# Patient Record
Sex: MALE | Race: WHITE | NOT HISPANIC OR LATINO | Employment: OTHER | URBAN - METROPOLITAN AREA
[De-identification: names, ages, dates, MRNs, and addresses within clinical notes are randomized per-mention and may not be internally consistent; named-entity substitution may affect disease eponyms.]

---

## 2020-09-02 ENCOUNTER — TRANSCRIBE ORDERS (OUTPATIENT)
Dept: ADMINISTRATIVE | Facility: HOSPITAL | Age: 70
End: 2020-09-02

## 2020-09-02 ENCOUNTER — HOSPITAL ENCOUNTER (OUTPATIENT)
Dept: GENERAL RADIOLOGY | Facility: HOSPITAL | Age: 70
Discharge: HOME | End: 2020-09-02
Admitting: INTERNAL MEDICINE

## 2020-09-02 DIAGNOSIS — M25.561 RIGHT KNEE PAIN, UNSPECIFIED CHRONICITY: ICD-10-CM

## 2020-09-02 DIAGNOSIS — M25.561 RIGHT KNEE PAIN, UNSPECIFIED CHRONICITY: Primary | ICD-10-CM

## 2020-09-02 PROCEDURE — 73562 X-RAY EXAM OF KNEE 3: CPT

## 2021-10-30 ENCOUNTER — HOSPITAL ENCOUNTER (INPATIENT)
Facility: HOSPITAL | Age: 71
LOS: 7 days | Discharge: HOME HEALTH | End: 2021-11-06
Attending: EMERGENCY MEDICINE | Admitting: SURGERY

## 2021-10-30 ENCOUNTER — APPOINTMENT (OUTPATIENT)
Dept: CT IMAGING | Facility: HOSPITAL | Age: 71
End: 2021-10-30

## 2021-10-30 ENCOUNTER — ANESTHESIA EVENT CONVERTED (OUTPATIENT)
Dept: ANESTHESIOLOGY | Facility: HOSPITAL | Age: 71
End: 2021-10-30

## 2021-10-30 ENCOUNTER — ANESTHESIA (OUTPATIENT)
Dept: PERIOP | Facility: HOSPITAL | Age: 71
End: 2021-10-30

## 2021-10-30 ENCOUNTER — ANESTHESIA EVENT (OUTPATIENT)
Dept: PERIOP | Facility: HOSPITAL | Age: 71
End: 2021-10-30

## 2021-10-30 ENCOUNTER — APPOINTMENT (OUTPATIENT)
Dept: ULTRASOUND IMAGING | Facility: HOSPITAL | Age: 71
End: 2021-10-30

## 2021-10-30 ENCOUNTER — APPOINTMENT (OUTPATIENT)
Dept: GENERAL RADIOLOGY | Facility: HOSPITAL | Age: 71
End: 2021-10-30

## 2021-10-30 DIAGNOSIS — K63.1 PERFORATED SIGMOID COLON (HCC): ICD-10-CM

## 2021-10-30 DIAGNOSIS — R79.89 ELEVATED LACTIC ACID LEVEL: ICD-10-CM

## 2021-10-30 DIAGNOSIS — K57.92 ACUTE DIVERTICULITIS: Primary | ICD-10-CM

## 2021-10-30 DIAGNOSIS — K57.20 PERFORATED DIVERTICULUM OF LARGE INTESTINE: ICD-10-CM

## 2021-10-30 DIAGNOSIS — K65.9 PERITONITIS (HCC): ICD-10-CM

## 2021-10-30 PROBLEM — E78.5 HYPERLIPEMIA: Status: ACTIVE | Noted: 2021-10-30

## 2021-10-30 PROBLEM — I10 ESSENTIAL HYPERTENSION: Status: ACTIVE | Noted: 2021-10-30

## 2021-10-30 LAB
ALBUMIN SERPL-MCNC: 4.5 G/DL (ref 3.5–5.2)
ALBUMIN/GLOB SERPL: 1.7 G/DL
ALP SERPL-CCNC: 62 U/L (ref 39–117)
ALT SERPL W P-5'-P-CCNC: 18 U/L (ref 1–41)
ANION GAP SERPL CALCULATED.3IONS-SCNC: 14 MMOL/L (ref 5–15)
AST SERPL-CCNC: 21 U/L (ref 1–40)
BASOPHILS # BLD MANUAL: 0 10*3/MM3 (ref 0–0.2)
BASOPHILS NFR BLD AUTO: 0 % (ref 0–1.5)
BILIRUB SERPL-MCNC: 0.4 MG/DL (ref 0–1.2)
BUN SERPL-MCNC: 36 MG/DL (ref 8–23)
BUN/CREAT SERPL: 34.6 (ref 7–25)
BURR CELLS BLD QL SMEAR: ABNORMAL
CALCIUM SPEC-SCNC: 9.6 MG/DL (ref 8.6–10.5)
CHLORIDE SERPL-SCNC: 102 MMOL/L (ref 98–107)
CO2 SERPL-SCNC: 23 MMOL/L (ref 22–29)
CREAT SERPL-MCNC: 1.04 MG/DL (ref 0.76–1.27)
D-LACTATE SERPL-SCNC: 2.6 MMOL/L (ref 0.5–2)
D-LACTATE SERPL-SCNC: 4.2 MMOL/L (ref 0.5–2)
DEPRECATED RDW RBC AUTO: 48.3 FL (ref 37–54)
EOSINOPHIL # BLD MANUAL: 0 10*3/MM3 (ref 0–0.4)
EOSINOPHIL NFR BLD MANUAL: 0 % (ref 0.3–6.2)
ERYTHROCYTE [DISTWIDTH] IN BLOOD BY AUTOMATED COUNT: 14.1 % (ref 12.3–15.4)
GFR SERPL CREATININE-BSD FRML MDRD: 71 ML/MIN/1.73
GLOBULIN UR ELPH-MCNC: 2.7 GM/DL
GLUCOSE SERPL-MCNC: 102 MG/DL (ref 65–99)
HCT VFR BLD AUTO: 45.9 % (ref 37.5–51)
HGB BLD-MCNC: 15.1 G/DL (ref 13–17.7)
HOLD SPECIMEN: NORMAL
LIPASE SERPL-CCNC: 18 U/L (ref 13–60)
LYMPHOCYTES # BLD MANUAL: 1.14 10*3/MM3 (ref 0.7–3.1)
LYMPHOCYTES NFR BLD MANUAL: 4 % (ref 5–12)
LYMPHOCYTES NFR BLD MANUAL: 6 % (ref 19.6–45.3)
MCH RBC QN AUTO: 30.7 PG (ref 26.6–33)
MCHC RBC AUTO-ENTMCNC: 32.9 G/DL (ref 31.5–35.7)
MCV RBC AUTO: 93.3 FL (ref 79–97)
MONOCYTES # BLD AUTO: 0.76 10*3/MM3 (ref 0.1–0.9)
NEUTROPHILS # BLD AUTO: 17.15 10*3/MM3 (ref 1.7–7)
NEUTROPHILS NFR BLD MANUAL: 77 % (ref 42.7–76)
NEUTS BAND NFR BLD MANUAL: 13 % (ref 0–5)
NRBC SPEC MANUAL: 0 /100 WBC (ref 0–0.2)
PLAT MORPH BLD: NORMAL
PLATELET # BLD AUTO: 304 10*3/MM3 (ref 140–450)
PMV BLD AUTO: 9.7 FL (ref 6–12)
POTASSIUM SERPL-SCNC: 4.3 MMOL/L (ref 3.5–5.2)
PROCALCITONIN SERPL-MCNC: 0.19 NG/ML (ref 0–0.25)
PROT SERPL-MCNC: 7.2 G/DL (ref 6–8.5)
RBC # BLD AUTO: 4.92 10*6/MM3 (ref 4.14–5.8)
SARS-COV-2 RDRP RESP QL NAA+PROBE: NORMAL
SODIUM SERPL-SCNC: 139 MMOL/L (ref 136–145)
TROPONIN T SERPL-MCNC: <0.01 NG/ML (ref 0–0.03)
WBC # BLD AUTO: 19.05 10*3/MM3 (ref 3.4–10.8)
WBC MORPH BLD: NORMAL
WHOLE BLOOD HOLD SPECIMEN: NORMAL
WHOLE BLOOD HOLD SPECIMEN: NORMAL

## 2021-10-30 PROCEDURE — 71045 X-RAY EXAM CHEST 1 VIEW: CPT

## 2021-10-30 PROCEDURE — 87116 MYCOBACTERIA CULTURE: CPT | Performed by: SURGERY

## 2021-10-30 PROCEDURE — 0D1M0Z4 BYPASS DESCENDING COLON TO CUTANEOUS, OPEN APPROACH: ICD-10-PCS | Performed by: SURGERY

## 2021-10-30 PROCEDURE — 88307 TISSUE EXAM BY PATHOLOGIST: CPT | Performed by: SURGERY

## 2021-10-30 PROCEDURE — 0DTN0ZZ RESECTION OF SIGMOID COLON, OPEN APPROACH: ICD-10-PCS | Performed by: SURGERY

## 2021-10-30 PROCEDURE — 25010000002 FENTANYL CITRATE (PF) 50 MCG/ML SOLUTION: Performed by: EMERGENCY MEDICINE

## 2021-10-30 PROCEDURE — 80053 COMPREHEN METABOLIC PANEL: CPT | Performed by: EMERGENCY MEDICINE

## 2021-10-30 PROCEDURE — 25010000002 LORAZEPAM PER 2 MG: Performed by: EMERGENCY MEDICINE

## 2021-10-30 PROCEDURE — 83605 ASSAY OF LACTIC ACID: CPT | Performed by: EMERGENCY MEDICINE

## 2021-10-30 PROCEDURE — 25010000002 PIPERACILLIN SOD-TAZOBACTAM PER 1 G: Performed by: EMERGENCY MEDICINE

## 2021-10-30 PROCEDURE — 85007 BL SMEAR W/DIFF WBC COUNT: CPT | Performed by: EMERGENCY MEDICINE

## 2021-10-30 PROCEDURE — 76870 US EXAM SCROTUM: CPT

## 2021-10-30 PROCEDURE — 25010000002 HYDROMORPHONE 1 MG/ML SOLUTION: Performed by: EMERGENCY MEDICINE

## 2021-10-30 PROCEDURE — 74178 CT ABD&PLV WO CNTR FLWD CNTR: CPT

## 2021-10-30 PROCEDURE — 83690 ASSAY OF LIPASE: CPT | Performed by: EMERGENCY MEDICINE

## 2021-10-30 PROCEDURE — 25010000002 PIPERACILLIN SOD-TAZOBACTAM PER 1 G: Performed by: SURGERY

## 2021-10-30 PROCEDURE — 25010000002 DEXAMETHASONE PER 1 MG: Performed by: ANESTHESIOLOGY

## 2021-10-30 PROCEDURE — 25010000002 IOPAMIDOL 61 % SOLUTION: Performed by: EMERGENCY MEDICINE

## 2021-10-30 PROCEDURE — 25010000002 DIAZEPAM PER 5 MG: Performed by: INTERNAL MEDICINE

## 2021-10-30 PROCEDURE — 25010000002 ONDANSETRON PER 1 MG: Performed by: ANESTHESIOLOGY

## 2021-10-30 PROCEDURE — 87186 SC STD MICRODIL/AGAR DIL: CPT | Performed by: SURGERY

## 2021-10-30 PROCEDURE — 25010000002 HYDROMORPHONE HCL-NACL 30-0.9 MG/30ML-% SOLUTION PREFILLED SYRINGE: Performed by: SURGERY

## 2021-10-30 PROCEDURE — 84145 PROCALCITONIN (PCT): CPT | Performed by: EMERGENCY MEDICINE

## 2021-10-30 PROCEDURE — 87075 CULTR BACTERIA EXCEPT BLOOD: CPT | Performed by: SURGERY

## 2021-10-30 PROCEDURE — 25010000002 NEOSTIGMINE 10 MG/10ML SOLUTION: Performed by: ANESTHESIOLOGY

## 2021-10-30 PROCEDURE — 93005 ELECTROCARDIOGRAM TRACING: CPT | Performed by: EMERGENCY MEDICINE

## 2021-10-30 PROCEDURE — 25010000002 DEXAMETHASONE SODIUM PHOSPHATE 10 MG/ML SOLUTION: Performed by: ANESTHESIOLOGY

## 2021-10-30 PROCEDURE — 87040 BLOOD CULTURE FOR BACTERIA: CPT | Performed by: EMERGENCY MEDICINE

## 2021-10-30 PROCEDURE — 87077 CULTURE AEROBIC IDENTIFY: CPT | Performed by: SURGERY

## 2021-10-30 PROCEDURE — 25010000002 ONDANSETRON PER 1 MG: Performed by: EMERGENCY MEDICINE

## 2021-10-30 PROCEDURE — 25010000002 HYDROMORPHONE PER 4 MG: Performed by: ANESTHESIOLOGY

## 2021-10-30 PROCEDURE — 87635 SARS-COV-2 COVID-19 AMP PRB: CPT | Performed by: EMERGENCY MEDICINE

## 2021-10-30 PROCEDURE — 87070 CULTURE OTHR SPECIMN AEROBIC: CPT | Performed by: SURGERY

## 2021-10-30 PROCEDURE — 87185 SC STD ENZYME DETCJ PER NZM: CPT | Performed by: SURGERY

## 2021-10-30 PROCEDURE — 99223 1ST HOSP IP/OBS HIGH 75: CPT | Performed by: INTERNAL MEDICINE

## 2021-10-30 PROCEDURE — 99283 EMERGENCY DEPT VISIT LOW MDM: CPT

## 2021-10-30 PROCEDURE — 84484 ASSAY OF TROPONIN QUANT: CPT | Performed by: EMERGENCY MEDICINE

## 2021-10-30 PROCEDURE — 25010000002 SUCCINYLCHOLINE PER 20 MG: Performed by: ANESTHESIOLOGY

## 2021-10-30 PROCEDURE — 25010000002 PROPOFOL 10 MG/ML EMULSION: Performed by: ANESTHESIOLOGY

## 2021-10-30 PROCEDURE — 87206 SMEAR FLUORESCENT/ACID STAI: CPT | Performed by: SURGERY

## 2021-10-30 PROCEDURE — 87102 FUNGUS ISOLATION CULTURE: CPT | Performed by: SURGERY

## 2021-10-30 PROCEDURE — 85025 COMPLETE CBC W/AUTO DIFF WBC: CPT | Performed by: EMERGENCY MEDICINE

## 2021-10-30 PROCEDURE — C1889 IMPLANT/INSERT DEVICE, NOC: HCPCS | Performed by: SURGERY

## 2021-10-30 PROCEDURE — 87205 SMEAR GRAM STAIN: CPT | Performed by: SURGERY

## 2021-10-30 PROCEDURE — 0 LIDOCAINE 1 % SOLUTION: Performed by: ANESTHESIOLOGY

## 2021-10-30 PROCEDURE — 25010000002 FENTANYL CITRATE (PF) 50 MCG/ML SOLUTION: Performed by: ANESTHESIOLOGY

## 2021-10-30 PROCEDURE — 44140 PARTIAL REMOVAL OF COLON: CPT | Performed by: PHYSICIAN ASSISTANT

## 2021-10-30 DEVICE — ECHELON CONTOUR GST RELOAD BLUE
Type: IMPLANTABLE DEVICE | Site: ABDOMEN | Status: FUNCTIONAL
Brand: ECHELON

## 2021-10-30 DEVICE — ECHELON CONTOUR W/ BLUE RELOAD
Type: IMPLANTABLE DEVICE | Site: ABDOMEN | Status: FUNCTIONAL
Brand: ECHELON

## 2021-10-30 RX ORDER — CYCLOBENZAPRINE HCL 5 MG
5 TABLET ORAL EVERY 8 HOURS SCHEDULED
Status: DISCONTINUED | OUTPATIENT
Start: 2021-10-30 | End: 2021-11-03

## 2021-10-30 RX ORDER — MAGNESIUM HYDROXIDE 1200 MG/15ML
LIQUID ORAL AS NEEDED
Status: DISCONTINUED | OUTPATIENT
Start: 2021-10-30 | End: 2021-10-30 | Stop reason: HOSPADM

## 2021-10-30 RX ORDER — HEPARIN SODIUM 5000 [USP'U]/ML
5000 INJECTION, SOLUTION INTRAVENOUS; SUBCUTANEOUS EVERY 8 HOURS SCHEDULED
Status: DISCONTINUED | OUTPATIENT
Start: 2021-10-31 | End: 2021-11-01

## 2021-10-30 RX ORDER — ACETAMINOPHEN 325 MG/1
650 TABLET ORAL EVERY 6 HOURS
Status: DISCONTINUED | OUTPATIENT
Start: 2021-10-30 | End: 2021-11-05

## 2021-10-30 RX ORDER — FENTANYL CITRATE 50 UG/ML
INJECTION, SOLUTION INTRAMUSCULAR; INTRAVENOUS AS NEEDED
Status: DISCONTINUED | OUTPATIENT
Start: 2021-10-30 | End: 2021-10-30 | Stop reason: SURG

## 2021-10-30 RX ORDER — SODIUM CHLORIDE, SODIUM LACTATE, POTASSIUM CHLORIDE, CALCIUM CHLORIDE 600; 310; 30; 20 MG/100ML; MG/100ML; MG/100ML; MG/100ML
125 INJECTION, SOLUTION INTRAVENOUS CONTINUOUS
Status: DISCONTINUED | OUTPATIENT
Start: 2021-10-30 | End: 2021-11-01

## 2021-10-30 RX ORDER — ONDANSETRON 2 MG/ML
4 INJECTION INTRAMUSCULAR; INTRAVENOUS ONCE
Status: COMPLETED | OUTPATIENT
Start: 2021-10-30 | End: 2021-10-30

## 2021-10-30 RX ORDER — FAMOTIDINE 10 MG/ML
20 INJECTION, SOLUTION INTRAVENOUS ONCE
Status: COMPLETED | OUTPATIENT
Start: 2021-10-30 | End: 2021-10-30

## 2021-10-30 RX ORDER — FENTANYL CITRATE 50 UG/ML
50 INJECTION, SOLUTION INTRAMUSCULAR; INTRAVENOUS
Status: DISCONTINUED | OUTPATIENT
Start: 2021-10-30 | End: 2021-10-31

## 2021-10-30 RX ORDER — DIPHENHYDRAMINE HYDROCHLORIDE 50 MG/ML
25 INJECTION INTRAMUSCULAR; INTRAVENOUS EVERY 6 HOURS PRN
Status: DISCONTINUED | OUTPATIENT
Start: 2021-10-30 | End: 2021-11-06 | Stop reason: HOSPADM

## 2021-10-30 RX ORDER — FENTANYL CITRATE 50 UG/ML
50 INJECTION, SOLUTION INTRAMUSCULAR; INTRAVENOUS
Status: DISCONTINUED | OUTPATIENT
Start: 2021-10-30 | End: 2021-10-30 | Stop reason: HOSPADM

## 2021-10-30 RX ORDER — MORPHINE SULFATE 2 MG/ML
2 INJECTION, SOLUTION INTRAMUSCULAR; INTRAVENOUS
Status: DISCONTINUED | OUTPATIENT
Start: 2021-10-30 | End: 2021-11-06 | Stop reason: HOSPADM

## 2021-10-30 RX ORDER — BUPIVACAINE HYDROCHLORIDE 2.5 MG/ML
INJECTION, SOLUTION EPIDURAL; INFILTRATION; INTRACAUDAL
Status: COMPLETED | OUTPATIENT
Start: 2021-10-30 | End: 2021-10-30

## 2021-10-30 RX ORDER — PANTOPRAZOLE SODIUM 40 MG/1
40 TABLET, DELAYED RELEASE ORAL
Status: DISCONTINUED | OUTPATIENT
Start: 2021-10-31 | End: 2021-10-31

## 2021-10-30 RX ORDER — DEXAMETHASONE SODIUM PHOSPHATE 4 MG/ML
INJECTION, SOLUTION INTRA-ARTICULAR; INTRALESIONAL; INTRAMUSCULAR; INTRAVENOUS; SOFT TISSUE AS NEEDED
Status: DISCONTINUED | OUTPATIENT
Start: 2021-10-30 | End: 2021-10-30 | Stop reason: SURG

## 2021-10-30 RX ORDER — FAMOTIDINE 10 MG/ML
INJECTION, SOLUTION INTRAVENOUS
Status: DISPENSED
Start: 2021-10-30 | End: 2021-10-31

## 2021-10-30 RX ORDER — SUCCINYLCHOLINE CHLORIDE 20 MG/ML
INJECTION INTRAMUSCULAR; INTRAVENOUS AS NEEDED
Status: DISCONTINUED | OUTPATIENT
Start: 2021-10-30 | End: 2021-10-30 | Stop reason: SURG

## 2021-10-30 RX ORDER — NALOXONE HCL 0.4 MG/ML
0.1 VIAL (ML) INJECTION
Status: DISCONTINUED | OUTPATIENT
Start: 2021-10-30 | End: 2021-11-05

## 2021-10-30 RX ORDER — SODIUM CHLORIDE 0.9 % (FLUSH) 0.9 %
10 SYRINGE (ML) INJECTION AS NEEDED
Status: DISCONTINUED | OUTPATIENT
Start: 2021-10-30 | End: 2021-11-06 | Stop reason: HOSPADM

## 2021-10-30 RX ORDER — DIAZEPAM 5 MG/ML
5 INJECTION, SOLUTION INTRAMUSCULAR; INTRAVENOUS ONCE
Status: COMPLETED | OUTPATIENT
Start: 2021-10-30 | End: 2021-10-30

## 2021-10-30 RX ORDER — PROPOFOL 10 MG/ML
VIAL (ML) INTRAVENOUS AS NEEDED
Status: DISCONTINUED | OUTPATIENT
Start: 2021-10-30 | End: 2021-10-30 | Stop reason: SURG

## 2021-10-30 RX ORDER — LABETALOL HYDROCHLORIDE 5 MG/ML
INJECTION, SOLUTION INTRAVENOUS AS NEEDED
Status: DISCONTINUED | OUTPATIENT
Start: 2021-10-30 | End: 2021-10-30 | Stop reason: SURG

## 2021-10-30 RX ORDER — ROCURONIUM BROMIDE 10 MG/ML
INJECTION, SOLUTION INTRAVENOUS AS NEEDED
Status: DISCONTINUED | OUTPATIENT
Start: 2021-10-30 | End: 2021-10-30 | Stop reason: SURG

## 2021-10-30 RX ORDER — DEXAMETHASONE SODIUM PHOSPHATE 10 MG/ML
INJECTION, SOLUTION INTRAMUSCULAR; INTRAVENOUS
Status: COMPLETED | OUTPATIENT
Start: 2021-10-30 | End: 2021-10-30

## 2021-10-30 RX ORDER — NEOSTIGMINE METHYLSULFATE 1 MG/ML
INJECTION, SOLUTION INTRAVENOUS AS NEEDED
Status: DISCONTINUED | OUTPATIENT
Start: 2021-10-30 | End: 2021-10-30 | Stop reason: SURG

## 2021-10-30 RX ORDER — LIDOCAINE HYDROCHLORIDE 10 MG/ML
INJECTION, SOLUTION INFILTRATION; PERINEURAL AS NEEDED
Status: DISCONTINUED | OUTPATIENT
Start: 2021-10-30 | End: 2021-10-30 | Stop reason: SURG

## 2021-10-30 RX ORDER — ONDANSETRON 2 MG/ML
4 INJECTION INTRAMUSCULAR; INTRAVENOUS EVERY 6 HOURS PRN
Status: DISCONTINUED | OUTPATIENT
Start: 2021-10-30 | End: 2021-11-06 | Stop reason: HOSPADM

## 2021-10-30 RX ORDER — HYDROMORPHONE HYDROCHLORIDE 1 MG/ML
0.5 INJECTION, SOLUTION INTRAMUSCULAR; INTRAVENOUS; SUBCUTANEOUS
Status: DISCONTINUED | OUTPATIENT
Start: 2021-10-30 | End: 2021-10-30 | Stop reason: HOSPADM

## 2021-10-30 RX ORDER — GLYCOPYRROLATE 0.2 MG/ML
INJECTION INTRAMUSCULAR; INTRAVENOUS AS NEEDED
Status: DISCONTINUED | OUTPATIENT
Start: 2021-10-30 | End: 2021-10-30 | Stop reason: SURG

## 2021-10-30 RX ORDER — LORAZEPAM 2 MG/ML
1 INJECTION INTRAMUSCULAR ONCE
Status: COMPLETED | OUTPATIENT
Start: 2021-10-30 | End: 2021-10-30

## 2021-10-30 RX ORDER — ONDANSETRON 2 MG/ML
INJECTION INTRAMUSCULAR; INTRAVENOUS AS NEEDED
Status: DISCONTINUED | OUTPATIENT
Start: 2021-10-30 | End: 2021-10-30 | Stop reason: SURG

## 2021-10-30 RX ORDER — ONDANSETRON 4 MG/1
4 TABLET, FILM COATED ORAL EVERY 6 HOURS PRN
Status: DISCONTINUED | OUTPATIENT
Start: 2021-10-30 | End: 2021-11-06 | Stop reason: HOSPADM

## 2021-10-30 RX ORDER — NALOXONE HCL 0.4 MG/ML
0.4 VIAL (ML) INJECTION
Status: DISCONTINUED | OUTPATIENT
Start: 2021-10-30 | End: 2021-11-06 | Stop reason: HOSPADM

## 2021-10-30 RX ORDER — SODIUM CHLORIDE 0.9 % (FLUSH) 0.9 %
10 SYRINGE (ML) INJECTION EVERY 12 HOURS SCHEDULED
Status: DISCONTINUED | OUTPATIENT
Start: 2021-10-30 | End: 2021-10-30

## 2021-10-30 RX ORDER — SODIUM CHLORIDE, SODIUM LACTATE, POTASSIUM CHLORIDE, CALCIUM CHLORIDE 600; 310; 30; 20 MG/100ML; MG/100ML; MG/100ML; MG/100ML
9 INJECTION, SOLUTION INTRAVENOUS CONTINUOUS
Status: DISCONTINUED | OUTPATIENT
Start: 2021-10-30 | End: 2021-10-31

## 2021-10-30 RX ADMIN — ROCURONIUM BROMIDE 10 MG: 10 INJECTION INTRAVENOUS at 16:27

## 2021-10-30 RX ADMIN — LIDOCAINE HYDROCHLORIDE 50 MG: 10 INJECTION, SOLUTION INFILTRATION; PERINEURAL at 15:23

## 2021-10-30 RX ADMIN — LORAZEPAM 1 MG: 2 INJECTION INTRAMUSCULAR; INTRAVENOUS at 13:57

## 2021-10-30 RX ADMIN — HYDROMORPHONE HYDROCHLORIDE 1 MG: 1 INJECTION, SOLUTION INTRAMUSCULAR; INTRAVENOUS; SUBCUTANEOUS at 13:31

## 2021-10-30 RX ADMIN — PROPOFOL 200 MG: 10 INJECTION, EMULSION INTRAVENOUS at 15:23

## 2021-10-30 RX ADMIN — SODIUM CHLORIDE, POTASSIUM CHLORIDE, SODIUM LACTATE AND CALCIUM CHLORIDE 9 ML/HR: 600; 310; 30; 20 INJECTION, SOLUTION INTRAVENOUS at 15:14

## 2021-10-30 RX ADMIN — ROCURONIUM BROMIDE 5 MG: 10 INJECTION INTRAVENOUS at 17:01

## 2021-10-30 RX ADMIN — SODIUM CHLORIDE 1000 ML: 9 INJECTION, SOLUTION INTRAVENOUS at 12:50

## 2021-10-30 RX ADMIN — ROCURONIUM BROMIDE 5 MG: 10 INJECTION INTRAVENOUS at 15:23

## 2021-10-30 RX ADMIN — FENTANYL CITRATE 50 MCG: 50 INJECTION, SOLUTION INTRAMUSCULAR; INTRAVENOUS at 16:24

## 2021-10-30 RX ADMIN — BUPIVACAINE HYDROCHLORIDE 60 ML: 2.5 INJECTION, SOLUTION EPIDURAL; INFILTRATION; INTRACAUDAL; PERINEURAL at 15:28

## 2021-10-30 RX ADMIN — FAMOTIDINE 20 MG: 10 INJECTION, SOLUTION INTRAVENOUS at 15:13

## 2021-10-30 RX ADMIN — DEXAMETHASONE SODIUM PHOSPHATE 4 MG: 4 INJECTION, SOLUTION INTRA-ARTICULAR; INTRALESIONAL; INTRAMUSCULAR; INTRAVENOUS; SOFT TISSUE at 15:32

## 2021-10-30 RX ADMIN — HYDROMORPHONE HYDROCHLORIDE 0.5 MG: 1 INJECTION, SOLUTION INTRAMUSCULAR; INTRAVENOUS; SUBCUTANEOUS at 17:59

## 2021-10-30 RX ADMIN — ROCURONIUM BROMIDE 45 MG: 10 INJECTION INTRAVENOUS at 15:32

## 2021-10-30 RX ADMIN — LABETALOL 20 MG/4 ML (5 MG/ML) INTRAVENOUS SYRINGE 5 MG: at 16:00

## 2021-10-30 RX ADMIN — SODIUM CHLORIDE 1000 ML: 9 INJECTION, SOLUTION INTRAVENOUS at 21:27

## 2021-10-30 RX ADMIN — PROPOFOL 50 MG: 10 INJECTION, EMULSION INTRAVENOUS at 17:26

## 2021-10-30 RX ADMIN — ONDANSETRON 4 MG: 2 INJECTION INTRAMUSCULAR; INTRAVENOUS at 12:46

## 2021-10-30 RX ADMIN — HYDROMORPHONE HYDROCHLORIDE 0.5 MG: 1 INJECTION, SOLUTION INTRAMUSCULAR; INTRAVENOUS; SUBCUTANEOUS at 17:57

## 2021-10-30 RX ADMIN — TAZOBACTAM SODIUM AND PIPERACILLIN SODIUM 3.38 G: 375; 3 INJECTION, SOLUTION INTRAVENOUS at 22:29

## 2021-10-30 RX ADMIN — NEOSTIGMINE 3.5 MG: 1 INJECTION INTRAVENOUS at 17:24

## 2021-10-30 RX ADMIN — ONDANSETRON 4 MG: 2 INJECTION INTRAMUSCULAR; INTRAVENOUS at 17:12

## 2021-10-30 RX ADMIN — SODIUM CHLORIDE, POTASSIUM CHLORIDE, SODIUM LACTATE AND CALCIUM CHLORIDE 125 ML/HR: 600; 310; 30; 20 INJECTION, SOLUTION INTRAVENOUS at 18:15

## 2021-10-30 RX ADMIN — DEXAMETHASONE SODIUM PHOSPHATE 4 MG: 10 INJECTION, SOLUTION INTRAMUSCULAR; INTRAVENOUS at 15:28

## 2021-10-30 RX ADMIN — GLYCOPYRROLATE 0.5 MG: 0.2 INJECTION INTRAMUSCULAR; INTRAVENOUS at 17:24

## 2021-10-30 RX ADMIN — FENTANYL CITRATE 50 MCG: 50 INJECTION, SOLUTION INTRAMUSCULAR; INTRAVENOUS at 17:39

## 2021-10-30 RX ADMIN — FENTANYL CITRATE 100 MCG: 50 INJECTION, SOLUTION INTRAMUSCULAR; INTRAVENOUS at 15:23

## 2021-10-30 RX ADMIN — Medication: at 18:09

## 2021-10-30 RX ADMIN — HYDROMORPHONE HYDROCHLORIDE 1 MG: 1 INJECTION, SOLUTION INTRAMUSCULAR; INTRAVENOUS; SUBCUTANEOUS at 12:46

## 2021-10-30 RX ADMIN — Medication 180 MG: at 15:23

## 2021-10-30 RX ADMIN — DIAZEPAM 5 MG: 5 INJECTION, SOLUTION INTRAMUSCULAR; INTRAVENOUS at 22:28

## 2021-10-30 RX ADMIN — TAZOBACTAM SODIUM AND PIPERACILLIN SODIUM 4.5 G: 500; 4 INJECTION, SOLUTION INTRAVENOUS at 14:08

## 2021-10-30 RX ADMIN — IOPAMIDOL 100 ML: 612 INJECTION, SOLUTION INTRAVENOUS at 13:41

## 2021-10-30 RX ADMIN — SODIUM CHLORIDE, POTASSIUM CHLORIDE, SODIUM LACTATE AND CALCIUM CHLORIDE: 600; 310; 30; 20 INJECTION, SOLUTION INTRAVENOUS at 16:11

## 2021-10-30 NOTE — ANESTHESIA POSTPROCEDURE EVALUATION
Patient: Khalif Joshua    Procedure Summary     Date: 10/30/21 Room / Location:  [facility]    Anesthesia Start: 1516 Anesthesia Stop: 1757    Procedure: LAPAROTOMY EXPLORATORY, SIGMOID COLECTOMY,  END COLOSTOMY (N/A Abdomen) Diagnosis:     Surgeons: Jose Ferguson MD Provider: Mary Graves MD    Anesthesia Type: general with block ASA Status: 3 - Emergent          Anesthesia Type: general with block    Vitals  Vitals Value Taken Time   /133 10/30/21 1754   Temp     Pulse 108 10/30/21 1757   Resp     SpO2 92 % 10/30/21 1757   Vitals shown include unvalidated device data.        Post Anesthesia Care and Evaluation    Patient location during evaluation: PACU  Patient participation: complete - patient participated  Level of consciousness: awake  Pain score: 4  Pain management: adequate  Airway patency: patent  Anesthetic complications: No anesthetic complications  PONV Status: none  Cardiovascular status: acceptable and hemodynamically stable  Respiratory status: acceptable, nasal cannula and nonlabored ventilation  Hydration status: acceptable    Comments: No apparent anesthesia complications noted at this time

## 2021-10-30 NOTE — ANESTHESIA PROCEDURE NOTES
Airway  Date/Time: 10/30/2021 3:24 PM  Airway not difficult    General Information and Staff    Patient location during procedure: OR  Anesthesiologist: Mary Graves MD    Indications and Patient Condition  Indications for airway management: airway protection    Preoxygenated: yes  Mask difficulty assessment: 0 - not attempted    Final Airway Details  Final airway type: endotracheal airway      Successful airway: ETT    Successful intubation technique: video laryngoscopy and RSI  Facilitating devices/methods: cricoid pressure and intubating stylet  Endotracheal tube insertion site: oral  Blade: Glidescope  Blade size: 4  ETT size (mm): 7.5  Cormack-Lehane Classification: grade I - full view of glottis  Placement verified by: chest auscultation and capnometry   Measured from: teeth  ETT/EBT  to teeth (cm): 23  Number of attempts at approach: 1  Assessment: lips, teeth, and gum same as pre-op and atraumatic intubation

## 2021-10-30 NOTE — ANESTHESIA PROCEDURE NOTES
Peripheral Block      Patient reassessed immediately prior to procedure    Patient location during procedure: OR  Start time: 10/30/2021 3:28 PM  Reason for block: at surgeon's request and post-op pain management  Performed by  Anesthesiologist: Mary Graves MD  Preanesthetic Checklist  Completed: patient identified, IV checked, site marked, risks and benefits discussed, surgical consent, monitors and equipment checked, pre-op evaluation and timeout performed  Prep:  Pt Position: supine  Sterile barriers:cap, gloves, sterile barriers and mask  Prep: ChloraPrep  Patient monitoring: blood pressure monitoring, continuous pulse oximetry and EKG  Procedure    Sedation: yes  Performed under: general  Guidance:ultrasound guided  Images:still images obtained, printed/placed on chart    Laterality:Bilateral  Block Type:TAP  Injection Technique:single-shot  Needle Type:short-bevel and echogenic  Needle Gauge:20 G  Resistance on Injection: none    Medications Used: bupivacaine PF (MARCAINE) 0.25 % injection, 60 mL  dexamethasone sodium phosphate injection, 4 mg  Med administered at 10/30/2021 3:28 PM      Medications  Comment:Block Injection:  LA dose divided between Right and Left block        Post Assessment  Injection Assessment: negative aspiration for heme, incremental injection and no paresthesia on injection  Patient Tolerance:comfortable throughout block  Complications:no  Additional Notes      Under Ultrasound guidance, a BBraun 4inch 360 degree needle was advanced with Normal Saline hydro dissection of tissue.  The Internal Oblique and Transversus Abdominus muscles where visualized.  At or before the aponeurosis of Internal Oblique, local anesthetic spread was visualized in the Transversus Abdominus Plane. Injection was made incrementally with aspiration every 5 mls.  There was no  intravascular injection,  injection pressure was normal, there was no neural injection, and the procedure was completed without  difficulty.  Thank You.

## 2021-10-31 PROBLEM — A41.9 SEPSIS WITHOUT ACUTE ORGAN DYSFUNCTION: Status: ACTIVE | Noted: 2021-10-31

## 2021-10-31 PROBLEM — K65.9 PERITONITIS: Status: ACTIVE | Noted: 2021-10-31

## 2021-10-31 PROBLEM — E87.20 LACTIC ACIDOSIS: Status: ACTIVE | Noted: 2021-10-31

## 2021-10-31 PROBLEM — R94.31 ABNORMAL EKG: Status: ACTIVE | Noted: 2021-10-31

## 2021-10-31 LAB
ALBUMIN SERPL-MCNC: 3.6 G/DL (ref 3.5–5.2)
ALBUMIN/GLOB SERPL: 1.4 G/DL
ALP SERPL-CCNC: 40 U/L (ref 39–117)
ALT SERPL W P-5'-P-CCNC: 14 U/L (ref 1–41)
ANION GAP SERPL CALCULATED.3IONS-SCNC: 11 MMOL/L (ref 5–15)
AST SERPL-CCNC: 18 U/L (ref 1–40)
BASOPHILS # BLD MANUAL: 0 10*3/MM3 (ref 0–0.2)
BASOPHILS NFR BLD AUTO: 0 % (ref 0–1.5)
BILIRUB SERPL-MCNC: 0.5 MG/DL (ref 0–1.2)
BUN SERPL-MCNC: 31 MG/DL (ref 8–23)
BUN/CREAT SERPL: 33.7 (ref 7–25)
CALCIUM SPEC-SCNC: 8.3 MG/DL (ref 8.6–10.5)
CHLORIDE SERPL-SCNC: 104 MMOL/L (ref 98–107)
CO2 SERPL-SCNC: 24 MMOL/L (ref 22–29)
CREAT SERPL-MCNC: 0.92 MG/DL (ref 0.76–1.27)
D-LACTATE SERPL-SCNC: 3.2 MMOL/L (ref 0.5–2)
DEPRECATED RDW RBC AUTO: 49.1 FL (ref 37–54)
EOSINOPHIL # BLD MANUAL: 0 10*3/MM3 (ref 0–0.4)
EOSINOPHIL NFR BLD MANUAL: 0 % (ref 0.3–6.2)
ERYTHROCYTE [DISTWIDTH] IN BLOOD BY AUTOMATED COUNT: 14.4 % (ref 12.3–15.4)
GFR SERPL CREATININE-BSD FRML MDRD: 81 ML/MIN/1.73
GLOBULIN UR ELPH-MCNC: 2.5 GM/DL
GLUCOSE SERPL-MCNC: 153 MG/DL (ref 65–99)
HCT VFR BLD AUTO: 40.7 % (ref 37.5–51)
HGB BLD-MCNC: 13.2 G/DL (ref 13–17.7)
LYMPHOCYTES # BLD MANUAL: 0.76 10*3/MM3 (ref 0.7–3.1)
LYMPHOCYTES NFR BLD MANUAL: 10 % (ref 5–12)
LYMPHOCYTES NFR BLD MANUAL: 7 % (ref 19.6–45.3)
MCH RBC QN AUTO: 30 PG (ref 26.6–33)
MCHC RBC AUTO-ENTMCNC: 32.4 G/DL (ref 31.5–35.7)
MCV RBC AUTO: 92.5 FL (ref 79–97)
METAMYELOCYTES NFR BLD MANUAL: 2 % (ref 0–0)
MONOCYTES # BLD AUTO: 1.08 10*3/MM3 (ref 0.1–0.9)
NEUTROPHILS # BLD AUTO: 8.76 10*3/MM3 (ref 1.7–7)
NEUTROPHILS NFR BLD MANUAL: 56 % (ref 42.7–76)
NEUTS BAND NFR BLD MANUAL: 25 % (ref 0–5)
NRBC SPEC MANUAL: 0 /100 WBC (ref 0–0.2)
PLAT MORPH BLD: NORMAL
PLATELET # BLD AUTO: 274 10*3/MM3 (ref 140–450)
PMV BLD AUTO: 10.1 FL (ref 6–12)
POTASSIUM SERPL-SCNC: 4.6 MMOL/L (ref 3.5–5.2)
PROT SERPL-MCNC: 6.1 G/DL (ref 6–8.5)
QT INTERVAL: 336 MS
QTC INTERVAL: 444 MS
RBC # BLD AUTO: 4.4 10*6/MM3 (ref 4.14–5.8)
RBC MORPH BLD: NORMAL
SODIUM SERPL-SCNC: 139 MMOL/L (ref 136–145)
TROPONIN T SERPL-MCNC: <0.01 NG/ML (ref 0–0.03)
WBC # BLD AUTO: 10.82 10*3/MM3 (ref 3.4–10.8)
WBC MORPH BLD: NORMAL

## 2021-10-31 PROCEDURE — 97161 PT EVAL LOW COMPLEX 20 MIN: CPT | Performed by: PHYSICAL THERAPIST

## 2021-10-31 PROCEDURE — 85007 BL SMEAR W/DIFF WBC COUNT: CPT | Performed by: INTERNAL MEDICINE

## 2021-10-31 PROCEDURE — 97530 THERAPEUTIC ACTIVITIES: CPT | Performed by: PHYSICAL THERAPIST

## 2021-10-31 PROCEDURE — 97116 GAIT TRAINING THERAPY: CPT | Performed by: PHYSICAL THERAPIST

## 2021-10-31 PROCEDURE — 80053 COMPREHEN METABOLIC PANEL: CPT | Performed by: INTERNAL MEDICINE

## 2021-10-31 PROCEDURE — 85025 COMPLETE CBC W/AUTO DIFF WBC: CPT | Performed by: INTERNAL MEDICINE

## 2021-10-31 PROCEDURE — 25010000002 HYDROMORPHONE 1 MG/ML SOLUTION: Performed by: INTERNAL MEDICINE

## 2021-10-31 PROCEDURE — 84484 ASSAY OF TROPONIN QUANT: CPT | Performed by: INTERNAL MEDICINE

## 2021-10-31 PROCEDURE — 25010000002 PIPERACILLIN SOD-TAZOBACTAM PER 1 G: Performed by: SURGERY

## 2021-10-31 PROCEDURE — 25010000002 MORPHINE PER 10 MG: Performed by: SURGERY

## 2021-10-31 PROCEDURE — 83605 ASSAY OF LACTIC ACID: CPT | Performed by: INTERNAL MEDICINE

## 2021-10-31 PROCEDURE — 99232 SBSQ HOSP IP/OBS MODERATE 35: CPT | Performed by: INTERNAL MEDICINE

## 2021-10-31 PROCEDURE — 25010000002 HEPARIN (PORCINE) PER 1000 UNITS: Performed by: SURGERY

## 2021-10-31 RX ORDER — GABAPENTIN 100 MG/1
100 CAPSULE ORAL EVERY 8 HOURS SCHEDULED
Status: DISCONTINUED | OUTPATIENT
Start: 2021-10-31 | End: 2021-11-05

## 2021-10-31 RX ORDER — MIDAZOLAM HYDROCHLORIDE 1 MG/ML
0.5 INJECTION INTRAMUSCULAR; INTRAVENOUS
Status: DISCONTINUED | OUTPATIENT
Start: 2021-10-31 | End: 2021-10-31

## 2021-10-31 RX ORDER — SODIUM CHLORIDE 0.9 % (FLUSH) 0.9 %
10 SYRINGE (ML) INJECTION AS NEEDED
Status: DISCONTINUED | OUTPATIENT
Start: 2021-10-31 | End: 2021-11-06 | Stop reason: HOSPADM

## 2021-10-31 RX ORDER — LANSOPRAZOLE
15 KIT
Status: DISCONTINUED | OUTPATIENT
Start: 2021-10-31 | End: 2021-11-05

## 2021-10-31 RX ORDER — FAMOTIDINE 20 MG/1
20 TABLET, FILM COATED ORAL ONCE
Status: DISCONTINUED | OUTPATIENT
Start: 2021-10-31 | End: 2021-10-31

## 2021-10-31 RX ORDER — LIDOCAINE HYDROCHLORIDE 10 MG/ML
0.5 INJECTION, SOLUTION EPIDURAL; INFILTRATION; INTRACAUDAL; PERINEURAL ONCE AS NEEDED
Status: DISCONTINUED | OUTPATIENT
Start: 2021-10-31 | End: 2021-10-31

## 2021-10-31 RX ADMIN — ACETAMINOPHEN 650 MG: 325 TABLET, FILM COATED ORAL at 23:02

## 2021-10-31 RX ADMIN — HEPARIN SODIUM 5000 UNITS: 5000 INJECTION, SOLUTION INTRAVENOUS; SUBCUTANEOUS at 23:02

## 2021-10-31 RX ADMIN — CYCLOBENZAPRINE HYDROCHLORIDE 5 MG: 5 TABLET, FILM COATED ORAL at 14:20

## 2021-10-31 RX ADMIN — TAZOBACTAM SODIUM AND PIPERACILLIN SODIUM 3.38 G: 375; 3 INJECTION, SOLUTION INTRAVENOUS at 23:01

## 2021-10-31 RX ADMIN — MORPHINE SULFATE 2 MG: 2 INJECTION, SOLUTION INTRAMUSCULAR; INTRAVENOUS at 03:57

## 2021-10-31 RX ADMIN — Medication 10 ML: at 23:01

## 2021-10-31 RX ADMIN — TAZOBACTAM SODIUM AND PIPERACILLIN SODIUM 3.38 G: 375; 3 INJECTION, SOLUTION INTRAVENOUS at 14:20

## 2021-10-31 RX ADMIN — HYDROMORPHONE HYDROCHLORIDE 1 MG: 1 INJECTION, SOLUTION INTRAMUSCULAR; INTRAVENOUS; SUBCUTANEOUS at 01:12

## 2021-10-31 RX ADMIN — HEPARIN SODIUM 5000 UNITS: 5000 INJECTION, SOLUTION INTRAVENOUS; SUBCUTANEOUS at 14:20

## 2021-10-31 RX ADMIN — CYCLOBENZAPRINE HYDROCHLORIDE 5 MG: 5 TABLET, FILM COATED ORAL at 23:01

## 2021-10-31 RX ADMIN — ACETAMINOPHEN 650 MG: 325 TABLET, FILM COATED ORAL at 14:19

## 2021-10-31 RX ADMIN — PHENOL 2 SPRAY: 1.5 LIQUID ORAL at 17:19

## 2021-10-31 RX ADMIN — GABAPENTIN 100 MG: 100 CAPSULE ORAL at 23:01

## 2021-10-31 RX ADMIN — ACETAMINOPHEN 650 MG: 325 TABLET, FILM COATED ORAL at 17:19

## 2021-10-31 RX ADMIN — ACETAMINOPHEN 650 MG: 325 TABLET, FILM COATED ORAL at 07:08

## 2021-10-31 RX ADMIN — GABAPENTIN 100 MG: 100 CAPSULE ORAL at 14:20

## 2021-10-31 RX ADMIN — CYCLOBENZAPRINE HYDROCHLORIDE 5 MG: 5 TABLET, FILM COATED ORAL at 07:08

## 2021-10-31 RX ADMIN — TAZOBACTAM SODIUM AND PIPERACILLIN SODIUM 3.38 G: 375; 3 INJECTION, SOLUTION INTRAVENOUS at 07:10

## 2021-10-31 RX ADMIN — LANSOPRAZOLE 15 MG: KIT at 07:08

## 2021-10-31 RX ADMIN — SODIUM CHLORIDE 500 ML: 9 INJECTION, SOLUTION INTRAVENOUS at 07:08

## 2021-11-01 LAB
ANION GAP SERPL CALCULATED.3IONS-SCNC: 9 MMOL/L (ref 5–15)
BUN SERPL-MCNC: 21 MG/DL (ref 8–23)
BUN/CREAT SERPL: 30.4 (ref 7–25)
CALCIUM SPEC-SCNC: 8.8 MG/DL (ref 8.6–10.5)
CHLORIDE SERPL-SCNC: 102 MMOL/L (ref 98–107)
CO2 SERPL-SCNC: 28 MMOL/L (ref 22–29)
CREAT SERPL-MCNC: 0.69 MG/DL (ref 0.76–1.27)
DEPRECATED RDW RBC AUTO: 49.1 FL (ref 37–54)
ERYTHROCYTE [DISTWIDTH] IN BLOOD BY AUTOMATED COUNT: 14.1 % (ref 12.3–15.4)
GFR SERPL CREATININE-BSD FRML MDRD: 113 ML/MIN/1.73
GLUCOSE SERPL-MCNC: 105 MG/DL (ref 65–99)
HCT VFR BLD AUTO: 38.8 % (ref 37.5–51)
HGB BLD-MCNC: 12.5 G/DL (ref 13–17.7)
MAGNESIUM SERPL-MCNC: 2.7 MG/DL (ref 1.6–2.4)
MCH RBC QN AUTO: 30.7 PG (ref 26.6–33)
MCHC RBC AUTO-ENTMCNC: 32.2 G/DL (ref 31.5–35.7)
MCV RBC AUTO: 95.3 FL (ref 79–97)
PHOSPHATE SERPL-MCNC: 2.3 MG/DL (ref 2.5–4.5)
PLATELET # BLD AUTO: 229 10*3/MM3 (ref 140–450)
PMV BLD AUTO: 10 FL (ref 6–12)
POTASSIUM SERPL-SCNC: 4.8 MMOL/L (ref 3.5–5.2)
RBC # BLD AUTO: 4.07 10*6/MM3 (ref 4.14–5.8)
SODIUM SERPL-SCNC: 139 MMOL/L (ref 136–145)
WBC # BLD AUTO: 11.19 10*3/MM3 (ref 3.4–10.8)

## 2021-11-01 PROCEDURE — 25010000002 ENOXAPARIN PER 10 MG: Performed by: SURGERY

## 2021-11-01 PROCEDURE — 85027 COMPLETE CBC AUTOMATED: CPT | Performed by: SURGERY

## 2021-11-01 PROCEDURE — 83735 ASSAY OF MAGNESIUM: CPT | Performed by: SURGERY

## 2021-11-01 PROCEDURE — 25010000002 PIPERACILLIN SOD-TAZOBACTAM PER 1 G: Performed by: SURGERY

## 2021-11-01 PROCEDURE — 80048 BASIC METABOLIC PNL TOTAL CA: CPT | Performed by: SURGERY

## 2021-11-01 PROCEDURE — 25010000002 MORPHINE PER 10 MG: Performed by: SURGERY

## 2021-11-01 PROCEDURE — 99232 SBSQ HOSP IP/OBS MODERATE 35: CPT | Performed by: INTERNAL MEDICINE

## 2021-11-01 PROCEDURE — 84100 ASSAY OF PHOSPHORUS: CPT | Performed by: SURGERY

## 2021-11-01 RX ORDER — DICLOFENAC SODIUM 75 MG/1
75 TABLET, DELAYED RELEASE ORAL 2 TIMES DAILY
COMMUNITY
End: 2022-04-19

## 2021-11-01 RX ORDER — LOSARTAN POTASSIUM 25 MG/1
100 TABLET ORAL
Status: DISCONTINUED | OUTPATIENT
Start: 2021-11-01 | End: 2021-11-06 | Stop reason: HOSPADM

## 2021-11-01 RX ORDER — DEXTROSE, SODIUM CHLORIDE, AND POTASSIUM CHLORIDE 5; .45; .15 G/100ML; G/100ML; G/100ML
50 INJECTION INTRAVENOUS CONTINUOUS
Status: DISCONTINUED | OUTPATIENT
Start: 2021-11-01 | End: 2021-11-04

## 2021-11-01 RX ORDER — DIPHENHYDRAMINE HYDROCHLORIDE AND LIDOCAINE HYDROCHLORIDE AND ALUMINUM HYDROXIDE AND MAGNESIUM HYDRO
10 KIT EVERY 6 HOURS PRN
Status: DISCONTINUED | OUTPATIENT
Start: 2021-11-01 | End: 2021-11-06 | Stop reason: HOSPADM

## 2021-11-01 RX ORDER — ROSUVASTATIN CALCIUM 40 MG/1
40 TABLET, COATED ORAL DAILY
COMMUNITY

## 2021-11-01 RX ORDER — LOSARTAN POTASSIUM AND HYDROCHLOROTHIAZIDE 25; 100 MG/1; MG/1
1 TABLET ORAL DAILY
COMMUNITY

## 2021-11-01 RX ADMIN — GABAPENTIN 100 MG: 100 CAPSULE ORAL at 21:35

## 2021-11-01 RX ADMIN — CYCLOBENZAPRINE HYDROCHLORIDE 5 MG: 5 TABLET, FILM COATED ORAL at 21:35

## 2021-11-01 RX ADMIN — CYCLOBENZAPRINE HYDROCHLORIDE 5 MG: 5 TABLET, FILM COATED ORAL at 06:34

## 2021-11-01 RX ADMIN — TAZOBACTAM SODIUM AND PIPERACILLIN SODIUM 3.38 G: 375; 3 INJECTION, SOLUTION INTRAVENOUS at 06:34

## 2021-11-01 RX ADMIN — MORPHINE SULFATE 2 MG: 2 INJECTION, SOLUTION INTRAMUSCULAR; INTRAVENOUS at 03:47

## 2021-11-01 RX ADMIN — GABAPENTIN 100 MG: 100 CAPSULE ORAL at 14:52

## 2021-11-01 RX ADMIN — LANSOPRAZOLE 15 MG: KIT at 06:34

## 2021-11-01 RX ADMIN — ENOXAPARIN SODIUM 40 MG: 40 INJECTION SUBCUTANEOUS at 08:27

## 2021-11-01 RX ADMIN — ACETAMINOPHEN 650 MG: 325 TABLET, FILM COATED ORAL at 17:56

## 2021-11-01 RX ADMIN — CYCLOBENZAPRINE HYDROCHLORIDE 5 MG: 5 TABLET, FILM COATED ORAL at 14:53

## 2021-11-01 RX ADMIN — LOSARTAN POTASSIUM 100 MG: 25 TABLET, FILM COATED ORAL at 14:52

## 2021-11-01 RX ADMIN — ACETAMINOPHEN 650 MG: 325 TABLET, FILM COATED ORAL at 06:34

## 2021-11-01 RX ADMIN — POTASSIUM CHLORIDE, DEXTROSE MONOHYDRATE AND SODIUM CHLORIDE 75 ML/HR: 150; 5; 450 INJECTION, SOLUTION INTRAVENOUS at 21:39

## 2021-11-01 RX ADMIN — ACETAMINOPHEN 650 MG: 325 TABLET, FILM COATED ORAL at 12:26

## 2021-11-01 RX ADMIN — TAZOBACTAM SODIUM AND PIPERACILLIN SODIUM 3.38 G: 375; 3 INJECTION, SOLUTION INTRAVENOUS at 14:56

## 2021-11-01 RX ADMIN — POTASSIUM CHLORIDE, DEXTROSE MONOHYDRATE AND SODIUM CHLORIDE 75 ML/HR: 150; 5; 450 INJECTION, SOLUTION INTRAVENOUS at 08:27

## 2021-11-01 RX ADMIN — Medication 10 ML: at 22:15

## 2021-11-01 RX ADMIN — GABAPENTIN 100 MG: 100 CAPSULE ORAL at 06:34

## 2021-11-01 RX ADMIN — TAZOBACTAM SODIUM AND PIPERACILLIN SODIUM 3.38 G: 375; 3 INJECTION, SOLUTION INTRAVENOUS at 21:35

## 2021-11-01 RX ADMIN — MORPHINE SULFATE 2 MG: 2 INJECTION, SOLUTION INTRAMUSCULAR; INTRAVENOUS at 22:15

## 2021-11-01 NOTE — PROGRESS NOTES
[facility] Medicine Services  CONSULT NOTE      Patient Name: Khalif Joshua  : 1950  MRN: [id number]    Primary Care Physician: Renee Liriano MD  Provider requesting consultation: No Known Provider    Subjective   Subjective     Reason for Consultation:  Med management post op    HPI:  Up in chair. On RA. NGT in place, clamped. Uncomfortable and reports pain all the time but PCA controlling. Asking what he can do to get home sooner. Wife present. Using IS. Ambulating.       Review of Systems  Gen- No fevers, chills  CV- No chest pain, palpitations  Resp- No cough, dyspnea  GI- No N/V/D,+ abd pain      All other systems reviewed and are negative.     Personal History     Past Medical History:   Diagnosis Date   • Diverticulitis    • Hyperlipemia    • Hypertension    • Osteoarthritis        Past Surgical History:   Procedure Laterality Date   • EXPLORATORY LAPAROTOMY N/A 10/30/2021    Procedure: LAPAROTOMY EXPLORATORY, SIGMOID COLECTOMY,  END COLOSTOMY;  Surgeon: Jose Ferguson MD;  [facility];  Service: General;  Laterality: N/A;   • KNEE ARTHROSCOPY Bilateral        Medications:  diclofenac, losartan-hydrochlorothiazide, and rosuvastatin    Scheduled Meds:acetaminophen, 650 mg, Oral, Q6H  cyclobenzaprine, 5 mg, Oral, Q8H  enoxaparin, 40 mg, Subcutaneous, Q24H  gabapentin, 100 mg, Oral, Q8H  lansoprazole, 15 mg, Per G Tube, Q AM  piperacillin-tazobactam, 3.375 g, Intravenous, Q8H      Continuous Infusions:dextrose 5 % and sodium chloride 0.45 % with KCl 20 mEq/L, 75 mL/hr, Last Rate: 75 mL/hr (21)  HYDROmorphone HCl-NaCl,       PRN Meds:.diphenhydrAMINE  •  First Mouthwash (Magic Mouthwash)  •  influenza vaccine  •  Morphine **AND** naloxone  •  naloxone  •  ondansetron **OR** ondansetron  •  phenol  •  sodium chloride  •  sodium chloride    No Known Allergies    Objective   Objective     Vital Signs:   Temp:  [98 °F (36.7 °C)-98.8 °F (37.1 °C)] 98 °F  (36.7 °C)  Heart Rate:  [] 99  Resp:  [16-18] 18  BP: (102-195)/() 146/79  Flow (L/min):  [3] 3    Physical Exam  GEN: NAD, resting in bed, sitting up in chair, more alert   HEENT: moist mucosa  NECK: supple, no masses  RESP: on RA, effort improved  CV: on tele, sinus rhythm  PSYCH: normal affect, appropriate  NEURO: awake, alert, no focal deficits noted  MSK: no edema noted  SKIN: no rashes noted       Result Review:      LAB RESULTS:      Lab 11/01/21  0724 10/31/21  0812 10/31/21  0233 10/30/21  2013 10/30/21  1215   WBC 11.19* 10.82*  --   --  19.05*   HEMOGLOBIN 12.5* 13.2  --   --  15.1   HEMATOCRIT 38.8 40.7  --   --  45.9   PLATELETS 229 274  --   --  304   NEUTROS ABS  --  8.76*  --   --  17.15*   EOS ABS  --  0.00  --   --  0.00   MCV 95.3 92.5  --   --  93.3   PROCALCITONIN  --   --   --   --  0.19   LACTATE  --   --  3.2* 4.2* 2.6*         Lab 10/31/21  0812 10/30/21  1215   SODIUM 139 139   POTASSIUM 4.6 4.3   CHLORIDE 104 102   CO2 24.0 23.0   ANION GAP 11.0 14.0   BUN 31* 36*   CREATININE 0.92 1.04   GLUCOSE 153* 102*   CALCIUM 8.3* 9.6         Lab 10/31/21  0812 10/30/21  1215   TOTAL PROTEIN 6.1 7.2   ALBUMIN 3.60 4.50   GLOBULIN 2.5 2.7   ALT (SGPT) 14 18   AST (SGOT) 18 21   BILIRUBIN 0.5 0.4   ALK PHOS 40 62   LIPASE  --  18         Lab 10/31/21  0812 10/30/21  1215   TROPONIN T <0.010 <0.010                 Brief Urine Lab Results     None        Microbiology Results (last 10 days)     Procedure Component Value - Date/Time    Body Fluid Culture - Peritoneal Fluid, Peritoneum [[id number]]  (Abnormal) Collected: 10/30/21 1663    Lab Status: Preliminary result Specimen: Peritoneal Fluid from Peritoneum Updated: 11/01/21 1124     Body Fluid Culture Light growth (2+) Escherichia coli      Light growth (2+) Pseudomonas species     Gram Stain Many (4+) Red blood cells      Many (4+) WBCs seen      Few (2+) Gram negative bacilli    AFB Culture - Peritoneal Fluid, Peritoneum [[id number]]  Collected: 10/30/21 1553    Lab Status: Preliminary result Specimen: Peritoneal Fluid from Peritoneum Updated: 10/31/21 1136     AFB Stain No acid fast bacilli seen on concentrated smear    COVID PRE-OP / PRE-PROCEDURE SCREENING ORDER (NO ISOLATION) - Swab, Nasopharynx [[id number]]  (Normal) Collected: 10/30/21 1402    Lab Status: Final result Specimen: Swab from Nasopharynx Updated: 10/30/21 1441    Narrative:      The following orders were created for panel order COVID PRE-OP / PRE-PROCEDURE SCREENING ORDER (NO ISOLATION) - Swab, Nasopharynx.  Procedure                               Abnormality         Status                     ---------                               -----------         ------                     COVID-19, ABBOTT IN-HOUS...[[id number]]  Normal              Final result                 Please view results for these tests on the individual orders.    COVID-19, ABBOTT IN-HOUSE,NASAL Swab (NO TRANSPORT MEDIA) 2 HR TAT - Swab, Nasopharynx [[id number]]  (Normal) Collected: 10/30/21 1402    Lab Status: Final result Specimen: Swab from Nasopharynx Updated: 10/30/21 1441     COVID19 Presumptive Negative    Narrative:      Fact sheet for providers: https://www.fda.gov/media/172156/download     Fact sheet for patients: https://www.fda.gov/media/342376/download    Test performed by PCR.  If inconsistent with clinical signs and symptoms patient should be tested with different authorized molecular test.    Blood Culture - Blood, Hand, Left [[id number]]  (Normal) Collected: 10/30/21 1400    Lab Status: Preliminary result Specimen: Blood from Hand, Left Updated: 10/31/21 1515     Blood Culture No growth at 24 hours    Blood Culture - Blood, Hand, Right [[id number]]  (Normal) Collected: 10/30/21 1355    Lab Status: Preliminary result Specimen: Blood from Hand, Right Updated: 10/31/21 1515     Blood Culture No growth at 24 hours          CT Abdomen Pelvis With & Without Contrast    Result Date: 10/30/2021  EXAMINATION:  CT ABDOMEN PELVIS W WO CONTRAST-  INDICATION: LLQ, suprapubic pain  TECHNIQUE: CT abdomen pelvis with and without IV contrast.  COMPARISON: NONE  FINDINGS:  The lower thorax demonstrates coronary artery calcifications and mild dependent bibasilar atelectasis.  There is evidence of pneumoperitoneum beneath the right hemidiaphragm (series 2 image 18). Numerous additional smaller locules of free air are noted along the anterior abdominal wall and in the lower mid abdomen and pelvis (series 2 image 67). There is severe colonic diverticulosis without evidence of acute diverticulitis of the sigmoid colon near the region of some of the small foci of pelvic and lower abdominal free air. There is associated fat stranding and a small amount of simple appearing, nonorganized fluid in the region of the right paracolic gutter (series 2 image 77). A normal-appearing appendix is identified with small appendicolith; the appendix is nondilated and partially air-filled, with the tip terminating in the region of the right paracolic gutter fluid and inflammatory mesenteric fat stranding. The remainder of the GI tract appears unremarkable. No evidence of bowel ischemia. No evidence of bowel obstruction. The liver, gallbladder, bile ducts, spleen, pancreas, and adrenal glands are unremarkable. There is moderate bilateral pelvocaliectasis without evidence of obstruction. Nonobstructing punctate right superior pole renal calculus is noted. The ureters are normal. The bladder is normal. Seminal vesicle and prostatic calcifications are seen. Atherosclerosis of the abdominal aorta without aneurysm. The body wall soft tissues are unremarkable. Likely right hydrocele. No acute osseous findings.      Impression:  Sigmoid colonic diverticulitis with pneumoperitoneum and small nonorganized right lower quadrant fluid concerning for perforation. Surgical consult recommended.  Findings discussed with Dr. Raul Gutierrez by Dr. Ham Maravilla in person at  1:45 PM 10/30/2021.   This report was finalized on 10/30/2021 2:38 PM by Ham Maravilla MD.      US Scrotum & Testicles    Result Date: 10/30/2021  EXAMINATION: US SCROTUM AND TESTICLES-  INDICATION: lower abd pain, testicular pain  TECHNIQUE: Sonographic evaluation of the testicles  COMPARISON: NONE  FINDINGS:  The right testicle measures 3.4 x 2.6 x 3.2 cm. A hydrocele is noted. The testicle demonstrates homogeneous echotexture without focal lesion, with normal color Doppler flow. The right epididymal head measures 0.5 x 0.3 cm.  The left testicle measures 4.1 x 2.2 x 3.2 cm. There is normal homogeneous echogenicity of the testicle without discrete lesion. Normal color Doppler flow. The epididymal head was not imaged as the exam was ended early.      Impression:  Unremarkable appearance of the bilateral testicles without evidence of torsion. Right hydrocele.  Technically incomplete study. The exam was terminated early owing to the acuity of patient's status.   This report was finalized on 10/30/2021 2:59 PM by Ham Maravilla MD.      Peripheral Block    Result Date: 10/30/2021  Mary Graves MD     10/30/2021  3:56 PM Peripheral Block Patient reassessed immediately prior to procedure Patient location during procedure: OR Start time: 10/30/2021 3:28 PM Reason for block: at surgeon's request and post-op pain management Performed by Anesthesiologist: Mary Graves MD Preanesthetic Checklist Completed: patient identified, IV checked, site marked, risks and benefits discussed, surgical consent, monitors and equipment checked, pre-op evaluation and timeout performed Prep: Pt Position: supine Sterile barriers:cap, gloves, sterile barriers and mask Prep: ChloraPrep Patient monitoring: blood pressure monitoring, continuous pulse oximetry and EKG Procedure Sedation: yes Performed under: general Guidance:ultrasound guided Images:still images obtained, printed/placed on chart Laterality:Bilateral Block Type:TAP  Injection Technique:single-shot Needle Type:short-bevel and echogenic Needle Gauge:20 G Resistance on Injection: none Medications Used: bupivacaine PF (MARCAINE) 0.25 % injection, 60 mL dexamethasone sodium phosphate injection, 4 mg Med administered at 10/30/2021 3:28 PM Medications Comment:Block Injection:  LA dose divided between Right and Left block Post Assessment Injection Assessment: negative aspiration for heme, incremental injection and no paresthesia on injection Patient Tolerance:comfortable throughout block Complications:no Additional Notes   Under Ultrasound guidance, a BBraun 4inch 360 degree needle was advanced with Normal Saline hydro dissection of tissue.  The Internal Oblique and Transversus Abdominus muscles where visualized.  At or before the aponeurosis of Internal Oblique, local anesthetic spread was visualized in the Transversus Abdominus Plane. Injection was made incrementally with aspiration every 5 mls.  There was no  intravascular injection,  injection pressure was normal, there was no neural injection, and the procedure was completed without difficulty.  Thank You.           Assessment/Plan   Assessment & Plan     Active Hospital Problems    Diagnosis  POA   • Abnormal EKG [R94.31]  Yes   • Lactic acidosis [E87.2]  Yes   • Sepsis without acute organ dysfunction (HCC) [A41.9]  Yes   • Peritonitis (HCC) [K65.9]  Yes   • Acute diverticulitis [K57.92]  Yes   • Hypertension [I10]  Yes   • Hyperlipemia [E78.5]  Yes      Resolved Hospital Problems   No resolved problems to display.       Khalif Joshua is a 71 y.o. male with HO HTN, HLP and severe OA of his knees.  He presented to the ED with acute onset of abdominal pain and found to have a perforated bowel Dr Ferguson took him to the OR emergently and performed an open sigmoid colectomy with end colostomy creation ([facility]'s Procedure), exploratory laparotomy, and abdominal washout for a perforated diverticulitis and pneumoperitoneum.        This  patient's problems and plans were partially entered by my partner and updated as appropriate by me 11/01/21.      Postop Pain  -patient has dilaudid PCA- good control at this time and patient a bit drowsy      Postop hypoxia--resolved  -probably related to pain   -maximize pulmonary toilet ASAP, add IS- have d/w patient and daugheter     Lactic acidosis-  - hydrate and trend     Sepsis POA due to peritonitis  Cx with Pseudomonas/Ecoli  -cont zosyn  -follow cultures, sensitivities     HTN  -add back home losartan today given elevated readings. Is on HCTZ combo pill as well at home but hold off on that for now as on IVF.      OA-  -monitor     ABN EKG-  patinet as Q waves in anterior/inferior leads-- need stress test postoperatively  -he denies history of any CAD      Thank you for allowing [facility] to provide consultative care for your patient, we will continue to follow while clinically appropriate.    Zoey Doherty MD  11/01/21

## 2021-11-01 NOTE — CONSULTS
INFECTIOUS DISEASE CONSULT/INITIAL HOSPITAL VISIT    Khalif Joshua  1950  [id number]    Date of Consult: 11/1/2021    Admission Date: 10/30/2021      Requesting Provider: No Known Provider  Evaluating Physician: Oliver Oliveros MD    Reason for Consultation: Perforated diverticulitis/sepsis    History of present illness:    11/1/2021:  Patient is a 71 y.o. male Who is seen today for evaluation of  Perforated diverticulitis with severe sepsis.  He has a history of prior diverticulitis and presented to the emergency room on 10/30 Sudden onset of severe left lower quadrant pain.  He took Cipro/Flagyl at home but continued to have rapidly worsening pain.  When he presented to the emergency room, radiographic studies revealed pneumoperitoneum.   He had a white blood cell count of 19.1, and azotemia with a BUN of 36. His lactic acid has been up to 4.2. In addition, he had acute hypoxic respiratory failure requiring 3 L of oxygen.  An abdominal/pelvic CT scan revealed sigmoid colonic diverticulitis with pneumoperitoneum.  He was taken to the OR promptly by Dr. Ferguson  and underwent sigmoid colectomy with colostomy for his perforated diverticulitis. His maximum temperature since admission is 99.9°.   He is growing a gram-negative bacillus from his peritoneal fluid.  Blood cultures are no growth so far.  He is on intravenous Zosyn therapy.  He currently complains of moderate diffuse abdominal pain.  He has a history of 3 prior episodes of diverticulitis treated with oral antibiotics as an outpatient.  His peritoneal cultures are now growing E. coli and Pseudomonas.    Past Medical History:   Diagnosis Date   • Diverticulitis    • Hyperlipemia    • Hypertension    • Osteoarthritis        Past Surgical History:   Procedure Laterality Date   • KNEE ARTHROSCOPY Bilateral 2001       Family History   Problem Relation Age of Onset   • Heart disease Father        Social History     Socioeconomic History   • Marital status:     Tobacco Use   • Smoking status: Never Smoker   • Smokeless tobacco: Never Used   Substance and Sexual Activity   • Alcohol use: Not Currently   • Drug use: Never       No Known Allergies      Medication:    Current Facility-Administered Medications:   •  acetaminophen (TYLENOL) tablet 650 mg, 650 mg, Oral, Q6H, Jose Ferguson MD, 650 mg at 11/01/21 0634  •  cyclobenzaprine (FLEXERIL) tablet 5 mg, 5 mg, Oral, Q8H, Jose Ferguson MD, 5 mg at 11/01/21 0634  •  dextrose 5 % and sodium chloride 0.45 % with KCl 20 mEq/L infusion, 75 mL/hr, Intravenous, Continuous, Jose Ferguson MD  •  diphenhydrAMINE (BENADRYL) injection 25 mg, 25 mg, Intravenous, Q6H PRN, Jose Ferguson MD  •  enoxaparin (LOVENOX) syringe 40 mg, 40 mg, Subcutaneous, Q24H, Jose Ferguson MD  •  First Mouthwash (Magic Mouthwash) 10 mL, 10 mL, Swish & Spit, Q6H PRN, Zoey Doherty MD  •  gabapentin (NEURONTIN) capsule 100 mg, 100 mg, Oral, Q8H, Jose Ferguson MD, 100 mg at 11/01/21 0634  •  HYDROmorphone (DILAUDID) PCA 1 mg/mL syringe, , Intravenous, Continuous, Jose Ferguson MD, Rate Change (DUAL SIGN) at 10/31/21 0351  •  influenza vac split quad (FLUZONE,FLUARIX,AFLURIA,FLULAVAL) injection 0.5 mL, 0.5 mL, Intramuscular, During Hospitalization, Zoey Doherty MD  •  lansoprazole (FIRST) oral suspension 15 mg, 15 mg, Per G Tube, Q AM, Jose Ferguson MD, 15 mg at 11/01/21 0634  •  morphine injection 2 mg, 2 mg, Intravenous, Q2H PRN, 2 mg at 11/01/21 0347 **AND** naloxone (NARCAN) injection 0.4 mg, 0.4 mg, Intravenous, Q5 Min PRN, Jose Ferguson MD  •  naloxone (NARCAN) injection 0.1 mg, 0.1 mg, Intravenous, Q5 Min PRN, Jose Ferguson MD  •  ondansetron (ZOFRAN) tablet 4 mg, 4 mg, Oral, Q6H PRN **OR** ondansetron (ZOFRAN) injection 4 mg, 4 mg, Intravenous, Q6H PRN, Jose Ferguson MD  •  phenol (CHLORASEPTIC) 1.4 % liquid 2 spray, 2 spray, Mouth/Throat, Q2H PRN, Zoey Doherty MD, 2 spray at  10/31/21 1719  •  piperacillin-tazobactam (ZOSYN) 3.375 g in iso-osmotic dextrose 50 ml (premix), 3.375 g, Intravenous, Q8H, Jose Ferguson MD, 3.375 g at 21 0634  •  sodium chloride 0.9 % flush 10 mL, 10 mL, Intravenous, PRN, Jose Ferguson MD, 10 mL at 10/31/21 2301  •  sodium chloride 0.9 % flush 10 mL, 10 mL, Intravenous, PRN, Mary Graves MD    Antibiotics:  Anti-Infectives (From admission, onward)    Ordered     Dose/Rate Route Frequency Start Stop    10/30/21 1746  piperacillin-tazobactam (ZOSYN) 3.375 g in iso-osmotic dextrose 50 ml (premix)        Ordering Provider: Jose Ferguson MD    3.375 g  over 30 Minutes Intravenous Every 8 Hours 10/30/21 2200 21 2159    10/30/21 1321  piperacillin-tazobactam (ZOSYN) 4.5 g in iso-osmotic dextrose 100 mL IVPB (premix)        Ordering Provider: Raul Gutierrez DO    4.5 g  over 30 Minutes Intravenous Once 10/30/21 1323 10/30/21 1438            Review of Systems:  Constitutional--he has had low-grade fevers to 99.9 degrees.  He complains of malaise and fatigue.  HEENT-- No new vision, hearing or throat complaints.  No epistaxis or oral sores.  Denies odynophagia or dysphagia. No headache, photophobia or neck stiffness.  CV-- No chest pain, or history of valvular heart disease  Resp-- No SOB/cough/Hemoptysis  GI-he has diffuse abdominal pain and a history of diverticulitis.  -- No dysuria, hematuria, or flank pain.  Denies hesitancy, urgency or flank pain.  Heme- No active bruising or bleeding;  MS-- no swelling or pain in the bones or joints of arms/legs.  No new back pain.  Neuro-- No acute focal weakness or numbness in the arms or legs.    Skin--No rashes or lesions      Physical Exam:   Vital Signs  Temp (24hrs), Av.8 °F (37.1 °C), Min:98.4 °F (36.9 °C), Max:99.3 °F (37.4 °C)    Temp  Min: 98.4 °F (36.9 °C)  Max: 99.3 °F (37.4 °C)  BP  Min: 102/89  Max: 195/130  Pulse  Min: 89  Max: 100  Resp  Min: 14  Max: 18  SpO2  Min: 93 %  Max:  97 %    GENERAL: Awake and alert, in no acute distress.   HEENT: Normocephalic, atraumatic.  PERRL. EOMI. No conjunctival injection. No icterus. Oropharynx clear without evidence of thrush or exudate. No evidence of peridontal disease.  He has a nasogastric tube in place.  NECK: Supple   LYMPH: No cervical, axillary or inguinal lymphadenopathy.  HEART: RRR; No murmur, rubs, gallops.   LUNGS: Clear to auscultation bilaterally without wheezing, rales, rhonchi. Normal respiratory effort. Nonlabored. No dullness.  ABDOMEN: He has mild diffuse abdominal tenderness  EXT:  No cyanosis, clubbing or edema.  :  Without Enrique catheter.  MSK: No focal joint swelling or erythema  SKIN: Warm and dry without cutaneous eruptions on Inspection/palpation.    NEURO: Oriented to PPT.  Motor 5/5 strength bilaterally  PSYCHIATRIC: Normal insight and judgement. Cooperative with PE    Laboratory Data    Results from last 7 days   Lab Units 10/31/21  0812 10/30/21  1215   WBC 10*3/mm3 10.82* 19.05*   HEMOGLOBIN g/dL 13.2 15.1   HEMATOCRIT % 40.7 45.9   PLATELETS 10*3/mm3 274 304     Results from last 7 days   Lab Units 10/31/21  0812   SODIUM mmol/L 139   POTASSIUM mmol/L 4.6   CHLORIDE mmol/L 104   CO2 mmol/L 24.0   BUN mg/dL 31*   CREATININE mg/dL 0.92   GLUCOSE mg/dL 153*   CALCIUM mg/dL 8.3*     Results from last 7 days   Lab Units 10/31/21  0812   ALK PHOS U/L 40   BILIRUBIN mg/dL 0.5   ALT (SGPT) U/L 14   AST (SGOT) U/L 18             Results from last 7 days   Lab Units 10/31/21  0233   LACTATE mmol/L 3.2*             Estimated Creatinine Clearance: 89.9 mL/min (by C-G formula based on SCr of 0.92 mg/dL).      Microbiology:  Blood Culture   Date Value Ref Range Status   10/30/2021 No growth at 24 hours  Preliminary     No results found for: BCIDPCR, CXREFLEX, CSFCX, CULTURETIS  No results found for: CULTURES, HSVCX, URCX  No results found for: EYECULTURE, GCCX, HSVCULTURE, LABHSV  No results found for: LEGIONELLA, MRSACX, MUMPSCX,  MYCOPLASCX  No results found for: NOCARDIACX, STOOLCX  No results found for: THROATCX, UNSTIMCULT, URINECX, CULTURE, VZVCULTUR  No results found for: VIRALCULTU, WOUNDCX        Radiology:  Imaging Results (Last 72 Hours)     Procedure Component Value Units Date/Time    US Scrotum & Testicles [[id number]] Collected: 10/30/21 1456     Updated: 10/30/21 1502    Narrative:      EXAMINATION: US SCROTUM AND TESTICLES-      INDICATION: lower abd pain, testicular pain     TECHNIQUE: Sonographic evaluation of the testicles     COMPARISON: NONE     FINDINGS:      The right testicle measures 3.4 x 2.6 x 3.2 cm. A hydrocele is noted.  The testicle demonstrates homogeneous echotexture without focal lesion,  with normal color Doppler flow. The right epididymal head measures 0.5 x  0.3 cm.     The left testicle measures 4.1 x 2.2 x 3.2 cm. There is normal  homogeneous echogenicity of the testicle without discrete lesion. Normal  color Doppler flow. The epididymal head was not imaged as the exam was  ended early.       Impression:         Unremarkable appearance of the bilateral testicles without evidence of  torsion. Right hydrocele.     Technically incomplete study. The exam was terminated early owing to the  acuity of patient's status.        This report was finalized on 10/30/2021 2:59 PM by Ham Maravilla MD.       CT Abdomen Pelvis With & Without Contrast [[id number]] Collected: 10/30/21 1424     Updated: 10/30/21 1441    Narrative:      EXAMINATION: CT ABDOMEN PELVIS W WO CONTRAST-      INDICATION: LLQ, suprapubic pain     TECHNIQUE: CT abdomen pelvis with and without IV contrast.     COMPARISON: NONE     FINDINGS:      The lower thorax demonstrates coronary artery calcifications and mild  dependent bibasilar atelectasis.     There is evidence of pneumoperitoneum beneath the right hemidiaphragm  (series 2 image 18). Numerous additional smaller locules of free air are  noted along the anterior abdominal wall and in the lower  mid abdomen and  pelvis (series 2 image 67). There is severe colonic diverticulosis  without evidence of acute diverticulitis of the sigmoid colon near the  region of some of the small foci of pelvic and lower abdominal free air.  There is associated fat stranding and a small amount of simple  appearing, nonorganized fluid in the region of the right paracolic  gutter (series 2 image 77). A normal-appearing appendix is identified  with small appendicolith; the appendix is nondilated and partially  air-filled, with the tip terminating in the region of the right  paracolic gutter fluid and inflammatory mesenteric fat stranding. The  remainder of the GI tract appears unremarkable. No evidence of bowel  ischemia. No evidence of bowel obstruction. The liver, gallbladder, bile  ducts, spleen, pancreas, and adrenal glands are unremarkable. There is  moderate bilateral pelvocaliectasis without evidence of obstruction.  Nonobstructing punctate right superior pole renal calculus is noted. The  ureters are normal. The bladder is normal. Seminal vesicle and prostatic  calcifications are seen. Atherosclerosis of the abdominal aorta without  aneurysm. The body wall soft tissues are unremarkable. Likely right  hydrocele. No acute osseous findings.       Impression:         Sigmoid colonic diverticulitis with pneumoperitoneum and small  nonorganized right lower quadrant fluid concerning for perforation.  Surgical consult recommended.     Findings discussed with Dr. Raul Gutierrez by Dr. Ham Maravilla in person  at 1:45 PM 10/30/2021.        This report was finalized on 10/30/2021 2:38 PM by Ham Maravilla MD.       XR Chest 1 View [[id number]] Collected: 10/30/21 1423     Updated: 10/30/21 1438    Narrative:      EXAMINATION: XR CHEST 1 VW-      INDICATION: Upper Abdominal Pain Triage Protocol     TECHNIQUE: Frontal view of the chest     COMPARISON: None     FINDINGS:      Normal cardiomediastinal silhouette. The lungs are clear  without focal  consolidation. No pleural effusion or pneumothorax. No acute osseous  findings.       Impression:         No acute cardiopulmonary findings.     This report was finalized on 10/30/2021 2:24 PM by Ham Maravilla MD.         I read his radiographic studies with Dr. Light.  He has diffuse, pancolonic diverticulosis.    Impression:   1.  Perforated diverticulitis-with severe intra-abdominal infection, status post sigmoid colectomy and colostomy on 10/30.  Intra-abdominal and blood cultures are pending.  Peritoneal cultures are growing E. coli and Pseudomonas with sensitivities pending.  2.  Severe sepsis- he had a blood pressure down to 89 systolic, tachycardia with a heart rate of 117, lactic acidosis, acute hypoxic respiratory failure, azotemia, leukocytosis/neutrophilia, and a known focus of intra-abdominal infection.  3.  Leukocytosis/neutrophilia  4.  Azotemia/volume depletion  5.  Acute hypoxic respiratory failure    PLAN/RECOMMENDATIONS:   Thank you for asking us to see Khalif Joshua, I recommend the followin.  Blood cultures  2.  Intra-abdominal cultures  3.  Supplemental oxygen  4.  Volume repletion  5.   Continue Zosyn    I discussed his complex situation in detail with the patient himself and his wife.  I discussed his situation in detail with Dr. Ferguson.    Oliver Oliveros MD  2021  08:05 EDT

## 2021-11-01 NOTE — PROGRESS NOTES
"Patient Name:  Khalif Joshua  YOB: 1950  [id number]    Surgery Progress Note    Date of visit: 11/1/2021      Subjective: No acute events overnight.  Still with lower abdominal pain which appears a little bit better controlled than yesterday.  He is less drowsy.  He got out of bed and ambulated in the arreguin with PT yesterday.  No significant output from the colostomy yet.  NG tube with 600 mL of output yesterday.          Objective:     /78 (BP Location: Left arm, Patient Position: Lying)   Pulse 99   Temp 98.6 °F (37 °C) (Oral)   Resp 18   Ht 180.3 cm (71\")   Wt 99.8 kg (220 lb)   SpO2 97%   BMI 30.68 kg/m²     Intake/Output Summary (Last 24 hours) at 11/1/2021 0759  Last data filed at 11/1/2021 0600  Gross per 24 hour   Intake 44.05 ml   Output 2585 ml   Net -2540.95 ml       GEN:   Awake, alert, in no acute distress, mild distress due to pain, NG tube in place  CV:   Regular rate and rhythm  L:  Clear to auscultation bilaterally, not labored on oxygen by nasal cannula  Abd:  Soft, not significantly distended, appropriately tender palpation along incision, colostomy on the left side is at the skin level, appears somewhat congested this morning but viable, midline incision clean with viet in place, MARCO drain serosanguineous  Ext:  No cyanosis, clubbing, or edema    Recent labs that are back at this time have been reviewed.           Assessment/ Plan:    Mr. Joshua is a 71-year-old gentleman with history of hypertension and past diverticulitis who presents with abdominal sepsis and concern for perforated diverticulitis.      #Perforated diverticulitis  #Peritonitis  -Postoperative day 2 after open sigmoid colectomy with end colostomy creation and abdominal washout  -Vital signs are stable.  Labs from today are still pending  -Continue NG tube to low wall suction.  Await return of bowel function to the stoma  -Continue IV fluids.  Transition to D5 half-normal saline today.  -Urine output 1150 " mL yesterday.  Discontinue catheter today  -Continue IV PCA.  Continue scheduled Tylenol, Flexeril, gabapentin  -Continue PPI  -Continue IV Zosyn.  ID consult for duration of antibiotics  -Out of bed and ambulate today.  Incentive spirometry      Jose Ferguson MD  11/1/2021  07:59 EDT

## 2021-11-01 NOTE — CASE MANAGEMENT/SOCIAL WORK
Discharge Planning Assessment  [facility]     Patient Name: Khalif Joshua  MRN: [id number]  Today's Date: 11/1/2021    Admit Date: 10/30/2021     Discharge Needs Assessment     Row Name 11/01/21 1914       Living Environment    Lives With spouse    Name(s) of Who Lives With Patient Eva Joshua- Wife    Current Living Arrangements home/apartment/condo    Primary Care Provided by self    Provides Primary Care For no one    Family Caregiver if Needed spouse    Family Caregiver Names Eva Joshua- Wife    Quality of Family Relationships helpful; involved; supportive    Able to Return to Prior Arrangements yes       Transition Planning    Patient/Family Anticipates Transition to home with help/services    Patient/Family Anticipated Services at Transition home health care    Transportation Anticipated family or friend will provide       Discharge Needs Assessment    Readmission Within the Last 30 Days no previous admission in last 30 days    Current Outpatient/Agency/Support Group homecare agency    Equipment Currently Used at Home none    Concerns to be Addressed discharge planning    Equipment Needed After Discharge wound care supplies    Outpatient/Agency/Support Group Needs homecare agency    Discharge Facility/Level of Care Needs home with home health               Discharge Plan     Row Name 11/01/21 1915       Plan    Plan Home with Home Health    Patient/Family in Agreement with Plan yes    Plan Comments I have met with Mr. Joshua and his wife at his bedside today to initiate a discharge plan.  He states that he lives in [facility] with his wife, Eva and is independent with activities of daily living and mobility.  He denies use of DME and current receipt of home health /outpatient services.  He is 2 days s/p open sigmoid colectomy with end colostomy creation and abdominal washout by Dr Ferguson.  [facility] has been consulted.  He states that he does plan to return to his home at discharge and that his wife will provide  transportation.  His wife requests home health services for ostomy education.  PT/OT rec home with assist.  CM will cont to follow his plan of care, submit referrals for home health services closer to discharge and assist with any other discharge planning as recommendations become available.    Final Discharge Disposition Code 06 - home with home health care              Continued Care and Services - Admitted Since 10/30/2021    Coordination has not been started for this encounter.          Demographic Summary     Row Name 11/01/21 1913       General Information    General Information Comments I have confirmed with Mr. Joshua his PCP is Renee Liriano DO and his insurance is Medicare.               Functional Status     Row Name 11/01/21 1913       Functional Status, IADL    Medications independent    Meal Preparation independent    Housekeeping independent    Laundry independent    Shopping independent               Psychosocial    No documentation.                Abuse/Neglect    No documentation.                Legal    No documentation.                Substance Abuse    No documentation.                Patient Forms    No documentation.                   Julissa Argueta RN

## 2021-11-01 NOTE — NURSING NOTE
71-year-old male seen today for colostomy surgery.  Colostomy is irregular, red, dusky and slightly elevated above skin level.  Education done with patient wife and appliance changed.  Patient and wife very eager to learn, wife did have some trouble looking at stoma and changing appliance but was determined to learn.  Patient had difficulty seeing stoma due to abdominal swelling.  Education done and questions answered.  Extensively reviewed pouching, diet, showering.  Supplies placed in the room.  WOCN will follow up and please call with any questions or concerns.  Educational folder reviewed.

## 2021-11-02 LAB
ANION GAP SERPL CALCULATED.3IONS-SCNC: 15 MMOL/L (ref 5–15)
BACTERIA FLD CULT: ABNORMAL
BACTERIA FLD CULT: ABNORMAL
BACTERIA SPEC ANAEROBE CULT: ABNORMAL
BUN SERPL-MCNC: 18 MG/DL (ref 8–23)
BUN/CREAT SERPL: 22.2 (ref 7–25)
CALCIUM SPEC-SCNC: 9 MG/DL (ref 8.6–10.5)
CHLORIDE SERPL-SCNC: 99 MMOL/L (ref 98–107)
CO2 SERPL-SCNC: 23 MMOL/L (ref 22–29)
CREAT SERPL-MCNC: 0.81 MG/DL (ref 0.76–1.27)
CYTO UR: NORMAL
DEPRECATED RDW RBC AUTO: 48.2 FL (ref 37–54)
ERYTHROCYTE [DISTWIDTH] IN BLOOD BY AUTOMATED COUNT: 13.7 % (ref 12.3–15.4)
GFR SERPL CREATININE-BSD FRML MDRD: 94 ML/MIN/1.73
GLUCOSE SERPL-MCNC: 96 MG/DL (ref 65–99)
GRAM STN SPEC: ABNORMAL
HCT VFR BLD AUTO: 39.3 % (ref 37.5–51)
HGB BLD-MCNC: 12.7 G/DL (ref 13–17.7)
LAB AP CASE REPORT: NORMAL
LAB AP CLINICAL INFORMATION: NORMAL
MCH RBC QN AUTO: 30.5 PG (ref 26.6–33)
MCHC RBC AUTO-ENTMCNC: 32.3 G/DL (ref 31.5–35.7)
MCV RBC AUTO: 94.5 FL (ref 79–97)
PATH REPORT.FINAL DX SPEC: NORMAL
PATH REPORT.GROSS SPEC: NORMAL
PHOSPHATE SERPL-MCNC: 3.2 MG/DL (ref 2.5–4.5)
PLATELET # BLD AUTO: 252 10*3/MM3 (ref 140–450)
PMV BLD AUTO: 9.9 FL (ref 6–12)
POTASSIUM SERPL-SCNC: 4.6 MMOL/L (ref 3.5–5.2)
RBC # BLD AUTO: 4.16 10*6/MM3 (ref 4.14–5.8)
SODIUM SERPL-SCNC: 137 MMOL/L (ref 136–145)
WBC # BLD AUTO: 13.07 10*3/MM3 (ref 3.4–10.8)

## 2021-11-02 PROCEDURE — 84100 ASSAY OF PHOSPHORUS: CPT | Performed by: PHYSICIAN ASSISTANT

## 2021-11-02 PROCEDURE — 25010000002 ENOXAPARIN PER 10 MG: Performed by: SURGERY

## 2021-11-02 PROCEDURE — 25010000002 PIPERACILLIN SOD-TAZOBACTAM PER 1 G: Performed by: SURGERY

## 2021-11-02 PROCEDURE — 97165 OT EVAL LOW COMPLEX 30 MIN: CPT

## 2021-11-02 PROCEDURE — 80048 BASIC METABOLIC PNL TOTAL CA: CPT | Performed by: SURGERY

## 2021-11-02 PROCEDURE — 25010000002 MORPHINE PER 10 MG: Performed by: SURGERY

## 2021-11-02 PROCEDURE — 99232 SBSQ HOSP IP/OBS MODERATE 35: CPT | Performed by: INTERNAL MEDICINE

## 2021-11-02 PROCEDURE — 85027 COMPLETE CBC AUTOMATED: CPT | Performed by: SURGERY

## 2021-11-02 RX ORDER — OXYCODONE AND ACETAMINOPHEN 7.5; 325 MG/1; MG/1
1 TABLET ORAL EVERY 4 HOURS PRN
Status: DISCONTINUED | OUTPATIENT
Start: 2021-11-02 | End: 2021-11-06 | Stop reason: HOSPADM

## 2021-11-02 RX ADMIN — CYCLOBENZAPRINE HYDROCHLORIDE 5 MG: 5 TABLET, FILM COATED ORAL at 13:07

## 2021-11-02 RX ADMIN — POTASSIUM CHLORIDE, DEXTROSE MONOHYDRATE AND SODIUM CHLORIDE 50 ML/HR: 150; 5; 450 INJECTION, SOLUTION INTRAVENOUS at 14:09

## 2021-11-02 RX ADMIN — OXYCODONE HYDROCHLORIDE AND ACETAMINOPHEN 1 TABLET: 7.5; 325 TABLET ORAL at 21:48

## 2021-11-02 RX ADMIN — LOSARTAN POTASSIUM 100 MG: 25 TABLET, FILM COATED ORAL at 08:56

## 2021-11-02 RX ADMIN — GABAPENTIN 100 MG: 100 CAPSULE ORAL at 13:07

## 2021-11-02 RX ADMIN — LANSOPRAZOLE 15 MG: KIT at 05:41

## 2021-11-02 RX ADMIN — TAZOBACTAM SODIUM AND PIPERACILLIN SODIUM 3.38 G: 375; 3 INJECTION, SOLUTION INTRAVENOUS at 05:42

## 2021-11-02 RX ADMIN — MORPHINE SULFATE 2 MG: 2 INJECTION, SOLUTION INTRAMUSCULAR; INTRAVENOUS at 05:41

## 2021-11-02 RX ADMIN — TAZOBACTAM SODIUM AND PIPERACILLIN SODIUM 3.38 G: 375; 3 INJECTION, SOLUTION INTRAVENOUS at 22:03

## 2021-11-02 RX ADMIN — ACETAMINOPHEN 650 MG: 325 TABLET, FILM COATED ORAL at 18:45

## 2021-11-02 RX ADMIN — TAZOBACTAM SODIUM AND PIPERACILLIN SODIUM 3.38 G: 375; 3 INJECTION, SOLUTION INTRAVENOUS at 15:57

## 2021-11-02 RX ADMIN — ACETAMINOPHEN 650 MG: 325 TABLET, FILM COATED ORAL at 13:01

## 2021-11-02 RX ADMIN — MORPHINE SULFATE 2 MG: 2 INJECTION, SOLUTION INTRAMUSCULAR; INTRAVENOUS at 02:55

## 2021-11-02 RX ADMIN — CYCLOBENZAPRINE HYDROCHLORIDE 5 MG: 5 TABLET, FILM COATED ORAL at 21:48

## 2021-11-02 RX ADMIN — ACETAMINOPHEN 650 MG: 325 TABLET, FILM COATED ORAL at 05:41

## 2021-11-02 RX ADMIN — GABAPENTIN 100 MG: 100 CAPSULE ORAL at 05:41

## 2021-11-02 RX ADMIN — GABAPENTIN 100 MG: 100 CAPSULE ORAL at 21:48

## 2021-11-02 RX ADMIN — ENOXAPARIN SODIUM 40 MG: 40 INJECTION SUBCUTANEOUS at 08:56

## 2021-11-02 RX ADMIN — CYCLOBENZAPRINE HYDROCHLORIDE 5 MG: 5 TABLET, FILM COATED ORAL at 05:41

## 2021-11-02 NOTE — PLAN OF CARE
Goal Outcome Evaluation:  Plan of Care Reviewed With: patient           Outcome Summary: OT eval complete.  Pt mod ind with bed mobility,  independent to don/doff socks given increased time and effort - pt declined need for AE for LBD,  SBA STS,  SBA to ambulate in room without AD.  Pt with pain, but demo ability to complete self care setup/ ind.  No further skilled OT indicated at this time.  Recommend home with assist upon d/c

## 2021-11-02 NOTE — NURSING NOTE
Spoke with Samir Harris about potassium phosphate order.  Pt is getting D5 in 0.45 normal saline + 20 meq of potassium.  Pt's last potassium level was 4.8.  Due to patient's potassium level and his fluids, I was told to hold the potassium phosphate until levels are checked in the AM.  Spoke with pharmacy, they do not have sodium phosphate.  Will continue to monitor.    Moni Acosta RN

## 2021-11-02 NOTE — PROGRESS NOTES
[facility] Medicine Services  CONSULT NOTE      Patient Name: hKalif Joshua  : 1950  MRN: [id number]    Primary Care Physician: Renee Liriano MD  Provider requesting consultation: No Known Provider    Subjective   Subjective     Reason for Consultation:  Med management post op    HPI:  Sitting in bed. NGT remains and patient reports frustrated it can't come out today. Ambulating in halls well. Family at bedside. Pain somewhat improved. VSS otherwise    Review of Systems  Gen- No fevers, chills  CV- No chest pain, palpitations  Resp- No cough, dyspnea  GI- No N/V/D,-abd pain      All other systems reviewed and are negative.     Personal History     Past Medical History:   Diagnosis Date   • Diverticulitis    • Hyperlipemia    • Hypertension    • Osteoarthritis        Past Surgical History:   Procedure Laterality Date   • EXPLORATORY LAPAROTOMY N/A 10/30/2021    Procedure: LAPAROTOMY EXPLORATORY, SIGMOID COLECTOMY,  END COLOSTOMY;  Surgeon: Jose Ferguson MD;  Location: [facility];  Service: General;  Laterality: N/A;   • KNEE ARTHROSCOPY Bilateral        Medications:  diclofenac, losartan-hydrochlorothiazide, and rosuvastatin    Scheduled Meds:acetaminophen, 650 mg, Oral, Q6H  cyclobenzaprine, 5 mg, Oral, Q8H  enoxaparin, 40 mg, Subcutaneous, Q24H  gabapentin, 100 mg, Oral, Q8H  lansoprazole, 15 mg, Per G Tube, Q AM  losartan, 100 mg, Oral, Q24H  piperacillin-tazobactam, 3.375 g, Intravenous, Q8H      Continuous Infusions:dextrose 5 % and sodium chloride 0.45 % with KCl 20 mEq/L, 50 mL/hr, Last Rate: 50 mL/hr (21 0857)  HYDROmorphone HCl-NaCl,       PRN Meds:.diphenhydrAMINE  •  First Mouthwash (Magic Mouthwash)  •  influenza vaccine  •  Morphine **AND** naloxone  •  naloxone  •  ondansetron **OR** ondansetron  •  phenol  •  potassium phosphate  •  sodium chloride  •  sodium chloride    No Known Allergies    Objective   Objective     Vital Signs:   Temp:  [97.7 °F  (36.5 °C)-99 °F (37.2 °C)] 99 °F (37.2 °C)  Heart Rate:  [85-93] 91  Resp:  [18] 18  BP: (119-147)/(63-94) 135/75    Physical Exam  GEN: NAD, resting in bed, sitting up in bed  HEENT: moist mucosa, NGT to suction, output noted  NECK: supple, no masses  RESP: on RA, effort improved  CV: on tele, sinus rhythm  PSYCH: normal affect, appropriate  NEURO: awake, alert, no focal deficits noted  MSK: no edema noted  SKIN: no rashes noted       Result Review:      LAB RESULTS:      Lab 11/02/21  0900 11/01/21  0724 10/31/21  0812 10/31/21  0233 10/30/21  2013 10/30/21  1215   WBC 13.07* 11.19* 10.82*  --   --  19.05*   HEMOGLOBIN 12.7* 12.5* 13.2  --   --  15.1   HEMATOCRIT 39.3 38.8 40.7  --   --  45.9   PLATELETS 252 229 274  --   --  304   NEUTROS ABS  --   --  8.76*  --   --  17.15*   EOS ABS  --   --  0.00  --   --  0.00   MCV 94.5 95.3 92.5  --   --  93.3   PROCALCITONIN  --   --   --   --   --  0.19   LACTATE  --   --   --  3.2* 4.2* 2.6*         Lab 11/01/21  1159 10/31/21  0812 10/30/21  1215   SODIUM 139 139 139   POTASSIUM 4.8 4.6 4.3   CHLORIDE 102 104 102   CO2 28.0 24.0 23.0   ANION GAP 9.0 11.0 14.0   BUN 21 31* 36*   CREATININE 0.69* 0.92 1.04   GLUCOSE 105* 153* 102*   CALCIUM 8.8 8.3* 9.6   MAGNESIUM 2.7*  --   --    PHOSPHORUS 2.3*  --   --          Lab 10/31/21  0812 10/30/21  1215   TOTAL PROTEIN 6.1 7.2   ALBUMIN 3.60 4.50   GLOBULIN 2.5 2.7   ALT (SGPT) 14 18   AST (SGOT) 18 21   BILIRUBIN 0.5 0.4   ALK PHOS 40 62   LIPASE  --  18         Lab 10/31/21  0812 10/30/21  1215   TROPONIN T <0.010 <0.010                 Brief Urine Lab Results     None        Microbiology Results (last 10 days)     Procedure Component Value - Date/Time    Anaerobic Culture - Peritoneal Fluid, Peritoneum [[id number]]  (Abnormal) Collected: 10/30/21 1553    Lab Status: Final result Specimen: Peritoneal Fluid from Peritoneum Updated: 11/02/21 1201     Anaerobic Culture Bacteroides fragilis group     Comment: Beta Lactamase  Positive         Body Fluid Culture - Peritoneal Fluid, Peritoneum [[id number]]  (Abnormal)  (Susceptibility) Collected: 10/30/21 1553    Lab Status: Final result Specimen: Peritoneal Fluid from Peritoneum Updated: 11/02/21 0955     Body Fluid Culture Light growth (2+) Escherichia coli      Light growth (2+) Pseudomonas aeruginosa     Gram Stain Many (4+) Red blood cells      Many (4+) WBCs seen      Few (2+) Gram negative bacilli    Susceptibility      Escherichia coli     MARY KAY     Ampicillin Resistant     Ampicillin + Sulbactam Resistant     Cefepime Susceptible     Ceftazidime Susceptible     Ceftriaxone Susceptible     Gentamicin Susceptible     Levofloxacin Susceptible     Piperacillin + Tazobactam Susceptible     Trimethoprim + Sulfamethoxazole Resistant                  Linear View               Susceptibility      Pseudomonas aeruginosa     MARY KAY     Cefepime Susceptible     Ceftazidime Susceptible     Ciprofloxacin Susceptible     Levofloxacin Susceptible     Piperacillin + Tazobactam Susceptible                  Linear View               Susceptibility Comments     Escherichia coli    Cefazolin sensitivity will not be reported for Enterobacteriaceae in non-urine isolates. If cefazolin is preferred, please call the microbiology lab to request an E-test.  With the exception of urinary-sourced infections, aminoglycosides should not be used as monotherapy.             AFB Culture - Peritoneal Fluid, Peritoneum [[id number]] Collected: 10/30/21 1553    Lab Status: Preliminary result Specimen: Peritoneal Fluid from Peritoneum Updated: 10/31/21 1136     AFB Stain No acid fast bacilli seen on concentrated smear    COVID PRE-OP / PRE-PROCEDURE SCREENING ORDER (NO ISOLATION) - Swab, Nasopharynx [[id number]]  (Normal) Collected: 10/30/21 1402    Lab Status: Final result Specimen: Swab from Nasopharynx Updated: 10/30/21 1441    Narrative:      The following orders were created for panel order COVID PRE-OP / PRE-PROCEDURE  SCREENING ORDER (NO ISOLATION) - Swab, Nasopharynx.  Procedure                               Abnormality         Status                     ---------                               -----------         ------                     COVID-19, ABBOTT IN-HOUS...[[id number]]  Normal              Final result                 Please view results for these tests on the individual orders.    COVID-19, ABBOTT IN-HOUSE,NASAL Swab (NO TRANSPORT MEDIA) 2 HR TAT - Swab, Nasopharynx [[id number]]  (Normal) Collected: 10/30/21 1402    Lab Status: Final result Specimen: Swab from Nasopharynx Updated: 10/30/21 1441     COVID19 Presumptive Negative    Narrative:      Fact sheet for providers: https://www.fda.gov/media/130262/download     Fact sheet for patients: https://www.fda.gov/media/097041/download    Test performed by PCR.  If inconsistent with clinical signs and symptoms patient should be tested with different authorized molecular test.    Blood Culture - Blood, Hand, Left [[id number]]  (Normal) Collected: 10/30/21 1400    Lab Status: Preliminary result Specimen: Blood from Hand, Left Updated: 11/01/21 1515     Blood Culture No growth at 2 days    Blood Culture - Blood, Hand, Right [[id number]]  (Normal) Collected: 10/30/21 1355    Lab Status: Preliminary result Specimen: Blood from Hand, Right Updated: 11/01/21 1515     Blood Culture No growth at 2 days          No radiology results from the last 24 hrs        Assessment/Plan   Assessment & Plan     Active Hospital Problems    Diagnosis  POA   • Abnormal EKG [R94.31]  Yes   • Lactic acidosis [E87.2]  Yes   • Sepsis without acute organ dysfunction (HCC) [A41.9]  Yes   • Peritonitis (HCC) [K65.9]  Yes   • Acute diverticulitis [K57.92]  Yes   • Hypertension [I10]  Yes   • Hyperlipemia [E78.5]  Yes      Resolved Hospital Problems   No resolved problems to display.       Khalif Joshua is a 71 y.o. male with HO HTN, HLP and severe OA of his knees.  He presented to the ED with acute onset of  abdominal pain and found to have a perforated bowel Dr Amaya took him to the OR emergently and performed an open sigmoid colectomy with end colostomy creation (Kate's Procedure), exploratory laparotomy, and abdominal washout for a perforated diverticulitis and pneumoperitoneum.        This patient's problems and plans were partially entered by my partner and updated as appropriate by me 11/02/21.    Perforated diverticulitis with peritonitis  --overall management per surgery, Dr amaya  --decr IVF today, continue pain control with PCA and has scheduled non-narcotic options    Postop hypoxia--resolved  -probably related to pain   -maximize pulmonary toilet ASAP, add IS- have d/w patient and daugheter     Lactic acidosis-  - hydrate and trend     Sepsis POA due to peritonitis  Cx with Pseudomonas/Ecoli  -cont abx, ID now following, appreciate  -follow cultures, sensitivities     HTN  -better control after addition of home losartan 11/1     OA-  -monitor     ABN EKG-  patinet as Q waves in anterior/inferior leads-- need stress test postoperatively  -he denies history of any CAD      Thank you for allowing [facility] Medicine Service to provide consultative care for your patient, we will continue to follow while clinically appropriate.    Zoey Doherty MD  11/02/21

## 2021-11-02 NOTE — THERAPY DISCHARGE NOTE
Acute Care - Occupational Therapy Discharge  [facility]    Patient Name: Khalif Joshua  : 1950    MRN: [id number]                              Today's Date: 2021       Admit Date: 10/30/2021    Visit Dx:     ICD-10-CM ICD-9-CM   1. Acute diverticulitis  K57.92 562.11   2. Perforated diverticulum of large intestine  K57.20 562.10   3. Perforated sigmoid colon (HCC)  K63.1 569.83   4. Elevated lactic acid level  R79.89 276.2     Patient Active Problem List   Diagnosis   • Acute diverticulitis   • Hypertension   • Hyperlipemia   • Abnormal EKG   • Lactic acidosis   • Sepsis without acute organ dysfunction (HCC)   • Peritonitis (HCC)     Past Medical History:   Diagnosis Date   • Diverticulitis    • Hyperlipemia    • Hypertension    • Osteoarthritis      Past Surgical History:   Procedure Laterality Date   • EXPLORATORY LAPAROTOMY N/A 10/30/2021    Procedure: LAPAROTOMY EXPLORATORY, SIGMOID COLECTOMY,  END COLOSTOMY;  Surgeon: Jose Ferguson MD;  Location: [facility];  Service: General;  Laterality: N/A;   • KNEE ARTHROSCOPY Bilateral       General Information     Row Name 21 1440          OT Time and Intention    Mode of Treatment occupational therapy  -     Row Name 21 1440          General Information    Patient Profile Reviewed yes  -     Prior Level of Function independent:; all household mobility; community mobility; ADL's; driving  has Rw and shower seat, but was not using  -     Existing Precautions/Restrictions fall  NG, MARCO drain, colostomy  -     Barriers to Rehab none identified  -     Row Name 21 1440          Occupational Profile    Environmental Supports and Barriers (Occupational Profile) walk in shower, has RW and shower seat if needed, but was not using previously  -     Row Name 21 1440          Living Environment    Lives With spouse  -     Row Name 21 1440          Home Main Entrance    Number of Stairs, Main Entrance two  -AC     Stair  Railings, Main Entrance none  -     Row Name 11/02/21 1440          Stairs Within Home, Primary    Stairs, Within Home, Primary has basement, but does not need to access  -     Number of Stairs, Within Home, Primary other (see comments)  12  -AC     Stair Railings, Within Home, Primary railings on both sides of stairs  -     Row Name 11/02/21 1440          Cognition    Orientation Status (Cognition) oriented x 4  -     Row Name 11/02/21 1440          Safety Issues, Functional Mobility    Impairments Affecting Function (Mobility) pain  -AC           User Key  (r) = Recorded By, (t) = Taken By, (c) = Cosigned By    Initials Name Provider Type     Marina Michelle OT Occupational Therapist               Mobility/ADL's     Row Name 11/02/21 1508          Bed Mobility    Bed Mobility supine-sit; sit-supine  -     Supine-Sit [place] (Bed Mobility) modified independence  -     Sit-Supine [place] (Bed Mobility) modified independence  -     Assistive Device (Bed Mobility) head of bed elevated; bed rails  -     Comment (Bed Mobility) increased time and effort, declined abdominal pillow to splint for pain  -AC     Row Name 11/02/21 1508          Transfers    Transfers sit-stand transfer  -     Sit-Stand [place] (Transfers) standby assist  -     Row Name 11/02/21 1508          Sit-Stand Transfer    Assistive Device (Sit-Stand Transfers) other (see comments)  No AD  -AC     Row Name 11/02/21 1508          Functional Mobility    Functional Mobility- Ind. Level standby assist  -     Functional Mobility- Device other (see comments)  no AD  -AC     Functional Mobility-Distance (Feet) 30  declined further distance as pt just returned to room from ambulating in hallway  -     Row Name 11/02/21 1508          Activities of Daily Living    BADL Assessment/Intervention lower body dressing  -     Row Name 11/02/21 1508          Lower Body Dressing Assessment/Training    [place] Level (Lower  Body Dressing) doff; don; socks; independent  -AC     Position (Lower Body Dressing) edge of bed sitting  -AC     Comment (Lower Body Dressing) increased time and effort - declined need for AE  -AC           User Key  (r) = Recorded By, (t) = Taken By, (c) = Cosigned By    Initials Name Provider Type     Marina Michelle, OT Occupational Therapist               Obj/Interventions     Row Name 11/02/21 1511          Sensory Assessment (Somatosensory)    Sensory Assessment (Somatosensory) UE sensation intact; bilateral UE  -AC     Row Name 11/02/21 1511          Vision Assessment/Intervention    Visual Impairment/Limitations corrective lenses full-time  -AC     Row Name 11/02/21 1511          Range of Motion Comprehensive    General Range of Motion bilateral upper extremity ROM WNL  -AC     Comment, General Range of Motion BUE grossly 4+/5  -AC           User Key  (r) = Recorded By, (t) = Taken By, (c) = Cosigned By    Initials Name Provider Type     Marina Michelle, OT Occupational Therapist               Goals/Plan    No documentation.                Clinical Impression     Row Name 11/02/21 1512          Pain Assessment    Additional Documentation Pain Scale: Numbers Pre/Post-Treatment (Group)  -[facility] Name 11/02/21 1512          Pain Scale: Numbers Pre/Post-Treatment    Pretreatment Pain Rating 6/10  -     Posttreatment Pain Rating 6/10  -AC     Pain Location abdomen  -AC     Pain Intervention(s) Repositioned; Ambulation/increased activity  -     Row Name 11/02/21 1512          Plan of Care Review    Plan of Care Reviewed With patient  -AC     Outcome Summary OT eval complete.  Pt mod ind with bed mobility,  independent to don/doff socks given increased time and effort - pt declined need for AE for LBD,  SBA STS,  SBA to ambulate in room without AD.  Pt with pain, but demo ability to complete self care setup/ ind.  No further skilled OT indicated at this time.  Recommend home with assist upon d/c  -AC      Row Name 11/02/21 1512          Therapy Assessment/Plan (OT)    Criteria for Skilled Therapeutic Interventions Met (OT) no; no problems identified which require skilled intervention  -AC     Therapy Frequency (OT) daily  -AC     Row Name 11/02/21 1512          Therapy Plan Review/Discharge Plan (OT)    Anticipated Discharge Disposition (OT) home with assist  -AC     Row Name 11/02/21 1512          Vital Signs    Pre Patient Position Supine  -AC     Intra Patient Position Standing  -AC     Post Patient Position Supine  -AC     Row Name 11/02/21 1512          Positioning and Restraints    Pre-Treatment Position in bed  -AC     Post Treatment Position bed  -AC     In Bed fowlers; call light within reach; encouraged to call for assist; with family/caregiver; notified nsg  -           User Key  (r) = Recorded By, (t) = Taken By, (c) = Cosigned By    Initials Name Provider Type    Marina Weber OT Occupational Therapist               Outcome Measures     Row Name 11/02/21 1516          How much help from another is currently needed...    Putting on and taking off regular lower body clothing? 4  -AC     Bathing (including washing, rinsing, and drying) 4  -AC     Toileting (which includes using toilet bed pan or urinal) 3  colostomy  -AC     Putting on and taking off regular upper body clothing 4  -AC     Taking care of personal grooming (such as brushing teeth) 4  -AC     Eating meals 4  -AC     AM-PAC 6 Clicks Score (OT) 23  -AC     Row Name 11/02/21 1516          Functional Assessment    Outcome Measure Options AM-PAC 6 Clicks Daily Activity (OT)  -AC           User Key  (r) = Recorded By, (t) = Taken By, (c) = Cosigned By    Initials Name Provider Type    Marina Weber OT Occupational Therapist              Occupational Therapy Education                 Title: PT OT SLP Therapies (In Progress)     Topic: Occupational Therapy (Done)     Point: ADL training (Done)     Description:   Instruct learner(s) on  proper safety adaptation and remediation techniques during self care or transfers.   Instruct in proper use of assistive devices.              Learning Progress Summary           Patient Nonacceptance, CAROLE, VU by  at 11/2/2021 3373    Comment: educated in availability of AE to assist with LBB/LBD -  pt declined need for use of AE                               User Key     Initials Effective Dates Name Provider Type Discipline     06/16/21 -  Marina Michelle OT Occupational Therapist OT              OT Recommendation and Plan  Retired Outcome Summary/Treatment Plan (OT)  Anticipated Discharge Disposition (OT): home with assist  Therapy Frequency (OT): daily  Plan of Care Review  Plan of Care Reviewed With: patient  Outcome Summary: OT eval complete.  Pt mod ind with bed mobility,  independent to don/doff socks given increased time and effort - pt declined need for AE for LBD,  SBA STS,  SBA to ambulate in room without AD.  Pt with pain, but demo ability to complete self care setup/ ind.  No further skilled OT indicated at this time.  Recommend home with assist upon d/c  Plan of Care Reviewed With: patient  Outcome Summary: OT eval complete.  Pt mod ind with bed mobility,  independent to don/doff socks given increased time and effort - pt declined need for AE for LBD,  SBA STS,  SBA to ambulate in room without AD.  Pt with pain, but demo ability to complete self care setup/ ind.  No further skilled OT indicated at this time.  Recommend home with assist upon d/c     Time Calculation:    Time Calculation- OT     Row Name 11/02/21 1440             Time Calculation- OT    OT Start Time 1440  -AC      OT Received On 11/02/21  -      OT Goal Re-Cert Due Date --  -              Untimed Charges    OT Eval/Re-eval Minutes 50  -AC              Total Minutes    Untimed Charges Total Minutes 50  -AC       Total Minutes 50  -AC            User Key  (r) = Recorded By, (t) = Taken By, (c) = Cosigned By    Initials Name  Provider Type    AC Marina Michelle, OT Occupational Therapist              Therapy Charges for Today     Code Description Service Date Service Provider Modifiers Qty    [id number] HC OT EVAL LOW COMPLEXITY 4 11/2/2021 Marina Michelle, LATONYA GO 1               Marina Michelle OT  11/2/2021

## 2021-11-02 NOTE — PLAN OF CARE
Goal Outcome Evaluation:  Plan of Care Reviewed With: patient     Pt ambulated in arreguin with spouse and walker. NG to suction between med passes. Very little output noted. No output from colostomy yet. Will continue to monitor.     Progress: improving

## 2021-11-02 NOTE — PLAN OF CARE
Goal Outcome Evaluation:  Plan of Care Reviewed With: patient        Progress: improving  Outcome Summary: Pt still has blood output from ostomy.  Pt in pain, pt is using PCA and received a dose of morphine.  Incision is clean and dry, there was some drainiage on dressing.  Cleaned incision with sterile normal saline and changed dressing.  VSS.  Bed alarm on, bed in lowest position and call light within reach.  Will continue to monitor.

## 2021-11-02 NOTE — PROGRESS NOTES
"Patient Name:  Khalif Joshua  YOB: 1950  [id number]    Surgery Progress Note    Date of visit: 11/2/2021      Subjective: No acute events overnight.  Pain is better controlled and he is more alert.  He denies nausea or vomiting with NG tube in place.  Has not had significant colostomy output yet.  Denies fevers or chills.  Voided after Enrique catheter was removed yesterday.  Has been ambulating in the hallway.  Overall feels better          Objective:     /93 (BP Location: Left arm, Patient Position: Sitting)   Pulse 92   Temp 98.8 °F (37.1 °C) (Oral)   Resp 18   Ht 180.3 cm (71\")   Wt 99.8 kg (220 lb)   SpO2 91%   BMI 30.68 kg/m²     Intake/Output Summary (Last 24 hours) at 11/2/2021 0819  Last data filed at 11/2/2021 0600  Gross per 24 hour   Intake 1065.16 ml   Output 2205 ml   Net -1139.84 ml       GEN:   Awake, alert, in no acute distress, resting comfortably in bed, NG tube in place  CV:   Regular rate and rhythm  L:  Clear to auscultation bilaterally, not labored on room air   Abd:  Soft, mildly distended, midline incision clean with serous drainage only from the open parts of the incision, colostomy is pink on the left side, drain on the right side is serosanguineous  Ext:  No cyanosis, clubbing, or edema    Recent labs that are back at this time have been reviewed.           Assessment/ Plan:    Mr. Joshua is a 71-year-old gentleman with history of hypertension and past diverticulitis who presents with abdominal sepsis and concern for perforated diverticulitis.      #Perforated diverticulitis  #Peritonitis  -Postoperative day 3 after open sigmoid colectomy with end colostomy creation and abdominal washout  -Vital signs are stable.    Labs yesterday without acute findings.  Labs from today are pending  -Continue NG tube to low wall suction.  300 mL of output yesterday  -Awaiting return of bowel function through the colostomy  -Decrease IV fluids to 50 cc/h.  Made over a liter 2 L of " urine yesterday  -Continue IV PCA.  Continue scheduled Tylenol, Flexeril, gabapentin  -Continue PPI  -Continue IV Zosyn.  ID consult yesterday.  Intra-abdominal cultures with E. coli and Pseudomonas  -Out of bed and ambulate today.  Incentive spirometry         Jose Ferguson MD  11/2/2021  08:19 EDT

## 2021-11-02 NOTE — NURSING NOTE
"WOC follow up for assessment/education: Colostomy     Stoma size, appearance, stool and status of appliance:  Colostomy still irregular shaped, pale pink, slightly elevated above skin level, appliance that was changed yesterday is in place intact.  Still with serosanguineous, no stool.     Current appliance in place:   Flat [place] 1 piece     Last appliance change:  11/1/2021     Surgery date: 10/30     Education provided:  Patient had just taken a shot of Dilaudid from his PCA so he was slightly \"loopy\".  Wife present at bedside. Asking great questions.  Reviewed frequency of appliance changes, stool consistency, foods to bulk or loosen stool, when to seek medical attention.  Protein intake, hydration and ambulation promoted.    Instructed wife that we would change appliance together most likely Thursday, or sooner if leaking occurs.     WOCN will continue to follow daily. Please contact with questions or if new needs arise.     "

## 2021-11-03 LAB
ANION GAP SERPL CALCULATED.3IONS-SCNC: 10 MMOL/L (ref 5–15)
BUN SERPL-MCNC: 18 MG/DL (ref 8–23)
BUN/CREAT SERPL: 26.5 (ref 7–25)
CALCIUM SPEC-SCNC: 8.6 MG/DL (ref 8.6–10.5)
CHLORIDE SERPL-SCNC: 101 MMOL/L (ref 98–107)
CO2 SERPL-SCNC: 24 MMOL/L (ref 22–29)
CREAT SERPL-MCNC: 0.68 MG/DL (ref 0.76–1.27)
DEPRECATED RDW RBC AUTO: 48.2 FL (ref 37–54)
ERYTHROCYTE [DISTWIDTH] IN BLOOD BY AUTOMATED COUNT: 13.9 % (ref 12.3–15.4)
GFR SERPL CREATININE-BSD FRML MDRD: 115 ML/MIN/1.73
GLUCOSE SERPL-MCNC: 100 MG/DL (ref 65–99)
HCT VFR BLD AUTO: 41.5 % (ref 37.5–51)
HGB BLD-MCNC: 13.3 G/DL (ref 13–17.7)
MAGNESIUM SERPL-MCNC: 2.4 MG/DL (ref 1.6–2.4)
MCH RBC QN AUTO: 30.1 PG (ref 26.6–33)
MCHC RBC AUTO-ENTMCNC: 32 G/DL (ref 31.5–35.7)
MCV RBC AUTO: 93.9 FL (ref 79–97)
PHOSPHATE SERPL-MCNC: 3.3 MG/DL (ref 2.5–4.5)
PLATELET # BLD AUTO: 279 10*3/MM3 (ref 140–450)
PMV BLD AUTO: 9.6 FL (ref 6–12)
POTASSIUM SERPL-SCNC: 4.1 MMOL/L (ref 3.5–5.2)
RBC # BLD AUTO: 4.42 10*6/MM3 (ref 4.14–5.8)
SODIUM SERPL-SCNC: 135 MMOL/L (ref 136–145)
WBC # BLD AUTO: 16.63 10*3/MM3 (ref 3.4–10.8)

## 2021-11-03 PROCEDURE — 85027 COMPLETE CBC AUTOMATED: CPT | Performed by: SURGERY

## 2021-11-03 PROCEDURE — 25010000002 ENOXAPARIN PER 10 MG: Performed by: SURGERY

## 2021-11-03 PROCEDURE — 25010000002 PIPERACILLIN SOD-TAZOBACTAM PER 1 G: Performed by: SURGERY

## 2021-11-03 PROCEDURE — 25010000002 MORPHINE PER 10 MG: Performed by: SURGERY

## 2021-11-03 PROCEDURE — 84100 ASSAY OF PHOSPHORUS: CPT | Performed by: SURGERY

## 2021-11-03 PROCEDURE — 83735 ASSAY OF MAGNESIUM: CPT | Performed by: SURGERY

## 2021-11-03 PROCEDURE — 80048 BASIC METABOLIC PNL TOTAL CA: CPT | Performed by: SURGERY

## 2021-11-03 PROCEDURE — 97110 THERAPEUTIC EXERCISES: CPT

## 2021-11-03 PROCEDURE — 99232 SBSQ HOSP IP/OBS MODERATE 35: CPT | Performed by: INTERNAL MEDICINE

## 2021-11-03 PROCEDURE — 97116 GAIT TRAINING THERAPY: CPT

## 2021-11-03 RX ORDER — DOCUSATE SODIUM 100 MG/1
100 CAPSULE, LIQUID FILLED ORAL 2 TIMES DAILY
Status: DISCONTINUED | OUTPATIENT
Start: 2021-11-03 | End: 2021-11-06 | Stop reason: HOSPADM

## 2021-11-03 RX ORDER — CYCLOBENZAPRINE HCL 10 MG
10 TABLET ORAL EVERY 8 HOURS SCHEDULED
Status: DISCONTINUED | OUTPATIENT
Start: 2021-11-03 | End: 2021-11-05

## 2021-11-03 RX ORDER — SENNA PLUS 8.6 MG/1
1 TABLET ORAL NIGHTLY
Status: DISCONTINUED | OUTPATIENT
Start: 2021-11-03 | End: 2021-11-06 | Stop reason: HOSPADM

## 2021-11-03 RX ADMIN — MORPHINE SULFATE 2 MG: 2 INJECTION, SOLUTION INTRAMUSCULAR; INTRAVENOUS at 09:49

## 2021-11-03 RX ADMIN — LANSOPRAZOLE 15 MG: KIT at 05:19

## 2021-11-03 RX ADMIN — TAZOBACTAM SODIUM AND PIPERACILLIN SODIUM 3.38 G: 375; 3 INJECTION, SOLUTION INTRAVENOUS at 21:45

## 2021-11-03 RX ADMIN — DOCUSATE SODIUM 100 MG: 100 CAPSULE, LIQUID FILLED ORAL at 21:45

## 2021-11-03 RX ADMIN — ENOXAPARIN SODIUM 40 MG: 40 INJECTION SUBCUTANEOUS at 09:07

## 2021-11-03 RX ADMIN — GABAPENTIN 100 MG: 100 CAPSULE ORAL at 13:20

## 2021-11-03 RX ADMIN — ACETAMINOPHEN 650 MG: 325 TABLET, FILM COATED ORAL at 09:06

## 2021-11-03 RX ADMIN — ACETAMINOPHEN 650 MG: 325 TABLET, FILM COATED ORAL at 01:47

## 2021-11-03 RX ADMIN — OXYCODONE HYDROCHLORIDE AND ACETAMINOPHEN 1 TABLET: 7.5; 325 TABLET ORAL at 21:45

## 2021-11-03 RX ADMIN — CYCLOBENZAPRINE 10 MG: 10 TABLET, FILM COATED ORAL at 13:20

## 2021-11-03 RX ADMIN — OXYCODONE HYDROCHLORIDE AND ACETAMINOPHEN 1 TABLET: 7.5; 325 TABLET ORAL at 13:37

## 2021-11-03 RX ADMIN — CYCLOBENZAPRINE HYDROCHLORIDE 5 MG: 5 TABLET, FILM COATED ORAL at 05:18

## 2021-11-03 RX ADMIN — TAZOBACTAM SODIUM AND PIPERACILLIN SODIUM 3.38 G: 375; 3 INJECTION, SOLUTION INTRAVENOUS at 05:18

## 2021-11-03 RX ADMIN — ACETAMINOPHEN 650 MG: 325 TABLET, FILM COATED ORAL at 23:39

## 2021-11-03 RX ADMIN — LOSARTAN POTASSIUM 100 MG: 25 TABLET, FILM COATED ORAL at 09:06

## 2021-11-03 RX ADMIN — MORPHINE SULFATE 2 MG: 2 INJECTION, SOLUTION INTRAMUSCULAR; INTRAVENOUS at 23:39

## 2021-11-03 RX ADMIN — SENNOSIDES 1 TABLET: 8.6 TABLET, FILM COATED ORAL at 21:45

## 2021-11-03 RX ADMIN — CYCLOBENZAPRINE 10 MG: 10 TABLET, FILM COATED ORAL at 21:45

## 2021-11-03 RX ADMIN — GABAPENTIN 100 MG: 100 CAPSULE ORAL at 05:18

## 2021-11-03 RX ADMIN — MORPHINE SULFATE 2 MG: 2 INJECTION, SOLUTION INTRAMUSCULAR; INTRAVENOUS at 17:03

## 2021-11-03 RX ADMIN — POTASSIUM CHLORIDE, DEXTROSE MONOHYDRATE AND SODIUM CHLORIDE 50 ML/HR: 150; 5; 450 INJECTION, SOLUTION INTRAVENOUS at 09:19

## 2021-11-03 RX ADMIN — TAZOBACTAM SODIUM AND PIPERACILLIN SODIUM 3.38 G: 375; 3 INJECTION, SOLUTION INTRAVENOUS at 13:20

## 2021-11-03 RX ADMIN — OXYCODONE HYDROCHLORIDE AND ACETAMINOPHEN 1 TABLET: 7.5; 325 TABLET ORAL at 07:35

## 2021-11-03 RX ADMIN — GABAPENTIN 100 MG: 100 CAPSULE ORAL at 21:45

## 2021-11-03 RX ADMIN — OXYCODONE HYDROCHLORIDE AND ACETAMINOPHEN 1 TABLET: 7.5; 325 TABLET ORAL at 01:47

## 2021-11-03 NOTE — PLAN OF CARE
Goal Outcome Evaluation:  Plan of Care Reviewed With: patient        Progress: improving  Outcome Summary: patient ambulated 200 feet with CGA x1, no AD used but may benefit form rollling walker/cane as patient with c/o bilateral knee pain with mobility. Completed B LE exercises with no complaints.

## 2021-11-03 NOTE — PROGRESS NOTES
INFECTIOUS DISEASE Progress Note    Khalif Joshua  1950  [id number]    Admission Date: 10/30/2021      Requesting Provider: No Known Provider  Evaluating Physician: Oliver Oliveros MD    Reason for Consultation: Perforated diverticulitis/sepsis    History of present illness:    11/1/2021:  Patient is a 71 y.o. male Who is seen today for evaluation of  Perforated diverticulitis with severe sepsis.  He has a history of prior diverticulitis and presented to the emergency room on 10/30 Sudden onset of severe left lower quadrant pain.  He took Cipro/Flagyl at home but continued to have rapidly worsening pain.  When he presented to the emergency room, radiographic studies revealed pneumoperitoneum.   He had a white blood cell count of 19.1, and azotemia with a BUN of 36. His lactic acid has been up to 4.2. In addition, he had acute hypoxic respiratory failure requiring 3 L of oxygen.  An abdominal/pelvic CT scan revealed sigmoid colonic diverticulitis with pneumoperitoneum.  He was taken to the OR promptly by Dr. Ferguson  and underwent sigmoid colectomy with colostomy for his perforated diverticulitis. His maximum temperature since admission is 99.9°.   He is growing a gram-negative bacillus from his peritoneal fluid.  Blood cultures are no growth so far.  He is on intravenous Zosyn therapy.  He currently complains of moderate diffuse abdominal pain.  He has a history of 3 prior episodes of diverticulitis treated with oral antibiotics as an outpatient.  His peritoneal cultures are now growing E. coli and Pseudomonas.    11/2/21: He has decreased abdominal pain.  He has remained afebrile.  He denies nausea and vomiting.  His nasogastric tube remains in place.  His abdominal cultures have grown Bacteroides, Zosyn sensitive Pseudomonas, and Zosyn sensitive E. Coli. His white blood cell count today is 13.1    Past Medical History:   Diagnosis Date   • Diverticulitis    • Hyperlipemia    • Hypertension    • Osteoarthritis         Past Surgical History:   Procedure Laterality Date   • EXPLORATORY LAPAROTOMY N/A 10/30/2021    Procedure: LAPAROTOMY EXPLORATORY, SIGMOID COLECTOMY,  END COLOSTOMY;  Surgeon: Jose Ferguson MD;  Location: [facility];  Service: General;  Laterality: N/A;   • KNEE ARTHROSCOPY Bilateral 2001       Family History   Problem Relation Age of Onset   • Heart disease Father        Social History     Socioeconomic History   • Marital status:    Tobacco Use   • Smoking status: Never Smoker   • Smokeless tobacco: Never Used   Substance and Sexual Activity   • Alcohol use: Not Currently   • Drug use: Never       No Known Allergies      Medication:    Current Facility-Administered Medications:   •  acetaminophen (TYLENOL) tablet 650 mg, 650 mg, Oral, Q6H, Jose Ferguson MD, 650 mg at 11/02/21 1845  •  cyclobenzaprine (FLEXERIL) tablet 5 mg, 5 mg, Oral, Q8H, Jose Ferguson MD, 5 mg at 11/02/21 2148  •  dextrose 5 % and sodium chloride 0.45 % with KCl 20 mEq/L infusion, 50 mL/hr, Intravenous, Continuous, Jose Ferguson MD, Last Rate: 50 mL/hr at 11/02/21 1409, 50 mL/hr at 11/02/21 1409  •  diphenhydrAMINE (BENADRYL) injection 25 mg, 25 mg, Intravenous, Q6H PRN, Jose Ferguson MD  •  enoxaparin (LOVENOX) syringe 40 mg, 40 mg, Subcutaneous, Q24H, Jose Ferguson MD, 40 mg at 11/02/21 0856  •  First Mouthwash (Magic Mouthwash) 10 mL, 10 mL, Swish & Spit, Q6H PRN, Zoey Doherty MD  •  gabapentin (NEURONTIN) capsule 100 mg, 100 mg, Oral, Q8H, Jose Ferguson MD, 100 mg at 11/02/21 2148  •  influenza vac split quad (FLUZONE,FLUARIX,AFLURIA,FLULAVAL) injection 0.5 mL, 0.5 mL, Intramuscular, During Hospitalization, Zoey Doherty MD  •  lansoprazole (FIRST) oral suspension 15 mg, 15 mg, Per G Tube, Q AM, Jose Ferguson MD, 15 mg at 11/02/21 0541  •  losartan (COZAAR) tablet 100 mg, 100 mg, Oral, Q24H, Zoey Doherty MD, 100 mg at 11/02/21 0856  •  morphine injection 2 mg, 2 mg,  Intravenous, Q2H PRN, 2 mg at 21 0541 **AND** naloxone (NARCAN) injection 0.4 mg, 0.4 mg, Intravenous, Q5 Min PRN, Jose Ferguson MD  •  naloxone (NARCAN) injection 0.1 mg, 0.1 mg, Intravenous, Q5 Min PRN, Jose Ferguson MD  •  ondansetron (ZOFRAN) tablet 4 mg, 4 mg, Oral, Q6H PRN **OR** ondansetron (ZOFRAN) injection 4 mg, 4 mg, Intravenous, Q6H PRN, Jose Ferguson MD  •  oxyCODONE-acetaminophen (PERCOCET) 7.5-325 MG per tablet 1 tablet, 1 tablet, Oral, Q4H PRN, Pako Lee MD, 1 tablet at 21 2148  •  phenol (CHLORASEPTIC) 1.4 % liquid 2 spray, 2 spray, Mouth/Throat, Q2H PRN, Zoey Doherty MD, 2 spray at 10/31/21 1719  •  piperacillin-tazobactam (ZOSYN) 3.375 g in iso-osmotic dextrose 50 ml (premix), 3.375 g, Intravenous, Q8H, Jose Ferguson MD, 3.375 g at 21 2203  •  potassium phosphate 15 mmol in sodium chloride 0.9 % 100 mL infusion, 15 mmol, Intravenous, PRN, Zoey Doherty MD  •  sodium chloride 0.9 % flush 10 mL, 10 mL, Intravenous, PRN, Jose Ferguson MD, 10 mL at 10/31/21 2301  •  sodium chloride 0.9 % flush 10 mL, 10 mL, Intravenous, PRN, Mary Graves MD, 10 mL at 21 2215    Antibiotics:  Anti-Infectives (From admission, onward)    Ordered     Dose/Rate Route Frequency Start Stop    10/30/21 1746  piperacillin-tazobactam (ZOSYN) 3.375 g in iso-osmotic dextrose 50 ml (premix)        Ordering Provider: Jose Ferguson MD    3.375 g  over 30 Minutes Intravenous Every 8 Hours 10/30/21 2200 11/03/21 2159    10/30/21 1321  piperacillin-tazobactam (ZOSYN) 4.5 g in iso-osmotic dextrose 100 mL IVPB (premix)        Ordering Provider: Raul Gutierrez DO    4.5 g  over 30 Minutes Intravenous Once 10/30/21 1323 10/30/21 1438            Review of Systems:  See HPI    Physical Exam:   Vital Signs  Temp (24hrs), Av.5 °F (36.9 °C), Min:97.7 °F (36.5 °C), Max:99 °F (37.2 °C)    Temp  Min: 97.7 °F (36.5 °C)  Max: 99 °F (37.2 °C)  BP  Min: 132/77  Max:  147/94  Pulse  Min: 91  Max: 93  Resp  Min: 18  Max: 18  SpO2  Min: 91 %  Max: 95 %    GENERAL: Awake and alert, in no acute distress.   HEENT: Normocephalic, atraumatic.  PERRL. EOMI. No conjunctival injection. No icterus. Oropharynx clear without evidence of thrush or exudate. No evidence of peridontal disease.  He has a nasogastric tube in place.  NECK: Supple   HEART: RRR; No murmur, rubs, gallops.   LUNGS: Clear to auscultation bilaterally without wheezing, rales, rhonchi. Normal respiratory effort. Nonlabored. .  ABDOMEN: He has mild diffuse abdominal tenderness  EXT:  No cyanosis, clubbing or edema.  :  Without Enrique catheter.  MSK: No focal joint swelling or erythema  SKIN: Warm and dry without cutaneous eruptions on Inspection/palpation.    NEURO: Oriented to PPT.  Motor 5/5 strength bilaterally  PSYCHIATRIC: Normal insight and judgement. Cooperative with PE    Laboratory Data    Results from last 7 days   Lab Units 11/02/21  0900 11/01/21  0724 10/31/21  0812   WBC 10*3/mm3 13.07* 11.19* 10.82*   HEMOGLOBIN g/dL 12.7* 12.5* 13.2   HEMATOCRIT % 39.3 38.8 40.7   PLATELETS 10*3/mm3 252 229 274     Results from last 7 days   Lab Units 11/02/21  1407   SODIUM mmol/L 137   POTASSIUM mmol/L 4.6   CHLORIDE mmol/L 99   CO2 mmol/L 23.0   BUN mg/dL 18   CREATININE mg/dL 0.81   GLUCOSE mg/dL 96   CALCIUM mg/dL 9.0     Results from last 7 days   Lab Units 10/31/21  0812   ALK PHOS U/L 40   BILIRUBIN mg/dL 0.5   ALT (SGPT) U/L 14   AST (SGOT) U/L 18             Results from last 7 days   Lab Units 10/31/21  0233   LACTATE mmol/L 3.2*             Estimated Creatinine Clearance: 102.1 mL/min (by C-G formula based on SCr of 0.81 mg/dL).      Microbiology:  Blood Culture   Date Value Ref Range Status   10/30/2021 No growth at 24 hours  Preliminary     No results found for: BCIDPCR, CXREFLEX, CSFCX, CULTURETIS  No results found for: CULTURES, HSVCX, URCX  No results found for: EYECULTURE, GCCX, HSVCULTURE, LABHSV  No  results found for: LEGIONELLA, MRSACX, MUMPSCX, MYCOPLASCX  No results found for: NOCARDIACX, STOOLCX  No results found for: THROATCX, UNSTIMCULT, URINECX, CULTURE, VZVCULTUR  No results found for: VIRALCULTU, WOUNDCX        Radiology:  Imaging Results (Last 72 Hours)     ** No results found for the last 72 hours. **      I read his radiographic studies with Dr. Light.  He has diffuse, pancolonic diverticulosis.    Impression:   1.  Perforated diverticulitis-with severe intra-abdominal infection, status post sigmoid colectomy and colostomy on 10/30.  Peritoneal cultures are growing Pseudomonas, E. Coli, and Bacteroides.  All of these are sensitive to Zosyn  2.  Severe sepsis- he had a blood pressure down to 89 systolic, tachycardia with a heart rate of 117, lactic acidosis, acute hypoxic respiratory failure, azotemia, leukocytosis/neutrophilia, and a known focus of intra-abdominal infection.  3.  Leukocytosis/neutrophilia  4.  Azotemia/volume depletion  5.  Acute hypoxic respiratory failure    PLAN/RECOMMENDATIONS:   1.  Blood cultures  2.  Intra-abdominal cultures  3.  Supplemental oxygen  4.  Volume repletion  5.  Continue Zosyn    Oliver Oliveros MD  11/2/2021  22:30 EDT

## 2021-11-03 NOTE — PROGRESS NOTES
[facility] Medicine Services  CONSULT NOTE      Patient Name: Khalif Joshua  : 1950  MRN: [id number]    Primary Care Physician: Renee Liriano MD  Provider requesting consultation: No Known Provider    Subjective   Subjective     Reason for Consultation:  Med management post op    HPI:  Sitting in bed. Remains frustrated that his NGT in place but doing ok ottherwise. Pain controlled but complaining of back pain- chronic for him. Family at bedside. No other complaints    Review of Systems  Gen- No fevers, chills  CV- No chest pain, palpitations  Resp- No cough, dyspnea  GI- No N/V/D,-abd pain  +back pain    All other systems reviewed and are negative.     Personal History     Past Medical History:   Diagnosis Date   • Diverticulitis    • Hyperlipemia    • Hypertension    • Osteoarthritis        Past Surgical History:   Procedure Laterality Date   • EXPLORATORY LAPAROTOMY N/A 10/30/2021    Procedure: LAPAROTOMY EXPLORATORY, SIGMOID COLECTOMY,  END COLOSTOMY;  Surgeon: Jose Ferguson MD;  Location: [facility];  Service: General;  Laterality: N/A;   • KNEE ARTHROSCOPY Bilateral        Medications:  diclofenac, losartan-hydrochlorothiazide, and rosuvastatin    Scheduled Meds:acetaminophen, 650 mg, Oral, Q6H  cyclobenzaprine, 10 mg, Oral, Q8H  enoxaparin, 40 mg, Subcutaneous, Q24H  gabapentin, 100 mg, Oral, Q8H  lansoprazole, 15 mg, Per G Tube, Q AM  losartan, 100 mg, Oral, Q24H  piperacillin-tazobactam, 3.375 g, Intravenous, Q8H      Continuous Infusions:dextrose 5 % and sodium chloride 0.45 % with KCl 20 mEq/L, 50 mL/hr, Last Rate: 50 mL/hr (21 1409)      PRN Meds:.diphenhydrAMINE  •  First Mouthwash (Magic Mouthwash)  •  influenza vaccine  •  Morphine **AND** naloxone  •  naloxone  •  ondansetron **OR** ondansetron  •  oxyCODONE-acetaminophen  •  phenol  •  potassium phosphate  •  sodium chloride  •  sodium chloride    No Known Allergies    Objective   Objective      Vital Signs:   Temp:  [98.3 °F (36.8 °C)-99 °F (37.2 °C)] 98.3 °F (36.8 °C)  Heart Rate:  [89-93] 93  Resp:  [18] 18  BP: (121-147)/(75-94) 122/78    Physical Exam  GEN: NAD, resting in bed, sitting up in bed, family at bedside  HEENT: moist mucosa, NGT to suction, output noted  NECK: supple, no masses  RESP: on RA, effort improved  CV: on tele, sinus rhythm  PSYCH: normal affect, appropriate  NEURO: awake, alert, no focal deficits noted  MSK: no edema noted  SKIN: no rashes noted       Result Review:      LAB RESULTS:      Lab 11/03/21  0506 11/02/21  0900 11/01/21  0724 10/31/21  0812 10/31/21  0233 10/30/21  2013 10/30/21  1215   WBC 16.63* 13.07* 11.19* 10.82*  --   --  19.05*   HEMOGLOBIN 13.3 12.7* 12.5* 13.2  --   --  15.1   HEMATOCRIT 41.5 39.3 38.8 40.7  --   --  45.9   PLATELETS 279 252 229 274  --   --  304   NEUTROS ABS  --   --   --  8.76*  --   --  17.15*   EOS ABS  --   --   --  0.00  --   --  0.00   MCV 93.9 94.5 95.3 92.5  --   --  93.3   PROCALCITONIN  --   --   --   --   --   --  0.19   LACTATE  --   --   --   --  3.2* 4.2* 2.6*         Lab 11/03/21  0506 11/02/21  1407 11/01/21  1159 10/31/21  0812 10/30/21  1215   SODIUM 135* 137 139 139 139   POTASSIUM 4.1 4.6 4.8 4.6 4.3   CHLORIDE 101 99 102 104 102   CO2 24.0 23.0 28.0 24.0 23.0   ANION GAP 10.0 15.0 9.0 11.0 14.0   BUN 18 18 21 31* 36*   CREATININE 0.68* 0.81 0.69* 0.92 1.04   GLUCOSE 100* 96 105* 153* 102*   CALCIUM 8.6 9.0 8.8 8.3* 9.6   MAGNESIUM 2.4  --  2.7*  --   --    PHOSPHORUS 3.3 3.2 2.3*  --   --          Lab 10/31/21  0812 10/30/21  1215   TOTAL PROTEIN 6.1 7.2   ALBUMIN 3.60 4.50   GLOBULIN 2.5 2.7   ALT (SGPT) 14 18   AST (SGOT) 18 21   BILIRUBIN 0.5 0.4   ALK PHOS 40 62   LIPASE  --  18         Lab 10/31/21  0812 10/30/21  1215   TROPONIN T <0.010 <0.010                 Brief Urine Lab Results     None        Microbiology Results (last 10 days)     Procedure Component Value - Date/Time    Anaerobic Culture - Peritoneal  Fluid, Peritoneum [[id number]]  (Abnormal) Collected: 10/30/21 1553    Lab Status: Final result Specimen: Peritoneal Fluid from Peritoneum Updated: 11/02/21 1201     Anaerobic Culture Bacteroides fragilis group     Comment: Beta Lactamase Positive         Body Fluid Culture - Peritoneal Fluid, Peritoneum [[id number]]  (Abnormal)  (Susceptibility) Collected: 10/30/21 1553    Lab Status: Final result Specimen: Peritoneal Fluid from Peritoneum Updated: 11/02/21 0955     Body Fluid Culture Light growth (2+) Escherichia coli      Light growth (2+) Pseudomonas aeruginosa     Gram Stain Many (4+) Red blood cells      Many (4+) WBCs seen      Few (2+) Gram negative bacilli    Susceptibility      Escherichia coli     MARY KAY     Ampicillin Resistant     Ampicillin + Sulbactam Resistant     Cefepime Susceptible     Ceftazidime Susceptible     Ceftriaxone Susceptible     Gentamicin Susceptible     Levofloxacin Susceptible     Piperacillin + Tazobactam Susceptible     Trimethoprim + Sulfamethoxazole Resistant                  Linear View               Susceptibility      Pseudomonas aeruginosa     MARY KAY     Cefepime Susceptible     Ceftazidime Susceptible     Ciprofloxacin Susceptible     Levofloxacin Susceptible     Piperacillin + Tazobactam Susceptible                  Linear View               Susceptibility Comments     Escherichia coli    Cefazolin sensitivity will not be reported for Enterobacteriaceae in non-urine isolates. If cefazolin is preferred, please call the microbiology lab to request an E-test.  With the exception of urinary-sourced infections, aminoglycosides should not be used as monotherapy.             AFB Culture - Peritoneal Fluid, Peritoneum [[id number]] Collected: 10/30/21 1553    Lab Status: Preliminary result Specimen: Peritoneal Fluid from Peritoneum Updated: 10/31/21 1136     AFB Stain No acid fast bacilli seen on concentrated smear    COVID PRE-OP / PRE-PROCEDURE SCREENING ORDER (NO ISOLATION) - Swab,  Nasopharynx [[id number]]  (Normal) Collected: 10/30/21 1402    Lab Status: Final result Specimen: Swab from Nasopharynx Updated: 10/30/21 1441    Narrative:      The following orders were created for panel order COVID PRE-OP / PRE-PROCEDURE SCREENING ORDER (NO ISOLATION) - Swab, Nasopharynx.  Procedure                               Abnormality         Status                     ---------                               -----------         ------                     COVID-19, ABBOTT IN-HOUS...[[id number]]  Normal              Final result                 Please view results for these tests on the individual orders.    COVID-19, ABBOTT IN-HOUSE,NASAL Swab (NO TRANSPORT MEDIA) 2 HR TAT - Swab, Nasopharynx [[id number]]  (Normal) Collected: 10/30/21 1402    Lab Status: Final result Specimen: Swab from Nasopharynx Updated: 10/30/21 1441     COVID19 Presumptive Negative    Narrative:      Fact sheet for providers: https://www.fda.gov/media/483648/download     Fact sheet for patients: https://www.fda.gov/media/407074/download    Test performed by PCR.  If inconsistent with clinical signs and symptoms patient should be tested with different authorized molecular test.    Blood Culture - Blood, Hand, Left [[id number]]  (Normal) Collected: 10/30/21 1400    Lab Status: Preliminary result Specimen: Blood from Hand, Left Updated: 11/02/21 1515     Blood Culture No growth at 3 days    Blood Culture - Blood, Hand, Right [[id number]]  (Normal) Collected: 10/30/21 1355    Lab Status: Preliminary result Specimen: Blood from Hand, Right Updated: 11/02/21 1515     Blood Culture No growth at 3 days          No radiology results from the last 24 hrs        Assessment/Plan   Assessment & Plan     Active Hospital Problems    Diagnosis  POA   • Abnormal EKG [R94.31]  Yes   • Lactic acidosis [E87.2]  Yes   • Sepsis without acute organ dysfunction (HCC) [A41.9]  Yes   • Peritonitis (HCC) [K65.9]  Yes   • Acute diverticulitis [K57.92]  Yes   •  Hypertension [I10]  Yes   • Hyperlipemia [E78.5]  Yes      Resolved Hospital Problems   No resolved problems to display.       Khalif Joshua is a 71 y.o. male with HO HTN, HLP and severe OA of his knees.  He presented to the ED with acute onset of abdominal pain and found to have a perforated bowel Dr Amaya took him to the OR emergently and performed an open sigmoid colectomy with end colostomy creation (Kate's Procedure), exploratory laparotomy, and abdominal washout for a perforated diverticulitis and pneumoperitoneum.        This patient's problems and plans were partially entered by my partner and updated as appropriate by me 11/03/21.    Perforated diverticulitis with peritonitis  --overall management per surgery, Dr amaya  --decr IVF today, continue pain control with PCA and has scheduled non-narcotic options  --have uptitrated muscle relaxer today    Postop hypoxia--resolved  -probably related to pain   -maximize pulmonary toilet ASAP, add IS- doing well on RA     Lactic acidosis-  - hydrate and trend     Sepsis POA due to peritonitis  Cx with Pseudomonas/Ecoli  -cont abx, ID now following, appreciate  -follow cultures, sensitivities     HTN  -better control after addition of home losartan 11/1     OA-  -monitor     ABN EKG-  patinet as Q waves in anterior/inferior leads-- need stress test postoperatively  -he denies history of any CAD      Thank you for allowing [facility] Medicine Service to provide consultative care for your patient, we will continue to follow while clinically appropriate.    Zoey Doherty MD  11/03/21

## 2021-11-03 NOTE — THERAPY TREATMENT NOTE
Patient Name: Khalif Joshua  : 1950    MRN: [id number]                              Today's Date: 11/3/2021       Admit Date: 10/30/2021    Visit Dx:     ICD-10-CM ICD-9-CM   1. Acute diverticulitis  K57.92 562.11   2. Perforated diverticulum of large intestine  K57.20 562.10   3. Perforated sigmoid colon (HCC)  K63.1 569.83   4. Elevated lactic acid level  R79.89 276.2     Patient Active Problem List   Diagnosis   • Acute diverticulitis   • Hypertension   • Hyperlipemia   • Abnormal EKG   • Lactic acidosis   • Sepsis without acute organ dysfunction (HCC)   • Peritonitis (HCC)     Past Medical History:   Diagnosis Date   • Diverticulitis    • Hyperlipemia    • Hypertension    • Osteoarthritis      Past Surgical History:   Procedure Laterality Date   • EXPLORATORY LAPAROTOMY N/A 10/30/2021    Procedure: LAPAROTOMY EXPLORATORY, SIGMOID COLECTOMY,  END COLOSTOMY;  Surgeon: Jose Ferguson MD;  Location: [facility];  Service: General;  Laterality: N/A;   • KNEE ARTHROSCOPY Bilateral       General Information     Row Name 21 1056          Physical Therapy Time and Intention    Document Type therapy note (daily note)  -AS     Mode of Treatment physical therapy  -AS     Row Name 21 1056          General Information    Patient Profile Reviewed yes  -AS     Existing Precautions/Restrictions fall  NG tube, MARCO drain, colostomy  -AS     Barriers to Rehab none identified  -AS     Row Name 21 1056          Cognition    Orientation Status (Cognition) oriented x 4  -AS     Row Name 21 1056          Safety Issues, Functional Mobility    Safety Issues Affecting Function (Mobility) sequencing abilities; safety precaution awareness; safety precautions follow-through/compliance  -AS     Impairments Affecting Function (Mobility) endurance/activity tolerance; pain  -AS           User Key  (r) = Recorded By, (t) = Taken By, (c) = Cosigned By    Initials Name Provider Type    AS Marily Llamas PTA  Physical Therapy Assistant               Mobility     Row Name 11/03/21 1057          Bed Mobility    Supine-Sit [place] (Bed Mobility) unable to assess  -AS     Row Name 11/03/21 1057          Transfers    Comment (Transfers) verbal cues to reach back when sitting  -AS     Row Name 11/03/21 1057          Bed-Chair Transfer    Bed-Chair [place] (Transfers) verbal cues; contact guard; 1 person assist  -AS     Assistive Device (Bed-Chair Transfers) other (see comments)  no AD  -AS     Row Name 11/03/21 1057          Sit-Stand Transfer    Sit-Stand [place] (Transfers) standby assist  -AS     Assistive Device (Sit-Stand Transfers) other (see comments)  no AD needed  -AS     Row Name 11/03/21 1057          Gait/Stairs (Locomotion)    [place] Level (Gait) verbal cues; contact guard; 1 person assist  -AS     Assistive Device (Gait) other (see comments)  no AD  -AS     Distance in Feet (Gait) 200  -AS     Deviations/Abnormal Patterns (Gait) antalgic; base of support, narrow; abbe decreased; gait speed decreased; stride length decreased  -AS     Bilateral Gait Deviations forward flexed posture  -AS     Comment (Gait/Stairs) patient ambulated 200 feet with CGA x1, no AD used but may benefit form rollling walker/cane as patient with c/o bilateral knee pain with mobility.  -AS           User Key  (r) = Recorded By, (t) = Taken By, (c) = Cosigned By    Initials Name Provider Type    AS Marily Llamas PTA Physical Therapy Assistant               Obj/Interventions     Row Name 11/03/21 1102          Motor Skills    Therapeutic Exercise knee; ankle  -AS     Row Name 11/03/21 1102          Knee (Therapeutic Exercise)    Knee (Therapeutic Exercise) strengthening exercise  -AS     Knee Strengthening (Therapeutic Exercise) bilateral; marching while seated; LAQ (long arc quad); sitting; 10 repetitions  -AS     Row Name 11/03/21 1102          Ankle (Therapeutic Exercise)    Ankle (Therapeutic Exercise) AROM  (active range of motion)  -AS     Ankle AROM (Therapeutic Exercise) bilateral; dorsiflexion; plantarflexion; sitting; 10 repetitions  -AS           User Key  (r) = Recorded By, (t) = Taken By, (c) = Cosigned By    Initials Name Provider Type    AS Marily Llamas PTA Physical Therapy Assistant               Goals/Plan    No documentation.                Clinical Impression     Row Name 11/03/21 1102          Pain    Additional Documentation Pain Scale: Numbers Pre/Post-Treatment (Group)  -AS     Row Name 11/03/21 1102          Pain Scale: Numbers Pre/Post-Treatment    Pretreatment Pain Rating 4/10  -AS     Posttreatment Pain Rating 4/10  -AS     Pain Location - Side Bilateral  -AS     Pain Location - Orientation incisional  -AS     Pain Location abdomen; knee  -AS     Pre/Posttreatment Pain Comment bilateral knee pain with mobility  -AS     Pain Intervention(s) Repositioned; Ambulation/increased activity  -AS     Row Name 11/03/21 1102          Plan of Care Review    Plan of Care Reviewed With patient  -AS     Progress improving  -AS     Outcome Summary patient ambulated 200 feet with CGA x1, no AD used but may benefit form rollling walker/cane as patient with c/o bilateral knee pain with mobility. Completed B LE exercises with no complaints.  -AS     Row Name 11/03/21 1102          Positioning and Restraints    Pre-Treatment Position in bed  -AS     Post Treatment Position chair  -AS     In Chair reclined; call light within reach; encouraged to call for assist; with family/caregiver; legs elevated  -AS           User Key  (r) = Recorded By, (t) = Taken By, (c) = Cosigned By    Initials Name Provider Type    AS Marily Llamas PTA Physical Therapy Assistant               Outcome Measures     Row Name 11/03/21 1104          How much help from another person do you currently need...    Turning from your back to your side while in flat bed without using bedrails? 4  -AS     Moving from lying on back to  sitting on the side of a flat bed without bedrails? 4  -AS     Moving to and from a bed to a chair (including a wheelchair)? 3  -AS     Standing up from a chair using your arms (e.g., wheelchair, bedside chair)? 3  -AS     Climbing 3-5 steps with a railing? 3  -AS     To walk in hospital room? 3  -AS     AM-PAC 6 Clicks Score (PT) 20  -AS     Row Name 11/03/21 1104          Functional Assessment    Outcome Measure Options AM-PAC 6 Clicks Basic Mobility (PT)  -AS           User Key  (r) = Recorded By, (t) = Taken By, (c) = Cosigned By    Initials Name Provider Type    AS Marily Llamas PTA Physical Therapy Assistant                             Physical Therapy Education                 Title: PT OT SLP Therapies (In Progress)     Topic: Physical Therapy (In Progress)     Point: Mobility training (In Progress)     Learning Progress Summary           Patient Acceptance, E, NR by AS at 11/3/2021 1104    Acceptance, E,TB, VU by KR at 11/3/2021 0301    Acceptance, E, VU,NR by LM at 10/31/2021 1319                   Point: Home exercise program (In Progress)     Learning Progress Summary           Patient Acceptance, E, NR by AS at 11/3/2021 1104    Acceptance, E,TB, VU by KR at 11/3/2021 0301                   Point: Precautions (In Progress)     Learning Progress Summary           Patient Acceptance, E, NR by AS at 11/3/2021 1104    Acceptance, E,TB, VU by KR at 11/3/2021 0301    Acceptance, E, VU,NR by LM at 10/31/2021 1319                               User Key     Initials Effective Dates Name Provider Type Discipline    AS 06/16/21 -  Marily Llamas PTA Physical Therapy Assistant PT    LM 06/16/21 -  Violet Schofield PT Physical Therapist PT    KR 01/04/21 -  Luz Marina Delcid, RN Registered Nurse Nurse              PT Recommendation and Plan     Plan of Care Reviewed With: patient  Progress: improving  Outcome Summary: patient ambulated 200 feet with CGA x1, no AD used but may benefit form rollling walker/cane  as patient with c/o bilateral knee pain with mobility. Completed B LE exercises with no complaints.     Time Calculation:    PT Charges     Row Name 11/03/21 1104             Time Calculation    Start Time 1000  -AS      PT Received On 11/03/21  -AS      PT Goal Re-Cert Due Date 11/10/21  -AS              Timed Charges    34035 - PT Therapeutic Exercise Minutes 8  -AS      03071 - Gait Training Minutes  16  -AS              Total Minutes    Timed Charges Total Minutes 24  -AS       Total Minutes 24  -AS            User Key  (r) = Recorded By, (t) = Taken By, (c) = Cosigned By    Initials Name Provider Type    AS Marily Llamas PTA Physical Therapy Assistant              Therapy Charges for Today     Code Description Service Date Service Provider Modifiers Qty    79014256178 HC PT THER PROC EA 15 MIN 11/3/2021 Marily Llamas PTA GP 1    49209597346 HC GAIT TRAINING EA 15 MIN 11/3/2021 Marily Llamas PTA GP 1          PT G-Codes  Outcome Measure Options: AM-PAC 6 Clicks Basic Mobility (PT)  AM-PAC 6 Clicks Score (PT): 20  AM-PAC 6 Clicks Score (OT): 23    Marily Llamas PTA  11/3/2021

## 2021-11-03 NOTE — PROGRESS NOTES
Clinical Nutrition   Reason For Visit: NPO/Clear liquid    Patient Name: Khalif Joshua  YOB: 1950  MRN: [id number]  Date of Encounter: 11/03/21 12:39 EDT  Admission date: 10/30/2021      NPO/Clear liquids >/= 3 days  Will order Boost Breeze with meals to begin when PO diet initiated.    Nutrition Assessment     Admission Problem List:  Abdominal pain  Abdominal sepsis  Perforated diverticulitis  Peritonitis  Free air      Applicable PMH:  HTN, HLD  Diverticulitis  OA      Applicable medical tests/procedures since admission:  (10/30) s/p exploratory laparotomy, sigmoid colectomy, end colostomy, abdominal washout;   NGT to LWS initiated      Reported/Observed/Food/Nutrition Related History   Patient and wife present during visit. Patient's wife reports patient's last solid food intake was Friday evening (10/29). Minimal PO intake since then. NGT clamped at this time. Hopeful to have NGT removed today. Agrees to try orange Boost Breeze once clear liquid diet initiated. Wife requesting nutrition education r/t diverticulitis/new colostomy prior to discharge.    Anthropometrics   Height: 71 in  Weight: 219 lbs (bed scale weight 11/3)  BMI: 30.5  BMI classification: Obese Class I: 30-34.9kg/m2   IBW: 172 lbs    Labs reviewed   Labs reviewed: Yes    Medications reviewed   Medications reviewed: Yes  Pertinent: colace, senekot, antibiotic  GTT: 1/2 normal saline + D5% + KCl @ 50 ml/hr    Current Nutrition Prescription   PO: NPO Diet NPO Except: Ice Chips, Sips With Meds    Average PO intake: insufficient data    Nutrition Diagnosis     Problem Inadequate oral intake   Etiology Altered GI function s/p surgery   Signs/Symptoms NPO/Clear liquids >/= 3 days       Problem Food and nutrition knowledge deficit   Etiology No prior need for edu   Signs/Symptoms New colostomy       Intervention   Intervention: Follow treatment progress, Care plan reviewed, Interview for preferences, Await begin PO, Supplement  provided    -Ordered Boost Breeze with meals to begin when PO diet initiated.  -Will provide colostomy nutrition education prior to discharge.    Goal:   General: Nutrition support treatment, Provide information regarding MNT therapy  PO: Initiate diet as medically appropriate    Monitoring/Evaluation:   Monitoring/Evaluation: Per protocol, I&O, Pertinent labs, Weight, GI status, Symptoms    Sonia Pro RD  Time Spent: 30 min

## 2021-11-03 NOTE — CASE MANAGEMENT/SOCIAL WORK
Continued Stay Note  [facility]     Patient Name: Khalif Joshua  MRN: [id number]  Today's Date: 11/3/2021    Admit Date: 10/30/2021     Discharge Plan     Row Name 11/03/21 1101       Plan    Plan Comments NG tube clamped today and ID is following. CM will follow stephen arrange and discharge needs as appropriate.    Final Discharge Disposition Code 01 - home or self-care               Discharge Codes    No documentation.               Expected Discharge Date and Time     Expected Discharge Date Expected Discharge Time    Nov 4, 2021             Tri Elizondo RN

## 2021-11-03 NOTE — PROGRESS NOTES
"Patient Name:  Khalif Joshua  YOB: 1950  [id number]    Surgery Progress Note    Date of visit: 11/3/2021      Subjective: No acute events overnight.  Main complaints are irritation from the NG tube as well as pain in his hips and back.  No significant colostomy output yet.  250 mL from the NG tube yesterday.  Urine output 1100.  MARCO drain 105.  Ambulated in the hallway yesterday.  Denies fevers or chills          Objective:     /96 (BP Location: Left arm, Patient Position: Lying)   Pulse 89   Temp 98.7 °F (37.1 °C) (Oral)   Resp 18   Ht 180.3 cm (71\")   Wt 99.8 kg (220 lb)   SpO2 93%   BMI 30.68 kg/m²     Intake/Output Summary (Last 24 hours) at 11/3/2021 0913  Last data filed at 11/3/2021 0530  Gross per 24 hour   Intake 987.8 ml   Output 1430 ml   Net -442.2 ml       GEN:   Awake, alert, in no acute distress, resting comfortably in bed   CV:   Regular rate and rhythm  L:  Clear to auscultation bilaterally, not labored on room air   Abd:  Soft, appropriately tender palpation along incision, incision with small amount of serous drainage from open aspects of the incision, colostomy is pink and patent on the left side without significant stool in the bag, MARCO on the right side is serosanguineous  Ext:  No cyanosis, clubbing, or edema    Recent labs that are back at this time have been reviewed.           Assessment/ Plan:    Mr. Joshua is a 71-year-old gentleman with history of hypertension and past diverticulitis who presents with abdominal sepsis and concern for perforated diverticulitis.      #Perforated diverticulitis  #Peritonitis  -Postoperative day 4 after open sigmoid colectomy with end colostomy creation and abdominal washout  -Vital signs are stable.      Labs with leukocytosis that is trending up.  16,000 today.  Otherwise without acute findings  -We will clamp NG tube today.  Check residuals.  -Awaiting return of bowel function through the colostomy  -Continue IV fluids at 50 cc/h.  " Had 1100 mL of urine output yesterday  -Pain appears to be controlled with scheduled Tylenol, gabapentin, Flexeril.  As needed Percocet  -Continue PPI  -Continue IV Zosyn.  ID is following.  Intra-abdominal cultures with E. coli and Pseudomonas  -Has a gradually worsening leukocytosis and has not had return of bowel function yet.  He is high risk for intra-abdominal abscess formation and potentially rectal stump leak given that his colon was unprepped and he had a large stool burden.  Only postoperative day 4 at this point.  Unless labs are improved tomorrow we will tentatively plan to proceed forward with a CT abdomen pelvis tomorrow after things have had more time to coalesce. Discussed with Dr. Domo Ferguson MD  11/3/2021  09:13 EDT

## 2021-11-03 NOTE — NURSING NOTE
WO follow up for assessment/education: Colostomy     Stoma size, appearance, stool and status of appliance: Colostomy still irregular shaped, pale pink, slightly elevated above skin level, appliance that was changed yesterday is in place intact.  Still with serosanguineous, no stool.  Patient is complaining of pain, sore throat, patient seems unmotivated and per wife she noticed that he seems more depressed today.    Current appliance in place:   Ellis 1 piece flat     Last appliance change:  11/1/2021     Surgery date:  10-30     Education provided:   Patient woke briefly during visit, I was able to check appliance, he stated he had no questions but did complain of pain and sore throat.  Patient's wife was focused on his comfort today, she did not have any further questions about pouch change, we will reinforce more tomorrow and possibly change appliance tomorrow or Friday.     WOCN will continue to follow daily. Please contact with questions or if new needs arise.

## 2021-11-03 NOTE — PLAN OF CARE
Goal Outcome Evaluation:  Plan of Care Reviewed With: patient      VSS. NSR on telemetry monitor. Pt has maintained adequate oxygen saturations on room air. Pt has had no c/o n/v. Pt has had some c/o pain, and verbalizes relief with PRN PO pain medication. Pt has also been irritable about NG placement and is hopeful that we can remove it today. He has not had any ouput per NG tube and is currently clamped. Bowel sounds are active and pt has passed flatus. Ostomy has serousanguineous output, though still no stool. Pt has otherwise remained calm and pleasant and has rested peacefully. No other problems identified at this time. Will continue to monitor.

## 2021-11-04 ENCOUNTER — APPOINTMENT (OUTPATIENT)
Dept: CT IMAGING | Facility: HOSPITAL | Age: 71
End: 2021-11-04

## 2021-11-04 LAB
ANION GAP SERPL CALCULATED.3IONS-SCNC: 10 MMOL/L (ref 5–15)
BACTERIA SPEC AEROBE CULT: NORMAL
BACTERIA SPEC AEROBE CULT: NORMAL
BUN SERPL-MCNC: 17 MG/DL (ref 8–23)
BUN/CREAT SERPL: 21.8 (ref 7–25)
CALCIUM SPEC-SCNC: 8.8 MG/DL (ref 8.6–10.5)
CHLORIDE SERPL-SCNC: 97 MMOL/L (ref 98–107)
CO2 SERPL-SCNC: 25 MMOL/L (ref 22–29)
CREAT SERPL-MCNC: 0.78 MG/DL (ref 0.76–1.27)
DEPRECATED RDW RBC AUTO: 46.6 FL (ref 37–54)
ERYTHROCYTE [DISTWIDTH] IN BLOOD BY AUTOMATED COUNT: 13.8 % (ref 12.3–15.4)
GFR SERPL CREATININE-BSD FRML MDRD: 98 ML/MIN/1.73
GLUCOSE SERPL-MCNC: 123 MG/DL (ref 65–99)
HCT VFR BLD AUTO: 41.4 % (ref 37.5–51)
HGB BLD-MCNC: 13.6 G/DL (ref 13–17.7)
MCH RBC QN AUTO: 30.2 PG (ref 26.6–33)
MCHC RBC AUTO-ENTMCNC: 32.9 G/DL (ref 31.5–35.7)
MCV RBC AUTO: 92 FL (ref 79–97)
PLATELET # BLD AUTO: 308 10*3/MM3 (ref 140–450)
PMV BLD AUTO: 9.1 FL (ref 6–12)
POTASSIUM SERPL-SCNC: 5 MMOL/L (ref 3.5–5.2)
RBC # BLD AUTO: 4.5 10*6/MM3 (ref 4.14–5.8)
SODIUM SERPL-SCNC: 132 MMOL/L (ref 136–145)
WBC # BLD AUTO: 14.29 10*3/MM3 (ref 3.4–10.8)

## 2021-11-04 PROCEDURE — C1894 INTRO/SHEATH, NON-LASER: HCPCS

## 2021-11-04 PROCEDURE — 25010000002 ENOXAPARIN PER 10 MG: Performed by: SURGERY

## 2021-11-04 PROCEDURE — C1751 CATH, INF, PER/CENT/MIDLINE: HCPCS

## 2021-11-04 PROCEDURE — 25010000002 PIPERACILLIN SOD-TAZOBACTAM PER 1 G: Performed by: SURGERY

## 2021-11-04 PROCEDURE — 25010000002 PIPERACILLIN SOD-TAZOBACTAM PER 1 G: Performed by: INTERNAL MEDICINE

## 2021-11-04 PROCEDURE — 02HV33Z INSERTION OF INFUSION DEVICE INTO SUPERIOR VENA CAVA, PERCUTANEOUS APPROACH: ICD-10-PCS | Performed by: INTERNAL MEDICINE

## 2021-11-04 PROCEDURE — 25010000002 IOPAMIDOL 61 % SOLUTION: Performed by: SURGERY

## 2021-11-04 PROCEDURE — 80048 BASIC METABOLIC PNL TOTAL CA: CPT | Performed by: SURGERY

## 2021-11-04 PROCEDURE — 74177 CT ABD & PELVIS W/CONTRAST: CPT

## 2021-11-04 PROCEDURE — 99232 SBSQ HOSP IP/OBS MODERATE 35: CPT | Performed by: INTERNAL MEDICINE

## 2021-11-04 PROCEDURE — 85027 COMPLETE CBC AUTOMATED: CPT | Performed by: SURGERY

## 2021-11-04 RX ORDER — SODIUM CHLORIDE 0.9 % (FLUSH) 0.9 %
10 SYRINGE (ML) INJECTION AS NEEDED
Status: DISCONTINUED | OUTPATIENT
Start: 2021-11-04 | End: 2021-11-06 | Stop reason: HOSPADM

## 2021-11-04 RX ADMIN — ACETAMINOPHEN 650 MG: 325 TABLET, FILM COATED ORAL at 05:38

## 2021-11-04 RX ADMIN — GABAPENTIN 100 MG: 100 CAPSULE ORAL at 05:38

## 2021-11-04 RX ADMIN — POTASSIUM CHLORIDE, DEXTROSE MONOHYDRATE AND SODIUM CHLORIDE 50 ML/HR: 150; 5; 450 INJECTION, SOLUTION INTRAVENOUS at 05:43

## 2021-11-04 RX ADMIN — TAZOBACTAM SODIUM AND PIPERACILLIN SODIUM 3.38 G: 375; 3 INJECTION, SOLUTION INTRAVENOUS at 05:41

## 2021-11-04 RX ADMIN — ACETAMINOPHEN 650 MG: 325 TABLET, FILM COATED ORAL at 17:11

## 2021-11-04 RX ADMIN — CYCLOBENZAPRINE 10 MG: 10 TABLET, FILM COATED ORAL at 20:10

## 2021-11-04 RX ADMIN — SENNOSIDES 1 TABLET: 8.6 TABLET, FILM COATED ORAL at 20:10

## 2021-11-04 RX ADMIN — CYCLOBENZAPRINE 10 MG: 10 TABLET, FILM COATED ORAL at 05:38

## 2021-11-04 RX ADMIN — LANSOPRAZOLE 15 MG: KIT at 05:39

## 2021-11-04 RX ADMIN — CYCLOBENZAPRINE 10 MG: 10 TABLET, FILM COATED ORAL at 14:58

## 2021-11-04 RX ADMIN — GABAPENTIN 100 MG: 100 CAPSULE ORAL at 20:10

## 2021-11-04 RX ADMIN — GABAPENTIN 100 MG: 100 CAPSULE ORAL at 14:58

## 2021-11-04 RX ADMIN — OXYCODONE HYDROCHLORIDE AND ACETAMINOPHEN 1 TABLET: 7.5; 325 TABLET ORAL at 03:59

## 2021-11-04 RX ADMIN — LOSARTAN POTASSIUM 100 MG: 25 TABLET, FILM COATED ORAL at 08:38

## 2021-11-04 RX ADMIN — TAZOBACTAM SODIUM AND PIPERACILLIN SODIUM 4.5 G: 500; 4 INJECTION, SOLUTION INTRAVENOUS at 22:12

## 2021-11-04 RX ADMIN — OXYCODONE HYDROCHLORIDE AND ACETAMINOPHEN 1 TABLET: 7.5; 325 TABLET ORAL at 12:57

## 2021-11-04 RX ADMIN — IOPAMIDOL 95 ML: 612 INJECTION, SOLUTION INTRAVENOUS at 13:38

## 2021-11-04 RX ADMIN — ENOXAPARIN SODIUM 40 MG: 40 INJECTION SUBCUTANEOUS at 08:38

## 2021-11-04 RX ADMIN — DOCUSATE SODIUM 100 MG: 100 CAPSULE, LIQUID FILLED ORAL at 08:38

## 2021-11-04 RX ADMIN — TAZOBACTAM SODIUM AND PIPERACILLIN SODIUM 4.5 G: 500; 4 INJECTION, SOLUTION INTRAVENOUS at 15:18

## 2021-11-04 RX ADMIN — ACETAMINOPHEN 650 MG: 325 TABLET, FILM COATED ORAL at 12:03

## 2021-11-04 RX ADMIN — OXYCODONE HYDROCHLORIDE AND ACETAMINOPHEN 1 TABLET: 7.5; 325 TABLET ORAL at 20:10

## 2021-11-04 RX ADMIN — DOCUSATE SODIUM 100 MG: 100 CAPSULE, LIQUID FILLED ORAL at 20:10

## 2021-11-04 NOTE — PROGRESS NOTES
INFECTIOUS DISEASE Progress Note    Khalif Joshua  1950  [id number]    Admission Date: 10/30/2021      Requesting Provider: No Known Provider  Evaluating Physician: Oliver Oliveros MD    Reason for Consultation: Perforated diverticulitis/sepsis    History of present illness:    11/1/2021:  Patient is a 71 y.o. male Who is seen today for evaluation of  Perforated diverticulitis with severe sepsis.  He has a history of prior diverticulitis and presented to the emergency room on 10/30 Sudden onset of severe left lower quadrant pain.  He took Cipro/Flagyl at home but continued to have rapidly worsening pain.  When he presented to the emergency room, radiographic studies revealed pneumoperitoneum.   He had a white blood cell count of 19.1, and azotemia with a BUN of 36. His lactic acid has been up to 4.2. In addition, he had acute hypoxic respiratory failure requiring 3 L of oxygen.  An abdominal/pelvic CT scan revealed sigmoid colonic diverticulitis with pneumoperitoneum.  He was taken to the OR promptly by Dr. Ferguson  and underwent sigmoid colectomy with colostomy for his perforated diverticulitis. His maximum temperature since admission is 99.9°.   He is growing a gram-negative bacillus from his peritoneal fluid.  Blood cultures are no growth so far.  He is on intravenous Zosyn therapy.  He currently complains of moderate diffuse abdominal pain.  He has a history of 3 prior episodes of diverticulitis treated with oral antibiotics as an outpatient.  His peritoneal cultures are now growing E. coli and Pseudomonas.    11/2/21: He has decreased abdominal pain.  He has remained afebrile.  He denies nausea and vomiting.  His nasogastric tube remains in place.  His abdominal cultures have grown Bacteroides, Zosyn sensitive Pseudomonas, and Zosyn sensitive E. Coli. His white blood cell count today is 13.1    11/3/21: He continues to have mild diffuse abdominal pain.  He has remained afebrile. Continues to require a  nasogastric tube. He has worsening leukocytosis.  He denies cough and sputum production.    Past Medical History:   Diagnosis Date   • Diverticulitis    • Hyperlipemia    • Hypertension    • Osteoarthritis        Past Surgical History:   Procedure Laterality Date   • EXPLORATORY LAPAROTOMY N/A 10/30/2021    Procedure: LAPAROTOMY EXPLORATORY, SIGMOID COLECTOMY,  END COLOSTOMY;  Surgeon: Jose Ferguson MD;  Location: [facility];  Service: General;  Laterality: N/A;   • KNEE ARTHROSCOPY Bilateral 2001       Family History   Problem Relation Age of Onset   • Heart disease Father        Social History     Socioeconomic History   • Marital status:    Tobacco Use   • Smoking status: Never Smoker   • Smokeless tobacco: Never Used   Substance and Sexual Activity   • Alcohol use: Not Currently   • Drug use: Never       No Known Allergies      Medication:    Current Facility-Administered Medications:   •  acetaminophen (TYLENOL) tablet 650 mg, 650 mg, Oral, Q6H, Jose Ferguson MD, 650 mg at 11/03/21 0906  •  cyclobenzaprine (FLEXERIL) tablet 10 mg, 10 mg, Oral, Q8H, Zoey Doherty MD, 10 mg at 11/03/21 1320  •  dextrose 5 % and sodium chloride 0.45 % with KCl 20 mEq/L infusion, 50 mL/hr, Intravenous, Continuous, Jose Ferguson MD, Last Rate: 50 mL/hr at 11/03/21 0919, 50 mL/hr at 11/03/21 0919  •  diphenhydrAMINE (BENADRYL) injection 25 mg, 25 mg, Intravenous, Q6H PRN, Jose Ferguson MD  •  docusate sodium (COLACE) capsule 100 mg, 100 mg, Oral, BID, Jose Ferguson MD  •  enoxaparin (LOVENOX) syringe 40 mg, 40 mg, Subcutaneous, Q24H, Jose Ferguson MD, 40 mg at 11/03/21 0907  •  First Mouthwash (Magic Mouthwash) 10 mL, 10 mL, Swish & Spit, Q6H PRN, Zoey Doherty MD  •  gabapentin (NEURONTIN) capsule 100 mg, 100 mg, Oral, Q8H, Jose Ferguson MD, 100 mg at 11/03/21 1320  •  influenza vac split quad (FLUZONE,FLUARIX,AFLURIA,FLULAVAL) injection 0.5 mL, 0.5 mL, Intramuscular, During  Hospitalization, Zoey Doherty MD  •  lansoprazole (FIRST) oral suspension 15 mg, 15 mg, Per G Tube, Q AM, Jose Ferguson MD, 15 mg at 11/03/21 0519  •  losartan (COZAAR) tablet 100 mg, 100 mg, Oral, Q24H, Zoey Doherty MD, 100 mg at 11/03/21 0906  •  morphine injection 2 mg, 2 mg, Intravenous, Q2H PRN, 2 mg at 11/03/21 1703 **AND** naloxone (NARCAN) injection 0.4 mg, 0.4 mg, Intravenous, Q5 Min PRN, Jose Ferguson MD  •  naloxone (NARCAN) injection 0.1 mg, 0.1 mg, Intravenous, Q5 Min PRN, Jose Ferguson MD  •  ondansetron (ZOFRAN) tablet 4 mg, 4 mg, Oral, Q6H PRN **OR** ondansetron (ZOFRAN) injection 4 mg, 4 mg, Intravenous, Q6H PRN, Jose Ferguson MD  •  oxyCODONE-acetaminophen (PERCOCET) 7.5-325 MG per tablet 1 tablet, 1 tablet, Oral, Q4H PRN, Pako Lee MD, 1 tablet at 11/03/21 1337  •  phenol (CHLORASEPTIC) 1.4 % liquid 2 spray, 2 spray, Mouth/Throat, Q2H PRN, Zoey Doherty MD, 2 spray at 10/31/21 1719  •  piperacillin-tazobactam (ZOSYN) 3.375 g in iso-osmotic dextrose 50 ml (premix), 3.375 g, Intravenous, Q8H, Jose Ferguson MD, 3.375 g at 11/03/21 1320  •  potassium phosphate 15 mmol in sodium chloride 0.9 % 100 mL infusion, 15 mmol, Intravenous, PRN, Zoey Doherty MD  •  senna (SENOKOT) tablet 1 tablet, 1 tablet, Oral, Nightly, Jose Ferguson MD  •  sodium chloride 0.9 % flush 10 mL, 10 mL, Intravenous, PRN, Jose Ferguson MD, 10 mL at 10/31/21 2301  •  sodium chloride 0.9 % flush 10 mL, 10 mL, Intravenous, PRN, Mary Graves MD, 10 mL at 11/01/21 2215    Antibiotics:  Anti-Infectives (From admission, onward)    Ordered     Dose/Rate Route Frequency Start Stop    10/30/21 1746  piperacillin-tazobactam (ZOSYN) 3.375 g in iso-osmotic dextrose 50 ml (premix)        Ordering Provider: Jose Ferguson MD    3.375 g  over 30 Minutes Intravenous Every 8 Hours 10/30/21 2200 11/07/21 0910    10/30/21 1321  piperacillin-tazobactam (ZOSYN) 4.5 g in iso-osmotic  dextrose 100 mL IVPB (premix)        Ordering Provider: Raul Gutierrez DO    4.5 g  over 30 Minutes Intravenous Once 10/30/21 1323 10/30/21 1438            Review of Systems:  See HPI    Physical Exam:   Vital Signs  Temp (24hrs), Av.5 °F (36.9 °C), Min:98.3 °F (36.8 °C), Max:98.7 °F (37.1 °C)    Temp  Min: 98.3 °F (36.8 °C)  Max: 98.7 °F (37.1 °C)  BP  Min: 121/84  Max: 124/96  Pulse  Min: 89  Max: 93  Resp  Min: 18  Max: 18  SpO2  Min: 92 %  Max: 93 %    GENERAL: Awake and alert, in no acute distress.   HEENT: Normocephalic, atraumatic.  PERRL. EOMI. No conjunctival injection. No icterus. Oropharynx clear without evidence of thrush or exudate. No evidence of peridontal disease.  He has a nasogastric tube in place.  NECK: Supple   HEART: RRR; No murmur, rubs, gallops.   LUNGS: Clear to auscultation bilaterally without wheezing, rales, rhonchi. Normal respiratory effort. Nonlabored. .  ABDOMEN: He has mild diffuse abdominal tenderness  EXT:  No cyanosis, clubbing or edema.  :  Without Enrique catheter.  MSK: No focal joint swelling or erythema  SKIN: Warm and dry without cutaneous eruptions on Inspection/palpation.    NEURO: Oriented to PPT.  Motor 5/5 strength bilaterally  PSYCHIATRIC: Normal insight and judgement. Cooperative with PE    Laboratory Data    Results from last 7 days   Lab Units 21  0506 21  0900 21  0724   WBC 10*3/mm3 16.63* 13.07* 11.19*   HEMOGLOBIN g/dL 13.3 12.7* 12.5*   HEMATOCRIT % 41.5 39.3 38.8   PLATELETS 10*3/mm3 279 252 229     Results from last 7 days   Lab Units 21  0506   SODIUM mmol/L 135*   POTASSIUM mmol/L 4.1   CHLORIDE mmol/L 101   CO2 mmol/L 24.0   BUN mg/dL 18   CREATININE mg/dL 0.68*   GLUCOSE mg/dL 100*   CALCIUM mg/dL 8.6     Results from last 7 days   Lab Units 10/31/21  0812   ALK PHOS U/L 40   BILIRUBIN mg/dL 0.5   ALT (SGPT) U/L 14   AST (SGOT) U/L 18             Results from last 7 days   Lab Units 10/31/21  0233   LACTATE mmol/L 3.2*              Estimated Creatinine Clearance: 103.2 mL/min (A) (by C-G formula based on SCr of 0.68 mg/dL (L)).      Microbiology:  Blood Culture   Date Value Ref Range Status   10/30/2021 No growth at 24 hours  Preliminary     No results found for: BCIDPCR, CXREFLEX, CSFCX, CULTURETIS  No results found for: CULTURES, HSVCX, URCX  No results found for: EYECULTURE, GCCX, HSVCULTURE, LABHSV  No results found for: LEGIONELLA, MRSACX, MUMPSCX, MYCOPLASCX  No results found for: NOCARDIACX, STOOLCX  No results found for: THROATCX, UNSTIMCULT, URINECX, CULTURE, VZVCULTUR  No results found for: VIRALCULTU, WOUNDCX        Radiology:  Imaging Results (Last 72 Hours)     ** No results found for the last 72 hours. **      I read his radiographic studies with Dr. Light.  He has diffuse, pancolonic diverticulosis.    Impression:   1.  Perforated diverticulitis-with severe intra-abdominal infection, status post sigmoid colectomy and colostomy on 10/30.  Peritoneal cultures are growing Pseudomonas, E. Coli, and Bacteroides.  All of these are sensitive to Zosyn  2.  Severe sepsis- he had a blood pressure down to 89 systolic, tachycardia with a heart rate of 117, lactic acidosis, acute hypoxic respiratory failure, azotemia, leukocytosis/neutrophilia, and a known focus of intra-abdominal infection.  3.  Leukocytosis/neutrophilia-Worsening.  4.  Azotemia/volume depletion  5.  Acute hypoxic respiratory failure    PLAN/RECOMMENDATIONS:   1.  Continue intravenous Zosyn  2.  Intra-abdominal culture  3.  PICC line placement tomorrow      I discussed the potential risks and benefits of PICC line placement with him and his wife in detail today.  I discussed his complex situation with Dr. Ferguson today.  If he worsens with increasing leukocytosis, fevers, or increased abdominal symptoms, we will proceed with a follow-up abdominal/pelvic CT scan to look for intra-abdominal abscess.  I discussed this complex situation with the patient and his wife  today.    Oliver Oliveros MD  11/3/2021  20:29 EDT

## 2021-11-04 NOTE — PROGRESS NOTES
"Patient Name:  Khalif Joshua  YOB: 1950  [id number]    Surgery Progress Note    Date of visit: 11/4/2021      Subjective: No acute events overnight. Denies nausea or vomiting with NGT removed yesterday. Has had some flatus from the colostomy but no stool yet. Denies fevers or chills. Ambulating in the hallway multiple times per day. Tolerated popsicles yesterday. No fevers or chills           Objective:     /79 (BP Location: Left arm, Patient Position: Lying)   Pulse 90   Temp 98.3 °F (36.8 °C) (Oral)   Resp 18   Ht 180.3 cm (71\")   Wt 99.3 kg (219 lb)   SpO2 93%   BMI 30.54 kg/m²     Intake/Output Summary (Last 24 hours) at 11/4/2021 0932  Last data filed at 11/4/2021 0800  Gross per 24 hour   Intake 1943 ml   Output 2448 ml   Net -505 ml       GEN:   Awake, alert, in no acute distress, resting comfortably in bed   CV:   Regular rate and rhythm  L:  Clear to auscultation bilaterally, not labored on room air   Abd:  Soft, appropriately tender to palpation along incision, incision with mild serous drainage and no significant erythema, drain serosang, colostomy pink on the left side  Ext:  No cyanosis, clubbing, or edema    Recent labs that are back at this time have been reviewed.           Assessment/ Plan:    Mr. Joshua is a 71-year-old gentleman with history of hypertension and past diverticulitis who presents with abdominal sepsis and concern for perforated diverticulitis.      #Perforated diverticulitis  #Peritonitis  -Postoperative day 5 after open sigmoid colectomy with end colostomy creation and abdominal washout  -Vital signs are stable. Leukocytosis down slightly at 14k  -Having flatus from the colostomy, no stool output yet  -Advance to clear liquid diet  -2500 mL of urine output yesterday. Will stop IV fluids and monitor Is/Os   -Pain appears to be controlled with scheduled Tylenol, gabapentin, Flexeril.  As needed Percocet  -Continue PPI  -Continue IV Zosyn.  ID is following.  " Intra-abdominal cultures with E. coli and Pseudomonas  -Plan for CT abd/pel due to persistent leukocytosis today          Jose Ferguson MD  11/4/2021  09:32 EDT

## 2021-11-04 NOTE — NURSING NOTE
WOC follow up for assessment/education: Colostomy     Stoma size, appearance, stool and status of appliance: Colostomy still irregular shaped, pale pink, slightly elevated above skin level, due for appliance change, this was done. Still with serosanguineous, no stool.  NG tube has been removed, patient is taking liquid diet, plans for CT scanning today.  Patient measured about 40 up-and-down by 44 side-to-side.    Current appliance in place:   Holley 1 piece flat     Last appliance change:  11/4/2021     Surgery date:  10-30     Education provided:   I was able to change the appliance with the wife's help, although we were rushed because towards the end of the appliance change process the patient was due to be transported to CT scan.  I showed her adhesive remover technique, cleansing around the stoma, barrier spray, how to cut the wafer with the irregularly-shaped stoma, and will apply new wafer.    Patient's wife states she would like our practice with this patient is in hospital.    WOCN will continue to follow daily. Please contact with questions or if new needs arise.

## 2021-11-04 NOTE — PROGRESS NOTES
INFECTIOUS DISEASE Progress Note    Khalif Joshua  1950  [id number]    Admission Date: 10/30/2021      Requesting Provider: No Known Provider  Evaluating Physician: Oliver Oliveros MD    Reason for Consultation: Perforated diverticulitis/sepsis    History of present illness:    11/1/2021:  Patient is a 71 y.o. male Who is seen today for evaluation of  Perforated diverticulitis with severe sepsis.  He has a history of prior diverticulitis and presented to the emergency room on 10/30 Sudden onset of severe left lower quadrant pain.  He took Cipro/Flagyl at home but continued to have rapidly worsening pain.  When he presented to the emergency room, radiographic studies revealed pneumoperitoneum.   He had a white blood cell count of 19.1, and azotemia with a BUN of 36. His lactic acid has been up to 4.2. In addition, he had acute hypoxic respiratory failure requiring 3 L of oxygen.  An abdominal/pelvic CT scan revealed sigmoid colonic diverticulitis with pneumoperitoneum.  He was taken to the OR promptly by Dr. Ferguson  and underwent sigmoid colectomy with colostomy for his perforated diverticulitis. His maximum temperature since admission is 99.9°.   He is growing a gram-negative bacillus from his peritoneal fluid.  Blood cultures are no growth so far.  He is on intravenous Zosyn therapy.  He currently complains of moderate diffuse abdominal pain.  He has a history of 3 prior episodes of diverticulitis treated with oral antibiotics as an outpatient.  His peritoneal cultures are now growing E. coli and Pseudomonas.    11/2/21: He has decreased abdominal pain.  He has remained afebrile.  He denies nausea and vomiting.  His nasogastric tube remains in place.  His abdominal cultures have grown Bacteroides, Zosyn sensitive Pseudomonas, and Zosyn sensitive E. Coli. His white blood cell count today is 13.1    11/3/21: He continues to have mild diffuse abdominal pain.  He has remained afebrile. Continues to require a  nasogastric tube. He has worsening leukocytosis.  He denies cough and sputum production.    11/4/21:  He remains afebrile. His white blood cell count is down today to 14.3. He has decreased abdominal pain.  His nasogastric tube is now out.  He has had minimal ostomy output.  A follow-up abdominal/pelvic CT scan today Reveals a few segments of borderline distended small bowel but there is no evidence of an abscess.    Past Medical History:   Diagnosis Date   • Diverticulitis    • Hyperlipemia    • Hypertension    • Osteoarthritis        Past Surgical History:   Procedure Laterality Date   • EXPLORATORY LAPAROTOMY N/A 10/30/2021    Procedure: LAPAROTOMY EXPLORATORY, SIGMOID COLECTOMY,  END COLOSTOMY;  Surgeon: Jose Ferguson MD;  Location: [facility];  Service: General;  Laterality: N/A;   • KNEE ARTHROSCOPY Bilateral 2001       Family History   Problem Relation Age of Onset   • Heart disease Father        Social History     Socioeconomic History   • Marital status:    Tobacco Use   • Smoking status: Never Smoker   • Smokeless tobacco: Never Used   Substance and Sexual Activity   • Alcohol use: Not Currently   • Drug use: Never       No Known Allergies      Medication:    Current Facility-Administered Medications:   •  acetaminophen (TYLENOL) tablet 650 mg, 650 mg, Oral, Q6H, Jose Ferguson MD, 650 mg at 11/04/21 0538  •  cyclobenzaprine (FLEXERIL) tablet 10 mg, 10 mg, Oral, Q8H, Zoey Doherty MD, 10 mg at 11/04/21 0538  •  dextrose 5 % and sodium chloride 0.45 % with KCl 20 mEq/L infusion, 50 mL/hr, Intravenous, Continuous, Jose Ferguson MD, Last Rate: 50 mL/hr at 11/04/21 0543, 50 mL/hr at 11/04/21 0543  •  diphenhydrAMINE (BENADRYL) injection 25 mg, 25 mg, Intravenous, Q6H PRN, Jose Ferguson MD  •  docusate sodium (COLACE) capsule 100 mg, 100 mg, Oral, BID, Jose Ferguson MD, 100 mg at 11/03/21 2146  •  enoxaparin (LOVENOX) syringe 40 mg, 40 mg, Subcutaneous, Q24H, Jose Ferguson  MD, 40 mg at 11/03/21 0907  •  First Mouthwash (Magic Mouthwash) 10 mL, 10 mL, Swish & Spit, Q6H PRN, Zoey Doherty MD  •  gabapentin (NEURONTIN) capsule 100 mg, 100 mg, Oral, Q8H, Jose Ferguson MD, 100 mg at 11/04/21 0538  •  influenza vac split quad (FLUZONE,FLUARIX,AFLURIA,FLULAVAL) injection 0.5 mL, 0.5 mL, Intramuscular, During Hospitalization, Zoey Doherty MD  •  lansoprazole (FIRST) oral suspension 15 mg, 15 mg, Per G Tube, Q AM, Jose Ferguson MD, 15 mg at 11/04/21 0539  •  losartan (COZAAR) tablet 100 mg, 100 mg, Oral, Q24H, Zoey Doherty MD, 100 mg at 11/03/21 0906  •  morphine injection 2 mg, 2 mg, Intravenous, Q2H PRN, 2 mg at 11/03/21 2339 **AND** naloxone (NARCAN) injection 0.4 mg, 0.4 mg, Intravenous, Q5 Min PRN, Jose Ferguson MD  •  naloxone (NARCAN) injection 0.1 mg, 0.1 mg, Intravenous, Q5 Min PRN, Jose Ferguson MD  •  ondansetron (ZOFRAN) tablet 4 mg, 4 mg, Oral, Q6H PRN **OR** ondansetron (ZOFRAN) injection 4 mg, 4 mg, Intravenous, Q6H PRN, Jose Ferguson MD  •  oxyCODONE-acetaminophen (PERCOCET) 7.5-325 MG per tablet 1 tablet, 1 tablet, Oral, Q4H PRN, Pako Lee MD, 1 tablet at 11/04/21 0359  •  phenol (CHLORASEPTIC) 1.4 % liquid 2 spray, 2 spray, Mouth/Throat, Q2H PRN, Zoey Doherty MD, 2 spray at 10/31/21 1719  •  piperacillin-tazobactam (ZOSYN) 4.5 g in iso-osmotic dextrose 100 mL IVPB (premix), 4.5 g, Intravenous, Q8H, Oliver Oliveros MD  •  potassium phosphate 15 mmol in sodium chloride 0.9 % 100 mL infusion, 15 mmol, Intravenous, PRN, Zoey Doherty MD  •  senna (SENOKOT) tablet 1 tablet, 1 tablet, Oral, Nightly, Jose Ferguson MD, 1 tablet at 11/03/21 2145  •  sodium chloride 0.9 % flush 10 mL, 10 mL, Intravenous, PRN, Jose Ferguson MD, 10 mL at 10/31/21 2301  •  sodium chloride 0.9 % flush 10 mL, 10 mL, Intravenous, PRN, Mary Graves MD, 10 mL at 11/01/21 2215    Antibiotics:  Anti-Infectives (From admission, onward)     Ordered     Dose/Rate Route Frequency Start Stop    21 0746  piperacillin-tazobactam (ZOSYN) 4.5 g in iso-osmotic dextrose 100 mL IVPB (premix)        Ordering Provider: Oliver Oliveros MD    4.5 g  over 4 Hours Intravenous Every 8 Hours 21 1400 21 0910    10/30/21 1321  piperacillin-tazobactam (ZOSYN) 4.5 g in iso-osmotic dextrose 100 mL IVPB (premix)        Ordering Provider: Raul Gutierrez DO    4.5 g  over 30 Minutes Intravenous Once 10/30/21 1323 10/30/21 1438            Review of Systems:  See HPI    Physical Exam:   Vital Signs  Temp (24hrs), Av.3 °F (36.8 °C), Min:97.8 °F (36.6 °C), Max:98.7 °F (37.1 °C)    Temp  Min: 97.8 °F (36.6 °C)  Max: 98.7 °F (37.1 °C)  BP  Min: 116/83  Max: 125/85  Pulse  Min: 89  Max: 96  Resp  Min: 18  Max: 18  SpO2  Min: 93 %  Max: 93 %    GENERAL: Awake and alert, in no acute distress.   HEENT: Normocephalic, atraumatic.  PERRL. EOMI. No conjunctival injection. No icterus. Oropharynx clear without evidence of thrush or exudate. No evidence of peridontal disease.  The nasogastric tube is now out  NECK: Supple   HEART: RRR; No murmur, rubs, gallops.   LUNGS: Clear to auscultation bilaterally without wheezing, rales, rhonchi. Normal respiratory effort. Nonlabored. .  ABDOMEN: He has mild diffuse abdominal tenderness  EXT:  No cyanosis, clubbing or edema.  :  Without Enrique catheter.  MSK: No focal joint swelling or erythema  SKIN: Warm and dry without cutaneous eruptions on Inspection/palpation.    NEURO: Oriented to PPT.  Motor 5/5 strength bilaterally  PSYCHIATRIC: Normal insight and judgement. Cooperative with PE    Laboratory Data    Results from last 7 days   Lab Units 21  0503 21  0506 21  0900   WBC 10*3/mm3 14.29* 16.63* 13.07*   HEMOGLOBIN g/dL 13.6 13.3 12.7*   HEMATOCRIT % 41.4 41.5 39.3   PLATELETS 10*3/mm3 308 279 252     Results from last 7 days   Lab Units 21  0503   SODIUM mmol/L 132*   POTASSIUM mmol/L 5.0   CHLORIDE  mmol/L 97*   CO2 mmol/L 25.0   BUN mg/dL 17   CREATININE mg/dL 0.78   GLUCOSE mg/dL 123*   CALCIUM mg/dL 8.8     Results from last 7 days   Lab Units 10/31/21  0812   ALK PHOS U/L 40   BILIRUBIN mg/dL 0.5   ALT (SGPT) U/L 14   AST (SGOT) U/L 18             Results from last 7 days   Lab Units 10/31/21  0233   LACTATE mmol/L 3.2*             Estimated Creatinine Clearance: 103.2 mL/min (by C-G formula based on SCr of 0.78 mg/dL).      Microbiology:  Blood Culture   Date Value Ref Range Status   10/30/2021 No growth at 24 hours  Preliminary     No results found for: BCIDPCR, CXREFLEX, CSFCX, CULTURETIS  No results found for: CULTURES, HSVCX, URCX  No results found for: EYECULTURE, GCCX, HSVCULTURE, LABHSV  No results found for: LEGIONELLA, MRSACX, MUMPSCX, MYCOPLASCX  No results found for: NOCARDIACX, STOOLCX  No results found for: THROATCX, UNSTIMCULT, URINECX, CULTURE, VZVCULTUR  No results found for: VIRALCULTU, WOUNDCX        Radiology:  Imaging Results (Last 72 Hours)     ** No results found for the last 72 hours. **      I read his radiographic studies with Dr. Light.  He has diffuse, pancolonic diverticulosis.    Impression:   1.  Perforated diverticulitis-with severe intra-abdominal infection, status post sigmoid colectomy and colostomy on 10/30.  Peritoneal cultures are growing Pseudomonas, E. Coli, and Bacteroides.  I will increase his Zosyn to 4.5 g IV every 8 hours.  2.  Severe sepsis- Improved  3.  Leukocytosis/neutrophilia- improved today  4.  Azotemia/volume depletion- improved  5.  Acute hypoxic respiratory failure- improved    PLAN/RECOMMENDATIONS:   1.  Increase Zosyn to 4.5 g IV every 8 hours  2.  CBC, ESR, and CRP in the morning  3.  PICC line placement    I discussed his complex situation with Dr. Ferguson today.  Overall, he appears to be improved.      Oliver Oliveros MD  11/4/2021  07:48 EDT

## 2021-11-04 NOTE — CONSULTS
Placed by Sherly Stewart Rn - confirmed with 3cg.  RUE single lumen PICC with 43cm total and 2cm exposed.

## 2021-11-04 NOTE — PROGRESS NOTES
Clinical Nutrition     Nutrition Education   Reason for Visit:   Criteria noted by RDN during nutrition assessment       Patient Name: Khalif Joshua  YOB: 1950  MRN: [id number]  Date of Encounter: 11/04/21 11:32 EDT  Admission date: 10/30/2021      Assessment   Applicable Diagnoses   Perforated diverticulitis with peritonitis s/p ex lap, sigmoid colectomy, end colostomy, abdominal washout (10/30/21)    Labs reviewed   Yes    Nutrition Diagnosis     Problem Food and nutrition knowledge deficit   Etiology No prior need for education   Signs/Symptoms New colostomy       Goal:     Increase knowledge on diet/nutrition effects on condition/status     Nutrition Intervention     RD provided patient and wife with colostomy nutrition education and written materials (from Nutrition Care Manual). RD reviewed low-fiber diet x 4-6 weeks post-op, slow re-introduction of high-fiber foods after 4-6 weeks, daily chewable/liquid MVI, food lists pertaining to gas/odor/blockage/diarrhea and stool thickening, appropriate oral nutrition supplements, and recommended daily protein intake for post-op wound healing. RD encouraged patient and wife to ask questions as they arise during this admission.    Monitoring/Evaluation:     RD to follow up PRN    Sonia Pro RD  Time Spent: 30 min

## 2021-11-04 NOTE — PROGRESS NOTES
[facility] Medicine Services  PROGRESS NOTE    Patient Name: Khalif Joshua  : 1950  MRN: [id number]    Date of Admission: 10/30/2021  Primary Care Physician: Renee Liriano MD    Subjective   Subjective     CC:  Follow-up med management    HPI: No acute events overnight, patient rested well has no new complaints this morning    ROS:  Gen- No fevers, chills  CV- No chest pain, palpitations  Resp- No cough, dyspnea  GI- No N/V/D, abd pain    All other systems reviewed and are negative  Objective   Objective     Vital Signs:   Temp:  [97.8 °F (36.6 °C)-98.7 °F (37.1 °C)] 98.3 °F (36.8 °C)  Heart Rate:  [89-96] 90  Resp:  [18] 18  BP: (116-125)/(83-96) 116/83     Physical Exam:  Constitutional: No acute distress, awake, alert  HENT: NCAT, mucous membranes moist  Respiratory: Clear to auscultation bilaterally, respiratory effort normal   Cardiovascular: RRR, no murmurs, rubs, or gallops  Gastrointestinal: Positive bowel sounds, incision CDI, colostomy in place  Musculoskeletal: No bilateral ankle edema  Psychiatric: Appropriate affect, cooperative  Neurologic: Oriented x 3, nonfocal  Skin: No rashes      Results Reviewed:  LAB RESULTS:      Lab 21  0503 21  0506 21  0900 21  0724 10/31/21  0812 10/31/21  0233 10/30/21  2013 10/30/21  1215 10/30/21  1215   WBC 14.29* 16.63* 13.07* 11.19* 10.82*  --   --    < > 19.05*   HEMOGLOBIN 13.6 13.3 12.7* 12.5* 13.2  --   --    < > 15.1   HEMATOCRIT 41.4 41.5 39.3 38.8 40.7  --   --    < > 45.9   PLATELETS 308 279 252 229 274  --   --    < > 304   NEUTROS ABS  --   --   --   --  8.76*  --   --   --  17.15*   EOS ABS  --   --   --   --  0.00  --   --   --  0.00   MCV 92.0 93.9 94.5 95.3 92.5  --   --    < > 93.3   PROCALCITONIN  --   --   --   --   --   --   --   --  0.19   LACTATE  --   --   --   --   --  3.2* 4.2*  --  2.6*    < > = values in this interval not displayed.         Lab 21  0503 21  0506  11/02/21  1407 11/01/21  1159 10/31/21  0812   SODIUM 132* 135* 137 139 139   POTASSIUM 5.0 4.1 4.6 4.8 4.6   CHLORIDE 97* 101 99 102 104   CO2 25.0 24.0 23.0 28.0 24.0   ANION GAP 10.0 10.0 15.0 9.0 11.0   BUN 17 18 18 21 31*   CREATININE 0.78 0.68* 0.81 0.69* 0.92   GLUCOSE 123* 100* 96 105* 153*   CALCIUM 8.8 8.6 9.0 8.8 8.3*   MAGNESIUM  --  2.4  --  2.7*  --    PHOSPHORUS  --  3.3 3.2 2.3*  --          Lab 10/31/21  0812 10/30/21  1215   TOTAL PROTEIN 6.1 7.2   ALBUMIN 3.60 4.50   GLOBULIN 2.5 2.7   ALT (SGPT) 14 18   AST (SGOT) 18 21   BILIRUBIN 0.5 0.4   ALK PHOS 40 62   LIPASE  --  18         Lab 10/31/21  0812 10/30/21  1215   TROPONIN T <0.010 <0.010                 Brief Urine Lab Results     None          Microbiology Results Abnormal     Procedure Component Value - Date/Time    Blood Culture - Blood, Hand, Left [[id number]]  (Normal) Collected: 10/30/21 1400    Lab Status: Preliminary result Specimen: Blood from Hand, Left Updated: 11/03/21 1515     Blood Culture No growth at 4 days    Blood Culture - Blood, Hand, Right [[id number]]  (Normal) Collected: 10/30/21 1355    Lab Status: Preliminary result Specimen: Blood from Hand, Right Updated: 11/03/21 1515     Blood Culture No growth at 4 days    AFB Culture - Peritoneal Fluid, Peritoneum [[id number]] Collected: 10/30/21 1553    Lab Status: Preliminary result Specimen: Peritoneal Fluid from Peritoneum Updated: 10/31/21 1136     AFB Stain No acid fast bacilli seen on concentrated smear    COVID PRE-OP / PRE-PROCEDURE SCREENING ORDER (NO ISOLATION) - Swab, Nasopharynx [[id number]]  (Normal) Collected: 10/30/21 1402    Lab Status: Final result Specimen: Swab from Nasopharynx Updated: 10/30/21 1441    Narrative:      The following orders were created for panel order COVID PRE-OP / PRE-PROCEDURE SCREENING ORDER (NO ISOLATION) - Swab, Nasopharynx.  Procedure                               Abnormality         Status                     ---------                                -----------         ------                     COVID-19, ABBOTT IN-HOUS...[[id number]]  Normal              Final result                 Please view results for these tests on the individual orders.    COVID-19, ABBOTT IN-HOUSE,NASAL Swab (NO TRANSPORT MEDIA) 2 HR TAT - Swab, Nasopharynx [[id number]]  (Normal) Collected: 10/30/21 1402    Lab Status: Final result Specimen: Swab from Nasopharynx Updated: 10/30/21 1441     COVID19 Presumptive Negative    Narrative:      Fact sheet for providers: https://www.fda.gov/media/450772/download     Fact sheet for patients: https://www.fda.gov/media/514214/download    Test performed by PCR.  If inconsistent with clinical signs and symptoms patient should be tested with different authorized molecular test.          No radiology results from the last 24 hrs        I have reviewed the medications:  Scheduled Meds:acetaminophen, 650 mg, Oral, Q6H  cyclobenzaprine, 10 mg, Oral, Q8H  docusate sodium, 100 mg, Oral, BID  enoxaparin, 40 mg, Subcutaneous, Q24H  gabapentin, 100 mg, Oral, Q8H  lansoprazole, 15 mg, Per G Tube, Q AM  losartan, 100 mg, Oral, Q24H  piperacillin-tazobactam, 4.5 g, Intravenous, Q8H  senna, 1 tablet, Oral, Nightly      Continuous Infusions:dextrose 5 % and sodium chloride 0.45 % with KCl 20 mEq/L, 50 mL/hr, Last Rate: 50 mL/hr (11/04/21 0543)      PRN Meds:.diphenhydrAMINE  •  First Mouthwash (Magic Mouthwash)  •  influenza vaccine  •  Morphine **AND** naloxone  •  naloxone  •  ondansetron **OR** ondansetron  •  oxyCODONE-acetaminophen  •  phenol  •  potassium phosphate  •  sodium chloride  •  sodium chloride    Assessment/Plan   Assessment & Plan     Active Hospital Problems    Diagnosis  POA   • Abnormal EKG [R94.31]  Yes   • Lactic acidosis [E87.2]  Yes   • Sepsis without acute organ dysfunction (HCC) [A41.9]  Yes   • Peritonitis (HCC) [K65.9]  Yes   • Acute diverticulitis [K57.92]  Yes   • Hypertension [I10]  Yes   • Hyperlipemia [E78.5]  Yes       Resolved Hospital Problems   No resolved problems to display.        Brief Hospital Course to date:  Khalif Joshua is a 71 y.o. male with HO HTN, HLP and severe OA of his knees.  He presented to the ED with acute onset of abdominal pain and found to have a perforated bowel Dr Amaya took him to the OR emergently and performed an open sigmoid colectomy with end colostomy creation (Kate's Procedure), exploratory laparotomy, and abdominal washout for a perforated diverticulitis and pneumoperitoneum.  Hospital medicine has been asked to assist with medical management.     Perforated diverticulitis with peritonitis  --overall management per surgery, Dr amaya  --Continue IV fluids, muscle relaxer, pain control, has scheduled non-narcotic options  --Encouraged to mobilize     Postop hypoxia--resolved  -This was likely related to pain, currently doing well is on room air, continue to encourage pulmonary toilet, IS and mobilization.    Sepsis POA due to peritonitis  Cx with Pseudomonas/Ecoli  Lactic acidosis  -Continue antibiotics per ID, currently on IV Zosyn  -follow cultures, sensitivities  -Continue IV fluids     Hypertension   -BP is now better controlled, continue home regimen      OA-  -monitor     Abnormal EKG  -ashu has Q waves in anterior/inferior leads-- need stress test postoperatively  -he denies history of any CAD     DVT prophylaxis:  Medical and mechanical DVT prophylaxis orders are present.       AM-PAC 6 Clicks Score (PT): 23 (11/03/21 9498)    Disposition: Per primary team    CODE STATUS:   Code Status and Medical Interventions:   Ordered at: 10/30/21 8133     Code Status (Patient has no pulse and is not breathing):    CPR (Attempt to Resuscitate)     Medical Interventions (Patient has pulse or is breathing):    Full       Eliud Breen MD  11/04/21

## 2021-11-05 LAB
ANION GAP SERPL CALCULATED.3IONS-SCNC: 8 MMOL/L (ref 5–15)
BASOPHILS # BLD AUTO: 0.08 10*3/MM3 (ref 0–0.2)
BASOPHILS NFR BLD AUTO: 0.6 % (ref 0–1.5)
BUN SERPL-MCNC: 14 MG/DL (ref 8–23)
BUN/CREAT SERPL: 17.3 (ref 7–25)
CALCIUM SPEC-SCNC: 9 MG/DL (ref 8.6–10.5)
CHLORIDE SERPL-SCNC: 102 MMOL/L (ref 98–107)
CO2 SERPL-SCNC: 27 MMOL/L (ref 22–29)
CREAT SERPL-MCNC: 0.81 MG/DL (ref 0.76–1.27)
CRP SERPL-MCNC: 2.99 MG/DL (ref 0–0.5)
DEPRECATED RDW RBC AUTO: 46.3 FL (ref 37–54)
EOSINOPHIL # BLD AUTO: 0.5 10*3/MM3 (ref 0–0.4)
EOSINOPHIL NFR BLD AUTO: 4 % (ref 0.3–6.2)
ERYTHROCYTE [DISTWIDTH] IN BLOOD BY AUTOMATED COUNT: 13.3 % (ref 12.3–15.4)
ERYTHROCYTE [SEDIMENTATION RATE] IN BLOOD: 44 MM/HR (ref 0–20)
GFR SERPL CREATININE-BSD FRML MDRD: 94 ML/MIN/1.73
GLUCOSE SERPL-MCNC: 93 MG/DL (ref 65–99)
HCT VFR BLD AUTO: 40.5 % (ref 37.5–51)
HGB BLD-MCNC: 12.9 G/DL (ref 13–17.7)
IMM GRANULOCYTES # BLD AUTO: 0.61 10*3/MM3 (ref 0–0.05)
IMM GRANULOCYTES NFR BLD AUTO: 4.9 % (ref 0–0.5)
LYMPHOCYTES # BLD AUTO: 1.59 10*3/MM3 (ref 0.7–3.1)
LYMPHOCYTES NFR BLD AUTO: 12.8 % (ref 19.6–45.3)
MCH RBC QN AUTO: 30.1 PG (ref 26.6–33)
MCHC RBC AUTO-ENTMCNC: 31.9 G/DL (ref 31.5–35.7)
MCV RBC AUTO: 94.4 FL (ref 79–97)
MONOCYTES # BLD AUTO: 1.01 10*3/MM3 (ref 0.1–0.9)
MONOCYTES NFR BLD AUTO: 8.1 % (ref 5–12)
NEUTROPHILS NFR BLD AUTO: 69.6 % (ref 42.7–76)
NEUTROPHILS NFR BLD AUTO: 8.61 10*3/MM3 (ref 1.7–7)
NRBC BLD AUTO-RTO: 0 /100 WBC (ref 0–0.2)
PLATELET # BLD AUTO: 290 10*3/MM3 (ref 140–450)
PMV BLD AUTO: 9.4 FL (ref 6–12)
POTASSIUM SERPL-SCNC: 4.8 MMOL/L (ref 3.5–5.2)
RBC # BLD AUTO: 4.29 10*6/MM3 (ref 4.14–5.8)
SODIUM SERPL-SCNC: 137 MMOL/L (ref 136–145)
WBC # BLD AUTO: 12.4 10*3/MM3 (ref 3.4–10.8)

## 2021-11-05 PROCEDURE — 25010000002 PIPERACILLIN SOD-TAZOBACTAM PER 1 G: Performed by: INTERNAL MEDICINE

## 2021-11-05 PROCEDURE — 86140 C-REACTIVE PROTEIN: CPT | Performed by: INTERNAL MEDICINE

## 2021-11-05 PROCEDURE — 85652 RBC SED RATE AUTOMATED: CPT | Performed by: INTERNAL MEDICINE

## 2021-11-05 PROCEDURE — 97116 GAIT TRAINING THERAPY: CPT

## 2021-11-05 PROCEDURE — 80048 BASIC METABOLIC PNL TOTAL CA: CPT | Performed by: SURGERY

## 2021-11-05 PROCEDURE — 85025 COMPLETE CBC W/AUTO DIFF WBC: CPT | Performed by: INTERNAL MEDICINE

## 2021-11-05 PROCEDURE — 99232 SBSQ HOSP IP/OBS MODERATE 35: CPT | Performed by: INTERNAL MEDICINE

## 2021-11-05 PROCEDURE — 25010000002 ENOXAPARIN PER 10 MG: Performed by: SURGERY

## 2021-11-05 RX ORDER — ACETAMINOPHEN 325 MG/1
650 TABLET ORAL EVERY 6 HOURS PRN
Status: DISCONTINUED | OUTPATIENT
Start: 2021-11-05 | End: 2021-11-06 | Stop reason: HOSPADM

## 2021-11-05 RX ORDER — PANTOPRAZOLE SODIUM 40 MG/1
40 TABLET, DELAYED RELEASE ORAL
Status: DISCONTINUED | OUTPATIENT
Start: 2021-11-06 | End: 2021-11-06 | Stop reason: HOSPADM

## 2021-11-05 RX ADMIN — LANSOPRAZOLE 15 MG: KIT at 06:09

## 2021-11-05 RX ADMIN — SENNOSIDES 1 TABLET: 8.6 TABLET, FILM COATED ORAL at 21:04

## 2021-11-05 RX ADMIN — GABAPENTIN 100 MG: 100 CAPSULE ORAL at 06:09

## 2021-11-05 RX ADMIN — TAZOBACTAM SODIUM AND PIPERACILLIN SODIUM 4.5 G: 500; 4 INJECTION, SOLUTION INTRAVENOUS at 13:36

## 2021-11-05 RX ADMIN — TAZOBACTAM SODIUM AND PIPERACILLIN SODIUM 4.5 G: 500; 4 INJECTION, SOLUTION INTRAVENOUS at 06:08

## 2021-11-05 RX ADMIN — LOSARTAN POTASSIUM 100 MG: 25 TABLET, FILM COATED ORAL at 09:51

## 2021-11-05 RX ADMIN — ENOXAPARIN SODIUM 40 MG: 40 INJECTION SUBCUTANEOUS at 09:50

## 2021-11-05 RX ADMIN — CYCLOBENZAPRINE 10 MG: 10 TABLET, FILM COATED ORAL at 06:09

## 2021-11-05 RX ADMIN — DOCUSATE SODIUM 100 MG: 100 CAPSULE, LIQUID FILLED ORAL at 21:04

## 2021-11-05 RX ADMIN — OXYCODONE HYDROCHLORIDE AND ACETAMINOPHEN 1 TABLET: 7.5; 325 TABLET ORAL at 11:32

## 2021-11-05 RX ADMIN — TAZOBACTAM SODIUM AND PIPERACILLIN SODIUM 4.5 G: 500; 4 INJECTION, SOLUTION INTRAVENOUS at 21:05

## 2021-11-05 RX ADMIN — OXYCODONE HYDROCHLORIDE AND ACETAMINOPHEN 1 TABLET: 7.5; 325 TABLET ORAL at 17:53

## 2021-11-05 RX ADMIN — ACETAMINOPHEN 650 MG: 325 TABLET, FILM COATED ORAL at 06:09

## 2021-11-05 RX ADMIN — OXYCODONE HYDROCHLORIDE AND ACETAMINOPHEN 1 TABLET: 7.5; 325 TABLET ORAL at 06:09

## 2021-11-05 RX ADMIN — DOCUSATE SODIUM 100 MG: 100 CAPSULE, LIQUID FILLED ORAL at 09:51

## 2021-11-05 NOTE — NURSING NOTE
[facility] follow up for ostomy education    Surgery date: 10/30/2021    Ostomy type: End colostomy    Appliance in place: [place] 8331    Last pouch change: 11/4/2021    Stoma Assessment: Stoma is swollen, red, slightly protruding above skin level, just a little bit of stool at the bottom of the pouch.  Stool is hard and round like a pellet.  Only stool visible.  Pouch is intact, pouch is closed, no holes in pouch observed.  Although odor can be smelled.  Patient does not need a one-piece patient would require 2 piece pouching system and upon discharge most likely need closed-end pouches already.    Education performed: Patient and daughter educated on schedule of pouch change, pouch changing with a 2 piece appliance, diet, protein, hydration, showering, emptying, the purpose for paste, purpose for barrier spray encrusting.  Resource folder, and access to [facility] ostomy nurses for follow-up care if needs arise.  Plan is to change appliance with wife tomorrow before discharge.    [facility] will continue to follow.     Please contact with questions or concerns.     Dragon disclaimer:  This note was entered via electronic voice recognition/ transcription prorgram. The electronic translation of spoken language may permit erroneous, or at times, nonsensical words or phrases to be inadvertently transcribed; Although I have reviewed the note for such errors, some may still exist.

## 2021-11-05 NOTE — PROGRESS NOTES
Clinical Nutrition   Reason For Visit: Follow-up protocol    Patient Name: Khalif Joshua  YOB: 1950  MRN: [id number]  Date of Encounter: 11/05/21 12:07 EDT  Admission date: 10/30/2021      Tolerating full liquids. Having some stool output in colostomy bag per pt.  Changed Boost Breeze to Ensure HP to increase protein intake.  Answered additional questions about diet patient will be following at home.    Nutrition Assessment     Admission Problem List:  Abdominal pain  Abdominal sepsis  Perforated diverticulitis  Peritonitis  Free air      Applicable PMH:  HTN, HLD  Diverticulitis  OA      Applicable medical tests/procedures since admission:  (10/30) s/p exploratory laparotomy, sigmoid colectomy, end colostomy, abdominal washout;   NGT to LWS initiated  (11/3) NGT to LWS discontinued  (11/4) CT - consistent with ileus; full liquid diet      Reported/Observed/Food/Nutrition Related History   11/5) Patient and daughter present during visit. Patient reports he tolerated full liquids at breakfast this morning. Having a little bit of stool output in colostomy bag. Drinking some Boost Breeze. Okay with changing Boost Breeze to Ensure HP - chocolate preferred. RD answered questions regarding diet patient will follow when he goes home.    11/3) Patient and wife present during visit. Patient's wife reports patient's last solid food intake was Friday evening (10/29). Minimal PO intake since then. NGT clamped at this time. Hopeful to have NGT removed today. Agrees to try orange Boost Breeze once clear liquid diet initiated. Wife requesting nutrition education r/t diverticulitis/new colostomy prior to discharge.    Anthropometrics   Height: 71 in  Weight: 219 lbs (bed scale weight 11/3)  BMI: 30.5  BMI classification: Obese Class I: 30-34.9kg/m2   IBW: 172 lbs    Labs reviewed   Labs reviewed: Yes    Medications reviewed   Medications reviewed: Yes  Pertinent: colace, senekot, antibiotic    Current Nutrition  Prescription   PO: Diet Full Liquid   Oral Nutrition Supplement: Boost Breeze 3x daily    Average PO intake: insufficient data    Nutrition Diagnosis     11/3  Problem Inadequate oral intake   Etiology Altered GI function s/p surgery   Signs/Symptoms Consuming full liquids at meals and some Boost Breeze   Status: ongoing/improving    11/3  Problem Food and nutrition knowledge deficit   Etiology No prior need for edu   Signs/Symptoms New colostomy   Status: resolved 11/4 - provided edu    Intervention   Intervention: Follow treatment progress, Care plan reviewed, Interview for preferences, Adjusted supplement, Encourage intake, Education provided    -Changed Boost Breeze to Ensure HP.    Goal:   General: Nutrition support treatment  PO: Increase intake, Advace diet as medically appropriate    Monitoring/Evaluation:   Monitoring/Evaluation: Per protocol, I&O, PO intake, Supplement intake, Pertinent labs, Weight, GI status, Symptoms    Sonia Pro RD  Time Spent: 20 min

## 2021-11-05 NOTE — CASE MANAGEMENT/SOCIAL WORK
Continued Stay Note   Chen     Patient Name: Khalif Joshua  MRN: [id number]  Today's Date: 11/5/2021    Admit Date: 10/30/2021     Discharge Plan     Row Name 11/05/21 1515       Plan    Plan Home with IV antibiotics, [facility]    Patient/Family in Agreement with Plan yes    Plan Comments Per discussion in MDR and review of chart, patient will be medically ready for discharge once ostomy output has increased sufficiently, probably Saturday or Sunday per Dr Ferguson's note. ID following for antibiotic management. Dr. Oliveros has contacted [facility] about supplying the IV Zosyn for home infusion. Spoke to Willis with [facility]. She states the cost to patient for the first week is $535.00 and will be $390.00 if patient needs subsequent weeks. Met with patient, his daughter, and his wife in the room. Patient states he is able to afford the cost. [facility] will deliver the antibiotic supply to patient's room and do initial teaching later today.     Patient has requested [facility] and would like [facility]. Orders for HH SN placed in Epic. Referral called to [facility], and she confirms they will be able to accept patient for  care. Per [facility], [facility] will be able to see patient for the first time on Sunday morning, 11/7/21. If he is able to DC on Saturday, she requests that he receive the three doses of IV Zosyn prior to discharge. If he does not discharge until Sunday,  will notify [facility] of the discharge time. CM will continue to follow.    Final Discharge Disposition Code 06 - home with home health care               Discharge Codes    No documentation.               Expected Discharge Date and Time     Expected Discharge Date Expected Discharge Time    Nov 6, 2021             Silvia Bryant RN

## 2021-11-05 NOTE — PROGRESS NOTES
"Patient Name:  Khalif Joshua  YOB: 1950  [id number]    Surgery Progress Note    Date of visit: 11/5/2021      Subjective: No acute events overnight.  Pain is much improved.  Denies nausea or vomiting and has tolerated a full liquid diet.  Only a very small amount of solid stool in the colostomy.  Ambulating without difficulty.  Denies fevers or chills.          Objective:     /89 (BP Location: Left arm, Patient Position: Lying)   Pulse 90   Temp 97.6 °F (36.4 °C) (Oral)   Resp 18   Ht 180.3 cm (71\")   Wt 99.3 kg (219 lb)   SpO2 93%   BMI 30.54 kg/m²     Intake/Output Summary (Last 24 hours) at 11/5/2021 0847  Last data filed at 11/5/2021 0600  Gross per 24 hour   Intake --   Output 1740 ml   Net -1740 ml       GEN:   Awake, alert, in no acute distress, resting comfortably in bed   CV:   Regular rate and rhythm  L:  Clear to auscultation bilaterally, not labored on room air   Abd:  Soft, mildly distended, appropriately tender to palpation along incision, incision is clean with mild serous drainage, MARCO on the right side is serosanguineous, colostomy on the left side is pink with small amount of solid stool in the bag  Ext:  No cyanosis, clubbing, or edema    Recent labs that are back at this time have been reviewed.           Assessment/ Plan:    Mr. Joshua is a 71-year-old gentleman with history of hypertension and past diverticulitis who presents with abdominal sepsis and concern for perforated diverticulitis.      #Perforated diverticulitis  #Peritonitis  -Postoperative day 6 after open sigmoid colectomy with end colostomy creation and abdominal washout  -Vital signs are stable. Leukocytosis down slightly at 12k  -CT scan was performed yesterday and is without any evidence of significant intra-abdominal fluid collection.  Only some mildly distended small bowel loops consistent with ileus  -Having flatus from the colostomy, has had only a very small amount of solid stool output  -Continue " full liquid diet  - over 1 L of urine output yesterday. Will stop IV fluids and monitor Is/Os   -Will make gabapentin Flexeril and Tylenol as needed today.  -Continue PPI  -Continue IV Zosyn.  ID is following.  Intra-abdominal cultures with E. coli and Pseudomonas  -Hopefully can be discharged home tomorrow versus Sunday depending on colostomy output and how he continues to improve         Jose Ferguson MD  11/5/2021  08:47 EDT

## 2021-11-05 NOTE — PROGRESS NOTES
INFECTIOUS DISEASE Progress Note    Khalif Joshua  1950  [id number]    Admission Date: 10/30/2021      Requesting Provider: No Known Provider  Evaluating Physician: Oliver Oliveros MD    Reason for Consultation: Perforated diverticulitis/sepsis    History of present illness:    11/1/2021:  Patient is a 71 y.o. male Who is seen today for evaluation of  Perforated diverticulitis with severe sepsis.  He has a history of prior diverticulitis and presented to the emergency room on 10/30 Sudden onset of severe left lower quadrant pain.  He took Cipro/Flagyl at home but continued to have rapidly worsening pain.  When he presented to the emergency room, radiographic studies revealed pneumoperitoneum.   He had a white blood cell count of 19.1, and azotemia with a BUN of 36. His lactic acid has been up to 4.2. In addition, he had acute hypoxic respiratory failure requiring 3 L of oxygen.  An abdominal/pelvic CT scan revealed sigmoid colonic diverticulitis with pneumoperitoneum.  He was taken to the OR promptly by Dr. Ferguson  and underwent sigmoid colectomy with colostomy for his perforated diverticulitis. His maximum temperature since admission is 99.9°.   He is growing a gram-negative bacillus from his peritoneal fluid.  Blood cultures are no growth so far.  He is on intravenous Zosyn therapy.  He currently complains of moderate diffuse abdominal pain.  He has a history of 3 prior episodes of diverticulitis treated with oral antibiotics as an outpatient.  His peritoneal cultures are now growing E. coli and Pseudomonas.    11/2/21: He has decreased abdominal pain.  He has remained afebrile.  He denies nausea and vomiting.  His nasogastric tube remains in place.  His abdominal cultures have grown Bacteroides, Zosyn sensitive Pseudomonas, and Zosyn sensitive E. Coli. His white blood cell count today is 13.1    11/3/21: He continues to have mild diffuse abdominal pain.  He has remained afebrile. Continues to require a  nasogastric tube. He has worsening leukocytosis.  He denies cough and sputum production.    11/4/21:  He remains afebrile. His white blood cell count is down today to 14.3. He has decreased abdominal pain.  His nasogastric tube is now out.  He has had minimal ostomy output.  A follow-up abdominal/pelvic CT scan today Reveals a few segments of borderline distended small bowel but there is no evidence of an abscess.    11/5/21: He continues to feel better.  His nasogastric tube remains out.  He has decreased diffuse abdominal pain.  He is tolerating an advancing diet.  He denies nausea, vomiting, and diarrhea.  He has remained afebrile.  He would like to go home in the next 1-2 days.      Past Medical History:   Diagnosis Date   • Diverticulitis    • Hyperlipemia    • Hypertension    • Osteoarthritis        Past Surgical History:   Procedure Laterality Date   • EXPLORATORY LAPAROTOMY N/A 10/30/2021    Procedure: LAPAROTOMY EXPLORATORY, SIGMOID COLECTOMY,  END COLOSTOMY;  Surgeon: Jose Ferguson MD;  Location: [facility];  Service: General;  Laterality: N/A;   • KNEE ARTHROSCOPY Bilateral 2001       Family History   Problem Relation Age of Onset   • Heart disease Father        Social History     Socioeconomic History   • Marital status:    Tobacco Use   • Smoking status: Never Smoker   • Smokeless tobacco: Never Used   Substance and Sexual Activity   • Alcohol use: Not Currently   • Drug use: Never       No Known Allergies      Medication:    Current Facility-Administered Medications:   •  acetaminophen (TYLENOL) tablet 650 mg, 650 mg, Oral, Q6H PRN, Jose Ferguson MD  •  diphenhydrAMINE (BENADRYL) injection 25 mg, 25 mg, Intravenous, Q6H PRN, Jose Ferguson MD  •  docusate sodium (COLACE) capsule 100 mg, 100 mg, Oral, BID, Joes Ferguson MD, 100 mg at 11/05/21 0951  •  enoxaparin (LOVENOX) syringe 40 mg, 40 mg, Subcutaneous, Q24H, Jose Ferguson MD, 40 mg at 11/05/21 0950  •  First Mouthwash  (Magic Mouthwash) 10 mL, 10 mL, Swish & Spit, Q6H PRN, Zoey Doherty MD  •  influenza vac split quad (FLUZONE,FLUARIX,AFLURIA,FLULAVAL) injection 0.5 mL, 0.5 mL, Intramuscular, During Hospitalization, Zoey Doherty MD  •  losartan (COZAAR) tablet 100 mg, 100 mg, Oral, Q24H, Zoey Doherty MD, 100 mg at 11/05/21 0951  •  morphine injection 2 mg, 2 mg, Intravenous, Q2H PRN, 2 mg at 11/03/21 2339 **AND** naloxone (NARCAN) injection 0.4 mg, 0.4 mg, Intravenous, Q5 Min PRN, Jose Ferguson MD  •  ondansetron (ZOFRAN) tablet 4 mg, 4 mg, Oral, Q6H PRN **OR** ondansetron (ZOFRAN) injection 4 mg, 4 mg, Intravenous, Q6H PRN, Jose Ferguson MD  •  oxyCODONE-acetaminophen (PERCOCET) 7.5-325 MG per tablet 1 tablet, 1 tablet, Oral, Q4H PRN, Pako Lee MD, 1 tablet at 11/05/21 1132  •  [START ON 11/6/2021] pantoprazole (PROTONIX) EC tablet 40 mg, 40 mg, Oral, Q AM, Bi Lunsford, PharmD  •  phenol (CHLORASEPTIC) 1.4 % liquid 2 spray, 2 spray, Mouth/Throat, Q2H PRN, Zoey Doherty MD, 2 spray at 10/31/21 1719  •  piperacillin-tazobactam (ZOSYN) 4.5 g in iso-osmotic dextrose 100 mL IVPB (premix), 4.5 g, Intravenous, Q8H, Oliver Oliveros MD, 4.5 g at 11/05/21 1336  •  potassium phosphate 15 mmol in sodium chloride 0.9 % 100 mL infusion, 15 mmol, Intravenous, PRN, Zoey Doherty MD  •  senna (SENOKOT) tablet 1 tablet, 1 tablet, Oral, Nightly, Jose Ferguson MD, 1 tablet at 11/04/21 2010  •  sodium chloride 0.9 % flush 10 mL, 10 mL, Intravenous, PRN, Jose Ferguson MD, 10 mL at 10/31/21 2301  •  sodium chloride 0.9 % flush 10 mL, 10 mL, Intravenous, PRN, Mary Graves MD, 10 mL at 11/01/21 2215  •  sodium chloride 0.9 % flush 10 mL, 10 mL, Intravenous, PRN, Oliver Oliveros MD    Antibiotics:  Anti-Infectives (From admission, onward)    Ordered     Dose/Rate Route Frequency Start Stop    11/04/21 0746  piperacillin-tazobactam (ZOSYN) 4.5 g in iso-osmotic dextrose 100 mL IVPB (premix)         Ordering Provider: Oliver Oliveros MD    4.5 g  over 4 Hours Intravenous Every 8 Hours 21 1400 21 2359    10/30/21 1321  piperacillin-tazobactam (ZOSYN) 4.5 g in iso-osmotic dextrose 100 mL IVPB (premix)        Ordering Provider: Raul Gutierrez DO    4.5 g  over 30 Minutes Intravenous Once 10/30/21 1323 10/30/21 1438            Review of Systems:  See HPI    Physical Exam:   Vital Signs  Temp (24hrs), Av.4 °F (36.9 °C), Min:97.6 °F (36.4 °C), Max:98.8 °F (37.1 °C)    Temp  Min: 97.6 °F (36.4 °C)  Max: 98.8 °F (37.1 °C)  BP  Min: 115/86  Max: 129/89  Pulse  Min: 90  Max: 119  Resp  Min: 18  Max: 19  No data recorded    GENERAL: Awake and alert, in no acute distress.   HEENT: Normocephalic, atraumatic.  PERRL. EOMI. No conjunctival injection. No icterus. Oropharynx clear without evidence of thrush or exudate.  The nasogastric tube is now out  NECK: Supple   HEART: RRR; No murmur, rubs, gallops.   LUNGS: Clear to auscultation bilaterally without wheezing, rales, rhonchi. Normal respiratory effort. Nonlabored. .  ABDOMEN: He has mild diffuse abdominal tenderness. He has no incisional erythema or purulent drainage.  An ostomy is in place.  EXT:  No cyanosis, clubbing or edema.  :  Without Enrique catheter.  MSK: No focal joint swelling or erythema  SKIN: Warm and dry without cutaneous eruptions on Inspection/palpation.    NEURO: Oriented to PPT.  Motor 5/5 strength bilaterally  PSYCHIATRIC: Normal insight and judgement. Cooperative with PE  Right arm PICC line: No erythema or drainage    Laboratory Data    Results from last 7 days   Lab Units 21  0542 21  0503 21  0506   WBC 10*3/mm3 12.40* 14.29* 16.63*   HEMOGLOBIN g/dL 12.9* 13.6 13.3   HEMATOCRIT % 40.5 41.4 41.5   PLATELETS 10*3/mm3 290 308 279     Results from last 7 days   Lab Units 21  0542   SODIUM mmol/L 137   POTASSIUM mmol/L 4.8   CHLORIDE mmol/L 102   CO2 mmol/L 27.0   BUN mg/dL 14   CREATININE mg/dL 0.81   GLUCOSE  mg/dL 93   CALCIUM mg/dL 9.0     Results from last 7 days   Lab Units 10/31/21  0812   ALK PHOS U/L 40   BILIRUBIN mg/dL 0.5   ALT (SGPT) U/L 14   AST (SGOT) U/L 18     Results from last 7 days   Lab Units 11/05/21  0542   SED RATE mm/hr 44*     Results from last 7 days   Lab Units 11/05/21  0542   CRP mg/dL 2.99*     Results from last 7 days   Lab Units 10/31/21  0233   LACTATE mmol/L 3.2*             Estimated Creatinine Clearance: 101.9 mL/min (by C-G formula based on SCr of 0.81 mg/dL).      Microbiology:  Blood Culture   Date Value Ref Range Status   10/30/2021 No growth at 24 hours  Preliminary     No results found for: BCIDPCR, CXREFLEX, CSFCX, CULTURETIS  No results found for: CULTURES, HSVCX, URCX  No results found for: EYECULTURE, GCCX, HSVCULTURE, LABHSV  No results found for: LEGIONELLA, MRSACX, MUMPSCX, MYCOPLASCX  No results found for: NOCARDIACX, STOOLCX  No results found for: THROATCX, UNSTIMCULT, URINECX, CULTURE, VZVCULTUR  No results found for: VIRALCULTU, WOUNDCX        Radiology:  Imaging Results (Last 72 Hours)     Procedure Component Value Units Date/Time    CT Abdomen Pelvis With Contrast [[id number]] Collected: 11/04/21 1403     Updated: 11/04/21 1412    Narrative:      EXAMINATION: CT ABDOMEN PELVIS W CONTRAST-      INDICATION: Abdominal pain, fever, post-op     TECHNIQUE: Axial IV contrast-enhanced CT of the abdomen and pelvis with  multiplanar reconstruction     The radiation dose reduction device was turned on for each scan per the  ALARA (As Low as Reasonably Achievable) protocol.     COMPARISON: 10/30/2021     FINDINGS: The lung bases are grossly clear with minimal dependent  atelectasis. The body wall soft tissues demonstrate postoperative  changes with a left lower quadrant ostomy and right pelvic flat MARCO drain  noted. The liver, spleen, pancreas and bilateral adrenal glands  demonstrate homogeneous attenuation without evidence of suspicious focal  lesion. The kidneys demonstrate  unchanged peripelvic cysts, without  evidence of hydronephrosis or nephropathy. The gallbladder is hydropic,  otherwise unremarkable. Operative changes are noted from interval distal  colonic resection. The stomach is mildly distended, and there are  several borderline distended segments of small bowel, favoring likely  ileus over obstruction. There is no focal fluid collection concerning  for abscess. There is no overt pneumoperitoneum to suggest perforation.  The pelvic viscera are unremarkable. Mildly atherosclerotic  nonaneurysmal abdominal aorta. No bulky retroperitoneal lymphadenopathy.  The osseous structures demonstrate no evidence of acute fracture or  aggressive osseous lesion.       Impression:      Operative changes from interval sigmoid colectomy with left  lower quadrant colostomy. There are few segments of borderline distended  small bowel, minimally edematous, possibly suggesting enteritis or  ileus, favored over mechanical obstruction. There is otherwise no  evidence of focal fluid collection concerning for abscess or overt  pneumoperitoneum to suggest perforation.        This report was finalized on 11/4/2021 2:09 PM by José Miguel Ko.         I read his radiographic studies with Dr. Light.  He has diffuse, pancolonic diverticulosis.    Impression:   1.  Perforated diverticulitis-with severe intra-abdominal infection, status post sigmoid colectomy and colostomy on 10/30.  Peritoneal cultures are growing Pseudomonas, E. Coli, and Bacteroides.  I will increased his Zosyn to 4.5 g IV every 8 hours.  2.  Severe sepsis- improved  3.  Leukocytosis/neutrophilia- improving  4.  Azotemia/volume depletion- improved  5.  Acute hypoxic respiratory failure- improved    PLAN/RECOMMENDATIONS:  1.  Continue Zosyn 4.5 g IV every 8 hours-I will plan to discharge him on intravenous Zosyn  2.  Discharge to home on Saturday or Sunday  3.  Follow-up with me on Tuesday 11/9 at 1130-this appointment has been made     He  has continued to improve.  Should be able to discharge on Saturday or Sunday.  I discussed his complex situation with Dr. Ferguson again today.  I discussed his complex situation in detail with his daughter, his wife, and the patient himself again today.  I spent over 35 minutes on his care today.    Outpatient orders:  1.  Outpatient intravenous antibiotic therapy: Zosyn 4.5 g IV every 8 hours with an anticipated duration of 7-10 days-[facility] infusion to provide  2.  Home health to change his PICC line dressing with a Biopatch or Tegaderm CHG gel dressing every Monday  3.  Stat CBC, CMP, ESR, CRP at the time of his visit with me on 11/9  4.  Appointment to see me on Tuesday 11/9 at 1130-this appointment has been made        Oliver Oliveros MD  11/5/2021  16:21 EDT

## 2021-11-05 NOTE — PLAN OF CARE
Goal Outcome Evaluation:  Plan of Care Reviewed With: patient        Progress: improving  Outcome Summary: patient ambulated 500 feet with SBA x1, patient held onto IV pole for support. No LOB noticed. Some SOA noticed with cues for PLB. Making good progress towards all goals.

## 2021-11-05 NOTE — PROGRESS NOTES
[facility] Medicine Services  PROGRESS NOTE    Patient Name: Khalif Joshua  : 1950  MRN: [id number]    Date of Admission: 10/30/2021  Primary Care Physician: Renee Liriano MD    Subjective   Subjective     CC: Follow-up med management    HPI: No acute events overnight, patient rested well, abdominal pain is improved, ambulating halls well, has had some output from colostomy, tolerating diet    ROS:  Gen- No fevers, chills  CV- No chest pain, palpitations  Resp- No cough, dyspnea  GI- No N/V/D, abd pain    All other systems reviewed and are negative  Objective   Objective     Vital Signs:   Temp:  [97.6 °F (36.4 °C)-98.8 °F (37.1 °C)] 97.6 °F (36.4 °C)  Heart Rate:  [] 90  Resp:  [18-19] 18  BP: (118-129)/(76-89) 129/89     Physical Exam:  Constitutional: No acute distress, awake, alert  HENT: NCAT, mucous membranes moist  Respiratory: Clear to auscultation bilaterally, respiratory effort normal   Cardiovascular: RRR, no murmurs, rubs, or gallops  Gastrointestinal: Positive bowel sounds, soft, nontender, incision CDI, colostomy in place  Musculoskeletal: No bilateral ankle edema  Psychiatric: Appropriate affect, cooperative  Neurologic: Oriented x 3, nonfocal  Skin: No rashes      Results Reviewed:  LAB RESULTS:      Lab 21  0542 21  0503 21  0506 21  0900 21  0724 10/31/21  0812 10/31/21  0812 10/31/21  0233 10/30/21  2013 10/30/21  1215 10/30/21  1215   WBC 12.40* 14.29* 16.63* 13.07* 11.19*   < > 10.82*  --   --    < > 19.05*   HEMOGLOBIN 12.9* 13.6 13.3 12.7* 12.5*   < > 13.2  --   --    < > 15.1   HEMATOCRIT 40.5 41.4 41.5 39.3 38.8   < > 40.7  --   --    < > 45.9   PLATELETS 290 308 279 252 229   < > 274  --   --    < > 304   NEUTROS ABS 8.61*  --   --   --   --   --  8.76*  --   --   --  17.15*   IMMATURE GRANS (ABS) 0.61*  --   --   --   --   --   --   --   --   --   --    LYMPHS ABS 1.59  --   --   --   --   --   --   --   --   --   --     MONOS ABS 1.01*  --   --   --   --   --   --   --   --   --   --    EOS ABS 0.50*  --   --   --   --   --  0.00  --   --   --  0.00   MCV 94.4 92.0 93.9 94.5 95.3   < > 92.5  --   --    < > 93.3   SED RATE 44*  --   --   --   --   --   --   --   --   --   --    CRP 2.99*  --   --   --   --   --   --   --   --   --   --    PROCALCITONIN  --   --   --   --   --   --   --   --   --   --  0.19   LACTATE  --   --   --   --   --   --   --  3.2* 4.2*  --  2.6*    < > = values in this interval not displayed.         Lab 11/05/21  0542 11/04/21  0503 11/03/21  0506 11/02/21  1407 11/01/21  1159   SODIUM 137 132* 135* 137 139   POTASSIUM 4.8 5.0 4.1 4.6 4.8   CHLORIDE 102 97* 101 99 102   CO2 27.0 25.0 24.0 23.0 28.0   ANION GAP 8.0 10.0 10.0 15.0 9.0   BUN 14 17 18 18 21   CREATININE 0.81 0.78 0.68* 0.81 0.69*   GLUCOSE 93 123* 100* 96 105*   CALCIUM 9.0 8.8 8.6 9.0 8.8   MAGNESIUM  --   --  2.4  --  2.7*   PHOSPHORUS  --   --  3.3 3.2 2.3*         Lab 10/31/21  0812 10/30/21  1215   TOTAL PROTEIN 6.1 7.2   ALBUMIN 3.60 4.50   GLOBULIN 2.5 2.7   ALT (SGPT) 14 18   AST (SGOT) 18 21   BILIRUBIN 0.5 0.4   ALK PHOS 40 62   LIPASE  --  18         Lab 10/31/21  0812 10/30/21  1215   TROPONIN T <0.010 <0.010                 Brief Urine Lab Results     None          Microbiology Results Abnormal     Procedure Component Value - Date/Time    Fungus Culture - Peritoneal Fluid, Peritoneum [[id number]] Collected: 10/30/21 7023    Lab Status: Preliminary result Specimen: Peritoneal Fluid from Peritoneum Updated: 11/04/21 1615     Fungus Culture No fungus isolated at less than 1 week    AFB Culture - Peritoneal Fluid, Peritoneum [[id number]] Collected: 10/30/21 1553    Lab Status: Preliminary result Specimen: Peritoneal Fluid from Peritoneum Updated: 11/04/21 1615     AFB Culture No AFB isolated at less than 1 week     AFB Stain No acid fast bacilli seen on concentrated smear    Blood Culture - Blood, Hand, Right [[id number]]  (Normal)  Collected: 10/30/21 1355    Lab Status: Final result Specimen: Blood from Hand, Right Updated: 11/04/21 1515     Blood Culture No growth at 5 days    Blood Culture - Blood, Hand, Left [[id number]]  (Normal) Collected: 10/30/21 1400    Lab Status: Final result Specimen: Blood from Hand, Left Updated: 11/04/21 1515     Blood Culture No growth at 5 days    COVID PRE-OP / PRE-PROCEDURE SCREENING ORDER (NO ISOLATION) - Swab, Nasopharynx [[id number]]  (Normal) Collected: 10/30/21 1402    Lab Status: Final result Specimen: Swab from Nasopharynx Updated: 10/30/21 1441    Narrative:      The following orders were created for panel order COVID PRE-OP / PRE-PROCEDURE SCREENING ORDER (NO ISOLATION) - Swab, Nasopharynx.  Procedure                               Abnormality         Status                     ---------                               -----------         ------                     COVID-19, ABBOTT IN-HOUS...[[id number]]  Normal              Final result                 Please view results for these tests on the individual orders.    COVID-19, ABBOTT IN-HOUSE,NASAL Swab (NO TRANSPORT MEDIA) 2 HR TAT - Swab, Nasopharynx [[id number]]  (Normal) Collected: 10/30/21 1402    Lab Status: Final result Specimen: Swab from Nasopharynx Updated: 10/30/21 1441     COVID19 Presumptive Negative    Narrative:      Fact sheet for providers: https://www.fda.gov/media/172774/download     Fact sheet for patients: https://www.fda.gov/media/546615/download    Test performed by PCR.  If inconsistent with clinical signs and symptoms patient should be tested with different authorized molecular test.          CT Abdomen Pelvis With Contrast    Result Date: 11/4/2021  EXAMINATION: CT ABDOMEN PELVIS W CONTRAST-  INDICATION: Abdominal pain, fever, post-op  TECHNIQUE: Axial IV contrast-enhanced CT of the abdomen and pelvis with multiplanar reconstruction  The radiation dose reduction device was turned on for each scan per the ALARA (As Low as  Reasonably Achievable) protocol.  COMPARISON: 10/30/2021  FINDINGS: The lung bases are grossly clear with minimal dependent atelectasis. The body wall soft tissues demonstrate postoperative changes with a left lower quadrant ostomy and right pelvic flat MARCO drain noted. The liver, spleen, pancreas and bilateral adrenal glands demonstrate homogeneous attenuation without evidence of suspicious focal lesion. The kidneys demonstrate unchanged peripelvic cysts, without evidence of hydronephrosis or nephropathy. The gallbladder is hydropic, otherwise unremarkable. Operative changes are noted from interval distal colonic resection. The stomach is mildly distended, and there are several borderline distended segments of small bowel, favoring likely ileus over obstruction. There is no focal fluid collection concerning for abscess. There is no overt pneumoperitoneum to suggest perforation. The pelvic viscera are unremarkable. Mildly atherosclerotic nonaneurysmal abdominal aorta. No bulky retroperitoneal lymphadenopathy. The osseous structures demonstrate no evidence of acute fracture or aggressive osseous lesion.      Impression: Operative changes from interval sigmoid colectomy with left lower quadrant colostomy. There are few segments of borderline distended small bowel, minimally edematous, possibly suggesting enteritis or ileus, favored over mechanical obstruction. There is otherwise no evidence of focal fluid collection concerning for abscess or overt pneumoperitoneum to suggest perforation.   This report was finalized on 11/4/2021 2:09 PM by José Miguel Ko.            I have reviewed the medications:  Scheduled Meds:acetaminophen, 650 mg, Oral, Q6H  cyclobenzaprine, 10 mg, Oral, Q8H  docusate sodium, 100 mg, Oral, BID  enoxaparin, 40 mg, Subcutaneous, Q24H  gabapentin, 100 mg, Oral, Q8H  lansoprazole, 15 mg, Per G Tube, Q AM  losartan, 100 mg, Oral, Q24H  piperacillin-tazobactam, 4.5 g, Intravenous, Q8H  senna, 1  tablet, Oral, Nightly      Continuous Infusions:   PRN Meds:.diphenhydrAMINE  •  First Mouthwash (Magic Mouthwash)  •  influenza vaccine  •  Morphine **AND** naloxone  •  naloxone  •  ondansetron **OR** ondansetron  •  oxyCODONE-acetaminophen  •  phenol  •  potassium phosphate  •  sodium chloride  •  sodium chloride  •  sodium chloride    Assessment/Plan   Assessment & Plan     Active Hospital Problems    Diagnosis  POA   • Abnormal EKG [R94.31]  Yes   • Lactic acidosis [E87.2]  Yes   • Sepsis without acute organ dysfunction (HCC) [A41.9]  Yes   • Peritonitis (HCC) [K65.9]  Yes   • Acute diverticulitis [K57.92]  Yes   • Hypertension [I10]  Yes   • Hyperlipemia [E78.5]  Yes      Resolved Hospital Problems   No resolved problems to display.        Brief Hospital Course to date:  Khalif Joshua is a 71 y.o. male with HO HTN, HLP and severe OA of his knees.  He presented to the ED with acute onset of abdominal pain and found to have a perforated bowel Dr Amaya took him to the OR emergently and performed an open sigmoid colectomy with end colostomy creation (Kate's Procedure), exploratory laparotomy, and abdominal washout for a perforated diverticulitis and pneumoperitoneum.  Hospital medicine has been asked to assist with medical management.     Perforated diverticulitis with peritonitis  --overall management per surgery, Dr amaya  --Continue IV fluids, muscle relaxer, pain control, has scheduled non-narcotic options  --Encouraged to mobilize     Sepsis POA due to peritonitis  Cx with Pseudomonas/Ecoli  Lactic acidosis  -Continue antibiotics per ID, currently on IV Zosyn, PICC line is in place  -Repeat CT abdomen pelvis done 11/4 is unremarkable.    Hypertension   -BP is now better controlled, continue home regimen      OA-  -monitor     Abnormal EKG  -patinet has Q waves in anterior/inferior leads-- need stress test postoperatively  -he denies history of any CAD     DVT prophylaxis:  Medical and mechanical DVT  prophylaxis orders are present.       AM-PAC 6 Clicks Score (PT): 24 (11/04/21 2010)    Disposition: Anticipate discharge in 1 to 2 days, per primary team    CODE STATUS:   Code Status and Medical Interventions:   Ordered at: 10/30/21 8463     Code Status (Patient has no pulse and is not breathing):    CPR (Attempt to Resuscitate)     Medical Interventions (Patient has pulse or is breathing):    Full       Eliud Breen MD  11/05/21

## 2021-11-05 NOTE — THERAPY TREATMENT NOTE
Patient Name: Khalif Joshua  : 1950    MRN: [id number]                              Today's Date: 2021       Admit Date: 10/30/2021    Visit Dx:     ICD-10-CM ICD-9-CM   1. Acute diverticulitis  K57.92 562.11   2. Perforated diverticulum of large intestine  K57.20 562.10   3. Perforated sigmoid colon (HCC)  K63.1 569.83   4. Elevated lactic acid level  R79.89 276.2     Patient Active Problem List   Diagnosis   • Acute diverticulitis   • Hypertension   • Hyperlipemia   • Abnormal EKG   • Lactic acidosis   • Sepsis without acute organ dysfunction (HCC)   • Peritonitis (HCC)     Past Medical History:   Diagnosis Date   • Diverticulitis    • Hyperlipemia    • Hypertension    • Osteoarthritis      Past Surgical History:   Procedure Laterality Date   • EXPLORATORY LAPAROTOMY N/A 10/30/2021    Procedure: LAPAROTOMY EXPLORATORY, SIGMOID COLECTOMY,  END COLOSTOMY;  Surgeon: Jose Ferguson MD;  Location: [facility], 1928–1983;  Service: General;  Laterality: N/A;   • KNEE ARTHROSCOPY Bilateral       General Information     Row Name 21          Physical Therapy Time and Intention    Document Type therapy note (daily note)  -AS     Mode of Treatment physical therapy  -AS     Row Name 21          General Information    Patient Profile Reviewed yes  -AS     Existing Precautions/Restrictions fall  MARCO drain, colostomy  -AS     Barriers to Rehab none identified  -AS     Row Name 21          Cognition    Orientation Status (Cognition) oriented x 4  -AS     Row Name 21          Safety Issues, Functional Mobility    Safety Issues Affecting Function (Mobility) safety precaution awareness  -AS     Impairments Affecting Function (Mobility) endurance/activity tolerance; pain  -AS           User Key  (r) = Recorded By, (t) = Taken By, (c) = Cosigned By    Initials Name Provider Type    AS Marily Llamas PTA Physical Therapy Assistant               Mobility     Row Name 21           Bed Mobility    Comment (Bed Mobility) sitting [facility] pre/post tx  -AS     [facility] Name 11/05/21 0946          Transfers    Comment (Transfers) patient demonstrated safe technique  -AS     [facility] Name 11/05/21 0946          Sit-Stand Transfer    Sit-Stand [place] (Transfers) modified independence  -AS     Assistive Device (Sit-Stand Transfers) other (see comments)  no AD  -AS     Row Name 11/05/21 0946          Gait/Stairs (Locomotion)    [place] Level (Gait) standby assist  -AS     Assistive Device (Gait) other (see comments)  patient holding onto IV pole throughout mobility  -AS     Distance in Feet (Gait) 500  -AS     Deviations/Abnormal Patterns (Gait) antalgic; base of support, narrow; abbe decreased; gait speed decreased  -AS     Comment (Gait/Stairs) patient ambulated 500 feet with SBA x1, patient held onto IV pole for support. No LOB noticed. Some SOA noticed with cues for PLB. Making good progress towards all goals.  -AS           User Key  (r) = Recorded By, (t) = Taken By, (c) = Cosigned By    Initials Name Provider Type    AS Marily Llamas PTA Physical Therapy Assistant               Obj/Interventions     Row Name 11/05/21 0957          Motor Skills    Therapeutic Exercise --  deferred due to focus on ambulation and increasing distance, fatigued after ambulation  -AS           User Key  (r) = Recorded By, (t) = Taken By, (c) = Cosigned By    Initials Name Provider Type    AS Marily Llamas PTA Physical Therapy Assistant               Goals/Plan    No documentation.                Clinical Impression     Row Name 11/05/21 0934          Pain    Additional Documentation Pain Scale: Numbers Pre/Post-Treatment (Group)  -AS     Row Name 11/05/21 0950          Pain Scale: Numbers Pre/Post-Treatment    Pretreatment Pain Rating 3/10  -AS     Posttreatment Pain Rating 3/10  -AS     Pain Location - Side Bilateral  -AS     Pain Location - Orientation incisional  -AS     Pain Location abdomen   -AS     Pain Intervention(s) Ambulation/increased activity; Repositioned  -AS     Row Name 11/05/21 0950          Plan of Care Review    Plan of Care Reviewed With patient  -AS     Progress improving  -AS     Outcome Summary patient ambulated 500 feet with SBA x1, patient held onto IV pole for support. No LOB noticed. Some SOA noticed with cues for PLB. Making good progress towards all goals.  -AS     Row Name 11/05/21 0950          Positioning and Restraints    Pre-Treatment Position sitting in chair/recliner  -AS     Post Treatment Position chair  -AS     In Bed sitting; call light within reach; encouraged to call for assist; with family/caregiver; with nsg  -AS           User Key  (r) = Recorded By, (t) = Taken By, (c) = Cosigned By    Initials Name Provider Type    AS Marily Llamas PTA Physical Therapy Assistant               Outcome Measures     Row Name 11/05/21 0950          How much help from another person do you currently need...    Turning from your back to your side while in flat bed without using bedrails? 4  -AS     Moving from lying on back to sitting on the side of a flat bed without bedrails? 4  -AS     Moving to and from a bed to a chair (including a wheelchair)? 4  -AS     Standing up from a chair using your arms (e.g., wheelchair, bedside chair)? 4  -AS     Climbing 3-5 steps with a railing? 4  -AS     To walk in hospital room? 3  -AS     AM-PAC 6 Clicks Score (PT) 23  -AS     Row Name 11/05/21 0950          Functional Assessment    Outcome Measure Options AM-PAC 6 Clicks Basic Mobility (PT)  -AS           User Key  (r) = Recorded By, (t) = Taken By, (c) = Cosigned By    Initials Name Provider Type    AS Marily Llamas PTA Physical Therapy Assistant                             Physical Therapy Education                 Title: PT OT SLP Therapies (In Progress)     Topic: Physical Therapy (In Progress)     Point: Mobility training (In Progress)     Learning Progress Summary            Patient Acceptance, E, NR by AS at 11/5/2021 0951    Acceptance, E, NR by AS at 11/3/2021 1104    Acceptance, E,TB, VU by KR at 11/3/2021 0301    Acceptance, E, VU,NR by LM at 10/31/2021 1319                   Point: Home exercise program (In Progress)     Learning Progress Summary           Patient Acceptance, E, NR by AS at 11/5/2021 0951    Acceptance, E, NR by AS at 11/3/2021 1104    Acceptance, E,TB, VU by KR at 11/3/2021 0301                   Point: Precautions (In Progress)     Learning Progress Summary           Patient Acceptance, E, NR by AS at 11/5/2021 0951    Acceptance, E, NR by AS at 11/3/2021 1104    Acceptance, E,TB, VU by KR at 11/3/2021 0301    Acceptance, E, VU,NR by LM at 10/31/2021 1319                               User Key     Initials Effective Dates Name Provider Type Discipline    AS 06/16/21 -  Marily Llamas PTA Physical Therapy Assistant PT    LM 06/16/21 -  Violet Schofield PT Physical Therapist PT    KR 01/04/21 -  Luz Marina Delcid, BUNNY Registered Nurse Nurse              PT Recommendation and Plan     Plan of Care Reviewed With: patient  Progress: improving  Outcome Summary: patient ambulated 500 feet with SBA x1, patient held onto IV pole for support. No LOB noticed. Some SOA noticed with cues for PLB. Making good progress towards all goals.     Time Calculation:    PT Charges     Row Name 11/05/21 0951             Time Calculation    Start Time 0920  -AS      PT Received On 11/05/21  -AS      PT Goal Re-Cert Due Date 11/10/21  -AS              Timed Charges    93013 - Gait Training Minutes  24  -AS              Total Minutes    Timed Charges Total Minutes 24  -AS       Total Minutes 24  -AS            User Key  (r) = Recorded By, (t) = Taken By, (c) = Cosigned By    Initials Name Provider Type    AS Marily Llamas PTA Physical Therapy Assistant              Therapy Charges for Today     Code Description Service Date Service Provider Modifiers Qty    [id number] HC GAIT  TRAINING EA 15 MIN 11/5/2021 Marily Llamas, ANTIONE GP 2          PT G-Codes  Outcome Measure Options: AM-PAC 6 Clicks Basic Mobility (PT)  AM-PAC 6 Clicks Score (PT): 23  AM-PAC 6 Clicks Score (OT): 23    Marily Llamas PTA  11/5/2021

## 2021-11-06 ENCOUNTER — HOME HEALTH ADMISSION (OUTPATIENT)
Dept: HOME HEALTH SERVICES | Facility: HOME HEALTHCARE | Age: 71
End: 2021-11-06

## 2021-11-06 ENCOUNTER — READMISSION MANAGEMENT (OUTPATIENT)
Dept: CALL CENTER | Facility: HOSPITAL | Age: 71
End: 2021-11-06

## 2021-11-06 VITALS
OXYGEN SATURATION: 95 % | RESPIRATION RATE: 18 BRPM | SYSTOLIC BLOOD PRESSURE: 141 MMHG | DIASTOLIC BLOOD PRESSURE: 91 MMHG | TEMPERATURE: 99.6 F | BODY MASS INDEX: 30.66 KG/M2 | WEIGHT: 219 LBS | HEART RATE: 95 BPM | HEIGHT: 71 IN

## 2021-11-06 PROCEDURE — 25010000002 ENOXAPARIN PER 10 MG: Performed by: SURGERY

## 2021-11-06 PROCEDURE — 99232 SBSQ HOSP IP/OBS MODERATE 35: CPT | Performed by: INTERNAL MEDICINE

## 2021-11-06 PROCEDURE — 25010000002 PIPERACILLIN SOD-TAZOBACTAM PER 1 G: Performed by: INTERNAL MEDICINE

## 2021-11-06 RX ORDER — DOCUSATE SODIUM 100 MG/1
100 CAPSULE, LIQUID FILLED ORAL 2 TIMES DAILY
Qty: 20 CAPSULE | Refills: 0 | Status: SHIPPED | OUTPATIENT
Start: 2021-11-06 | End: 2022-04-19

## 2021-11-06 RX ORDER — OXYCODONE HYDROCHLORIDE AND ACETAMINOPHEN 5; 325 MG/1; MG/1
1 TABLET ORAL EVERY 4 HOURS PRN
Qty: 14 TABLET | Refills: 0 | Status: SHIPPED | OUTPATIENT
Start: 2021-11-06 | End: 2022-04-19

## 2021-11-06 RX ORDER — SENNA PLUS 8.6 MG/1
1 TABLET ORAL NIGHTLY
Qty: 30 TABLET | Refills: 3 | Status: SHIPPED | OUTPATIENT
Start: 2021-11-06 | End: 2022-04-19

## 2021-11-06 RX ORDER — POLYETHYLENE GLYCOL 3350 17 G/17G
17 POWDER, FOR SOLUTION ORAL DAILY
Status: DISCONTINUED | OUTPATIENT
Start: 2021-11-06 | End: 2021-11-06 | Stop reason: HOSPADM

## 2021-11-06 RX ADMIN — ENOXAPARIN SODIUM 40 MG: 40 INJECTION SUBCUTANEOUS at 08:07

## 2021-11-06 RX ADMIN — TAZOBACTAM SODIUM AND PIPERACILLIN SODIUM 4.5 G: 500; 4 INJECTION, SOLUTION INTRAVENOUS at 13:05

## 2021-11-06 RX ADMIN — OXYCODONE HYDROCHLORIDE AND ACETAMINOPHEN 1 TABLET: 7.5; 325 TABLET ORAL at 08:28

## 2021-11-06 RX ADMIN — PANTOPRAZOLE SODIUM 40 MG: 40 TABLET, DELAYED RELEASE ORAL at 05:54

## 2021-11-06 RX ADMIN — ACETAMINOPHEN 650 MG: 325 TABLET, FILM COATED ORAL at 16:13

## 2021-11-06 RX ADMIN — LOSARTAN POTASSIUM 100 MG: 25 TABLET, FILM COATED ORAL at 08:08

## 2021-11-06 RX ADMIN — TAZOBACTAM SODIUM AND PIPERACILLIN SODIUM 4.5 G: 500; 4 INJECTION, SOLUTION INTRAVENOUS at 05:54

## 2021-11-06 RX ADMIN — POLYETHYLENE GLYCOL 3350 17 G: 17 POWDER, FOR SOLUTION ORAL at 13:57

## 2021-11-06 RX ADMIN — DOCUSATE SODIUM 100 MG: 100 CAPSULE, LIQUID FILLED ORAL at 08:08

## 2021-11-06 NOTE — DISCHARGE SUMMARY
Discharge Summary    Patient Name:  Khalif Joshua  YOB: 1950  [id number]      DATE OF ADMISSION: 10/30/2021    DATE OF DISCHARGE: 11/6/2021       FINAL DIAGNOSES:   Patient Active Problem List   Diagnosis   • Acute diverticulitis   • Hypertension   • Hyperlipemia   • Abnormal EKG   • Lactic acidosis   • Sepsis without acute organ dysfunction (HCC)   • Peritonitis (HCC)       CONSULTING SERVICES: Infectious disease, hospital medicine service, physical therapy, Occupational Therapy, wound ostomy consult     PROCEDURES PERFORMED:   1.  Open sigmoid colectomy with end colostomy creation on October 30, 2021    HISTORY: The patient is a very pleasant 71 y.o. male with a history of perforated sigmoid diverticulitis.     HOSPITAL COURSE: Mr. Joshua presented to the [facility] emergency department on October 30 and was found to have evidence of perforated sigmoid diverticulitis with intra-abdominal sepsis.  He was taken to the operating room emergently where he underwent exploratory laparotomy, open sigmoid colectomy with end colostomy creation, and abdominal washout.  He tolerated the procedure without difficulty and was transported to the floor in stable condition postoperatively.  Over the following week he progressed.  His Enrique catheter was removed on postoperative day 2 and he had no difficulty voiding.  His NG tube was able to be removed on postoperative day 4 and his diet was slowly advanced.  His intra-abdominal cultures grew E. coli and Pseudomonas and infectious disease was consulted.  He was followed by Dr. Oliveros and maintained on IV Zosyn.  A PICC line was placed for continued IV antibiotic therapy.  On the morning of postoperative day 5 due to continued slight leukocytosis he underwent a CT scan of the abdomen and pelvis which demonstrated no evidence of undrained infection or fluid collection.  By the morning of postoperative day 7 he was tolerating a regular diet without  difficulty, he was having stool output from his colostomy, his pain was well controlled on oral pain medicine, and his MARCO drain was able to be removed.  At this time he was deemed appropriate for discharge in stable condition after having achieved maximal benefit of his hospital stay..      CONDITION: At the time of discharge, the patient is tolerating a regular diet without difficulty. he is having normal bowel and bladder function. his incisions are clean, dry and intact.      DISCHARGE MEDICATIONS:   1. All previous home medications.   2. Percocet 5 - 10 mg PO  Q4h  PRN pain  3. Colace 100mg PO BID  4. Miralax 17g QD  5. IV Zosyn 4.5 G infusions      DISCHARGE INSTRUCTIONS:  1. The patient is instructed to follow up with Dr. Ferguson in 2 weeks’ time.  2. The patient is to follow-up with Dr. Oliveros on Tuesday 11/9 at 1130 with labs   3. he is instructed to refrain from lifting more than 15 or 20 pounds until their return to clinic.   4. If the patient has worsening problems with fever or chills nausea or vomiting he is to contact Dr. Ferguson's office and a contact card has been provided.  5.  He has been instructed it is okay to shower.  Let warm soapy water run over the incisions.  Pat dry do not scrub.  Do not scrub or soak in tub bath for 2 weeks.  6.  Keep a dry dressing on the incision sites as needed for drainage.  7.  He has been instructed to schedule a follow-up appointment with his primary care provider within the next week after discharge and to discuss potential need for stress test with him.    Jose Ferguson MD  11/6/2021  09:43 EDT

## 2021-11-06 NOTE — PROGRESS NOTES
[facility] Medicine Services  PROGRESS NOTE    Patient Name: Khalif Joshua  : 1950  MRN: [id number]    Date of Admission: 10/30/2021  Primary Care Physician: Renee Liriano MD    Subjective   Subjective     CC: Follow-up med management    HPI: No acute events overnight, patient rested well, having some output from his colostomy, tolerating his diet.    ROS:  Gen- No fevers, chills  CV- No chest pain, palpitations  Resp- No cough, dyspnea  GI- No N/V/D, abd pain    All other systems reviewed and are negative    Objective   Objective     Vital Signs:   Temp:  [98.6 °F (37 °C)-98.9 °F (37.2 °C)] 98.7 °F (37.1 °C)  Heart Rate:  [] 94  Resp:  [18] 18  BP: (100-126)/(57-86) 100/57     Physical Exam:  Constitutional: No acute distress, awake, alert  HENT: NCAT, mucous membranes moist  Respiratory: Clear to auscultation bilaterally, respiratory effort normal   Cardiovascular: RRR, no murmurs, rubs, or gallops  Gastrointestinal: Positive bowel sounds, soft, incision CDI, colostomy in place  Musculoskeletal: No bilateral ankle edema  Psychiatric: Appropriate affect, cooperative  Neurologic: Oriented x 3, nonfocal  Skin: No rashes      Results Reviewed:  LAB RESULTS:      Lab 21  0542 21  0503 21  0506 21  0900 21  0724 10/31/21  0812 10/31/21  0812 10/31/21  0233 10/30/21  2013 10/30/21  1215 10/30/21  1215   WBC 12.40* 14.29* 16.63* 13.07* 11.19*   < > 10.82*  --   --    < > 19.05*   HEMOGLOBIN 12.9* 13.6 13.3 12.7* 12.5*   < > 13.2  --   --    < > 15.1   HEMATOCRIT 40.5 41.4 41.5 39.3 38.8   < > 40.7  --   --    < > 45.9   PLATELETS 290 308 279 252 229   < > 274  --   --    < > 304   NEUTROS ABS 8.61*  --   --   --   --   --  8.76*  --   --   --  17.15*   IMMATURE GRANS (ABS) 0.61*  --   --   --   --   --   --   --   --   --   --    LYMPHS ABS 1.59  --   --   --   --   --   --   --   --   --   --    MONOS ABS 1.01*  --   --   --   --   --   --   --   --    --   --    EOS ABS 0.50*  --   --   --   --   --  0.00  --   --   --  0.00   MCV 94.4 92.0 93.9 94.5 95.3   < > 92.5  --   --    < > 93.3   SED RATE 44*  --   --   --   --   --   --   --   --   --   --    CRP 2.99*  --   --   --   --   --   --   --   --   --   --    PROCALCITONIN  --   --   --   --   --   --   --   --   --   --  0.19   LACTATE  --   --   --   --   --   --   --  3.2* 4.2*  --  2.6*    < > = values in this interval not displayed.         Lab 11/05/21  0542 11/04/21  0503 11/03/21  0506 11/02/21  1407 11/01/21  1159   SODIUM 137 132* 135* 137 139   POTASSIUM 4.8 5.0 4.1 4.6 4.8   CHLORIDE 102 97* 101 99 102   CO2 27.0 25.0 24.0 23.0 28.0   ANION GAP 8.0 10.0 10.0 15.0 9.0   BUN 14 17 18 18 21   CREATININE 0.81 0.78 0.68* 0.81 0.69*   GLUCOSE 93 123* 100* 96 105*   CALCIUM 9.0 8.8 8.6 9.0 8.8   MAGNESIUM  --   --  2.4  --  2.7*   PHOSPHORUS  --   --  3.3 3.2 2.3*         Lab 10/31/21  0812 10/30/21  1215   TOTAL PROTEIN 6.1 7.2   ALBUMIN 3.60 4.50   GLOBULIN 2.5 2.7   ALT (SGPT) 14 18   AST (SGOT) 18 21   BILIRUBIN 0.5 0.4   ALK PHOS 40 62   LIPASE  --  18         Lab 10/31/21  0812 10/30/21  1215   TROPONIN T <0.010 <0.010                 Brief Urine Lab Results     None          Microbiology Results Abnormal     Procedure Component Value - Date/Time    Fungus Culture - Peritoneal Fluid, Peritoneum [[id number]] Collected: 10/30/21 1636    Lab Status: Preliminary result Specimen: Peritoneal Fluid from Peritoneum Updated: 11/04/21 1615     Fungus Culture No fungus isolated at less than 1 week    AFB Culture - Peritoneal Fluid, Peritoneum [[id number]] Collected: 10/30/21 1553    Lab Status: Preliminary result Specimen: Peritoneal Fluid from Peritoneum Updated: 11/04/21 1615     AFB Culture No AFB isolated at less than 1 week     AFB Stain No acid fast bacilli seen on concentrated smear    Blood Culture - Blood, Hand, Right [[id number]]  (Normal) Collected: 10/30/21 1355    Lab Status: Final result Specimen:  Blood from Hand, Right Updated: 11/04/21 1515     Blood Culture No growth at 5 days    Blood Culture - Blood, Hand, Left [[id number]]  (Normal) Collected: 10/30/21 1400    Lab Status: Final result Specimen: Blood from Hand, Left Updated: 11/04/21 1515     Blood Culture No growth at 5 days    COVID PRE-OP / PRE-PROCEDURE SCREENING ORDER (NO ISOLATION) - Swab, Nasopharynx [[id number]]  (Normal) Collected: 10/30/21 1402    Lab Status: Final result Specimen: Swab from Nasopharynx Updated: 10/30/21 1441    Narrative:      The following orders were created for panel order COVID PRE-OP / PRE-PROCEDURE SCREENING ORDER (NO ISOLATION) - Swab, Nasopharynx.  Procedure                               Abnormality         Status                     ---------                               -----------         ------                     COVID-19, ABBOTT IN-HOUS...[[id number]]  Normal              Final result                 Please view results for these tests on the individual orders.    COVID-19, ABBOTT IN-HOUSE,NASAL Swab (NO TRANSPORT MEDIA) 2 HR TAT - Swab, Nasopharynx [[id number]]  (Normal) Collected: 10/30/21 1402    Lab Status: Final result Specimen: Swab from Nasopharynx Updated: 10/30/21 1441     COVID19 Presumptive Negative    Narrative:      Fact sheet for providers: https://www.fda.gov/media/947518/download     Fact sheet for patients: https://www.fda.gov/media/115892/download    Test performed by PCR.  If inconsistent with clinical signs and symptoms patient should be tested with different authorized molecular test.          CT Abdomen Pelvis With Contrast    Result Date: 11/4/2021  EXAMINATION: CT ABDOMEN PELVIS W CONTRAST-  INDICATION: Abdominal pain, fever, post-op  TECHNIQUE: Axial IV contrast-enhanced CT of the abdomen and pelvis with multiplanar reconstruction  The radiation dose reduction device was turned on for each scan per the ALARA (As Low as Reasonably Achievable) protocol.  COMPARISON: 10/30/2021  FINDINGS: The  lung bases are grossly clear with minimal dependent atelectasis. The body wall soft tissues demonstrate postoperative changes with a left lower quadrant ostomy and right pelvic flat MARCO drain noted. The liver, spleen, pancreas and bilateral adrenal glands demonstrate homogeneous attenuation without evidence of suspicious focal lesion. The kidneys demonstrate unchanged peripelvic cysts, without evidence of hydronephrosis or nephropathy. The gallbladder is hydropic, otherwise unremarkable. Operative changes are noted from interval distal colonic resection. The stomach is mildly distended, and there are several borderline distended segments of small bowel, favoring likely ileus over obstruction. There is no focal fluid collection concerning for abscess. There is no overt pneumoperitoneum to suggest perforation. The pelvic viscera are unremarkable. Mildly atherosclerotic nonaneurysmal abdominal aorta. No bulky retroperitoneal lymphadenopathy. The osseous structures demonstrate no evidence of acute fracture or aggressive osseous lesion.      Impression: Operative changes from interval sigmoid colectomy with left lower quadrant colostomy. There are few segments of borderline distended small bowel, minimally edematous, possibly suggesting enteritis or ileus, favored over mechanical obstruction. There is otherwise no evidence of focal fluid collection concerning for abscess or overt pneumoperitoneum to suggest perforation.   This report was finalized on 11/4/2021 2:09 PM by José Miguel Ko.            I have reviewed the medications:  Scheduled Meds:docusate sodium, 100 mg, Oral, BID  enoxaparin, 40 mg, Subcutaneous, Q24H  losartan, 100 mg, Oral, Q24H  pantoprazole, 40 mg, Oral, Q AM  piperacillin-tazobactam, 4.5 g, Intravenous, Q8H  senna, 1 tablet, Oral, Nightly      Continuous Infusions:   PRN Meds:.•  acetaminophen  •  diphenhydrAMINE  •  First Mouthwash (Magic Mouthwash)  •  influenza vaccine  •  Morphine **AND**  naloxone  •  ondansetron **OR** ondansetron  •  oxyCODONE-acetaminophen  •  phenol  •  potassium phosphate  •  sodium chloride  •  sodium chloride  •  sodium chloride    Assessment/Plan   Assessment & Plan     Active Hospital Problems    Diagnosis  POA   • Abnormal EKG [R94.31]  Yes   • Lactic acidosis [E87.2]  Yes   • Sepsis without acute organ dysfunction (HCC) [A41.9]  Yes   • Peritonitis (HCC) [K65.9]  Yes   • Acute diverticulitis [K57.92]  Yes   • Hypertension [I10]  Yes   • Hyperlipemia [E78.5]  Yes      Resolved Hospital Problems   No resolved problems to display.        Brief Hospital Course to date:  Khalif Joshua is a 71 y.o. male with HO HTN, HLP and severe OA of his knees.  He presented to the ED with acute onset of abdominal pain and found to have a perforated bowel Dr Amaya took him to the OR emergently and performed an open sigmoid colectomy with end colostomy creation (Kate's Procedure), exploratory laparotomy, and abdominal washout for a perforated diverticulitis and pneumoperitoneum.  Hospital medicine has been asked to assist with medical management.     Perforated diverticulitis with peritonitis  --overall management per surgery, Dr amaya  --Continue IV fluids, muscle relaxer, pain control, has scheduled non-narcotic options  --Encouraged to mobilize     Sepsis POA due to peritonitis  Cx with Pseudomonas/Ecoli  Lactic acidosis  -Continue antibiotics per ID, currently on IV Zosyn, PICC line is in place, plan for 7-10 day duration per ID     Hypertension   -BP is now better controlled, continue home regimen      OA-  -monitor     Abnormal EKG  -Patient has Q waves in anterior/inferior leads, no previous EKGs available to compare.  He reports having a annual physical in September and PCP did obtain an EKG. with recommend patient sees his PCP after discharge and if indeed indicated he will need stress test to be arranged  -he denies history of any CAD, no previous chest pain     DVT  prophylaxis:  Medical and mechanical DVT prophylaxis orders are present.       AM-PAC 6 Clicks Score (PT): 23 (11/05/21 2000)    Disposition:ok to d/c home today    CODE STATUS:   Code Status and Medical Interventions:   Ordered at: 10/30/21 2206     Code Status (Patient has no pulse and is not breathing):    CPR (Attempt to Resuscitate)     Medical Interventions (Patient has pulse or is breathing):    Full       Eliud Breen MD  11/06/21

## 2021-11-06 NOTE — NURSING NOTE
"[facility] follow-up:     Colostomy     Patient to be discharged.   Appliance change and stomal teaching performed.     Stomal looks good.   Peristomal skin is intact.   Stomal care performed.   Stoma measures 42mm.   He has subtle stomal creasing at the medial edge next to his umbilicus.   He will need convexity.     Placed him in a [employer] 2 3/4\" convex.     Reviewed activity, diet, hydration, stomal care, showering, and supply ordering.   He plans to use disposable pouches.   I have his needed supplies flagged in the [employer] catalog. Reviewed with spouse.     I will send sample to his house.   He is to contact [facility] if needs arise or if an outpatient appointment is needed.     Questions answered and encouragement provided.     Thanks       "

## 2021-11-06 NOTE — CASE MANAGEMENT/SOCIAL WORK
Case Management Discharge Note      Final Note: I have met with Mr. Joshua and his wife at the bedside to discuss today's discharge.  They remain agreeable to receive [facility] services and state they have received meds and instruction for home iv antibiotic infusions.  Mr. Joshua will receive two infusions prior to his discharge and his wife will administer an infusion tonight at home.  I have notified Chrissy with [facility] to confirm today's discharge.  She states that they will see him in the am.  Mr. Joshua denies having any other discharge needs at this time.         Selected Continued Care - Admitted Since 10/30/2021     Destination    No services have been selected for the patient.              Durable Medical Equipment    No services have been selected for the patient.              Dialysis/Infusion Coordination complete.    Service Provider Selected Services Address Phone Fax Patient Preferred    [facility]  Infusion and IV Therapy [address] [place] [id number] [phone] --          Home Medical Care Coordination complete.    Service Provider Selected Services Address Phone Fax Patient Preferred    [facility]  Home Health Services [address] [facility] [id number] [phone] --          Therapy    No services have been selected for the patient.              Community Resources    No services have been selected for the patient.              [facility]    No services have been selected for the patient.                       Final Discharge Disposition Code: 06 - home with home health care

## 2021-11-07 ENCOUNTER — HOME CARE VISIT (OUTPATIENT)
Dept: HOME HEALTH SERVICES | Facility: HOME HEALTHCARE | Age: 71
End: 2021-11-07

## 2021-11-07 VITALS
DIASTOLIC BLOOD PRESSURE: 64 MMHG | HEART RATE: 96 BPM | OXYGEN SATURATION: 95 % | RESPIRATION RATE: 16 BRPM | TEMPERATURE: 98.4 F | SYSTOLIC BLOOD PRESSURE: 104 MMHG

## 2021-11-07 PROCEDURE — G0299 HHS/HOSPICE OF RN EA 15 MIN: HCPCS

## 2021-11-07 NOTE — OUTREACH NOTE
Prep Survey      Responses   [facility] patient discharged from? [place]   Is LACE score < 7 ? No   Emergency Room discharge w/ pulse ox? No   Eligibility Readm Mgmt   Discharge diagnosis Acute diverticulitisOpen sigmoid colectomy with end colostomy creation on October 30, 2021   Does the patient have one of the following disease processes/diagnoses(primary or secondary)? Other   Does the patient have Home health ordered? Yes   What is the Home health agency?  [facility]    Is there a DME ordered? No   Prep survey completed? Yes          Meredith Ramirez RN

## 2021-11-09 ENCOUNTER — LAB (OUTPATIENT)
Dept: LAB | Facility: HOSPITAL | Age: 71
End: 2021-11-09

## 2021-11-09 ENCOUNTER — TRANSCRIBE ORDERS (OUTPATIENT)
Dept: LAB | Facility: HOSPITAL | Age: 71
End: 2021-11-09

## 2021-11-09 ENCOUNTER — READMISSION MANAGEMENT (OUTPATIENT)
Dept: CALL CENTER | Facility: HOSPITAL | Age: 71
End: 2021-11-09

## 2021-11-09 DIAGNOSIS — K57.20 PERFORATED DIVERTICULUM OF LARGE INTESTINE: ICD-10-CM

## 2021-11-09 DIAGNOSIS — K57.20 PERFORATED DIVERTICULUM OF LARGE INTESTINE: Primary | ICD-10-CM

## 2021-11-09 LAB
ALBUMIN SERPL-MCNC: 4.1 G/DL (ref 3.5–5.2)
ALBUMIN/GLOB SERPL: 1.3 G/DL
ALP SERPL-CCNC: 91 U/L (ref 39–117)
ALT SERPL W P-5'-P-CCNC: 36 U/L (ref 1–41)
ANION GAP SERPL CALCULATED.3IONS-SCNC: 9 MMOL/L (ref 5–15)
AST SERPL-CCNC: 20 U/L (ref 1–40)
BASOPHILS # BLD AUTO: 0.07 10*3/MM3 (ref 0–0.2)
BASOPHILS NFR BLD AUTO: 0.5 % (ref 0–1.5)
BILIRUB SERPL-MCNC: 0.2 MG/DL (ref 0–1.2)
BUN SERPL-MCNC: 21 MG/DL (ref 8–23)
BUN/CREAT SERPL: 26.3 (ref 7–25)
CALCIUM SPEC-SCNC: 9.5 MG/DL (ref 8.6–10.5)
CHLORIDE SERPL-SCNC: 99 MMOL/L (ref 98–107)
CO2 SERPL-SCNC: 29 MMOL/L (ref 22–29)
CREAT SERPL-MCNC: 0.8 MG/DL (ref 0.76–1.27)
CRP SERPL-MCNC: 0.73 MG/DL (ref 0–0.5)
DEPRECATED RDW RBC AUTO: 45.1 FL (ref 37–54)
EOSINOPHIL # BLD AUTO: 0.18 10*3/MM3 (ref 0–0.4)
EOSINOPHIL NFR BLD AUTO: 1.3 % (ref 0.3–6.2)
ERYTHROCYTE [DISTWIDTH] IN BLOOD BY AUTOMATED COUNT: 13.3 % (ref 12.3–15.4)
ERYTHROCYTE [SEDIMENTATION RATE] IN BLOOD: 60 MM/HR (ref 0–20)
GFR SERPL CREATININE-BSD FRML MDRD: 96 ML/MIN/1.73
GLOBULIN UR ELPH-MCNC: 3.2 GM/DL
GLUCOSE SERPL-MCNC: 92 MG/DL (ref 65–99)
HCT VFR BLD AUTO: 42.5 % (ref 37.5–51)
HGB BLD-MCNC: 14 G/DL (ref 13–17.7)
IMM GRANULOCYTES # BLD AUTO: 0.26 10*3/MM3 (ref 0–0.05)
IMM GRANULOCYTES NFR BLD AUTO: 1.9 % (ref 0–0.5)
LYMPHOCYTES # BLD AUTO: 1.88 10*3/MM3 (ref 0.7–3.1)
LYMPHOCYTES NFR BLD AUTO: 13.9 % (ref 19.6–45.3)
MCH RBC QN AUTO: 30.5 PG (ref 26.6–33)
MCHC RBC AUTO-ENTMCNC: 32.9 G/DL (ref 31.5–35.7)
MCV RBC AUTO: 92.6 FL (ref 79–97)
MONOCYTES # BLD AUTO: 0.75 10*3/MM3 (ref 0.1–0.9)
MONOCYTES NFR BLD AUTO: 5.5 % (ref 5–12)
NEUTROPHILS NFR BLD AUTO: 10.43 10*3/MM3 (ref 1.7–7)
NEUTROPHILS NFR BLD AUTO: 76.9 % (ref 42.7–76)
NRBC BLD AUTO-RTO: 0 /100 WBC (ref 0–0.2)
PLATELET # BLD AUTO: 390 10*3/MM3 (ref 140–450)
PMV BLD AUTO: 9.2 FL (ref 6–12)
POTASSIUM SERPL-SCNC: 4.6 MMOL/L (ref 3.5–5.2)
PROT SERPL-MCNC: 7.3 G/DL (ref 6–8.5)
RBC # BLD AUTO: 4.59 10*6/MM3 (ref 4.14–5.8)
SODIUM SERPL-SCNC: 137 MMOL/L (ref 136–145)
WBC # BLD AUTO: 13.57 10*3/MM3 (ref 3.4–10.8)

## 2021-11-09 PROCEDURE — 85025 COMPLETE CBC W/AUTO DIFF WBC: CPT

## 2021-11-09 PROCEDURE — 86140 C-REACTIVE PROTEIN: CPT

## 2021-11-09 PROCEDURE — 80053 COMPREHEN METABOLIC PANEL: CPT

## 2021-11-09 PROCEDURE — 85652 RBC SED RATE AUTOMATED: CPT

## 2021-11-09 PROCEDURE — 36415 COLL VENOUS BLD VENIPUNCTURE: CPT

## 2021-11-09 NOTE — OUTREACH NOTE
Medical Week 1 Survey      Responses   [facility] patient discharged from? [place]   Does the patient have one of the following disease processes/diagnoses(primary or secondary)? Other   Week 1 attempt successful? No   Unsuccessful attempts Attempt 1          Lashonda Sesay RN

## 2021-11-11 ENCOUNTER — HOME CARE VISIT (OUTPATIENT)
Dept: HOME HEALTH SERVICES | Facility: HOME HEALTHCARE | Age: 71
End: 2021-11-11

## 2021-11-11 ENCOUNTER — READMISSION MANAGEMENT (OUTPATIENT)
Dept: CALL CENTER | Facility: HOSPITAL | Age: 71
End: 2021-11-11

## 2021-11-11 NOTE — CASE COMMUNICATION
Patient not seen on 11.11.21 d/t issue with PICC line that he went to [facility] to have fixed.  They changed his PICC dressing at that time.

## 2021-11-11 NOTE — OUTREACH NOTE
Medical Week 1 Survey      Responses   [facility] patient discharged from? [place]   Does the patient have one of the following disease processes/diagnoses(primary or secondary)? Other   Week 1 attempt successful? Yes   Call start time 1515   Call end time 1520   Discharge diagnosis Acute diverticulitisOpen sigmoid colectomy with end colostomy creation on October 30, 2021   Person spoke with today (if not patient) and relationship Eva-Spouse    Medication alerts for this patient Pt receives ABX at home via PICC   Meds reviewed with patient/caregiver? Yes   Is the patient having any side effects they believe may be caused by any medication additions or changes? No   Does the patient have all medications ordered at discharge? Yes   Is the patient taking all medications as directed (includes completed medication regime)? Yes   Comments regarding appointments Appt with ID is on 11/9/21 and again on 11/18/21,  Appt with surgeon is on 11/23/21   Does the patient have a primary care provider?  Yes   Does the patient have an appointment with their PCP within 7 days of discharge? No   What is preventing the patient from scheduling follow up appointments within 7 days of discharge? Haven't had time   Nursing Interventions Advised patient to make appointment   Has the patient kept scheduled appointments due by today? N/A   What is the Home health agency?  [facility]    Has home health visited the patient within 72 hours of discharge? Yes   Psychosocial issues? No   Did the patient receive a copy of their discharge instructions? Yes   Nursing interventions Reviewed instructions with patient   What is the patient's perception of their health status since discharge? Improving   Is the patient/caregiver able to teach back signs and symptoms related to disease process for when to call PCP? Yes   Is the patient/caregiver able to teach back signs and symptoms related to disease process  for when to call 911? Yes   Is the patient/caregiver able to teach back the hierarchy of who to call/visit for symptoms/problems? PCP, Specialist, Home health nurse, Urgent Care, ED, 911 Yes   If the patient is a current smoker, are they able to teach back resources for cessation? Not a smoker   Week 1 call completed? Yes          Tammy Garcia RN

## 2021-11-15 NOTE — HOME HEALTH
Skilled nursing orders: PICC line care/instruction Infusion therapy Ostomy instruction    Notes per surgeon:  CONDITION: At the time of discharge, the patient is tolerating a regular diet without difficulty. he is having normal bowel and bladder function. his incisions are clean, dry and intact.   DISCHARGE MEDICATIONS:   1. All previous home medications.   2. Percocet 5 - 10 mg PO Q4h PRN pain   3. Colace 100mg PO BID   4. Miralax 17g QD   5. IV Zosyn 4.5 G infusions   DISCHARGE INSTRUCTIONS:   1. The patient is instructed to follow up with Dr. Ferguson in 2 weeks’ time.   2. The patient is to follow-up with Dr. Oliveros on Tuesday 11/9 at 1130 with labs   3. he is instructed to refrain from lifting more than 15 or 20 pounds until their return to clinic.   4. If the patient has worsening problems with fever or chills nausea or vomiting he is to contact Dr. Ferguson's office and a contact card has been provided.   5. He has been instructed it is okay to shower. Let warm soapy water run over the incisions. Pat dry do not scrub. Do not scrub or soak in tub bath for 2 weeks.   6. Keep a dry dressing on the incision sites as needed for drainage.   7. He has been instructed to schedule a follow-up appointment with his primary care provider within the next week after discharge and to discuss potential need for stress test with him.   Jose Ferguson MD   11/6/2021   09:43 EDT

## 2021-11-18 ENCOUNTER — HOME CARE VISIT (OUTPATIENT)
Dept: HOME HEALTH SERVICES | Facility: HOME HEALTHCARE | Age: 71
End: 2021-11-18

## 2021-11-18 ENCOUNTER — LAB (OUTPATIENT)
Dept: LAB | Facility: HOSPITAL | Age: 71
End: 2021-11-18

## 2021-11-18 ENCOUNTER — TRANSCRIBE ORDERS (OUTPATIENT)
Dept: LAB | Facility: HOSPITAL | Age: 71
End: 2021-11-18

## 2021-11-18 DIAGNOSIS — A04.8 INTESTINAL INFECTION DUE TO BACTEROIDES FRAGILIS: ICD-10-CM

## 2021-11-18 DIAGNOSIS — K65.0 ACUTE PERITONITIS (HCC): ICD-10-CM

## 2021-11-18 DIAGNOSIS — B96.6 INTESTINAL INFECTION DUE TO BACTEROIDES FRAGILIS: ICD-10-CM

## 2021-11-18 DIAGNOSIS — K57.20 PERFORATED DIVERTICULUM OF LARGE INTESTINE: Primary | ICD-10-CM

## 2021-11-18 DIAGNOSIS — K57.20 PERFORATED DIVERTICULUM OF LARGE INTESTINE: ICD-10-CM

## 2021-11-18 LAB
ALBUMIN SERPL-MCNC: 4.4 G/DL (ref 3.5–5.2)
ALBUMIN/GLOB SERPL: 1.5 G/DL
ALP SERPL-CCNC: 66 U/L (ref 39–117)
ALT SERPL W P-5'-P-CCNC: 15 U/L (ref 1–41)
ANION GAP SERPL CALCULATED.3IONS-SCNC: 10 MMOL/L (ref 5–15)
AST SERPL-CCNC: 15 U/L (ref 1–40)
BASOPHILS # BLD AUTO: 0.06 10*3/MM3 (ref 0–0.2)
BASOPHILS NFR BLD AUTO: 0.8 % (ref 0–1.5)
BILIRUB SERPL-MCNC: 0.4 MG/DL (ref 0–1.2)
BUN SERPL-MCNC: 25 MG/DL (ref 8–23)
BUN/CREAT SERPL: 25 (ref 7–25)
CALCIUM SPEC-SCNC: 10 MG/DL (ref 8.6–10.5)
CHLORIDE SERPL-SCNC: 101 MMOL/L (ref 98–107)
CO2 SERPL-SCNC: 29 MMOL/L (ref 22–29)
CREAT SERPL-MCNC: 1 MG/DL (ref 0.76–1.27)
CRP SERPL-MCNC: <0.3 MG/DL (ref 0–0.5)
DEPRECATED RDW RBC AUTO: 47.9 FL (ref 37–54)
EOSINOPHIL # BLD AUTO: 0.31 10*3/MM3 (ref 0–0.4)
EOSINOPHIL NFR BLD AUTO: 3.9 % (ref 0.3–6.2)
ERYTHROCYTE [DISTWIDTH] IN BLOOD BY AUTOMATED COUNT: 14 % (ref 12.3–15.4)
ERYTHROCYTE [SEDIMENTATION RATE] IN BLOOD: 34 MM/HR (ref 0–20)
GFR SERPL CREATININE-BSD FRML MDRD: 74 ML/MIN/1.73
GLOBULIN UR ELPH-MCNC: 2.9 GM/DL
GLUCOSE SERPL-MCNC: 96 MG/DL (ref 65–99)
HCT VFR BLD AUTO: 42 % (ref 37.5–51)
HGB BLD-MCNC: 13.6 G/DL (ref 13–17.7)
IMM GRANULOCYTES # BLD AUTO: 0.06 10*3/MM3 (ref 0–0.05)
IMM GRANULOCYTES NFR BLD AUTO: 0.8 % (ref 0–0.5)
LYMPHOCYTES # BLD AUTO: 1.66 10*3/MM3 (ref 0.7–3.1)
LYMPHOCYTES NFR BLD AUTO: 21.1 % (ref 19.6–45.3)
MCH RBC QN AUTO: 30.4 PG (ref 26.6–33)
MCHC RBC AUTO-ENTMCNC: 32.4 G/DL (ref 31.5–35.7)
MCV RBC AUTO: 93.8 FL (ref 79–97)
MONOCYTES # BLD AUTO: 0.82 10*3/MM3 (ref 0.1–0.9)
MONOCYTES NFR BLD AUTO: 10.4 % (ref 5–12)
NEUTROPHILS NFR BLD AUTO: 4.95 10*3/MM3 (ref 1.7–7)
NEUTROPHILS NFR BLD AUTO: 63 % (ref 42.7–76)
NRBC BLD AUTO-RTO: 0 /100 WBC (ref 0–0.2)
PLATELET # BLD AUTO: 442 10*3/MM3 (ref 140–450)
PMV BLD AUTO: 8.8 FL (ref 6–12)
POTASSIUM SERPL-SCNC: 4.3 MMOL/L (ref 3.5–5.2)
PROT SERPL-MCNC: 7.3 G/DL (ref 6–8.5)
RBC # BLD AUTO: 4.48 10*6/MM3 (ref 4.14–5.8)
SODIUM SERPL-SCNC: 140 MMOL/L (ref 136–145)
WBC NRBC COR # BLD: 7.86 10*3/MM3 (ref 3.4–10.8)

## 2021-11-18 PROCEDURE — 85025 COMPLETE CBC W/AUTO DIFF WBC: CPT

## 2021-11-18 PROCEDURE — 86140 C-REACTIVE PROTEIN: CPT

## 2021-11-18 PROCEDURE — 85652 RBC SED RATE AUTOMATED: CPT

## 2021-11-18 PROCEDURE — 36415 COLL VENOUS BLD VENIPUNCTURE: CPT

## 2021-11-18 PROCEDURE — 80053 COMPREHEN METABOLIC PANEL: CPT

## 2021-11-19 ENCOUNTER — READMISSION MANAGEMENT (OUTPATIENT)
Dept: CALL CENTER | Facility: HOSPITAL | Age: 71
End: 2021-11-19

## 2021-11-19 NOTE — CASE COMMUNICATION
Patient not seen on 11.18.21 for SNV d/t having PICC line pulled at [facility].  Plan is to discharge patient on 11.22.21.

## 2021-11-19 NOTE — OUTREACH NOTE
Medical Week 2 Survey      Responses   [facility] patient discharged from? [place]   Does the patient have one of the following disease processes/diagnoses(primary or secondary)? Other   Week 2 attempt successful? Yes   Call start time 1451   Discharge diagnosis Acute diverticulitisOpen sigmoid colectomy with end colostomy creation on October 30, 2021   Call end time 1453   Meds reviewed with patient/caregiver? Yes   Is the patient having any side effects they believe may be caused by any medication additions or changes? No   Does the patient have all medications ordered at discharge? Yes   Is the patient taking all medications as directed (includes completed medication regime)? Yes   Does the patient have a primary care provider?  Yes   Has the patient kept scheduled appointments due by today? Yes   Comments Saw Dr. Oliveros yesterday, PICC line out yesterday.    Home health comments Expects to be dischared from  on Monday   Psychosocial issues? No   Did the patient receive a copy of their discharge instructions? Yes   What is the patient's perception of their health status since discharge? Improving   Is the patient/caregiver able to teach back signs and symptoms related to disease process for when to call PCP? Yes   Is the patient/caregiver able to teach back signs and symptoms related to disease process for when to call 911? Yes   Is the patient/caregiver able to teach back the hierarchy of who to call/visit for symptoms/problems? PCP, Specialist, Home health nurse, Urgent Care, ED, 911 Yes   If the patient is a current smoker, are they able to teach back resources for cessation? Not a smoker   Week 2 Call Completed? Yes   Wrap up additional comments Pt states he is doing well. Had PICC line removed yesterday and expects to be discharged by  on Monday.           Courtney Salazar RN

## 2021-11-22 ENCOUNTER — HOME CARE VISIT (OUTPATIENT)
Dept: HOME HEALTH SERVICES | Facility: HOME HEALTHCARE | Age: 71
End: 2021-11-22

## 2021-11-22 VITALS
RESPIRATION RATE: 16 BRPM | TEMPERATURE: 97.5 F | HEART RATE: 107 BPM | DIASTOLIC BLOOD PRESSURE: 81 MMHG | OXYGEN SATURATION: 97 % | SYSTOLIC BLOOD PRESSURE: 118 MMHG

## 2021-11-22 PROCEDURE — G0495 RN CARE TRAIN/EDU IN HH: HCPCS

## 2021-11-22 NOTE — CASE COMMUNICATION
Patient discharged from  services effective 11.22.21 d/t goals met, teaching complete, and patient independent with care.  No further SN needs at this time.

## 2021-11-29 ENCOUNTER — READMISSION MANAGEMENT (OUTPATIENT)
Dept: CALL CENTER | Facility: HOSPITAL | Age: 71
End: 2021-11-29

## 2021-11-29 NOTE — OUTREACH NOTE
Medical Week 3 Survey      Responses   [facility] patient discharged from? [place]   Does the patient have one of the following disease processes/diagnoses(primary or secondary)? Other   Week 3 attempt successful? Yes   Call start time 1641   Call end time 1643   Discharge diagnosis Acute diverticulitisOpen sigmoid colectomy with end colostomy creation on October 30, 2021   Meds reviewed with patient/caregiver? Yes   Is the patient taking all medications as directed (includes completed medication regime)? Yes   Has the patient kept scheduled appointments due by today? Yes   Home health comments Pt has had trouble getting supplies. He's working with a company since he's been discharged from .    What is the patient's perception of their health status since discharge? Improving   Week 3 Call Completed? Yes   Is the patient interested in additional calls from an ambulatory ?  NOTE:  applies to high risk patients requiring additional follow-up. No   Revoked No further contact(revokes)-requires comment   Graduated/Revoked comments Pt reports he's doing well. He's been discharged from .           Tammy Garcia RN

## 2021-11-30 ENCOUNTER — LAB (OUTPATIENT)
Dept: LAB | Facility: HOSPITAL | Age: 71
End: 2021-11-30

## 2021-11-30 ENCOUNTER — TRANSCRIBE ORDERS (OUTPATIENT)
Dept: LAB | Facility: HOSPITAL | Age: 71
End: 2021-11-30

## 2021-11-30 DIAGNOSIS — K57.20 PERFORATED DIVERTICULUM OF LARGE INTESTINE: ICD-10-CM

## 2021-11-30 DIAGNOSIS — K65.0 ACUTE PERITONITIS (HCC): ICD-10-CM

## 2021-11-30 DIAGNOSIS — B95.2 ENTEROCOCCUS FAECALIS INFECTION: Primary | ICD-10-CM

## 2021-11-30 DIAGNOSIS — B96.6 INTESTINAL INFECTION DUE TO BACTEROIDES FRAGILIS: ICD-10-CM

## 2021-11-30 DIAGNOSIS — B95.2 ENTEROCOCCUS FAECALIS INFECTION: ICD-10-CM

## 2021-11-30 DIAGNOSIS — A04.8 INTESTINAL INFECTION DUE TO BACTEROIDES FRAGILIS: ICD-10-CM

## 2021-11-30 LAB
ALBUMIN SERPL-MCNC: 4.5 G/DL (ref 3.5–5.2)
ALBUMIN/GLOB SERPL: 1.5 G/DL
ALP SERPL-CCNC: 63 U/L (ref 39–117)
ALT SERPL W P-5'-P-CCNC: 22 U/L (ref 1–41)
ANION GAP SERPL CALCULATED.3IONS-SCNC: 11 MMOL/L (ref 5–15)
AST SERPL-CCNC: 17 U/L (ref 1–40)
BASOPHILS # BLD AUTO: 0.04 10*3/MM3 (ref 0–0.2)
BASOPHILS NFR BLD AUTO: 0.5 % (ref 0–1.5)
BILIRUB SERPL-MCNC: 0.3 MG/DL (ref 0–1.2)
BUN SERPL-MCNC: 33 MG/DL (ref 8–23)
BUN/CREAT SERPL: 39.8 (ref 7–25)
CALCIUM SPEC-SCNC: 10.4 MG/DL (ref 8.6–10.5)
CHLORIDE SERPL-SCNC: 102 MMOL/L (ref 98–107)
CO2 SERPL-SCNC: 28 MMOL/L (ref 22–29)
CREAT SERPL-MCNC: 0.83 MG/DL (ref 0.76–1.27)
CRP SERPL-MCNC: <0.3 MG/DL (ref 0–0.5)
DEPRECATED RDW RBC AUTO: 47.3 FL (ref 37–54)
EOSINOPHIL # BLD AUTO: 0.23 10*3/MM3 (ref 0–0.4)
EOSINOPHIL NFR BLD AUTO: 2.7 % (ref 0.3–6.2)
ERYTHROCYTE [DISTWIDTH] IN BLOOD BY AUTOMATED COUNT: 14.1 % (ref 12.3–15.4)
ERYTHROCYTE [SEDIMENTATION RATE] IN BLOOD: 23 MM/HR (ref 0–20)
GFR SERPL CREATININE-BSD FRML MDRD: 92 ML/MIN/1.73
GLOBULIN UR ELPH-MCNC: 3.1 GM/DL
GLUCOSE SERPL-MCNC: 110 MG/DL (ref 65–99)
HCT VFR BLD AUTO: 46.4 % (ref 37.5–51)
HGB BLD-MCNC: 15.1 G/DL (ref 13–17.7)
IMM GRANULOCYTES # BLD AUTO: 0.06 10*3/MM3 (ref 0–0.05)
IMM GRANULOCYTES NFR BLD AUTO: 0.7 % (ref 0–0.5)
LYMPHOCYTES # BLD AUTO: 2.49 10*3/MM3 (ref 0.7–3.1)
LYMPHOCYTES NFR BLD AUTO: 28.9 % (ref 19.6–45.3)
MCH RBC QN AUTO: 29.8 PG (ref 26.6–33)
MCHC RBC AUTO-ENTMCNC: 32.5 G/DL (ref 31.5–35.7)
MCV RBC AUTO: 91.7 FL (ref 79–97)
MONOCYTES # BLD AUTO: 0.91 10*3/MM3 (ref 0.1–0.9)
MONOCYTES NFR BLD AUTO: 10.6 % (ref 5–12)
NEUTROPHILS NFR BLD AUTO: 4.88 10*3/MM3 (ref 1.7–7)
NEUTROPHILS NFR BLD AUTO: 56.6 % (ref 42.7–76)
NRBC BLD AUTO-RTO: 0 /100 WBC (ref 0–0.2)
PLATELET # BLD AUTO: 259 10*3/MM3 (ref 140–450)
PMV BLD AUTO: 9.3 FL (ref 6–12)
POTASSIUM SERPL-SCNC: 4.1 MMOL/L (ref 3.5–5.2)
PROT SERPL-MCNC: 7.6 G/DL (ref 6–8.5)
RBC # BLD AUTO: 5.06 10*6/MM3 (ref 4.14–5.8)
SODIUM SERPL-SCNC: 141 MMOL/L (ref 136–145)
WBC NRBC COR # BLD: 8.61 10*3/MM3 (ref 3.4–10.8)

## 2021-11-30 PROCEDURE — 85025 COMPLETE CBC W/AUTO DIFF WBC: CPT

## 2021-11-30 PROCEDURE — 80053 COMPREHEN METABOLIC PANEL: CPT

## 2021-11-30 PROCEDURE — 36415 COLL VENOUS BLD VENIPUNCTURE: CPT

## 2021-11-30 PROCEDURE — 86140 C-REACTIVE PROTEIN: CPT

## 2021-11-30 PROCEDURE — 85652 RBC SED RATE AUTOMATED: CPT

## 2021-12-11 LAB
FUNGUS WND CULT: NORMAL
MYCOBACTERIUM SPEC CULT: NORMAL
NIGHT BLUE STAIN TISS: NORMAL

## 2022-01-28 ENCOUNTER — TRANSCRIBE ORDERS (OUTPATIENT)
Dept: ADMINISTRATIVE | Facility: HOSPITAL | Age: 72
End: 2022-01-28

## 2022-01-28 DIAGNOSIS — K57.20 DIVERTICULITIS OF LARGE INTESTINE WITH PERFORATION AND ABSCESS WITHOUT BLEEDING: Primary | ICD-10-CM

## 2022-01-28 DIAGNOSIS — Z11.59 ENCOUNTER FOR SCREENING FOR VIRAL DISEASE: Primary | ICD-10-CM

## 2022-02-07 ENCOUNTER — APPOINTMENT (OUTPATIENT)
Dept: GENERAL RADIOLOGY | Facility: HOSPITAL | Age: 72
End: 2022-02-07

## 2022-02-08 ENCOUNTER — HOSPITAL ENCOUNTER (OUTPATIENT)
Dept: GENERAL RADIOLOGY | Facility: HOSPITAL | Age: 72
Discharge: HOME | End: 2022-02-08
Admitting: SURGERY

## 2022-02-08 DIAGNOSIS — K57.20 DIVERTICULITIS OF LARGE INTESTINE WITH PERFORATION AND ABSCESS WITHOUT BLEEDING: ICD-10-CM

## 2022-02-08 PROCEDURE — 0 DIATRIZOATE MEGLUMINE & SODIUM PER 1 ML: Performed by: SURGERY

## 2022-02-08 PROCEDURE — 74270 X-RAY XM COLON 1CNTRST STD: CPT

## 2022-02-08 RX ADMIN — DIATRIZOATE MEGLUMINE AND DIATRIZOATE SODIUM 480 ML: 660; 100 LIQUID ORAL; RECTAL at 09:43

## 2022-02-13 ENCOUNTER — LAB (OUTPATIENT)
Dept: PREADMISSION TESTING | Facility: HOSPITAL | Age: 72
End: 2022-02-13

## 2022-02-13 DIAGNOSIS — Z11.59 ENCOUNTER FOR SCREENING FOR VIRAL DISEASE: ICD-10-CM

## 2022-02-13 LAB — SARS-COV-2 RNA PNL SPEC NAA+PROBE: NOT DETECTED

## 2022-02-13 PROCEDURE — U0005 INFEC AGEN DETEC AMPLI PROBE: HCPCS

## 2022-02-13 PROCEDURE — U0004 COV-19 TEST NON-CDC HGH THRU: HCPCS

## 2022-02-13 PROCEDURE — C9803 HOPD COVID-19 SPEC COLLECT: HCPCS

## 2022-04-19 ENCOUNTER — PRE-ADMISSION TESTING (OUTPATIENT)
Dept: PREADMISSION TESTING | Facility: HOSPITAL | Age: 72
End: 2022-04-19

## 2022-04-19 VITALS — WEIGHT: 228.62 LBS | BODY MASS INDEX: 32.01 KG/M2 | HEIGHT: 71 IN

## 2022-04-19 LAB
ANION GAP SERPL CALCULATED.3IONS-SCNC: 8 MMOL/L (ref 5–15)
BUN SERPL-MCNC: 20 MG/DL (ref 8–23)
BUN/CREAT SERPL: 23.3 (ref 7–25)
CALCIUM SPEC-SCNC: 10.1 MG/DL (ref 8.6–10.5)
CHLORIDE SERPL-SCNC: 98 MMOL/L (ref 98–107)
CO2 SERPL-SCNC: 29 MMOL/L (ref 22–29)
CREAT SERPL-MCNC: 0.86 MG/DL (ref 0.76–1.27)
DEPRECATED RDW RBC AUTO: 52.3 FL (ref 37–54)
EGFRCR SERPLBLD CKD-EPI 2021: 93.1 ML/MIN/1.73
ERYTHROCYTE [DISTWIDTH] IN BLOOD BY AUTOMATED COUNT: 14.9 % (ref 12.3–15.4)
GLUCOSE SERPL-MCNC: 91 MG/DL (ref 65–99)
HBA1C MFR BLD: 5.2 % (ref 4.8–5.6)
HCT VFR BLD AUTO: 49.3 % (ref 37.5–51)
HGB BLD-MCNC: 16.5 G/DL (ref 13–17.7)
INR PPP: 0.92 (ref 0.84–1.13)
MCH RBC QN AUTO: 31.5 PG (ref 26.6–33)
MCHC RBC AUTO-ENTMCNC: 33.5 G/DL (ref 31.5–35.7)
MCV RBC AUTO: 94.1 FL (ref 79–97)
PLATELET # BLD AUTO: 290 10*3/MM3 (ref 140–450)
PMV BLD AUTO: 9.7 FL (ref 6–12)
POTASSIUM SERPL-SCNC: 4.1 MMOL/L (ref 3.5–5.2)
PROTHROMBIN TIME: 12.3 SECONDS (ref 11.4–14.4)
QT INTERVAL: 332 MS
QTC INTERVAL: 415 MS
RBC # BLD AUTO: 5.24 10*6/MM3 (ref 4.14–5.8)
SARS-COV-2 RNA PNL SPEC NAA+PROBE: NOT DETECTED
SODIUM SERPL-SCNC: 135 MMOL/L (ref 136–145)
WBC NRBC COR # BLD: 8.56 10*3/MM3 (ref 3.4–10.8)

## 2022-04-19 PROCEDURE — 83036 HEMOGLOBIN GLYCOSYLATED A1C: CPT

## 2022-04-19 PROCEDURE — C9803 HOPD COVID-19 SPEC COLLECT: HCPCS

## 2022-04-19 PROCEDURE — 36415 COLL VENOUS BLD VENIPUNCTURE: CPT

## 2022-04-19 PROCEDURE — 93005 ELECTROCARDIOGRAM TRACING: CPT

## 2022-04-19 PROCEDURE — 85610 PROTHROMBIN TIME: CPT

## 2022-04-19 PROCEDURE — 85027 COMPLETE CBC AUTOMATED: CPT

## 2022-04-19 PROCEDURE — U0004 COV-19 TEST NON-CDC HGH THRU: HCPCS

## 2022-04-19 PROCEDURE — 93010 ELECTROCARDIOGRAM REPORT: CPT | Performed by: INTERNAL MEDICINE

## 2022-04-19 PROCEDURE — U0005 INFEC AGEN DETEC AMPLI PROBE: HCPCS

## 2022-04-19 PROCEDURE — 80048 BASIC METABOLIC PNL TOTAL CA: CPT

## 2022-04-20 ENCOUNTER — ANESTHESIA EVENT (OUTPATIENT)
Dept: PERIOP | Facility: HOSPITAL | Age: 72
End: 2022-04-20

## 2022-04-21 ENCOUNTER — ANESTHESIA EVENT CONVERTED (OUTPATIENT)
Dept: ANESTHESIOLOGY | Facility: HOSPITAL | Age: 72
End: 2022-04-21

## 2022-04-21 ENCOUNTER — HOSPITAL ENCOUNTER (INPATIENT)
Facility: HOSPITAL | Age: 72
LOS: 8 days | Discharge: HOME HEALTH | End: 2022-04-29
Attending: SURGERY | Admitting: SURGERY

## 2022-04-21 ENCOUNTER — ANESTHESIA (OUTPATIENT)
Dept: PERIOP | Facility: HOSPITAL | Age: 72
End: 2022-04-21

## 2022-04-21 DIAGNOSIS — K57.50 DIVERTICUL DISEASE SMALL AND LARGE INTESTINE, NO PERFORATI OR ABSCESS: ICD-10-CM

## 2022-04-21 PROCEDURE — 25010000002 HYDROMORPHONE PER 4 MG: Performed by: NURSE ANESTHETIST, CERTIFIED REGISTERED

## 2022-04-21 PROCEDURE — 25010000002 DEXAMETHASONE PER 1 MG: Performed by: ANESTHESIOLOGY

## 2022-04-21 PROCEDURE — C1889 IMPLANT/INSERT DEVICE, NOC: HCPCS | Performed by: SURGERY

## 2022-04-21 PROCEDURE — 88304 TISSUE EXAM BY PATHOLOGIST: CPT | Performed by: SURGERY

## 2022-04-21 PROCEDURE — 0DSM0ZZ REPOSITION DESCENDING COLON, OPEN APPROACH: ICD-10-PCS | Performed by: SURGERY

## 2022-04-21 PROCEDURE — 0 LIDOCAINE 1 % SOLUTION: Performed by: ANESTHESIOLOGY

## 2022-04-21 PROCEDURE — 25010000002 CEFOXITIN PER 1 G

## 2022-04-21 PROCEDURE — 25010000002 FENTANYL CITRATE (PF) 50 MCG/ML SOLUTION: Performed by: NURSE ANESTHETIST, CERTIFIED REGISTERED

## 2022-04-21 PROCEDURE — 25010000002 ONDANSETRON PER 1 MG: Performed by: ANESTHESIOLOGY

## 2022-04-21 PROCEDURE — 25010000002 FENTANYL CITRATE (PF) 50 MCG/ML SOLUTION: Performed by: ANESTHESIOLOGY

## 2022-04-21 PROCEDURE — 25010000002 FENTANYL CITRATE (PF) 50 MCG/ML SOLUTION

## 2022-04-21 PROCEDURE — 25010000002 MORPHINE PER 10 MG: Performed by: SURGERY

## 2022-04-21 PROCEDURE — 25010000002 PROPOFOL 10 MG/ML EMULSION: Performed by: ANESTHESIOLOGY

## 2022-04-21 PROCEDURE — 0WQF0ZZ REPAIR ABDOMINAL WALL, OPEN APPROACH: ICD-10-PCS | Performed by: SURGERY

## 2022-04-21 PROCEDURE — 25010000002 HYDROMORPHONE 1 MG/ML SOLUTION

## 2022-04-21 PROCEDURE — 25010000002 DEXAMETHASONE SODIUM PHOSPHATE 10 MG/ML SOLUTION: Performed by: NURSE ANESTHETIST, CERTIFIED REGISTERED

## 2022-04-21 PROCEDURE — 25010000002 HYDROMORPHONE HCL-NACL 30-0.9 MG/30ML-% SOLUTION PREFILLED SYRINGE

## 2022-04-21 PROCEDURE — 25010000002 ONDANSETRON PER 1 MG

## 2022-04-21 DEVICE — ECHELON CIRCULAR POWERED STAPLER
Type: IMPLANTABLE DEVICE | Site: SIGMOID COLON | Status: FUNCTIONAL
Brand: ECHELON CIRCULAR

## 2022-04-21 RX ORDER — SODIUM CHLORIDE 0.9 % (FLUSH) 0.9 %
10 SYRINGE (ML) INJECTION EVERY 12 HOURS SCHEDULED
Status: DISCONTINUED | OUTPATIENT
Start: 2022-04-21 | End: 2022-04-21 | Stop reason: HOSPADM

## 2022-04-21 RX ORDER — HEPARIN SODIUM 5000 [USP'U]/ML
5000 INJECTION, SOLUTION INTRAVENOUS; SUBCUTANEOUS EVERY 8 HOURS SCHEDULED
Status: DISCONTINUED | OUTPATIENT
Start: 2022-04-22 | End: 2022-04-22

## 2022-04-21 RX ORDER — DEXAMETHASONE SODIUM PHOSPHATE 4 MG/ML
INJECTION, SOLUTION INTRA-ARTICULAR; INTRALESIONAL; INTRAMUSCULAR; INTRAVENOUS; SOFT TISSUE AS NEEDED
Status: DISCONTINUED | OUTPATIENT
Start: 2022-04-21 | End: 2022-04-21 | Stop reason: SURG

## 2022-04-21 RX ORDER — ONDANSETRON 2 MG/ML
INJECTION INTRAMUSCULAR; INTRAVENOUS
Status: COMPLETED
Start: 2022-04-21 | End: 2022-04-21

## 2022-04-21 RX ORDER — SODIUM CHLORIDE 0.9 % (FLUSH) 0.9 %
10 SYRINGE (ML) INJECTION AS NEEDED
Status: DISCONTINUED | OUTPATIENT
Start: 2022-04-21 | End: 2022-04-21 | Stop reason: HOSPADM

## 2022-04-21 RX ORDER — NALOXONE HCL 0.4 MG/ML
0.4 VIAL (ML) INJECTION
Status: DISCONTINUED | OUTPATIENT
Start: 2022-04-21 | End: 2022-04-25

## 2022-04-21 RX ORDER — PANTOPRAZOLE SODIUM 40 MG/1
40 TABLET, DELAYED RELEASE ORAL
Status: DISCONTINUED | OUTPATIENT
Start: 2022-04-22 | End: 2022-04-29 | Stop reason: HOSPADM

## 2022-04-21 RX ORDER — AMOXICILLIN 250 MG
2 CAPSULE ORAL NIGHTLY
Status: DISCONTINUED | OUTPATIENT
Start: 2022-04-21 | End: 2022-04-29 | Stop reason: HOSPADM

## 2022-04-21 RX ORDER — ONDANSETRON 2 MG/ML
4 INJECTION INTRAMUSCULAR; INTRAVENOUS ONCE AS NEEDED
Status: COMPLETED | OUTPATIENT
Start: 2022-04-21 | End: 2022-04-21

## 2022-04-21 RX ORDER — DEXAMETHASONE SODIUM PHOSPHATE 10 MG/ML
INJECTION, SOLUTION INTRAMUSCULAR; INTRAVENOUS
Status: COMPLETED | OUTPATIENT
Start: 2022-04-21 | End: 2022-04-21

## 2022-04-21 RX ORDER — LIDOCAINE HYDROCHLORIDE 10 MG/ML
INJECTION, SOLUTION INFILTRATION; PERINEURAL AS NEEDED
Status: DISCONTINUED | OUTPATIENT
Start: 2022-04-21 | End: 2022-04-21 | Stop reason: SURG

## 2022-04-21 RX ORDER — PROPOFOL 10 MG/ML
VIAL (ML) INTRAVENOUS AS NEEDED
Status: DISCONTINUED | OUTPATIENT
Start: 2022-04-21 | End: 2022-04-21 | Stop reason: SURG

## 2022-04-21 RX ORDER — ACETAMINOPHEN 325 MG/1
650 TABLET ORAL EVERY 6 HOURS
Status: DISCONTINUED | OUTPATIENT
Start: 2022-04-21 | End: 2022-04-28

## 2022-04-21 RX ORDER — FENTANYL CITRATE 50 UG/ML
INJECTION, SOLUTION INTRAMUSCULAR; INTRAVENOUS
Status: COMPLETED
Start: 2022-04-21 | End: 2022-04-21

## 2022-04-21 RX ORDER — ONDANSETRON 2 MG/ML
INJECTION INTRAMUSCULAR; INTRAVENOUS AS NEEDED
Status: DISCONTINUED | OUTPATIENT
Start: 2022-04-21 | End: 2022-04-21 | Stop reason: SURG

## 2022-04-21 RX ORDER — PROMETHAZINE HYDROCHLORIDE 25 MG/1
25 TABLET ORAL ONCE AS NEEDED
Status: DISCONTINUED | OUTPATIENT
Start: 2022-04-21 | End: 2022-04-21 | Stop reason: HOSPADM

## 2022-04-21 RX ORDER — ROSUVASTATIN CALCIUM 20 MG/1
40 TABLET, COATED ORAL DAILY
Status: DISCONTINUED | OUTPATIENT
Start: 2022-04-21 | End: 2022-04-29 | Stop reason: HOSPADM

## 2022-04-21 RX ORDER — ROCURONIUM BROMIDE 10 MG/ML
INJECTION, SOLUTION INTRAVENOUS AS NEEDED
Status: DISCONTINUED | OUTPATIENT
Start: 2022-04-21 | End: 2022-04-21 | Stop reason: SURG

## 2022-04-21 RX ORDER — MORPHINE SULFATE 2 MG/ML
2 INJECTION, SOLUTION INTRAMUSCULAR; INTRAVENOUS
Status: DISCONTINUED | OUTPATIENT
Start: 2022-04-21 | End: 2022-04-29 | Stop reason: HOSPADM

## 2022-04-21 RX ORDER — SODIUM CHLORIDE, SODIUM LACTATE, POTASSIUM CHLORIDE, CALCIUM CHLORIDE 600; 310; 30; 20 MG/100ML; MG/100ML; MG/100ML; MG/100ML
9 INJECTION, SOLUTION INTRAVENOUS CONTINUOUS PRN
Status: DISCONTINUED | OUTPATIENT
Start: 2022-04-21 | End: 2022-04-23

## 2022-04-21 RX ORDER — BUPIVACAINE HYDROCHLORIDE 2.5 MG/ML
INJECTION, SOLUTION EPIDURAL; INFILTRATION; INTRACAUDAL
Status: COMPLETED | OUTPATIENT
Start: 2022-04-21 | End: 2022-04-21

## 2022-04-21 RX ORDER — ONDANSETRON 2 MG/ML
4 INJECTION INTRAMUSCULAR; INTRAVENOUS EVERY 6 HOURS PRN
Status: DISCONTINUED | OUTPATIENT
Start: 2022-04-21 | End: 2022-04-29 | Stop reason: HOSPADM

## 2022-04-21 RX ORDER — LIDOCAINE HYDROCHLORIDE 10 MG/ML
0.5 INJECTION, SOLUTION EPIDURAL; INFILTRATION; INTRACAUDAL; PERINEURAL ONCE AS NEEDED
Status: COMPLETED | OUTPATIENT
Start: 2022-04-21 | End: 2022-04-21

## 2022-04-21 RX ORDER — SODIUM CHLORIDE, SODIUM LACTATE, POTASSIUM CHLORIDE, CALCIUM CHLORIDE 600; 310; 30; 20 MG/100ML; MG/100ML; MG/100ML; MG/100ML
125 INJECTION, SOLUTION INTRAVENOUS CONTINUOUS
Status: DISCONTINUED | OUTPATIENT
Start: 2022-04-21 | End: 2022-04-23

## 2022-04-21 RX ORDER — FENTANYL CITRATE 50 UG/ML
50 INJECTION, SOLUTION INTRAMUSCULAR; INTRAVENOUS
Status: DISCONTINUED | OUTPATIENT
Start: 2022-04-21 | End: 2022-04-21 | Stop reason: HOSPADM

## 2022-04-21 RX ORDER — ONDANSETRON 4 MG/1
4 TABLET, FILM COATED ORAL EVERY 6 HOURS PRN
Status: DISCONTINUED | OUTPATIENT
Start: 2022-04-21 | End: 2022-04-29 | Stop reason: HOSPADM

## 2022-04-21 RX ORDER — HYDROMORPHONE HYDROCHLORIDE 1 MG/ML
0.5 INJECTION, SOLUTION INTRAMUSCULAR; INTRAVENOUS; SUBCUTANEOUS
Status: DISCONTINUED | OUTPATIENT
Start: 2022-04-21 | End: 2022-04-21 | Stop reason: HOSPADM

## 2022-04-21 RX ORDER — FAMOTIDINE 20 MG/1
20 TABLET, FILM COATED ORAL
Status: COMPLETED | OUTPATIENT
Start: 2022-04-21 | End: 2022-04-21

## 2022-04-21 RX ORDER — PROMETHAZINE HYDROCHLORIDE 25 MG/1
25 SUPPOSITORY RECTAL ONCE AS NEEDED
Status: DISCONTINUED | OUTPATIENT
Start: 2022-04-21 | End: 2022-04-21 | Stop reason: HOSPADM

## 2022-04-21 RX ORDER — NALOXONE HCL 0.4 MG/ML
0.1 VIAL (ML) INJECTION
Status: DISCONTINUED | OUTPATIENT
Start: 2022-04-21 | End: 2022-04-25

## 2022-04-21 RX ORDER — MAGNESIUM HYDROXIDE 1200 MG/15ML
LIQUID ORAL AS NEEDED
Status: DISCONTINUED | OUTPATIENT
Start: 2022-04-21 | End: 2022-04-21 | Stop reason: HOSPADM

## 2022-04-21 RX ORDER — DIPHENHYDRAMINE HYDROCHLORIDE 50 MG/ML
25 INJECTION INTRAMUSCULAR; INTRAVENOUS EVERY 6 HOURS PRN
Status: DISCONTINUED | OUTPATIENT
Start: 2022-04-21 | End: 2022-04-29 | Stop reason: HOSPADM

## 2022-04-21 RX ORDER — FENTANYL CITRATE 50 UG/ML
INJECTION, SOLUTION INTRAMUSCULAR; INTRAVENOUS AS NEEDED
Status: DISCONTINUED | OUTPATIENT
Start: 2022-04-21 | End: 2022-04-21 | Stop reason: SURG

## 2022-04-21 RX ORDER — CYCLOBENZAPRINE HCL 10 MG
5 TABLET ORAL EVERY 8 HOURS SCHEDULED
Status: DISCONTINUED | OUTPATIENT
Start: 2022-04-21 | End: 2022-04-28

## 2022-04-21 RX ADMIN — ROSUVASTATIN CALCIUM 40 MG: 20 TABLET, FILM COATED ORAL at 17:37

## 2022-04-21 RX ADMIN — Medication 2 G: at 10:09

## 2022-04-21 RX ADMIN — FENTANYL CITRATE 50 MCG: 50 INJECTION, SOLUTION INTRAMUSCULAR; INTRAVENOUS at 13:32

## 2022-04-21 RX ADMIN — Medication: at 15:42

## 2022-04-21 RX ADMIN — ONDANSETRON 4 MG: 2 INJECTION INTRAMUSCULAR; INTRAVENOUS at 14:42

## 2022-04-21 RX ADMIN — PROPOFOL 200 MG: 10 INJECTION, EMULSION INTRAVENOUS at 10:04

## 2022-04-21 RX ADMIN — ACETAMINOPHEN 650 MG: 325 TABLET ORAL at 17:37

## 2022-04-21 RX ADMIN — SUGAMMADEX 200 MG: 100 INJECTION, SOLUTION INTRAVENOUS at 13:26

## 2022-04-21 RX ADMIN — ONDANSETRON 4 MG: 2 INJECTION INTRAMUSCULAR; INTRAVENOUS at 12:49

## 2022-04-21 RX ADMIN — HYDROMORPHONE HYDROCHLORIDE 0.5 MG: 1 INJECTION, SOLUTION INTRAMUSCULAR; INTRAVENOUS; SUBCUTANEOUS at 14:10

## 2022-04-21 RX ADMIN — HYDROMORPHONE HYDROCHLORIDE 0.5 MG: 1 INJECTION, SOLUTION INTRAMUSCULAR; INTRAVENOUS; SUBCUTANEOUS at 14:21

## 2022-04-21 RX ADMIN — ROCURONIUM BROMIDE 50 MG: 10 INJECTION, SOLUTION INTRAVENOUS at 10:04

## 2022-04-21 RX ADMIN — FENTANYL CITRATE 50 MCG: 50 INJECTION, SOLUTION INTRAMUSCULAR; INTRAVENOUS at 13:52

## 2022-04-21 RX ADMIN — FENTANYL CITRATE 25 MCG: 50 INJECTION, SOLUTION INTRAMUSCULAR; INTRAVENOUS at 13:21

## 2022-04-21 RX ADMIN — FENTANYL CITRATE 25 MCG: 50 INJECTION, SOLUTION INTRAMUSCULAR; INTRAVENOUS at 13:29

## 2022-04-21 RX ADMIN — ROCURONIUM BROMIDE 20 MG: 10 INJECTION, SOLUTION INTRAVENOUS at 12:04

## 2022-04-21 RX ADMIN — SODIUM CHLORIDE, POTASSIUM CHLORIDE, SODIUM LACTATE AND CALCIUM CHLORIDE 125 ML/HR: 600; 310; 30; 20 INJECTION, SOLUTION INTRAVENOUS at 20:12

## 2022-04-21 RX ADMIN — MORPHINE SULFATE 2 MG: 2 INJECTION, SOLUTION INTRAMUSCULAR; INTRAVENOUS at 16:22

## 2022-04-21 RX ADMIN — FENTANYL CITRATE 50 MCG: 50 INJECTION, SOLUTION INTRAMUSCULAR; INTRAVENOUS at 10:38

## 2022-04-21 RX ADMIN — DEXAMETHASONE SODIUM PHOSPHATE 6 MG: 4 INJECTION, SOLUTION INTRA-ARTICULAR; INTRALESIONAL; INTRAMUSCULAR; INTRAVENOUS; SOFT TISSUE at 10:22

## 2022-04-21 RX ADMIN — HYDROMORPHONE HYDROCHLORIDE 0.5 MG: 1 INJECTION, SOLUTION INTRAMUSCULAR; INTRAVENOUS; SUBCUTANEOUS at 14:43

## 2022-04-21 RX ADMIN — BUPIVACAINE HYDROCHLORIDE 60 ML: 2.5 INJECTION, SOLUTION EPIDURAL; INFILTRATION; INTRACAUDAL; PERINEURAL at 10:27

## 2022-04-21 RX ADMIN — LIDOCAINE HYDROCHLORIDE 0.5 ML: 10 INJECTION, SOLUTION EPIDURAL; INFILTRATION; INTRACAUDAL; PERINEURAL at 08:48

## 2022-04-21 RX ADMIN — FAMOTIDINE 20 MG: 20 TABLET ORAL at 08:40

## 2022-04-21 RX ADMIN — LIDOCAINE HYDROCHLORIDE 50 MG: 10 INJECTION, SOLUTION INFILTRATION; PERINEURAL at 10:04

## 2022-04-21 RX ADMIN — SENNOSIDES AND DOCUSATE SODIUM 2 TABLET: 50; 8.6 TABLET ORAL at 20:12

## 2022-04-21 RX ADMIN — ROCURONIUM BROMIDE 10 MG: 10 INJECTION, SOLUTION INTRAVENOUS at 11:18

## 2022-04-21 RX ADMIN — DEXAMETHASONE SODIUM PHOSPHATE 4 MG: 10 INJECTION, SOLUTION INTRAMUSCULAR; INTRAVENOUS at 10:27

## 2022-04-21 RX ADMIN — SODIUM CHLORIDE, POTASSIUM CHLORIDE, SODIUM LACTATE AND CALCIUM CHLORIDE: 600; 310; 30; 20 INJECTION, SOLUTION INTRAVENOUS at 09:59

## 2022-04-21 RX ADMIN — SODIUM CHLORIDE, POTASSIUM CHLORIDE, SODIUM LACTATE AND CALCIUM CHLORIDE 9 ML/HR: 600; 310; 30; 20 INJECTION, SOLUTION INTRAVENOUS at 08:30

## 2022-04-21 RX ADMIN — FENTANYL CITRATE 50 MCG: 50 INJECTION, SOLUTION INTRAMUSCULAR; INTRAVENOUS at 10:04

## 2022-04-21 RX ADMIN — CYCLOBENZAPRINE 5 MG: 10 TABLET, FILM COATED ORAL at 20:34

## 2022-04-21 RX ADMIN — ROCURONIUM BROMIDE 10 MG: 10 INJECTION, SOLUTION INTRAVENOUS at 11:02

## 2022-04-21 RX ADMIN — ROCURONIUM BROMIDE 10 MG: 10 INJECTION, SOLUTION INTRAVENOUS at 11:45

## 2022-04-21 RX ADMIN — MORPHINE SULFATE 2 MG: 2 INJECTION, SOLUTION INTRAMUSCULAR; INTRAVENOUS at 20:12

## 2022-04-21 RX ADMIN — CYCLOBENZAPRINE 5 MG: 10 TABLET, FILM COATED ORAL at 17:37

## 2022-04-21 RX ADMIN — FENTANYL CITRATE 50 MCG: 50 INJECTION, SOLUTION INTRAMUSCULAR; INTRAVENOUS at 14:01

## 2022-04-21 RX ADMIN — Medication 2 G: at 12:09

## 2022-04-21 NOTE — ANESTHESIA PREPROCEDURE EVALUATION
Anesthesia Evaluation     Patient summary reviewed and Nursing notes reviewed   NPO Solid Status: > 8 hours  NPO Liquid Status: > 2 hours           Airway   Mallampati: III  TM distance: <3 FB  Neck ROM: full  Possible difficult intubation  Dental      Pulmonary    (-) asthma, shortness of breath, recent URI, sleep apnea, not a smoker  Cardiovascular     ECG reviewed    (+) hypertension, hyperlipidemia,   (-) past MI, angina, cardiac stents    ROS comment: ECG NSR     Neuro/Psych  (-) seizures, CVA  GI/Hepatic/Renal/Endo    (+)   diabetes mellitus type 2, thyroid problem hypothyroidism  (-) GERD, liver disease, no renal disease    ROS Comment: Sp diverticuar perf w surgery last year    Musculoskeletal     Abdominal    Substance History      OB/GYN          Other   arthritis,          Phys Exam Other: Glidescope IV grade 1 view (Gouzd)               Anesthesia Plan    ASA 2     general with block   (TAPs  Glidescope )  intravenous induction     Anesthetic plan, all risks, benefits, and alternatives have been provided, discussed and informed consent has been obtained with: patient.    Plan discussed with CRNA.        CODE STATUS:

## 2022-04-21 NOTE — ANESTHESIA POSTPROCEDURE EVALUATION
Patient: Khalif Joshua    Procedure Summary     Date: 04/21/22 Room / Location:  [facility] /  [facility]    Anesthesia Start: 0959 Anesthesia Stop:     Procedure: COLOSTOMY TAKEDOWN OPEN (N/A Abdomen) Diagnosis:     Surgeons: Jose Ferguson MD Provider: Mikhail Krueger MD    Anesthesia Type: general with block ASA Status: 2          Anesthesia Type: general with block    Vitals  Vitals Value Taken Time   BP     Temp     Pulse     Resp     SpO2 96 % 04/21/22 1336   Vitals shown include unvalidated device data.        Post Anesthesia Care and Evaluation    Patient location during evaluation: PACU  Patient participation: complete - patient participated  Level of consciousness: awake and alert  Pain management: adequate  Airway patency: patent  Anesthetic complications: No anesthetic complications  PONV Status: none  Cardiovascular status: hemodynamically stable and acceptable  Respiratory status: nonlabored ventilation, acceptable and nasal cannula  Hydration status: acceptable

## 2022-04-21 NOTE — ANESTHESIA PROCEDURE NOTES
Peripheral Block      Patient reassessed immediately prior to procedure    Patient location during procedure: OR  Reason for block: at surgeon's request and post-op pain management  Performed by  Anesthesiologist: Jean Montoya MD  Preanesthetic Checklist  Completed: patient identified, IV checked, site marked, risks and benefits discussed, surgical consent, monitors and equipment checked, pre-op evaluation and timeout performed  Prep:  Pt Position: supine  Sterile barriers:cap, gloves, sterile barriers and mask  Prep: ChloraPrep  Patient monitoring: blood pressure monitoring, continuous pulse oximetry and EKG  Procedure    Sedation: yes  Performed under: general  Guidance:ultrasound guided  Images:still images obtained, printed/placed on chart    Laterality:Bilateral  Block Type:TAP  Injection Technique:single-shot  Needle Type:short-bevel and echogenic  Needle Gauge:20 G  Resistance on Injection: none    Medications Used: dexamethasone sodium phosphate injection - Injection   4 mg - 4/21/2022 10:27:00 AM  bupivacaine PF (MARCAINE) 0.25 % injection - Injection   60 mL - 4/21/2022 10:27:00 AM      Medications  Comment:Block Injection:  LA dose divided between Right and Left block        Post Assessment  Injection Assessment: negative aspiration for heme, incremental injection and no paresthesia on injection  Patient Tolerance:comfortable throughout block  Complications:no  Additional Notes      Under Ultrasound guidance, a BBraun 4inch 360 degree needle was advanced with Normal Saline hydro dissection of tissue.  The Internal Oblique and Transversus Abdominus muscles where visualized.  At or before the aponeurosis of Internal Oblique, local anesthetic spread was visualized in the Transversus Abdominus Plane. Injection was made incrementally with aspiration every 5 mls.  There was no  intravascular injection,  injection pressure was normal, there was no neural injection, and the procedure was completed without  difficulty.  Thank You.

## 2022-04-21 NOTE — ANESTHESIA PROCEDURE NOTES
Airway  Urgency: elective    Date/Time: 4/21/2022 10:07 AM  Airway not difficult    General Information and Staff    Patient location during procedure: OR  SRNA: Lynn Noe SRNA  Indications and Patient Condition  Indications for airway management: airway protection    Preoxygenated: yes  MILS not maintained throughout  Mask difficulty assessment: 1 - vent by mask    Final Airway Details  Final airway type: endotracheal airway      Successful airway: ETT  Cuffed: yes   Successful intubation technique: direct laryngoscopy  Endotracheal tube insertion site: oral  Blade: Glidescope  Blade size: 4  ETT size (mm): 7.5  Cormack-Lehane Classification: grade I - full view of glottis  Placement verified by: chest auscultation and capnometry   Cuff volume (mL): 9  Measured from: lips  ETT/EBT  to lips (cm): 22  Number of attempts at approach: 1  Assessment: lips, teeth, and gum same as pre-op and atraumatic intubation    Additional Comments  Negative epigastric sounds, Breath sound equal bilaterally with symmetric chest rise and fall

## 2022-04-22 LAB
ANION GAP SERPL CALCULATED.3IONS-SCNC: 8 MMOL/L (ref 5–15)
BASOPHILS # BLD AUTO: 0.02 10*3/MM3 (ref 0–0.2)
BASOPHILS NFR BLD AUTO: 0.2 % (ref 0–1.5)
BUN SERPL-MCNC: 14 MG/DL (ref 8–23)
BUN/CREAT SERPL: 16.9 (ref 7–25)
CALCIUM SPEC-SCNC: 8.6 MG/DL (ref 8.6–10.5)
CHLORIDE SERPL-SCNC: 104 MMOL/L (ref 98–107)
CO2 SERPL-SCNC: 26 MMOL/L (ref 22–29)
CREAT SERPL-MCNC: 0.83 MG/DL (ref 0.76–1.27)
CYTO UR: NORMAL
DEPRECATED RDW RBC AUTO: 50.3 FL (ref 37–54)
EGFRCR SERPLBLD CKD-EPI 2021: 93.6 ML/MIN/1.73
EOSINOPHIL # BLD AUTO: 0 10*3/MM3 (ref 0–0.4)
EOSINOPHIL NFR BLD AUTO: 0 % (ref 0.3–6.2)
ERYTHROCYTE [DISTWIDTH] IN BLOOD BY AUTOMATED COUNT: 14.6 % (ref 12.3–15.4)
GLUCOSE SERPL-MCNC: 117 MG/DL (ref 65–99)
HCT VFR BLD AUTO: 45.3 % (ref 37.5–51)
HGB BLD-MCNC: 15.2 G/DL (ref 13–17.7)
IMM GRANULOCYTES # BLD AUTO: 0.06 10*3/MM3 (ref 0–0.05)
IMM GRANULOCYTES NFR BLD AUTO: 0.5 % (ref 0–0.5)
LAB AP CASE REPORT: NORMAL
LAB AP CLINICAL INFORMATION: NORMAL
LYMPHOCYTES # BLD AUTO: 0.89 10*3/MM3 (ref 0.7–3.1)
LYMPHOCYTES NFR BLD AUTO: 6.9 % (ref 19.6–45.3)
MCH RBC QN AUTO: 31.2 PG (ref 26.6–33)
MCHC RBC AUTO-ENTMCNC: 33.6 G/DL (ref 31.5–35.7)
MCV RBC AUTO: 93 FL (ref 79–97)
MONOCYTES # BLD AUTO: 1.45 10*3/MM3 (ref 0.1–0.9)
MONOCYTES NFR BLD AUTO: 11.2 % (ref 5–12)
NEUTROPHILS NFR BLD AUTO: 10.5 10*3/MM3 (ref 1.7–7)
NEUTROPHILS NFR BLD AUTO: 81.2 % (ref 42.7–76)
NRBC BLD AUTO-RTO: 0 /100 WBC (ref 0–0.2)
PATH REPORT.FINAL DX SPEC: NORMAL
PATH REPORT.GROSS SPEC: NORMAL
PLATELET # BLD AUTO: 255 10*3/MM3 (ref 140–450)
PMV BLD AUTO: 9.5 FL (ref 6–12)
POTASSIUM SERPL-SCNC: 4.3 MMOL/L (ref 3.5–5.2)
RBC # BLD AUTO: 4.87 10*6/MM3 (ref 4.14–5.8)
SODIUM SERPL-SCNC: 138 MMOL/L (ref 136–145)
WBC NRBC COR # BLD: 12.92 10*3/MM3 (ref 3.4–10.8)

## 2022-04-22 PROCEDURE — 25010000002 ENOXAPARIN PER 10 MG: Performed by: SURGERY

## 2022-04-22 PROCEDURE — 85025 COMPLETE CBC W/AUTO DIFF WBC: CPT | Performed by: SURGERY

## 2022-04-22 PROCEDURE — 80048 BASIC METABOLIC PNL TOTAL CA: CPT | Performed by: SURGERY

## 2022-04-22 PROCEDURE — 25010000002 HEPARIN (PORCINE) PER 1000 UNITS: Performed by: SURGERY

## 2022-04-22 PROCEDURE — 25010000002 MORPHINE PER 10 MG: Performed by: SURGERY

## 2022-04-22 RX ADMIN — CYCLOBENZAPRINE 5 MG: 10 TABLET, FILM COATED ORAL at 21:08

## 2022-04-22 RX ADMIN — SODIUM CHLORIDE, POTASSIUM CHLORIDE, SODIUM LACTATE AND CALCIUM CHLORIDE 125 ML/HR: 600; 310; 30; 20 INJECTION, SOLUTION INTRAVENOUS at 12:26

## 2022-04-22 RX ADMIN — ACETAMINOPHEN 650 MG: 325 TABLET ORAL at 13:29

## 2022-04-22 RX ADMIN — ROSUVASTATIN CALCIUM 40 MG: 20 TABLET, FILM COATED ORAL at 09:00

## 2022-04-22 RX ADMIN — SODIUM CHLORIDE, POTASSIUM CHLORIDE, SODIUM LACTATE AND CALCIUM CHLORIDE 125 ML/HR: 600; 310; 30; 20 INJECTION, SOLUTION INTRAVENOUS at 20:08

## 2022-04-22 RX ADMIN — CYCLOBENZAPRINE 5 MG: 10 TABLET, FILM COATED ORAL at 13:29

## 2022-04-22 RX ADMIN — CYCLOBENZAPRINE 5 MG: 10 TABLET, FILM COATED ORAL at 04:53

## 2022-04-22 RX ADMIN — PANTOPRAZOLE SODIUM 40 MG: 40 TABLET, DELAYED RELEASE ORAL at 04:48

## 2022-04-22 RX ADMIN — HEPARIN SODIUM 5000 UNITS: 5000 INJECTION, SOLUTION INTRAVENOUS; SUBCUTANEOUS at 04:48

## 2022-04-22 RX ADMIN — SENNOSIDES AND DOCUSATE SODIUM 2 TABLET: 50; 8.6 TABLET ORAL at 20:08

## 2022-04-22 RX ADMIN — MORPHINE SULFATE 2 MG: 2 INJECTION, SOLUTION INTRAMUSCULAR; INTRAVENOUS at 20:23

## 2022-04-22 RX ADMIN — SODIUM CHLORIDE, POTASSIUM CHLORIDE, SODIUM LACTATE AND CALCIUM CHLORIDE 125 ML/HR: 600; 310; 30; 20 INJECTION, SOLUTION INTRAVENOUS at 04:48

## 2022-04-22 RX ADMIN — ACETAMINOPHEN 650 MG: 325 TABLET ORAL at 18:18

## 2022-04-22 RX ADMIN — ENOXAPARIN SODIUM 40 MG: 40 INJECTION SUBCUTANEOUS at 13:29

## 2022-04-22 RX ADMIN — MORPHINE SULFATE 2 MG: 2 INJECTION, SOLUTION INTRAMUSCULAR; INTRAVENOUS at 04:54

## 2022-04-22 RX ADMIN — ACETAMINOPHEN 650 MG: 325 TABLET ORAL at 04:48

## 2022-04-23 LAB
ANION GAP SERPL CALCULATED.3IONS-SCNC: 16 MMOL/L (ref 5–15)
BUN SERPL-MCNC: 14 MG/DL (ref 8–23)
BUN/CREAT SERPL: 21.9 (ref 7–25)
CALCIUM SPEC-SCNC: 8.5 MG/DL (ref 8.6–10.5)
CHLORIDE SERPL-SCNC: 100 MMOL/L (ref 98–107)
CO2 SERPL-SCNC: 19 MMOL/L (ref 22–29)
CREAT SERPL-MCNC: 0.64 MG/DL (ref 0.76–1.27)
DEPRECATED RDW RBC AUTO: 51 FL (ref 37–54)
EGFRCR SERPLBLD CKD-EPI 2021: 101.2 ML/MIN/1.73
ERYTHROCYTE [DISTWIDTH] IN BLOOD BY AUTOMATED COUNT: 14.6 % (ref 12.3–15.4)
GLUCOSE SERPL-MCNC: 86 MG/DL (ref 65–99)
HCT VFR BLD AUTO: 47.7 % (ref 37.5–51)
HGB BLD-MCNC: 15.8 G/DL (ref 13–17.7)
MAGNESIUM SERPL-MCNC: 2.1 MG/DL (ref 1.6–2.4)
MCH RBC QN AUTO: 31.2 PG (ref 26.6–33)
MCHC RBC AUTO-ENTMCNC: 33.1 G/DL (ref 31.5–35.7)
MCV RBC AUTO: 94.1 FL (ref 79–97)
PHOSPHATE SERPL-MCNC: 2.3 MG/DL (ref 2.5–4.5)
PLATELET # BLD AUTO: 231 10*3/MM3 (ref 140–450)
PMV BLD AUTO: 9.5 FL (ref 6–12)
POTASSIUM SERPL-SCNC: 4.3 MMOL/L (ref 3.5–5.2)
RBC # BLD AUTO: 5.07 10*6/MM3 (ref 4.14–5.8)
SODIUM SERPL-SCNC: 135 MMOL/L (ref 136–145)
WBC NRBC COR # BLD: 11.85 10*3/MM3 (ref 3.4–10.8)

## 2022-04-23 PROCEDURE — 85027 COMPLETE CBC AUTOMATED: CPT | Performed by: SURGERY

## 2022-04-23 PROCEDURE — 80048 BASIC METABOLIC PNL TOTAL CA: CPT | Performed by: SURGERY

## 2022-04-23 PROCEDURE — 84100 ASSAY OF PHOSPHORUS: CPT | Performed by: SURGERY

## 2022-04-23 PROCEDURE — 25010000002 ENOXAPARIN PER 10 MG: Performed by: SURGERY

## 2022-04-23 PROCEDURE — 83735 ASSAY OF MAGNESIUM: CPT | Performed by: SURGERY

## 2022-04-23 PROCEDURE — 25010000002 MORPHINE PER 10 MG: Performed by: SURGERY

## 2022-04-23 RX ORDER — DEXTROSE, SODIUM CHLORIDE, AND POTASSIUM CHLORIDE 5; .45; .15 G/100ML; G/100ML; G/100ML
50 INJECTION INTRAVENOUS CONTINUOUS
Status: DISCONTINUED | OUTPATIENT
Start: 2022-04-23 | End: 2022-04-27

## 2022-04-23 RX ADMIN — POTASSIUM CHLORIDE, DEXTROSE MONOHYDRATE AND SODIUM CHLORIDE 75 ML/HR: 150; 5; 450 INJECTION, SOLUTION INTRAVENOUS at 20:11

## 2022-04-23 RX ADMIN — ACETAMINOPHEN 650 MG: 325 TABLET ORAL at 06:19

## 2022-04-23 RX ADMIN — CYCLOBENZAPRINE 5 MG: 10 TABLET, FILM COATED ORAL at 06:19

## 2022-04-23 RX ADMIN — POTASSIUM CHLORIDE, DEXTROSE MONOHYDRATE AND SODIUM CHLORIDE 75 ML/HR: 150; 5; 450 INJECTION, SOLUTION INTRAVENOUS at 10:10

## 2022-04-23 RX ADMIN — CYCLOBENZAPRINE 5 MG: 10 TABLET, FILM COATED ORAL at 21:21

## 2022-04-23 RX ADMIN — SODIUM CHLORIDE, POTASSIUM CHLORIDE, SODIUM LACTATE AND CALCIUM CHLORIDE 125 ML/HR: 600; 310; 30; 20 INJECTION, SOLUTION INTRAVENOUS at 06:19

## 2022-04-23 RX ADMIN — CYCLOBENZAPRINE 5 MG: 10 TABLET, FILM COATED ORAL at 12:48

## 2022-04-23 RX ADMIN — ENOXAPARIN SODIUM 40 MG: 40 INJECTION SUBCUTANEOUS at 12:48

## 2022-04-23 RX ADMIN — SENNOSIDES AND DOCUSATE SODIUM 2 TABLET: 50; 8.6 TABLET ORAL at 20:11

## 2022-04-23 RX ADMIN — PANTOPRAZOLE SODIUM 40 MG: 40 TABLET, DELAYED RELEASE ORAL at 06:19

## 2022-04-23 RX ADMIN — ACETAMINOPHEN 650 MG: 325 TABLET ORAL at 17:51

## 2022-04-23 RX ADMIN — ROSUVASTATIN CALCIUM 40 MG: 20 TABLET, FILM COATED ORAL at 09:59

## 2022-04-23 RX ADMIN — MORPHINE SULFATE 2 MG: 2 INJECTION, SOLUTION INTRAMUSCULAR; INTRAVENOUS at 06:43

## 2022-04-23 RX ADMIN — ACETAMINOPHEN 650 MG: 325 TABLET ORAL at 12:47

## 2022-04-24 LAB
ANION GAP SERPL CALCULATED.3IONS-SCNC: 8 MMOL/L (ref 5–15)
BUN SERPL-MCNC: 10 MG/DL (ref 8–23)
BUN/CREAT SERPL: 13.9 (ref 7–25)
CALCIUM SPEC-SCNC: 8.6 MG/DL (ref 8.6–10.5)
CHLORIDE SERPL-SCNC: 100 MMOL/L (ref 98–107)
CO2 SERPL-SCNC: 27 MMOL/L (ref 22–29)
CREAT SERPL-MCNC: 0.72 MG/DL (ref 0.76–1.27)
DEPRECATED RDW RBC AUTO: 49.6 FL (ref 37–54)
EGFRCR SERPLBLD CKD-EPI 2021: 97.7 ML/MIN/1.73
ERYTHROCYTE [DISTWIDTH] IN BLOOD BY AUTOMATED COUNT: 13.9 % (ref 12.3–15.4)
GLUCOSE SERPL-MCNC: 99 MG/DL (ref 65–99)
HCT VFR BLD AUTO: 45.8 % (ref 37.5–51)
HGB BLD-MCNC: 15 G/DL (ref 13–17.7)
MCH RBC QN AUTO: 31.3 PG (ref 26.6–33)
MCHC RBC AUTO-ENTMCNC: 32.8 G/DL (ref 31.5–35.7)
MCV RBC AUTO: 95.6 FL (ref 79–97)
PLATELET # BLD AUTO: 251 10*3/MM3 (ref 140–450)
PMV BLD AUTO: 9.6 FL (ref 6–12)
POTASSIUM SERPL-SCNC: 3.7 MMOL/L (ref 3.5–5.2)
RBC # BLD AUTO: 4.79 10*6/MM3 (ref 4.14–5.8)
SODIUM SERPL-SCNC: 135 MMOL/L (ref 136–145)
WBC NRBC COR # BLD: 8.95 10*3/MM3 (ref 3.4–10.8)

## 2022-04-24 PROCEDURE — 25010000002 MORPHINE PER 10 MG: Performed by: SURGERY

## 2022-04-24 PROCEDURE — 85027 COMPLETE CBC AUTOMATED: CPT | Performed by: SURGERY

## 2022-04-24 PROCEDURE — 80048 BASIC METABOLIC PNL TOTAL CA: CPT | Performed by: SURGERY

## 2022-04-24 PROCEDURE — 25010000002 ENOXAPARIN PER 10 MG: Performed by: SURGERY

## 2022-04-24 RX ADMIN — POTASSIUM CHLORIDE, DEXTROSE MONOHYDRATE AND SODIUM CHLORIDE 75 ML/HR: 150; 5; 450 INJECTION, SOLUTION INTRAVENOUS at 05:53

## 2022-04-24 RX ADMIN — ENOXAPARIN SODIUM 40 MG: 40 INJECTION SUBCUTANEOUS at 12:14

## 2022-04-24 RX ADMIN — ACETAMINOPHEN 650 MG: 325 TABLET ORAL at 05:51

## 2022-04-24 RX ADMIN — MORPHINE SULFATE 2 MG: 2 INJECTION, SOLUTION INTRAMUSCULAR; INTRAVENOUS at 19:16

## 2022-04-24 RX ADMIN — ROSUVASTATIN CALCIUM 40 MG: 20 TABLET, FILM COATED ORAL at 08:51

## 2022-04-24 RX ADMIN — SENNOSIDES AND DOCUSATE SODIUM 2 TABLET: 50; 8.6 TABLET ORAL at 21:58

## 2022-04-24 RX ADMIN — ACETAMINOPHEN 650 MG: 325 TABLET ORAL at 12:14

## 2022-04-24 RX ADMIN — CYCLOBENZAPRINE 5 MG: 10 TABLET, FILM COATED ORAL at 21:58

## 2022-04-24 RX ADMIN — CYCLOBENZAPRINE 5 MG: 10 TABLET, FILM COATED ORAL at 14:02

## 2022-04-24 RX ADMIN — CYCLOBENZAPRINE 5 MG: 10 TABLET, FILM COATED ORAL at 05:51

## 2022-04-24 RX ADMIN — PANTOPRAZOLE SODIUM 40 MG: 40 TABLET, DELAYED RELEASE ORAL at 05:51

## 2022-04-24 RX ADMIN — ACETAMINOPHEN 650 MG: 325 TABLET ORAL at 17:13

## 2022-04-25 PROCEDURE — 25010000002 HYDROMORPHONE HCL-NACL 30-0.9 MG/30ML-% SOLUTION PREFILLED SYRINGE: Performed by: SURGERY

## 2022-04-25 PROCEDURE — 25010000002 ENOXAPARIN PER 10 MG: Performed by: SURGERY

## 2022-04-25 PROCEDURE — 25010000002 MORPHINE PER 10 MG: Performed by: SURGERY

## 2022-04-25 RX ORDER — OXYCODONE HYDROCHLORIDE 5 MG/1
10 TABLET ORAL EVERY 4 HOURS PRN
Status: DISCONTINUED | OUTPATIENT
Start: 2022-04-25 | End: 2022-04-25

## 2022-04-25 RX ORDER — NALOXONE HCL 0.4 MG/ML
0.1 VIAL (ML) INJECTION
Status: DISCONTINUED | OUTPATIENT
Start: 2022-04-25 | End: 2022-04-29 | Stop reason: HOSPADM

## 2022-04-25 RX ADMIN — MORPHINE SULFATE 2 MG: 2 INJECTION, SOLUTION INTRAMUSCULAR; INTRAVENOUS at 12:02

## 2022-04-25 RX ADMIN — CYCLOBENZAPRINE 5 MG: 10 TABLET, FILM COATED ORAL at 06:12

## 2022-04-25 RX ADMIN — ACETAMINOPHEN 650 MG: 325 TABLET ORAL at 00:41

## 2022-04-25 RX ADMIN — PANTOPRAZOLE SODIUM 40 MG: 40 TABLET, DELAYED RELEASE ORAL at 06:12

## 2022-04-25 RX ADMIN — POTASSIUM CHLORIDE, DEXTROSE MONOHYDRATE AND SODIUM CHLORIDE 50 ML/HR: 150; 5; 450 INJECTION, SOLUTION INTRAVENOUS at 14:01

## 2022-04-25 RX ADMIN — Medication: at 13:14

## 2022-04-25 RX ADMIN — SENNOSIDES AND DOCUSATE SODIUM 2 TABLET: 50; 8.6 TABLET ORAL at 21:41

## 2022-04-25 RX ADMIN — ACETAMINOPHEN 650 MG: 325 TABLET ORAL at 06:12

## 2022-04-25 RX ADMIN — OXYCODONE 10 MG: 5 TABLET ORAL at 10:26

## 2022-04-25 RX ADMIN — ACETAMINOPHEN 650 MG: 325 TABLET ORAL at 12:51

## 2022-04-25 RX ADMIN — ROSUVASTATIN CALCIUM 40 MG: 20 TABLET, FILM COATED ORAL at 08:29

## 2022-04-25 RX ADMIN — ACETAMINOPHEN 650 MG: 325 TABLET ORAL at 17:35

## 2022-04-25 RX ADMIN — CYCLOBENZAPRINE 5 MG: 10 TABLET, FILM COATED ORAL at 21:41

## 2022-04-25 RX ADMIN — ENOXAPARIN SODIUM 40 MG: 40 INJECTION SUBCUTANEOUS at 12:51

## 2022-04-26 LAB
ANION GAP SERPL CALCULATED.3IONS-SCNC: 11 MMOL/L (ref 5–15)
BUN SERPL-MCNC: 12 MG/DL (ref 8–23)
BUN/CREAT SERPL: 16.9 (ref 7–25)
CALCIUM SPEC-SCNC: 9 MG/DL (ref 8.6–10.5)
CHLORIDE SERPL-SCNC: 97 MMOL/L (ref 98–107)
CO2 SERPL-SCNC: 27 MMOL/L (ref 22–29)
CREAT SERPL-MCNC: 0.71 MG/DL (ref 0.76–1.27)
DEPRECATED RDW RBC AUTO: 48.5 FL (ref 37–54)
EGFRCR SERPLBLD CKD-EPI 2021: 98.1 ML/MIN/1.73
ERYTHROCYTE [DISTWIDTH] IN BLOOD BY AUTOMATED COUNT: 13.5 % (ref 12.3–15.4)
GLUCOSE SERPL-MCNC: 107 MG/DL (ref 65–99)
HCT VFR BLD AUTO: 48.5 % (ref 37.5–51)
HGB BLD-MCNC: 16.1 G/DL (ref 13–17.7)
MCH RBC QN AUTO: 31.6 PG (ref 26.6–33)
MCHC RBC AUTO-ENTMCNC: 33.2 G/DL (ref 31.5–35.7)
MCV RBC AUTO: 95.3 FL (ref 79–97)
PLATELET # BLD AUTO: 298 10*3/MM3 (ref 140–450)
PMV BLD AUTO: 9.3 FL (ref 6–12)
POTASSIUM SERPL-SCNC: 4.1 MMOL/L (ref 3.5–5.2)
RBC # BLD AUTO: 5.09 10*6/MM3 (ref 4.14–5.8)
SODIUM SERPL-SCNC: 135 MMOL/L (ref 136–145)
WBC NRBC COR # BLD: 8.06 10*3/MM3 (ref 3.4–10.8)

## 2022-04-26 PROCEDURE — 80048 BASIC METABOLIC PNL TOTAL CA: CPT | Performed by: SURGERY

## 2022-04-26 PROCEDURE — 25010000002 METOCLOPRAMIDE PER 10 MG: Performed by: SURGERY

## 2022-04-26 PROCEDURE — 25010000002 ENOXAPARIN PER 10 MG: Performed by: SURGERY

## 2022-04-26 PROCEDURE — 85027 COMPLETE CBC AUTOMATED: CPT | Performed by: SURGERY

## 2022-04-26 RX ORDER — METOCLOPRAMIDE HYDROCHLORIDE 5 MG/ML
10 INJECTION INTRAMUSCULAR; INTRAVENOUS EVERY 6 HOURS
Status: DISCONTINUED | OUTPATIENT
Start: 2022-04-26 | End: 2022-04-27

## 2022-04-26 RX ORDER — OXYCODONE HYDROCHLORIDE 5 MG/1
10 TABLET ORAL EVERY 4 HOURS PRN
Status: DISCONTINUED | OUTPATIENT
Start: 2022-04-26 | End: 2022-04-29 | Stop reason: HOSPADM

## 2022-04-26 RX ORDER — ENOXAPARIN SODIUM 100 MG/ML
40 INJECTION SUBCUTANEOUS EVERY 24 HOURS
Status: DISCONTINUED | OUTPATIENT
Start: 2022-04-26 | End: 2022-04-29 | Stop reason: HOSPADM

## 2022-04-26 RX ADMIN — ACETAMINOPHEN 650 MG: 325 TABLET ORAL at 05:51

## 2022-04-26 RX ADMIN — CYCLOBENZAPRINE 5 MG: 10 TABLET, FILM COATED ORAL at 15:33

## 2022-04-26 RX ADMIN — METOCLOPRAMIDE 10 MG: 5 INJECTION, SOLUTION INTRAMUSCULAR; INTRAVENOUS at 16:24

## 2022-04-26 RX ADMIN — ACETAMINOPHEN 650 MG: 325 TABLET ORAL at 12:09

## 2022-04-26 RX ADMIN — ROSUVASTATIN CALCIUM 40 MG: 20 TABLET, FILM COATED ORAL at 08:26

## 2022-04-26 RX ADMIN — OXYCODONE 10 MG: 5 TABLET ORAL at 17:04

## 2022-04-26 RX ADMIN — ACETAMINOPHEN 650 MG: 325 TABLET ORAL at 17:04

## 2022-04-26 RX ADMIN — ENOXAPARIN SODIUM 40 MG: 40 INJECTION SUBCUTANEOUS at 12:09

## 2022-04-26 RX ADMIN — PANTOPRAZOLE SODIUM 40 MG: 40 TABLET, DELAYED RELEASE ORAL at 05:51

## 2022-04-26 RX ADMIN — CYCLOBENZAPRINE 5 MG: 10 TABLET, FILM COATED ORAL at 05:51

## 2022-04-26 RX ADMIN — CYCLOBENZAPRINE 5 MG: 10 TABLET, FILM COATED ORAL at 21:19

## 2022-04-26 RX ADMIN — OXYCODONE 10 MG: 5 TABLET ORAL at 12:41

## 2022-04-27 LAB
ANION GAP SERPL CALCULATED.3IONS-SCNC: 9 MMOL/L (ref 5–15)
BUN SERPL-MCNC: 13 MG/DL (ref 8–23)
BUN/CREAT SERPL: 16.5 (ref 7–25)
CALCIUM SPEC-SCNC: 9.1 MG/DL (ref 8.6–10.5)
CHLORIDE SERPL-SCNC: 100 MMOL/L (ref 98–107)
CO2 SERPL-SCNC: 27 MMOL/L (ref 22–29)
CREAT SERPL-MCNC: 0.79 MG/DL (ref 0.76–1.27)
DEPRECATED RDW RBC AUTO: 48.1 FL (ref 37–54)
EGFRCR SERPLBLD CKD-EPI 2021: 95 ML/MIN/1.73
ERYTHROCYTE [DISTWIDTH] IN BLOOD BY AUTOMATED COUNT: 13.7 % (ref 12.3–15.4)
GLUCOSE SERPL-MCNC: 98 MG/DL (ref 65–99)
HCT VFR BLD AUTO: 46.4 % (ref 37.5–51)
HGB BLD-MCNC: 15.4 G/DL (ref 13–17.7)
MCH RBC QN AUTO: 31.4 PG (ref 26.6–33)
MCHC RBC AUTO-ENTMCNC: 33.2 G/DL (ref 31.5–35.7)
MCV RBC AUTO: 94.5 FL (ref 79–97)
PLATELET # BLD AUTO: 319 10*3/MM3 (ref 140–450)
PMV BLD AUTO: 9.3 FL (ref 6–12)
POTASSIUM SERPL-SCNC: 4 MMOL/L (ref 3.5–5.2)
RBC # BLD AUTO: 4.91 10*6/MM3 (ref 4.14–5.8)
SODIUM SERPL-SCNC: 136 MMOL/L (ref 136–145)
WBC NRBC COR # BLD: 7.36 10*3/MM3 (ref 3.4–10.8)

## 2022-04-27 PROCEDURE — 25010000002 ENOXAPARIN PER 10 MG: Performed by: SURGERY

## 2022-04-27 PROCEDURE — 87205 SMEAR GRAM STAIN: CPT | Performed by: SURGERY

## 2022-04-27 PROCEDURE — 25010000002 METOCLOPRAMIDE PER 10 MG: Performed by: SURGERY

## 2022-04-27 PROCEDURE — 87186 SC STD MICRODIL/AGAR DIL: CPT | Performed by: SURGERY

## 2022-04-27 PROCEDURE — 85027 COMPLETE CBC AUTOMATED: CPT | Performed by: SURGERY

## 2022-04-27 PROCEDURE — 80048 BASIC METABOLIC PNL TOTAL CA: CPT | Performed by: SURGERY

## 2022-04-27 PROCEDURE — 87077 CULTURE AEROBIC IDENTIFY: CPT | Performed by: SURGERY

## 2022-04-27 PROCEDURE — 87070 CULTURE OTHR SPECIMN AEROBIC: CPT | Performed by: SURGERY

## 2022-04-27 RX ORDER — AMOXICILLIN AND CLAVULANATE POTASSIUM 500; 125 MG/1; MG/1
1 TABLET, FILM COATED ORAL EVERY 8 HOURS SCHEDULED
Status: DISCONTINUED | OUTPATIENT
Start: 2022-04-27 | End: 2022-04-28

## 2022-04-27 RX ADMIN — METOCLOPRAMIDE 10 MG: 5 INJECTION, SOLUTION INTRAMUSCULAR; INTRAVENOUS at 06:01

## 2022-04-27 RX ADMIN — ENOXAPARIN SODIUM 40 MG: 40 INJECTION SUBCUTANEOUS at 12:27

## 2022-04-27 RX ADMIN — OXYCODONE 10 MG: 5 TABLET ORAL at 00:11

## 2022-04-27 RX ADMIN — ACETAMINOPHEN 650 MG: 325 TABLET ORAL at 06:01

## 2022-04-27 RX ADMIN — CYCLOBENZAPRINE 5 MG: 10 TABLET, FILM COATED ORAL at 06:01

## 2022-04-27 RX ADMIN — PANTOPRAZOLE SODIUM 40 MG: 40 TABLET, DELAYED RELEASE ORAL at 06:01

## 2022-04-27 RX ADMIN — METOCLOPRAMIDE 10 MG: 5 INJECTION, SOLUTION INTRAMUSCULAR; INTRAVENOUS at 10:30

## 2022-04-27 RX ADMIN — ROSUVASTATIN CALCIUM 40 MG: 20 TABLET, FILM COATED ORAL at 08:48

## 2022-04-27 RX ADMIN — OXYCODONE 10 MG: 5 TABLET ORAL at 17:01

## 2022-04-27 RX ADMIN — METOCLOPRAMIDE 10 MG: 5 INJECTION, SOLUTION INTRAMUSCULAR; INTRAVENOUS at 00:11

## 2022-04-27 RX ADMIN — ACETAMINOPHEN 650 MG: 325 TABLET ORAL at 17:00

## 2022-04-27 RX ADMIN — ACETAMINOPHEN 650 MG: 325 TABLET ORAL at 12:27

## 2022-04-27 RX ADMIN — CYCLOBENZAPRINE 5 MG: 10 TABLET, FILM COATED ORAL at 14:58

## 2022-04-27 RX ADMIN — CYCLOBENZAPRINE 5 MG: 10 TABLET, FILM COATED ORAL at 21:07

## 2022-04-27 RX ADMIN — SENNOSIDES AND DOCUSATE SODIUM 2 TABLET: 50; 8.6 TABLET ORAL at 21:07

## 2022-04-27 RX ADMIN — OXYCODONE 10 MG: 5 TABLET ORAL at 08:48

## 2022-04-27 RX ADMIN — AMOXICILLIN AND CLAVULANATE POTASSIUM 500 MG: 500; 125 TABLET, FILM COATED ORAL at 09:47

## 2022-04-27 RX ADMIN — AMOXICILLIN AND CLAVULANATE POTASSIUM 500 MG: 500; 125 TABLET, FILM COATED ORAL at 21:07

## 2022-04-27 RX ADMIN — OXYCODONE 10 MG: 5 TABLET ORAL at 12:27

## 2022-04-27 RX ADMIN — AMOXICILLIN AND CLAVULANATE POTASSIUM 500 MG: 500; 125 TABLET, FILM COATED ORAL at 14:58

## 2022-04-28 LAB
ANION GAP SERPL CALCULATED.3IONS-SCNC: 9 MMOL/L (ref 5–15)
BUN SERPL-MCNC: 12 MG/DL (ref 8–23)
BUN/CREAT SERPL: 16.2 (ref 7–25)
CALCIUM SPEC-SCNC: 8.7 MG/DL (ref 8.6–10.5)
CHLORIDE SERPL-SCNC: 96 MMOL/L (ref 98–107)
CO2 SERPL-SCNC: 26 MMOL/L (ref 22–29)
CREAT SERPL-MCNC: 0.74 MG/DL (ref 0.76–1.27)
DEPRECATED RDW RBC AUTO: 44.9 FL (ref 37–54)
EGFRCR SERPLBLD CKD-EPI 2021: 96.9 ML/MIN/1.73
ERYTHROCYTE [DISTWIDTH] IN BLOOD BY AUTOMATED COUNT: 13.4 % (ref 12.3–15.4)
GLUCOSE SERPL-MCNC: 100 MG/DL (ref 65–99)
HCT VFR BLD AUTO: 41.5 % (ref 37.5–51)
HGB BLD-MCNC: 14.1 G/DL (ref 13–17.7)
MCH RBC QN AUTO: 30.9 PG (ref 26.6–33)
MCHC RBC AUTO-ENTMCNC: 34 G/DL (ref 31.5–35.7)
MCV RBC AUTO: 90.8 FL (ref 79–97)
PLATELET # BLD AUTO: 300 10*3/MM3 (ref 140–450)
PMV BLD AUTO: 9.3 FL (ref 6–12)
POTASSIUM SERPL-SCNC: 3.8 MMOL/L (ref 3.5–5.2)
RBC # BLD AUTO: 4.57 10*6/MM3 (ref 4.14–5.8)
SODIUM SERPL-SCNC: 131 MMOL/L (ref 136–145)
WBC NRBC COR # BLD: 7.37 10*3/MM3 (ref 3.4–10.8)

## 2022-04-28 PROCEDURE — 85027 COMPLETE CBC AUTOMATED: CPT | Performed by: SURGERY

## 2022-04-28 PROCEDURE — 80048 BASIC METABOLIC PNL TOTAL CA: CPT | Performed by: SURGERY

## 2022-04-28 PROCEDURE — 25010000002 PIPERACILLIN SOD-TAZOBACTAM PER 1 G: Performed by: SURGERY

## 2022-04-28 PROCEDURE — 25010000002 ENOXAPARIN PER 10 MG: Performed by: SURGERY

## 2022-04-28 RX ORDER — ACETAMINOPHEN 325 MG/1
650 TABLET ORAL EVERY 6 HOURS PRN
Status: DISCONTINUED | OUTPATIENT
Start: 2022-04-28 | End: 2022-04-29 | Stop reason: HOSPADM

## 2022-04-28 RX ORDER — SODIUM CHLORIDE 0.9 % (FLUSH) 0.9 %
10 SYRINGE (ML) INJECTION AS NEEDED
Status: DISCONTINUED | OUTPATIENT
Start: 2022-04-28 | End: 2022-04-29 | Stop reason: HOSPADM

## 2022-04-28 RX ORDER — SODIUM CHLORIDE 0.9 % (FLUSH) 0.9 %
10 SYRINGE (ML) INJECTION EVERY 12 HOURS SCHEDULED
Status: DISCONTINUED | OUTPATIENT
Start: 2022-04-28 | End: 2022-04-29 | Stop reason: HOSPADM

## 2022-04-28 RX ORDER — CYCLOBENZAPRINE HCL 10 MG
5 TABLET ORAL 3 TIMES DAILY PRN
Status: DISCONTINUED | OUTPATIENT
Start: 2022-04-28 | End: 2022-04-29 | Stop reason: HOSPADM

## 2022-04-28 RX ADMIN — PANTOPRAZOLE SODIUM 40 MG: 40 TABLET, DELAYED RELEASE ORAL at 05:14

## 2022-04-28 RX ADMIN — TAZOBACTAM SODIUM AND PIPERACILLIN SODIUM 3.38 G: 375; 3 INJECTION, SOLUTION INTRAVENOUS at 20:29

## 2022-04-28 RX ADMIN — ROSUVASTATIN CALCIUM 40 MG: 20 TABLET, FILM COATED ORAL at 08:51

## 2022-04-28 RX ADMIN — ACETAMINOPHEN 650 MG: 325 TABLET ORAL at 05:15

## 2022-04-28 RX ADMIN — AMOXICILLIN AND CLAVULANATE POTASSIUM 500 MG: 500; 125 TABLET, FILM COATED ORAL at 14:17

## 2022-04-28 RX ADMIN — ENOXAPARIN SODIUM 40 MG: 40 INJECTION SUBCUTANEOUS at 14:17

## 2022-04-28 RX ADMIN — SENNOSIDES AND DOCUSATE SODIUM 2 TABLET: 50; 8.6 TABLET ORAL at 20:28

## 2022-04-28 RX ADMIN — CYCLOBENZAPRINE 5 MG: 10 TABLET, FILM COATED ORAL at 05:14

## 2022-04-28 RX ADMIN — OXYCODONE 10 MG: 5 TABLET ORAL at 12:09

## 2022-04-28 RX ADMIN — Medication 10 ML: at 20:29

## 2022-04-28 RX ADMIN — AMOXICILLIN AND CLAVULANATE POTASSIUM 500 MG: 500; 125 TABLET, FILM COATED ORAL at 05:14

## 2022-04-28 RX ADMIN — OXYCODONE 10 MG: 5 TABLET ORAL at 02:06

## 2022-04-28 RX ADMIN — OXYCODONE 10 MG: 5 TABLET ORAL at 20:40

## 2022-04-29 ENCOUNTER — HOME HEALTH ADMISSION (OUTPATIENT)
Dept: HOME HEALTH SERVICES | Facility: HOME HEALTHCARE | Age: 72
End: 2022-04-29

## 2022-04-29 VITALS
RESPIRATION RATE: 18 BRPM | OXYGEN SATURATION: 93 % | BODY MASS INDEX: 31.92 KG/M2 | TEMPERATURE: 98.2 F | SYSTOLIC BLOOD PRESSURE: 133 MMHG | HEIGHT: 71 IN | HEART RATE: 97 BPM | DIASTOLIC BLOOD PRESSURE: 85 MMHG | WEIGHT: 228 LBS

## 2022-04-29 LAB
BACTERIA SPEC AEROBE CULT: ABNORMAL
GRAM STN SPEC: ABNORMAL
GRAM STN SPEC: ABNORMAL

## 2022-04-29 PROCEDURE — 25010000002 PIPERACILLIN SOD-TAZOBACTAM PER 1 G: Performed by: SURGERY

## 2022-04-29 RX ORDER — LEVOFLOXACIN 750 MG/1
750 TABLET ORAL DAILY
Qty: 10 TABLET | Refills: 0 | Status: SHIPPED | OUTPATIENT
Start: 2022-04-29 | End: 2022-05-09

## 2022-04-29 RX ORDER — ONDANSETRON 4 MG/1
4 TABLET, FILM COATED ORAL EVERY 8 HOURS PRN
Qty: 12 TABLET | Refills: 0 | Status: SHIPPED | OUTPATIENT
Start: 2022-04-29

## 2022-04-29 RX ORDER — OXYCODONE HYDROCHLORIDE AND ACETAMINOPHEN 5; 325 MG/1; MG/1
1 TABLET ORAL EVERY 4 HOURS PRN
Qty: 14 TABLET | Refills: 0 | Status: SHIPPED | OUTPATIENT
Start: 2022-04-29

## 2022-04-29 RX ORDER — DOCUSATE SODIUM 100 MG/1
100 CAPSULE, LIQUID FILLED ORAL 2 TIMES DAILY
Qty: 30 CAPSULE | Refills: 1 | Status: SHIPPED | OUTPATIENT
Start: 2022-04-29 | End: 2023-04-29

## 2022-04-29 RX ADMIN — PANTOPRAZOLE SODIUM 40 MG: 40 TABLET, DELAYED RELEASE ORAL at 05:05

## 2022-04-29 RX ADMIN — TAZOBACTAM SODIUM AND PIPERACILLIN SODIUM 3.38 G: 375; 3 INJECTION, SOLUTION INTRAVENOUS at 05:05

## 2022-04-30 ENCOUNTER — READMISSION MANAGEMENT (OUTPATIENT)
Dept: CALL CENTER | Facility: HOSPITAL | Age: 72
End: 2022-04-30

## 2022-04-30 ENCOUNTER — HOME CARE VISIT (OUTPATIENT)
Dept: HOME HEALTH SERVICES | Facility: HOME HEALTHCARE | Age: 72
End: 2022-04-30

## 2022-04-30 PROCEDURE — G0299 HHS/HOSPICE OF RN EA 15 MIN: HCPCS

## 2022-04-30 NOTE — OUTREACH NOTE
Prep Survey    Flowsheet Row Responses   [facility] facility patient discharged from? [place]   Is LACE score < 7 ? No   Emergency Room discharge w/ pulse ox? No   Eligibility Readm Mgmt   Discharge diagnosis Acute diverticulitisOpen colostomy takedown    Does the patient have one of the following disease processes/diagnoses(primary or secondary)? General Surgery   Does the patient have Home health ordered? Yes   What is the Home health agency?  [facility]   Is there a DME ordered? No   Prep survey completed? Yes          MIKA BLAKE - Registered Nurse

## 2022-05-01 VITALS
TEMPERATURE: 97.2 F | RESPIRATION RATE: 18 BRPM | OXYGEN SATURATION: 97 % | HEIGHT: 71 IN | BODY MASS INDEX: 31.8 KG/M2 | SYSTOLIC BLOOD PRESSURE: 128 MMHG | DIASTOLIC BLOOD PRESSURE: 70 MMHG | HEART RATE: 89 BPM

## 2022-05-01 NOTE — HOME HEALTH
SOC Note: 73 y/o male treated at [facility] from 4.21.22-4.29.22 for Open colostomy takedown on 4/21/2022. PMH: Acute diverticulitis. Hypertension, Hyperlipidemia, Lactic acidosis, Sepsis without acute organ dysfunction, Peritonitis, and Diverticul disease small and large intestine, no perforati or abscess. Patient has a midline incision that is closed with staples. Per patient doctor removed a few staple to allow is to be packes with nugauze and gauze. LUQ colostomy reversal is healing well and requires a simple daily dressing. Patient reports eating 3 meals a day. Patient has positive bowel sounds to all quadrants. Patient last bowel movment was the evening before. Patient is continent to bladder and bowel. Patienthas no breakdown to buttocks. Patient has no edema to BLE. Wife was instructed on wound care of midline incision and LUQ colostomy takedown.       Home Health ordered for: disciplines : Skilled nursing for wound care and management    Reason for Hosp/Primary Dx/Co-morbidities: diverticulosis of both small and large intestine without perforation or abscess without bleeding    Focus of Care: aftercare following colostomy takedown along with wound care to midline incision    Current Functional status/mobility/DME: walker, assist x 1     HB status/Living Arrangements: Yes patientis homebound.Lives with wife.    Skin Integrity/wound status: Midline incision with staples and LUQ takedown     Code Status: Full    Fall Risk: High    POC confirmed with Khalif Joshua on date 4.30.2022.

## 2022-05-03 ENCOUNTER — HOME CARE VISIT (OUTPATIENT)
Dept: HOME HEALTH SERVICES | Facility: HOME HEALTHCARE | Age: 72
End: 2022-05-03

## 2022-05-04 ENCOUNTER — READMISSION MANAGEMENT (OUTPATIENT)
Dept: CALL CENTER | Facility: HOSPITAL | Age: 72
End: 2022-05-04

## 2022-05-04 NOTE — OUTREACH NOTE
General Surgery Week 1 Survey    Flowsheet Row Responses   [facility] patient discharged from? [place]   Does the patient have one of the following disease processes/diagnoses(primary or secondary)? General Surgery   Week 1 attempt successful? Yes   Call start time 1125   Call end time 1132   Discharge diagnosis Acute diverticulitisOpen colostomy takedown    Meds reviewed with patient/caregiver? Yes   Is the patient having any side effects they believe may be caused by any medication additions or changes? No   Does the patient have all medications related to this admission filled (includes all antibiotics, pain medications, etc.) Yes   Is the patient taking all medications as directed (includes completed medication regime)? Yes   Does the patient have a follow up appointment scheduled with their surgeon? Yes   Has the patient kept scheduled appointments due by today? N/A   Comments Seen by surgeon yesterday 5/2/2022 and sees surgeon again on Tues May 10 to have staples removed.    What is the Home health agency?  [facility]   Has home health visited the patient within 72 hours of discharge? Yes   Psychosocial issues? No   Did the patient receive a copy of their discharge instructions? Yes   Nursing interventions Reviewed instructions with patient   What is the patient's perception of their health status since discharge? Improving   Nursing interventions Nurse provided patient education   Is the patient /caregiver able to teach back basic post-op care? No tub bath, swimming, or hot tub until instructed by MD, Keep incision areas clean,dry and protected   Is the patient/caregiver able to teach back signs and symptoms of incisional infection? Increased redness, swelling or pain at the incisonal site, Fever   Is the patient/caregiver able to teach back steps to recovery at home? Set small, achievable goals for return to baseline health, Rest and rebuild strength, gradually increase activity, Make a list of questions  for surgeon's appointment   If the patient is a current smoker, are they able to teach back resources for cessation? Not a smoker   Is the patient/caregiver able to teach back the hierarchy of who to call/visit for symptoms/problems? PCP, Specialist, Home health nurse, Urgent Care, ED, 911 Yes   Week 1 call completed? Yes          CORTES MARSH - Registered Nurse

## 2022-05-05 ENCOUNTER — HOME CARE VISIT (OUTPATIENT)
Dept: HOME HEALTH SERVICES | Facility: HOME HEALTHCARE | Age: 72
End: 2022-05-05

## 2022-05-11 ENCOUNTER — HOME CARE VISIT (OUTPATIENT)
Dept: HOME HEALTH SERVICES | Facility: HOME HEALTHCARE | Age: 72
End: 2022-05-11

## 2022-05-11 PROCEDURE — G0299 HHS/HOSPICE OF RN EA 15 MIN: HCPCS

## 2022-05-11 NOTE — CASE COMMUNICATION
Patient missed a { Visit Type:SN} visit from [facility] on {DATE:5-5-22}.     Reason: {HH Missed Visit Reason:Patient cancelled }.       For your records only.   As per home health protocol, MD must be notified of missed/cancelled visits; therefore the prescribed frequency was not met.

## 2022-05-12 ENCOUNTER — READMISSION MANAGEMENT (OUTPATIENT)
Dept: CALL CENTER | Facility: HOSPITAL | Age: 72
End: 2022-05-12

## 2022-05-12 ENCOUNTER — HOME CARE VISIT (OUTPATIENT)
Dept: HOME HEALTH SERVICES | Facility: HOME HEALTHCARE | Age: 72
End: 2022-05-12

## 2022-05-12 NOTE — OUTREACH NOTE
General Surgery Week 2 Survey    Flowsheet Row Responses   [facility] patient discharged from? [place]   Does the patient have one of the following disease processes/diagnoses(primary or secondary)? General Surgery   Week 2 attempt successful? Yes   Call start time 1547   Call end time 1548   Discharge diagnosis Acute diverticulitisOpen colostomy takedown    Person spoke with today (if not patient) and relationship Eva-spouse   Meds reviewed with patient/caregiver? Yes   Is the patient having any side effects they believe may be caused by any medication additions or changes? No   Is the patient taking all medications as directed (includes completed medication regime)? Yes   Does the patient have a follow up appointment scheduled with their surgeon? Yes   Has the patient kept scheduled appointments due by today? Yes   What is the Home health agency?  [facility]   Has home health visited the patient within 72 hours of discharge? Yes   Home health comments Home health discharged pt at last visit   Psychosocial issues? No   What is the patient's perception of their health status since discharge? Improving   Nursing interventions Nurse provided patient education   Is the patient /caregiver able to teach back basic post-op care? Keep incision areas clean,dry and protected, No tub bath, swimming, or hot tub until instructed by MD   Is the patient/caregiver able to teach back signs and symptoms of incisional infection? Increased drainage or bleeding, Increased redness, swelling or pain at the incisonal site, Pus or odor from incision   Is the patient/caregiver able to teach back steps to recovery at home? Set small, achievable goals for return to baseline health, Rest and rebuild strength, gradually increase activity, Eat a well-balance diet   Is the patient/caregiver able to teach back the hierarchy of who to call/visit for symptoms/problems? PCP, Specialist, Home health nurse, Urgent Care, ED, 911 Yes   Week 2 call  completed? Yes          BRUNO S - Registered Nurse

## 2022-05-13 VITALS
RESPIRATION RATE: 18 BRPM | HEART RATE: 105 BPM | OXYGEN SATURATION: 96 % | SYSTOLIC BLOOD PRESSURE: 130 MMHG | TEMPERATURE: 97.2 F | DIASTOLIC BLOOD PRESSURE: 70 MMHG

## 2022-07-27 ENCOUNTER — TRANSCRIBE ORDERS (OUTPATIENT)
Dept: LAB | Facility: HOSPITAL | Age: 72
End: 2022-07-27

## 2022-07-27 ENCOUNTER — LAB (OUTPATIENT)
Dept: LAB | Facility: HOSPITAL | Age: 72
End: 2022-07-27

## 2022-07-27 DIAGNOSIS — K65.0 ACUTE PERITONITIS: ICD-10-CM

## 2022-07-27 DIAGNOSIS — K57.20 PERFORATED DIVERTICULUM OF LARGE INTESTINE: ICD-10-CM

## 2022-07-27 DIAGNOSIS — Z96.653 PRESENCE OF BOTH ARTIFICIAL KNEE JOINTS: ICD-10-CM

## 2022-07-27 DIAGNOSIS — K63.1 PERFORATION OF INTESTINE: ICD-10-CM

## 2022-07-27 DIAGNOSIS — Z96.653 PRESENCE OF BOTH ARTIFICIAL KNEE JOINTS: Primary | ICD-10-CM

## 2022-07-27 LAB
ALBUMIN SERPL-MCNC: 3.8 G/DL (ref 3.5–5.2)
ALBUMIN/GLOB SERPL: 1 G/DL
ALP SERPL-CCNC: 97 U/L (ref 39–117)
ALT SERPL W P-5'-P-CCNC: 10 U/L (ref 1–41)
ANION GAP SERPL CALCULATED.3IONS-SCNC: 10 MMOL/L (ref 5–15)
AST SERPL-CCNC: 15 U/L (ref 1–40)
BASOPHILS # BLD AUTO: 0.08 10*3/MM3 (ref 0–0.2)
BASOPHILS NFR BLD AUTO: 0.8 % (ref 0–1.5)
BILIRUB SERPL-MCNC: 0.4 MG/DL (ref 0–1.2)
BUN SERPL-MCNC: 18 MG/DL (ref 8–23)
BUN/CREAT SERPL: 19.4 (ref 7–25)
CALCIUM SPEC-SCNC: 9.7 MG/DL (ref 8.6–10.5)
CHLORIDE SERPL-SCNC: 96 MMOL/L (ref 98–107)
CO2 SERPL-SCNC: 27 MMOL/L (ref 22–29)
CREAT SERPL-MCNC: 0.93 MG/DL (ref 0.76–1.27)
DEPRECATED RDW RBC AUTO: 51.8 FL (ref 37–54)
EGFRCR SERPLBLD CKD-EPI 2021: 87.8 ML/MIN/1.73
EOSINOPHIL # BLD AUTO: 0.16 10*3/MM3 (ref 0–0.4)
EOSINOPHIL NFR BLD AUTO: 1.6 % (ref 0.3–6.2)
ERYTHROCYTE [DISTWIDTH] IN BLOOD BY AUTOMATED COUNT: 15.7 % (ref 12.3–15.4)
GLOBULIN UR ELPH-MCNC: 3.8 GM/DL
GLUCOSE SERPL-MCNC: 119 MG/DL (ref 65–99)
HCT VFR BLD AUTO: 34.8 % (ref 37.5–51)
HGB BLD-MCNC: 11.4 G/DL (ref 13–17.7)
IMM GRANULOCYTES # BLD AUTO: 0.13 10*3/MM3 (ref 0–0.05)
IMM GRANULOCYTES NFR BLD AUTO: 1.3 % (ref 0–0.5)
LYMPHOCYTES # BLD AUTO: 1.3 10*3/MM3 (ref 0.7–3.1)
LYMPHOCYTES NFR BLD AUTO: 12.9 % (ref 19.6–45.3)
MCH RBC QN AUTO: 29.6 PG (ref 26.6–33)
MCHC RBC AUTO-ENTMCNC: 32.8 G/DL (ref 31.5–35.7)
MCV RBC AUTO: 90.4 FL (ref 79–97)
MONOCYTES # BLD AUTO: 0.93 10*3/MM3 (ref 0.1–0.9)
MONOCYTES NFR BLD AUTO: 9.2 % (ref 5–12)
NEUTROPHILS NFR BLD AUTO: 7.51 10*3/MM3 (ref 1.7–7)
NEUTROPHILS NFR BLD AUTO: 74.2 % (ref 42.7–76)
NRBC BLD AUTO-RTO: 0 /100 WBC (ref 0–0.2)
PLATELET # BLD AUTO: 792 10*3/MM3 (ref 140–450)
PMV BLD AUTO: 9.7 FL (ref 6–12)
POTASSIUM SERPL-SCNC: 4 MMOL/L (ref 3.5–5.2)
PROT SERPL-MCNC: 7.6 G/DL (ref 6–8.5)
RBC # BLD AUTO: 3.85 10*6/MM3 (ref 4.14–5.8)
SODIUM SERPL-SCNC: 133 MMOL/L (ref 136–145)
WBC NRBC COR # BLD: 10.11 10*3/MM3 (ref 3.4–10.8)

## 2022-07-27 PROCEDURE — 80053 COMPREHEN METABOLIC PANEL: CPT

## 2022-07-27 PROCEDURE — 85025 COMPLETE CBC W/AUTO DIFF WBC: CPT

## 2022-07-27 PROCEDURE — 36415 COLL VENOUS BLD VENIPUNCTURE: CPT

## 2022-09-06 ENCOUNTER — TRANSCRIBE ORDERS (OUTPATIENT)
Dept: LAB | Facility: HOSPITAL | Age: 72
End: 2022-09-06

## 2022-09-06 ENCOUNTER — LAB (OUTPATIENT)
Dept: LAB | Facility: HOSPITAL | Age: 72
End: 2022-09-06

## 2022-09-06 DIAGNOSIS — K57.20 PERFORATED DIVERTICULUM OF LARGE INTESTINE: ICD-10-CM

## 2022-09-06 DIAGNOSIS — K65.0 ACUTE PERITONITIS: ICD-10-CM

## 2022-09-06 DIAGNOSIS — B95.4 BACTERIAL INFECTION DUE TO STREPTOCOCCUS, GROUP G: ICD-10-CM

## 2022-09-06 DIAGNOSIS — K63.1 PERFORATION OF INTESTINE: ICD-10-CM

## 2022-09-06 DIAGNOSIS — K83.09 BACTERIAL CHOLANGITIS: ICD-10-CM

## 2022-09-06 DIAGNOSIS — K63.1 PERFORATION OF INTESTINE: Primary | ICD-10-CM

## 2022-09-06 DIAGNOSIS — B96.89 BACTERIAL CHOLANGITIS: ICD-10-CM

## 2022-09-06 LAB
ALBUMIN SERPL-MCNC: 4.3 G/DL (ref 3.5–5.2)
ALBUMIN/GLOB SERPL: 1.5 G/DL
ALP SERPL-CCNC: 73 U/L (ref 39–117)
ALT SERPL W P-5'-P-CCNC: 18 U/L (ref 1–41)
ANION GAP SERPL CALCULATED.3IONS-SCNC: 11 MMOL/L (ref 5–15)
AST SERPL-CCNC: 17 U/L (ref 1–40)
BASOPHILS # BLD AUTO: 0.02 10*3/MM3 (ref 0–0.2)
BASOPHILS NFR BLD AUTO: 0.3 % (ref 0–1.5)
BILIRUB SERPL-MCNC: 0.5 MG/DL (ref 0–1.2)
BUN SERPL-MCNC: 26 MG/DL (ref 8–23)
BUN/CREAT SERPL: 30.6 (ref 7–25)
CALCIUM SPEC-SCNC: 10.1 MG/DL (ref 8.6–10.5)
CHLORIDE SERPL-SCNC: 98 MMOL/L (ref 98–107)
CO2 SERPL-SCNC: 26 MMOL/L (ref 22–29)
CREAT SERPL-MCNC: 0.85 MG/DL (ref 0.76–1.27)
CRP SERPL-MCNC: <0.3 MG/DL (ref 0–0.5)
DEPRECATED RDW RBC AUTO: 50.4 FL (ref 37–54)
EGFRCR SERPLBLD CKD-EPI 2021: 92.9 ML/MIN/1.73
EOSINOPHIL # BLD AUTO: 0.16 10*3/MM3 (ref 0–0.4)
EOSINOPHIL NFR BLD AUTO: 2.2 % (ref 0.3–6.2)
ERYTHROCYTE [DISTWIDTH] IN BLOOD BY AUTOMATED COUNT: 15.2 % (ref 12.3–15.4)
ERYTHROCYTE [SEDIMENTATION RATE] IN BLOOD: 11 MM/HR (ref 0–20)
GLOBULIN UR ELPH-MCNC: 2.9 GM/DL
GLUCOSE SERPL-MCNC: 131 MG/DL (ref 65–99)
HCT VFR BLD AUTO: 41.9 % (ref 37.5–51)
HGB BLD-MCNC: 13.8 G/DL (ref 13–17.7)
IMM GRANULOCYTES # BLD AUTO: 0.06 10*3/MM3 (ref 0–0.05)
IMM GRANULOCYTES NFR BLD AUTO: 0.8 % (ref 0–0.5)
LYMPHOCYTES # BLD AUTO: 1.78 10*3/MM3 (ref 0.7–3.1)
LYMPHOCYTES NFR BLD AUTO: 24.1 % (ref 19.6–45.3)
MCH RBC QN AUTO: 30.1 PG (ref 26.6–33)
MCHC RBC AUTO-ENTMCNC: 32.9 G/DL (ref 31.5–35.7)
MCV RBC AUTO: 91.5 FL (ref 79–97)
MONOCYTES # BLD AUTO: 0.44 10*3/MM3 (ref 0.1–0.9)
MONOCYTES NFR BLD AUTO: 6 % (ref 5–12)
NEUTROPHILS NFR BLD AUTO: 4.92 10*3/MM3 (ref 1.7–7)
NEUTROPHILS NFR BLD AUTO: 66.6 % (ref 42.7–76)
NRBC BLD AUTO-RTO: 0 /100 WBC (ref 0–0.2)
PLATELET # BLD AUTO: 261 10*3/MM3 (ref 140–450)
PMV BLD AUTO: 9.1 FL (ref 6–12)
POTASSIUM SERPL-SCNC: 3.7 MMOL/L (ref 3.5–5.2)
PROT SERPL-MCNC: 7.2 G/DL (ref 6–8.5)
RBC # BLD AUTO: 4.58 10*6/MM3 (ref 4.14–5.8)
SODIUM SERPL-SCNC: 135 MMOL/L (ref 136–145)
WBC NRBC COR # BLD: 7.38 10*3/MM3 (ref 3.4–10.8)

## 2022-09-06 PROCEDURE — 86140 C-REACTIVE PROTEIN: CPT

## 2022-09-06 PROCEDURE — 36415 COLL VENOUS BLD VENIPUNCTURE: CPT

## 2022-09-06 PROCEDURE — 85025 COMPLETE CBC W/AUTO DIFF WBC: CPT

## 2022-09-06 PROCEDURE — 85652 RBC SED RATE AUTOMATED: CPT

## 2022-09-06 PROCEDURE — 80053 COMPREHEN METABOLIC PANEL: CPT

## 2023-03-08 NOTE — CASE COMMUNICATION
Patient missed a {SN} visit from [facility] on {5/12/22}.     Reason: {No response or return call from patient}.       For your records only.   As per home health protocol, MD must be notified of missed/cancelled visits; therefore the prescribed frequency was not met.  Pt has seen her PCP recently for this nausea and vomiting- has a history and is on zofran but has run out of her prescription. Pt has a referral for GI scope but has not scheduled. Pt having epigastric abdominal pain radiating up the chest. Pt is nauseated and vomiting for the last 4 days.

## 2023-03-21 ENCOUNTER — TRANSCRIBE ORDERS (OUTPATIENT)
Dept: ADMINISTRATIVE | Facility: HOSPITAL | Age: 73
End: 2023-03-21
Payer: MEDICARE

## 2023-03-21 DIAGNOSIS — R07.9 CHEST PAIN, UNSPECIFIED TYPE: Primary | ICD-10-CM

## 2024-04-05 ENCOUNTER — APPOINTMENT (OUTPATIENT)
Dept: GENERAL RADIOLOGY | Facility: HOSPITAL | Age: 74
End: 2024-04-05
Payer: MEDICARE

## 2024-04-05 ENCOUNTER — HOSPITAL ENCOUNTER (INPATIENT)
Facility: HOSPITAL | Age: 74
LOS: 11 days | Discharge: HOME | End: 2024-04-17
Attending: EMERGENCY MEDICINE | Admitting: INTERNAL MEDICINE
Payer: MEDICARE

## 2024-04-05 ENCOUNTER — APPOINTMENT (OUTPATIENT)
Dept: CT IMAGING | Facility: HOSPITAL | Age: 74
End: 2024-04-05
Payer: MEDICARE

## 2024-04-05 DIAGNOSIS — R07.9 CHEST PAIN, UNSPECIFIED TYPE: Primary | ICD-10-CM

## 2024-04-05 DIAGNOSIS — I21.4 NSTEMI (NON-ST ELEVATED MYOCARDIAL INFARCTION): ICD-10-CM

## 2024-04-05 DIAGNOSIS — I25.10 CORONARY ARTERY DISEASE INVOLVING NATIVE HEART, UNSPECIFIED VESSEL OR LESION TYPE, UNSPECIFIED WHETHER ANGINA PRESENT: ICD-10-CM

## 2024-04-05 DIAGNOSIS — J95.811 POSTPROCEDURAL PNEUMOTHORAX: ICD-10-CM

## 2024-04-05 DIAGNOSIS — Z95.1 S/P CABG (CORONARY ARTERY BYPASS GRAFT): ICD-10-CM

## 2024-04-05 LAB
ALBUMIN SERPL-MCNC: 4.1 G/DL (ref 3.5–5.2)
ALBUMIN/GLOB SERPL: 1.6 G/DL
ALP SERPL-CCNC: 61 U/L (ref 39–117)
ALT SERPL W P-5'-P-CCNC: 16 U/L (ref 1–41)
ANION GAP SERPL CALCULATED.3IONS-SCNC: 9 MMOL/L (ref 5–15)
APTT PPP: 26.5 SECONDS (ref 60–90)
AST SERPL-CCNC: 22 U/L (ref 1–40)
BASOPHILS # BLD AUTO: 0.06 10*3/MM3 (ref 0–0.2)
BASOPHILS NFR BLD AUTO: 0.7 % (ref 0–1.5)
BILIRUB SERPL-MCNC: 0.3 MG/DL (ref 0–1.2)
BUN SERPL-MCNC: 22 MG/DL (ref 8–23)
BUN/CREAT SERPL: 26.5 (ref 7–25)
CALCIUM SPEC-SCNC: 10.2 MG/DL (ref 8.6–10.5)
CHLORIDE SERPL-SCNC: 100 MMOL/L (ref 98–107)
CO2 SERPL-SCNC: 26 MMOL/L (ref 22–29)
CREAT SERPL-MCNC: 0.83 MG/DL (ref 0.76–1.27)
DEPRECATED RDW RBC AUTO: 47 FL (ref 37–54)
EGFRCR SERPLBLD CKD-EPI 2021: 93 ML/MIN/1.73
EOSINOPHIL # BLD AUTO: 0.15 10*3/MM3 (ref 0–0.4)
EOSINOPHIL NFR BLD AUTO: 1.8 % (ref 0.3–6.2)
ERYTHROCYTE [DISTWIDTH] IN BLOOD BY AUTOMATED COUNT: 13.4 % (ref 12.3–15.4)
GEN 5 2HR TROPONIN T REFLEX: 53 NG/L
GLOBULIN UR ELPH-MCNC: 2.5 GM/DL
GLUCOSE SERPL-MCNC: 90 MG/DL (ref 65–99)
HCT VFR BLD AUTO: 42.8 % (ref 37.5–51)
HGB BLD-MCNC: 14.2 G/DL (ref 13–17.7)
HOLD SPECIMEN: NORMAL
IMM GRANULOCYTES # BLD AUTO: 0.03 10*3/MM3 (ref 0–0.05)
IMM GRANULOCYTES NFR BLD AUTO: 0.4 % (ref 0–0.5)
INR PPP: 0.95 (ref 0.89–1.12)
LIPASE SERPL-CCNC: 29 U/L (ref 13–60)
LYMPHOCYTES # BLD AUTO: 2.55 10*3/MM3 (ref 0.7–3.1)
LYMPHOCYTES NFR BLD AUTO: 29.9 % (ref 19.6–45.3)
MCH RBC QN AUTO: 31.4 PG (ref 26.6–33)
MCHC RBC AUTO-ENTMCNC: 33.2 G/DL (ref 31.5–35.7)
MCV RBC AUTO: 94.7 FL (ref 79–97)
MONOCYTES # BLD AUTO: 0.65 10*3/MM3 (ref 0.1–0.9)
MONOCYTES NFR BLD AUTO: 7.6 % (ref 5–12)
NEUTROPHILS NFR BLD AUTO: 5.09 10*3/MM3 (ref 1.7–7)
NEUTROPHILS NFR BLD AUTO: 59.6 % (ref 42.7–76)
NRBC BLD AUTO-RTO: 0 /100 WBC (ref 0–0.2)
NT-PROBNP SERPL-MCNC: 102.6 PG/ML (ref 0–900)
PLATELET # BLD AUTO: 240 10*3/MM3 (ref 140–450)
PMV BLD AUTO: 9.7 FL (ref 6–12)
POTASSIUM SERPL-SCNC: 4.3 MMOL/L (ref 3.5–5.2)
PROT SERPL-MCNC: 6.6 G/DL (ref 6–8.5)
PROTHROMBIN TIME: 12.7 SECONDS (ref 12.2–14.5)
QT INTERVAL: 340 MS
QT INTERVAL: 358 MS
QTC INTERVAL: 429 MS
QTC INTERVAL: 433 MS
RBC # BLD AUTO: 4.52 10*6/MM3 (ref 4.14–5.8)
SODIUM SERPL-SCNC: 135 MMOL/L (ref 136–145)
TROPONIN T DELTA: -1 NG/L
TROPONIN T SERPL HS-MCNC: 54 NG/L
UFH PPP CHRO-ACNC: 0.1 IU/ML (ref 0.3–0.7)
WBC NRBC COR # BLD AUTO: 8.53 10*3/MM3 (ref 3.4–10.8)
WHOLE BLOOD HOLD COAG: NORMAL
WHOLE BLOOD HOLD SPECIMEN: NORMAL

## 2024-04-05 PROCEDURE — 71275 CT ANGIOGRAPHY CHEST: CPT

## 2024-04-05 PROCEDURE — B2111ZZ FLUOROSCOPY OF MULTIPLE CORONARY ARTERIES USING LOW OSMOLAR CONTRAST: ICD-10-PCS | Performed by: INTERNAL MEDICINE

## 2024-04-05 PROCEDURE — 25010000002 HEPARIN (PORCINE) 25000-0.45 UT/250ML-% SOLUTION: Performed by: INTERNAL MEDICINE

## 2024-04-05 PROCEDURE — 25010000002 HEPARIN (PORCINE) PER 1000 UNITS: Performed by: INTERNAL MEDICINE

## 2024-04-05 PROCEDURE — G0378 HOSPITAL OBSERVATION PER HR: HCPCS

## 2024-04-05 PROCEDURE — 85610 PROTHROMBIN TIME: CPT | Performed by: INTERNAL MEDICINE

## 2024-04-05 PROCEDURE — 25010000002 MIDAZOLAM PER 1 MG: Performed by: INTERNAL MEDICINE

## 2024-04-05 PROCEDURE — 84484 ASSAY OF TROPONIN QUANT: CPT

## 2024-04-05 PROCEDURE — 83690 ASSAY OF LIPASE: CPT

## 2024-04-05 PROCEDURE — 93458 L HRT ARTERY/VENTRICLE ANGIO: CPT | Performed by: INTERNAL MEDICINE

## 2024-04-05 PROCEDURE — 4A023N7 MEASUREMENT OF CARDIAC SAMPLING AND PRESSURE, LEFT HEART, PERCUTANEOUS APPROACH: ICD-10-PCS | Performed by: INTERNAL MEDICINE

## 2024-04-05 PROCEDURE — 99285 EMERGENCY DEPT VISIT HI MDM: CPT

## 2024-04-05 PROCEDURE — C1769 GUIDE WIRE: HCPCS | Performed by: INTERNAL MEDICINE

## 2024-04-05 PROCEDURE — 25010000002 LIDOCAINE 1 % SOLUTION: Performed by: INTERNAL MEDICINE

## 2024-04-05 PROCEDURE — C1894 INTRO/SHEATH, NON-LASER: HCPCS | Performed by: INTERNAL MEDICINE

## 2024-04-05 PROCEDURE — 80053 COMPREHEN METABOLIC PANEL: CPT

## 2024-04-05 PROCEDURE — 25010000002 FENTANYL CITRATE (PF) 50 MCG/ML SOLUTION: Performed by: INTERNAL MEDICINE

## 2024-04-05 PROCEDURE — 71045 X-RAY EXAM CHEST 1 VIEW: CPT

## 2024-04-05 PROCEDURE — 85730 THROMBOPLASTIN TIME PARTIAL: CPT | Performed by: INTERNAL MEDICINE

## 2024-04-05 PROCEDURE — 25510000001 IOPAMIDOL PER 1 ML: Performed by: INTERNAL MEDICINE

## 2024-04-05 PROCEDURE — 85025 COMPLETE CBC W/AUTO DIFF WBC: CPT

## 2024-04-05 PROCEDURE — 99223 1ST HOSP IP/OBS HIGH 75: CPT | Performed by: INTERNAL MEDICINE

## 2024-04-05 PROCEDURE — 93005 ELECTROCARDIOGRAM TRACING: CPT | Performed by: INTERNAL MEDICINE

## 2024-04-05 PROCEDURE — 93005 ELECTROCARDIOGRAM TRACING: CPT

## 2024-04-05 PROCEDURE — 36415 COLL VENOUS BLD VENIPUNCTURE: CPT

## 2024-04-05 PROCEDURE — 74177 CT ABD & PELVIS W/CONTRAST: CPT

## 2024-04-05 PROCEDURE — 83880 ASSAY OF NATRIURETIC PEPTIDE: CPT

## 2024-04-05 PROCEDURE — 25510000001 IOPAMIDOL PER 1 ML: Performed by: EMERGENCY MEDICINE

## 2024-04-05 PROCEDURE — 85520 HEPARIN ASSAY: CPT | Performed by: INTERNAL MEDICINE

## 2024-04-05 RX ORDER — ASPIRIN 81 MG/1
324 TABLET, CHEWABLE ORAL ONCE
Status: DISCONTINUED | OUTPATIENT
Start: 2024-04-05 | End: 2024-04-05

## 2024-04-05 RX ORDER — BISACODYL 5 MG/1
5 TABLET, DELAYED RELEASE ORAL DAILY PRN
Status: DISCONTINUED | OUTPATIENT
Start: 2024-04-05 | End: 2024-04-17 | Stop reason: HOSPADM

## 2024-04-05 RX ORDER — ROSUVASTATIN CALCIUM 20 MG/1
40 TABLET, COATED ORAL NIGHTLY
Status: DISCONTINUED | OUTPATIENT
Start: 2024-04-05 | End: 2024-04-17 | Stop reason: HOSPADM

## 2024-04-05 RX ORDER — SODIUM CHLORIDE 9 MG/ML
40 INJECTION, SOLUTION INTRAVENOUS AS NEEDED
Status: DISCONTINUED | OUTPATIENT
Start: 2024-04-05 | End: 2024-04-11

## 2024-04-05 RX ORDER — NICARDIPINE HCL-0.9% SOD CHLOR 1 MG/10 ML
SYRINGE (ML) INTRAVENOUS
Status: DISCONTINUED | OUTPATIENT
Start: 2024-04-05 | End: 2024-04-05 | Stop reason: HOSPADM

## 2024-04-05 RX ORDER — HEPARIN SODIUM 10000 [USP'U]/100ML
19 INJECTION, SOLUTION INTRAVENOUS
Status: DISCONTINUED | OUTPATIENT
Start: 2024-04-05 | End: 2024-04-10

## 2024-04-05 RX ORDER — FENTANYL CITRATE 50 UG/ML
INJECTION, SOLUTION INTRAMUSCULAR; INTRAVENOUS
Status: DISCONTINUED | OUTPATIENT
Start: 2024-04-05 | End: 2024-04-05 | Stop reason: HOSPADM

## 2024-04-05 RX ORDER — SODIUM CHLORIDE 0.9 % (FLUSH) 0.9 %
10 SYRINGE (ML) INJECTION EVERY 12 HOURS SCHEDULED
Status: DISCONTINUED | OUTPATIENT
Start: 2024-04-05 | End: 2024-04-11

## 2024-04-05 RX ORDER — CHOLECALCIFEROL (VITAMIN D3) 125 MCG
5 CAPSULE ORAL NIGHTLY PRN
Status: DISCONTINUED | OUTPATIENT
Start: 2024-04-05 | End: 2024-04-17 | Stop reason: HOSPADM

## 2024-04-05 RX ORDER — HEPARIN SODIUM 1000 [USP'U]/ML
INJECTION, SOLUTION INTRAVENOUS; SUBCUTANEOUS
Status: DISCONTINUED | OUTPATIENT
Start: 2024-04-05 | End: 2024-04-05 | Stop reason: HOSPADM

## 2024-04-05 RX ORDER — HEPARIN SODIUM 1000 [USP'U]/ML
4000 INJECTION, SOLUTION INTRAVENOUS; SUBCUTANEOUS AS NEEDED
Status: DISCONTINUED | OUTPATIENT
Start: 2024-04-05 | End: 2024-04-05

## 2024-04-05 RX ORDER — POLYETHYLENE GLYCOL 3350 17 G/17G
17 POWDER, FOR SOLUTION ORAL DAILY PRN
Status: DISCONTINUED | OUTPATIENT
Start: 2024-04-05 | End: 2024-04-10

## 2024-04-05 RX ORDER — AMOXICILLIN 250 MG
2 CAPSULE ORAL 2 TIMES DAILY
Status: DISCONTINUED | OUTPATIENT
Start: 2024-04-05 | End: 2024-04-10

## 2024-04-05 RX ORDER — SODIUM CHLORIDE 0.9 % (FLUSH) 0.9 %
10 SYRINGE (ML) INJECTION AS NEEDED
Status: DISCONTINUED | OUTPATIENT
Start: 2024-04-05 | End: 2024-04-11

## 2024-04-05 RX ORDER — LIDOCAINE HYDROCHLORIDE 10 MG/ML
INJECTION, SOLUTION INFILTRATION; PERINEURAL
Status: DISCONTINUED | OUTPATIENT
Start: 2024-04-05 | End: 2024-04-05 | Stop reason: HOSPADM

## 2024-04-05 RX ORDER — NITROGLYCERIN 0.4 MG/1
0.4 TABLET SUBLINGUAL
Status: DISCONTINUED | OUTPATIENT
Start: 2024-04-05 | End: 2024-04-10

## 2024-04-05 RX ORDER — HEPARIN SODIUM 1000 [USP'U]/ML
2000 INJECTION, SOLUTION INTRAVENOUS; SUBCUTANEOUS AS NEEDED
Status: DISCONTINUED | OUTPATIENT
Start: 2024-04-05 | End: 2024-04-05

## 2024-04-05 RX ORDER — MIDAZOLAM HYDROCHLORIDE 1 MG/ML
INJECTION INTRAMUSCULAR; INTRAVENOUS
Status: DISCONTINUED | OUTPATIENT
Start: 2024-04-05 | End: 2024-04-05 | Stop reason: HOSPADM

## 2024-04-05 RX ORDER — LOSARTAN POTASSIUM 50 MG/1
100 TABLET ORAL
Status: DISCONTINUED | OUTPATIENT
Start: 2024-04-05 | End: 2024-04-06

## 2024-04-05 RX ORDER — HYDROCHLOROTHIAZIDE 25 MG/1
25 TABLET ORAL
Status: DISCONTINUED | OUTPATIENT
Start: 2024-04-05 | End: 2024-04-06

## 2024-04-05 RX ORDER — BISACODYL 10 MG
10 SUPPOSITORY, RECTAL RECTAL DAILY PRN
Status: DISCONTINUED | OUTPATIENT
Start: 2024-04-05 | End: 2024-04-10

## 2024-04-05 RX ORDER — ASPIRIN 325 MG
325 TABLET ORAL DAILY
Status: DISCONTINUED | OUTPATIENT
Start: 2024-04-05 | End: 2024-04-10

## 2024-04-05 RX ADMIN — ROSUVASTATIN 40 MG: 20 TABLET, FILM COATED ORAL at 21:56

## 2024-04-05 RX ADMIN — HEPARIN SODIUM 11 UNITS/KG/HR: 10000 INJECTION, SOLUTION INTRAVENOUS at 21:57

## 2024-04-05 RX ADMIN — Medication 10 ML: at 14:59

## 2024-04-05 RX ADMIN — Medication 5 MG: at 22:27

## 2024-04-05 RX ADMIN — IOPAMIDOL 90 ML: 755 INJECTION, SOLUTION INTRAVENOUS at 13:14

## 2024-04-05 RX ADMIN — Medication 10 ML: at 21:57

## 2024-04-05 RX ADMIN — SENNOSIDES AND DOCUSATE SODIUM 2 TABLET: 8.6; 5 TABLET ORAL at 21:56

## 2024-04-05 RX ADMIN — LOSARTAN POTASSIUM 100 MG: 50 TABLET, FILM COATED ORAL at 14:58

## 2024-04-05 RX ADMIN — HYDROCHLOROTHIAZIDE 25 MG: 25 TABLET ORAL at 14:58

## 2024-04-05 NOTE — H&P (VIEW-ONLY)
[facility]   Consult Note    Patient Name: Khalif Joshua  : 1950  MRN: [id number]  Primary Care Physician:  Renee Liriano MD  Referring Physician: No ref. 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  Over past two weeks having chest pain when lays flat.  More concerningly he is getting chest pain at times with exertion that radiates into his jaw and left arm relieved by rest.  This seems to be worsening.  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  Not the case a month ago.  Ate a quarter of sandwhich when came to room.  Told him to stop.  ROS negative except for the above.      Review of Systems   Cardiovascular:  Positive for chest pain.        Personal History     Past Medical History:   Diagnosis Date    Diverticulitis     Hyperlipemia     Hypertension     Osteoarthritis        Past Surgical History:   Procedure Laterality Date    COLOSTOMY CLOSURE N/A 2022    Procedure: COLOSTOMY TAKEDOWN OPEN;  Surgeon: Jose Ferguson MD;  Location: [facility] OR;  Service: General;  Laterality: N/A;    EXPLORATORY LAPAROTOMY N/A 10/30/2021    Procedure: LAPAROTOMY EXPLORATORY, SIGMOID COLECTOMY,  END COLOSTOMY;  Surgeon: Jose Ferguson MD;  Location:  [facility] OR;  Service: General;  Laterality: N/A;    KNEE ARTHROSCOPY Bilateral        Family History: family history includes Heart disease in his father. Otherwise pertinent FHx was reviewed and not pertinent to current issue.    Social  History:  reports that he has never smoked. He has never used smokeless tobacco. He reports that he does not currently use alcohol. He reports that he does not use drugs.    Home Medications:   losartan-hydrochlorothiazide, ondansetron, oxyCODONE-acetaminophen, and rosuvastatin    Allergies:  No Known Allergies    Objective    Objective     Vitals:  Temp:  [97.9 °F (36.6 °C)] 97.9 °F (36.6 °C)  Heart Rate:  [105] 105  Resp:  [22] 22  BP: (153)/(88) 153/88    Physical Exam    Result Review    Result Review:  I have personally reviewed the results from the time of this admission to 4/5/2024 14:16 EDT and agree with these findings:  []  Laboratory list / accordion  []  Microbiology  []  Radiology  []  EKG/Telemetry   []  Cardiology/Vascular   []  Pathology  []  Old records  []  Other:      Assessment & Plan   Assessment / Plan     Assessment  -NSTEMI, hs trop 50, EKG negative for ST elevations  -bnp negative  -Strong family hx of heart disease, CAC on CT along with atherosclerosis of the aorta, carotid arteries and iliacs on non dedicated CT  -HLD  -GERD  -DM  -Hx of bowel perforation a year ago surgically repaired        Plan:   -Discussed with Dr. Pineda.  Will be proceeding to [facility] now.  Will see if after case will start anticoagulation  -Aspirin, statin  -cont. HTN meds.    -further decisions post cath  -admit to cardiology      Gil Gerber MD

## 2024-04-05 NOTE — H&P
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  Over past two weeks having chest pain when lays flat.  More concerningly he is getting chest pain at times with exertion that radiates into his jaw and left arm relieved by rest.  This seems to be worsening.  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  Not the case a month ago.  Ate a quarter of sandwhich when came to room.  Told him to stop.  ROS negative except for the above.       Review of Systems   Cardiovascular:  Positive for chest pain.         Personal History      Medical History        Past Medical History:   Diagnosis Date    Diverticulitis      Hyperlipemia      Hypertension      Osteoarthritis              Surgical History         Past Surgical History:   Procedure Laterality Date    COLOSTOMY CLOSURE N/A 4/21/2022     Procedure: COLOSTOMY TAKEDOWN OPEN;  Surgeon: Jose Ferguson MD;  Location: [facility] OR;  Service: General;  Laterality: N/A;    EXPLORATORY LAPAROTOMY N/A 10/30/2021     Procedure: LAPAROTOMY EXPLORATORY, SIGMOID COLECTOMY,  END COLOSTOMY;  Surgeon: Jose Ferguson MD;  Location: [facility] OR;  Service: General;  Laterality: N/A;    KNEE ARTHROSCOPY Bilateral 2001            Family History: family history includes Heart disease in his father. Otherwise pertinent FHx was reviewed and not pertinent to current issue.     Social History:  reports that he has never smoked. He has never used smokeless tobacco. He reports that he does not currently use alcohol. He reports that he does not use drugs.     Home  Medications:   losartan-hydrochlorothiazide, ondansetron, oxyCODONE-acetaminophen, and rosuvastatin     Allergies:  Allergies   No Known Allergies              Objective  Objective      Vitals:  Temp:  [97.9 °F (36.6 °C)] 97.9 °F (36.6 °C)  Heart Rate:  [105] 105  Resp:  [22] 22  BP: (153)/(88) 153/88     Physical Exam           Result Review[]Expand by Default  Result Review:  I have personally reviewed the results from the time of this admission to 4/5/2024 14:16 EDT and agree with these findings:  []  Laboratory list / accordion  []  Microbiology  []  Radiology  []  EKG/Telemetry   []  Cardiology/Vascular   []  Pathology  []  Old records  []  Other:              Assessment & Plan  Assessment / Plan      Assessment  -NSTEMI, hs trop 50, EKG negative for ST elevations  -bnp negative  -Strong family hx of heart disease, CAC on CT along with atherosclerosis of the aorta, carotid arteries and iliacs on non dedicated CT  -HLD  -GERD  -DM  -Hx of bowel perforation a year ago surgically repaired           Plan:   -Discussed with Dr. Pineda.  Will be proceeding to [facility] now.  Will see if after case will start anticoagulation  -Aspirin, statin  -cont. HTN meds.    -further decisions post cath  -admit to cardiology        Gil eGrber MD

## 2024-04-05 NOTE — CONSULTS
[facility]   Consult Note    Patient Name: Khalif Joshua  : 1950  MRN: [id number]  Primary Care Physician:  Renee Liriano MD  Referring Physician: No ref. 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  Over past two weeks having chest pain when lays flat.  More concerningly he is getting chest pain at times with exertion that radiates into his jaw and left arm relieved by rest.  This seems to be worsening.  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  Not the case a month ago.  Ate a quarter of sandwhich when came to room.  Told him to stop.  ROS negative except for the above.      Review of Systems   Cardiovascular:  Positive for chest pain.        Personal History     Past Medical History:   Diagnosis Date    Diverticulitis     Hyperlipemia     Hypertension     Osteoarthritis        Past Surgical History:   Procedure Laterality Date    COLOSTOMY CLOSURE N/A 2022    Procedure: COLOSTOMY TAKEDOWN OPEN;  Surgeon: Jose Ferguson MD;  Location: [facility] OR;  Service: General;  Laterality: N/A;    EXPLORATORY LAPAROTOMY N/A 10/30/2021    Procedure: LAPAROTOMY EXPLORATORY, SIGMOID COLECTOMY,  END COLOSTOMY;  Surgeon: Jose Ferguson MD;  Location:  [facility] OR;  Service: General;  Laterality: N/A;    KNEE ARTHROSCOPY Bilateral        Family History: family history includes Heart disease in his father. Otherwise pertinent FHx was reviewed and not pertinent to current issue.    Social  History:  reports that he has never smoked. He has never used smokeless tobacco. He reports that he does not currently use alcohol. He reports that he does not use drugs.    Home Medications:   losartan-hydrochlorothiazide, ondansetron, oxyCODONE-acetaminophen, and rosuvastatin    Allergies:  No Known Allergies    Objective    Objective     Vitals:  Temp:  [97.9 °F (36.6 °C)] 97.9 °F (36.6 °C)  Heart Rate:  [105] 105  Resp:  [22] 22  BP: (153)/(88) 153/88    Physical Exam    Result Review    Result Review:  I have personally reviewed the results from the time of this admission to 4/5/2024 14:16 EDT and agree with these findings:  []  Laboratory list / accordion  []  Microbiology  []  Radiology  []  EKG/Telemetry   []  Cardiology/Vascular   []  Pathology  []  Old records  []  Other:      Assessment & Plan   Assessment / Plan     Assessment  -NSTEMI, hs trop 50, EKG negative for ST elevations  -bnp negative  -Strong family hx of heart disease, CAC on CT along with atherosclerosis of the aorta, carotid arteries and iliacs on non dedicated CT  -HLD  -GERD  -DM  -Hx of bowel perforation a year ago surgically repaired        Plan:   -Discussed with Dr. Pineda.  Will be proceeding to [facility] now.  Will see if after case will start anticoagulation  -Aspirin, statin  -cont. HTN meds.    -further decisions post cath  -admit to cardiology      Gil Gerber MD

## 2024-04-05 NOTE — ED PROVIDER NOTES
[place]    EMERGENCY DEPARTMENT ENCOUNTER      Pt Name: Khalif Joshua  MRN: [id number]  YOB: 1950  Date of evaluation: 4/5/2024  Provider: Perez Nichols DO    CHIEF COMPLAINT       Chief Complaint   Patient presents with    Chest Pain       HPI  Stated Reason for Visit: Pt. states on and off chest pain for 2 weeks. states has been taking antacids but not helpling. History Obtained From: patient;family       HISTORY OF PRESENT ILLNESS  (Location/Symptom, Timing/Onset, Context/Setting, Quality, Duration, Modifying Factors, Severity.)   Khalif Joshua is a 73 y.o. male who presents to the emergency department for evaluation of retrosternal chest pain, epigastric discomfort which have been present for the last couple weeks.  Is been waxing waning in severity, is mainly a pressure sensation with some radiation to his bilateral neck and jaw regions.  Seems to be associated with exertion.  He went out of town to [facility], was having some discomfort with ambulation, somewhat relieved with rest.  No associated nausea and vomiting or shortness of breath.  Had another increase severity overnight last night, decided come to hospital today for further assessment.  Has a history of multiple abdominal surgeries, colon resection with takedown and reanastomosis last year.  Does have multiple abdominal hernias.  He denies any known cardiac history, not had any prior catheterizations or any recent stress testing.  Non-smoker, does not drink alcohol.  He denies any nausea and vomiting, diarrhea, no postprandial discomfort.  Currently notes he is still having some retrosternal discomfort, he is seated at rest.  Denies any other acute systemic complaints this time.      Nursing notes were reviewed.      PAST MEDICAL HISTORY     Past Medical History:   Diagnosis Date    Diverticulitis     Hyperlipemia     Hypertension     Osteoarthritis          SURGICAL HISTORY       Past Surgical History:   Procedure  "Laterality Date    COLOSTOMY CLOSURE N/A 4/21/2022    Procedure: COLOSTOMY TAKEDOWN OPEN;  Surgeon: Jose Ferguson MD;  Location:  [facility];  Service: General;  Laterality: N/A;    EXPLORATORY LAPAROTOMY N/A 10/30/2021    Procedure: LAPAROTOMY EXPLORATORY, SIGMOID COLECTOMY,  END COLOSTOMY;  Surgeon: Jose Ferguson MD;  Location:  [facility];  Service: General;  Laterality: N/A;    KNEE ARTHROSCOPY Bilateral 2001         CURRENT MEDICATIONS       Current Facility-Administered Medications:     aspirin chewable tablet 324 mg, 324 mg, Oral, Once, Emergency, Triage Protocol, MD    sodium chloride 0.9 % flush 10 mL, 10 mL, Intravenous, PRN, Emergency, Triage Protocol, MD    Current Outpatient Medications:     losartan-hydrochlorothiazide (HYZAAR) 100-25 MG per tablet, Take 1 tablet by mouth Daily., Disp: , Rfl:     ondansetron (ZOFRAN) 4 MG tablet, Take 1 tablet by mouth Every 8 (Eight) Hours As Needed for Nausea or Vomiting., Disp: 12 tablet, Rfl: 0    oxyCODONE-acetaminophen (Percocet) 5-325 MG per tablet, Take 1 tablet by mouth Every 4 (Four) Hours As Needed for Moderate Pain ., Disp: 14 tablet, Rfl: 0    rosuvastatin (CRESTOR) 40 MG tablet, Take 40 mg by mouth Daily., Disp: , Rfl:     ALLERGIES     Patient has no known allergies.    FAMILY HISTORY       Family History   Problem Relation Age of Onset    Heart disease Father           SOCIAL HISTORY       Social History     Socioeconomic History    Marital status:    Tobacco Use    Smoking status: Never    Smokeless tobacco: Never   Vaping Use    Vaping status: Never Used   Substance and Sexual Activity    Alcohol use: Not Currently    Drug use: Never    Sexual activity: Defer         PHYSICAL EXAM    (up to 7 for level 4, 8 or more for level 5)     Vitals:    04/05/24 1014 04/05/24 1358   BP: 153/88 144/81   Pulse: 105 93   Resp: 22    Temp: 97.9 °F (36.6 °C)    TempSrc: Oral    SpO2: 95% 95%   Weight: 90.7 kg (200 lb)    Height: 180.3 cm (71\")  "       Physical Exam  General : Patient is awake, alert, oriented, in mild discomfort, nontoxic appearing  HEENT: Pupils are equally round, EOMI, conjunctivae clear  Neck: Neck is supple, full range of motion, trachea midline  Cardiac: Heart regular rate, rhythm, no murmurs, rubs, or gallops  Lungs: Lungs are clear to auscultation, there is no wheezing, rhonchi, or rales. There is no use of accessory muscles  Chest wall: There is no tenderness to palpation over the chest wall or over ribs  Abdomen: Abdomen is soft, nontender, nondistended.  Partial changes to the mid lower abdomen, no peritoneal signs there are no firm or pulsatile masses, no rebound rigidity or guarding  Musculoskeletal: 5 out of 5 strength in all 4 extremities.  No focal muscle deficits are appreciated  Neuro: Motor intact, sensory intact, level of consciousness is normal  Dermatology: Skin is warm and dry  Psych: Mentation is grossly normal, cognition is grossly normal. Affect is appropriate      DIAGNOSTIC RESULTS     EKG:  All EKGs are interpreted by the Emergency Department Physician who either signs or Co-signs this chart in the absence of a cardiologist.    ECG 12 Lead ED Triage Standing Order; Chest Pain   Preliminary Result   Test Reason : ED Triage Standing Order~   Blood Pressure :   */*   mmHG   Vent. Rate :  88 BPM     Atrial Rate :  88 BPM      P-R Int : 160 ms          QRS Dur :  90 ms       QT Int : 358 ms       P-R-T Axes :  36   0 -11 degrees      QTc Int : 433 ms      Normal sinus rhythm   Inferior infarct (cited on or before 05-APR-2024)   Cannot rule out Anterior infarct (cited on or before 05-APR-2024)   Abnormal ECG   When compared with ECG of 05-APR-2024 10:18,   No significant change was found      Referred By: ERMD           Confirmed By:       ECG 12 Lead ED Triage Standing Order; Chest Pain   Final Result   Test Reason : ED Triage Standing Order~   Blood Pressure :   */*   mmHG   Vent. Rate :  96 BPM     Atrial Rate :  96  BPM      P-R Int : 154 ms          QRS Dur :  92 ms       QT Int : 340 ms       P-R-T Axes :  44   4 -13 degrees      QTc Int : 429 ms      Normal sinus rhythm   Inferior infarct , age undetermined   Abnormal ECG   When compared with ECG of 19-APR-2022 11:14,   no acute sig changes   Confirmed by ORLIN LOZANO MD ([id number]) on 4/5/2024 10:45:46 AM      Referred By:            Confirmed By: ORLIN LOZANO MD          RADIOLOGY:     [x] Radiologist's Report Reviewed:  CT Angiogram Chest   Final Result   Impression:   Negative exam for pulmonary embolism. No acute process in the chest.      Cholelithiasis without acute cholecystitis. Chronic findings as detailed. No acute process of the abdomen or pelvis.            Electronically Signed: Tammy Grande MD     4/5/2024 1:28 PM EDT     Workstation ID: [id number]      CT Abdomen Pelvis With Contrast   Final Result   Impression:   Negative exam for pulmonary embolism. No acute process in the chest.      Cholelithiasis without acute cholecystitis. Chronic findings as detailed. No acute process of the abdomen or pelvis.            Electronically Signed: Tammy Grande MD     4/5/2024 1:28 PM EDT     Workstation ID: [id number]      XR Chest 1 View   Final Result   Impression:   No acute process.         Electronically Signed: Tammy Grande MD     4/5/2024 10:41 AM EDT     Workstation ID: [id number]          I ordered and independently reviewed the above noted radiographic studies.      I viewed images of chest x-ray which showed no acute cardiopulmonary process per my independent interpretation.    See radiologist's dictation for official interpretation.      ED BEDSIDE ULTRASOUND:   Performed by ED Physician - none    LABS:    I have reviewed and interpreted all of the currently available lab results from this visit (if applicable):  Results for orders placed or performed during the hospital encounter of 04/05/24   High Sensitivity Troponin T    Specimen: Blood   Result Value  Ref Range    HS Troponin T 54 (C) <22 ng/L   Comprehensive Metabolic Panel    Specimen: Blood   Result Value Ref Range    Glucose 90 65 - 99 mg/dL    BUN 22 8 - 23 mg/dL    Creatinine 0.83 0.76 - 1.27 mg/dL    Sodium 135 (L) 136 - 145 mmol/L    Potassium 4.3 3.5 - 5.2 mmol/L    Chloride 100 98 - 107 mmol/L    CO2 26.0 22.0 - 29.0 mmol/L    Calcium 10.2 8.6 - 10.5 mg/dL    Total Protein 6.6 6.0 - 8.5 g/dL    Albumin 4.1 3.5 - 5.2 g/dL    ALT (SGPT) 16 1 - 41 U/L    AST (SGOT) 22 1 - 40 U/L    Alkaline Phosphatase 61 39 - 117 U/L    Total Bilirubin 0.3 0.0 - 1.2 mg/dL    Globulin 2.5 gm/dL    A/G Ratio 1.6 g/dL    BUN/Creatinine Ratio 26.5 (H) 7.0 - 25.0    Anion Gap 9.0 5.0 - 15.0 mmol/L    eGFR 93.0 >60.0 mL/min/1.73   Lipase    Specimen: Blood   Result Value Ref Range    Lipase 29 13 - 60 U/L   BNP    Specimen: Blood   Result Value Ref Range    proBNP 102.6 0.0 - 900.0 pg/mL   CBC Auto Differential    Specimen: Blood   Result Value Ref Range    WBC 8.53 3.40 - 10.80 10*3/mm3    RBC 4.52 4.14 - 5.80 10*6/mm3    Hemoglobin 14.2 13.0 - 17.7 g/dL    Hematocrit 42.8 37.5 - 51.0 %    MCV 94.7 79.0 - 97.0 fL    MCH 31.4 26.6 - 33.0 pg    MCHC 33.2 31.5 - 35.7 g/dL    RDW 13.4 12.3 - 15.4 %    RDW-SD 47.0 37.0 - 54.0 fl    MPV 9.7 6.0 - 12.0 fL    Platelets 240 140 - 450 10*3/mm3    Neutrophil % 59.6 42.7 - 76.0 %    Lymphocyte % 29.9 19.6 - 45.3 %    Monocyte % 7.6 5.0 - 12.0 %    Eosinophil % 1.8 0.3 - 6.2 %    Basophil % 0.7 0.0 - 1.5 %    Immature Grans % 0.4 0.0 - 0.5 %    Neutrophils, Absolute 5.09 1.70 - 7.00 10*3/mm3    Lymphocytes, Absolute 2.55 0.70 - 3.10 10*3/mm3    Monocytes, Absolute 0.65 0.10 - 0.90 10*3/mm3    Eosinophils, Absolute 0.15 0.00 - 0.40 10*3/mm3    Basophils, Absolute 0.06 0.00 - 0.20 10*3/mm3    Immature Grans, Absolute 0.03 0.00 - 0.05 10*3/mm3    nRBC 0.0 0.0 - 0.2 /100 WBC   High Sensitivity Troponin T 2Hr    Specimen: Blood   Result Value Ref Range    HS Troponin T 53 (C) <22 ng/L     Troponin T Delta -1 >=-4 - <+4 ng/L   ECG 12 Lead ED Triage Standing Order; Chest Pain   Result Value Ref Range    QT Interval 340 ms    QTC Interval 429 ms   ECG 12 Lead ED Triage Standing Order; Chest Pain   Result Value Ref Range    QT Interval 358 ms    QTC Interval 433 ms   Green Top (Gel)   Result Value Ref Range    Extra Tube Hold for add-ons.    Lavender Top   Result Value Ref Range    Extra Tube hold for add-on    Gold Top - SST   Result Value Ref Range    Extra Tube Hold for add-ons.    Gray Top   Result Value Ref Range    Extra Tube Hold for add-ons.    Light Blue Top   Result Value Ref Range    Extra Tube Hold for add-ons.         If labs were ordered, I independently reviewed the results and considered them in treating the patient.      EMERGENCY DEPARTMENT COURSE and DIFFERENTIAL DIAGNOSIS/MDM:   Vitals:  AS OF 14:30 EDT    BP - 144/81  HR - 93  TEMP - 97.9 °F (36.6 °C) (Oral)  O2 SATS - 95%      Orders placed during this visit:  Orders Placed This Encounter   Procedures    XR Chest 1 View    CT Angiogram Chest    CT Abdomen Pelvis With Contrast    [place] Draw    High Sensitivity Troponin T    Comprehensive Metabolic Panel    Lipase    BNP    CBC Auto Differential    High Sensitivity Troponin T 2Hr    NPO Diet NPO Type: Strict NPO    Undress & Gown    Continuous Pulse Oximetry    Oxygen Therapy- Nasal Cannula; Titrate 1-6 LPM Per SpO2; 90 - 95%    ECG 12 Lead ED Triage Standing Order; Chest Pain    ECG 12 Lead ED Triage Standing Order; Chest Pain    Insert Peripheral IV    Tele Bed Request ([facility] / [facility] ONLY)    CBC & Differential    Green Top (Gel)    Lavender Top    Gold Top - SST    Gray Top    Light Blue Top       All labs have been independently reviewed by me.  All radiology studies have been reviewed by me and the radiologist dictating the report.  All EKG's have been independently viewed and interpreted by me.      Discussion below represents my analysis of pertinent findings related to patient's  condition, differential diagnosis, treatment plan and final disposition.    Differential diagnosis:  The differential diagnosis associated with the patient's presentation includes: CAD, unstable angina, NSTEMI, coronary vasospasm, hernia, sliding hiatal hernia, esophageal spasm    Additional sources  Discussed/ obtained information from independent historians:   [] Spouse  [] Parent  [] Family member  [] Friend  [] EMS   [] Other:    External (non-ED) record review:   [] Inpatient record:   [] Office record:   [] Outpatient record:   [] Prior Outpatient labs:   [] Prior Outpatient radiology:   [] Primary Care record:   [] Outside ED record:   [] Other:     Patient's care impacted by:   [] Diabetes  [] Hypertension  [] CHF  [] Hyperlipidemia  [] Coronary Artery Disease   [] COPD   [] Cancer   [] Tobacco Abuse   [] Substance Abuse    [] Other:     Care significantly affected by Social Determinants of Health (housing and economic circumstances, unemployment)    [] Yes     [x] No   If yes, Patient's care significantly limited by Social Determinants of Health including:   [] Inadequate housing   [] Low income   [] Alcoholism and drug addiction in family   [] Problems related to primary support group   [] Unemployment   [] Problems related to employment   [] Other Social Determinants of Health:       MEDICATIONS ADMINISTERED IN ED:  Medications   sodium chloride 0.9 % flush 10 mL (has no administration in time range)   aspirin chewable tablet 324 mg (324 mg Oral Not Given 4/5/24 1042)   iopamidol (ISOVUE-370) 76 % injection 100 mL (90 mL Intravenous Given 4/5/24 1314)              This is a very pleasant 73-year-old male who was had intermittent chest pain pressure with some radiation to the bilateral neck region over the last couple weeks.  Has been persistent waxing waning in severity.  Initial EKG without acute ischemic changes.  He is somewhat anxious regarding coming to the hospital but he does not have any  peritoneal signs on physical examination, is had multiple abdominal surgeries and feels he may have some type of internal hernia.  With his continued symptoms we discussed obtaining IV, labs and image including CT angio of the chest, abdomen/pelvis.    Initial labs reveal stable kidney function, electrolytes are normal as well as his white count and H&H.  His BNP is normal.  Initial troponin of 54.  A repeat troponin will be obtained.  I did discuss the case with on-call for cardiology for assessment while in the emergency department.  Appreciate their input.    Troponins 54, recheck troponin of 53.  Cardiology consultation in the ED with Dr. Gerber.  They recommend admission under their service, plan for cardiac evaluation, catheterization with Dr. Pineda.  Patient admitted.      PROCEDURES:  Procedures    CRITICAL CARE TIME    Total Critical Care time was 0 minutes, excluding separately reportable procedures.   There was a high probability of clinically significant/life threatening deterioration in the patient's condition which required my urgent intervention.      FINAL IMPRESSION      1. Chest pain, unspecified type    2. NSTEMI (non-ST elevated myocardial infarction)          DISPOSITION/PLAN     ED Disposition       ED Disposition   Decision to Admit    Condition   --    Comment   Level of Care: Telemetry [5]   Admitting Physician: RANDAL GERBER [[id number]]                 Comment: Please note this report has been produced using speech recognition software.      Perez Nichols DO  Attending Emergency Physician         Perez Nichols DO  04/05/24 3882

## 2024-04-06 ENCOUNTER — APPOINTMENT (OUTPATIENT)
Dept: PULMONOLOGY | Facility: HOSPITAL | Age: 74
End: 2024-04-06
Payer: MEDICARE

## 2024-04-06 ENCOUNTER — APPOINTMENT (OUTPATIENT)
Dept: CARDIOLOGY | Facility: HOSPITAL | Age: 74
End: 2024-04-06
Payer: MEDICARE

## 2024-04-06 LAB
ALBUMIN SERPL-MCNC: 3.7 G/DL (ref 3.5–5.2)
ALBUMIN/GLOB SERPL: 1.8 G/DL
ALP SERPL-CCNC: 51 U/L (ref 39–117)
ALT SERPL W P-5'-P-CCNC: 14 U/L (ref 1–41)
AMPHET+METHAMPHET UR QL: NEGATIVE
AMPHETAMINES UR QL: NEGATIVE
ANION GAP SERPL CALCULATED.3IONS-SCNC: 9 MMOL/L (ref 5–15)
AST SERPL-CCNC: 16 U/L (ref 1–40)
BARBITURATES UR QL SCN: NEGATIVE
BASOPHILS # BLD AUTO: 0.04 10*3/MM3 (ref 0–0.2)
BASOPHILS NFR BLD AUTO: 0.5 % (ref 0–1.5)
BENZODIAZ UR QL SCN: NEGATIVE
BILIRUB SERPL-MCNC: 0.3 MG/DL (ref 0–1.2)
BUN SERPL-MCNC: 20 MG/DL (ref 8–23)
BUN/CREAT SERPL: 24.4 (ref 7–25)
BUPRENORPHINE SERPL-MCNC: NEGATIVE NG/ML
CALCIUM SPEC-SCNC: 9.2 MG/DL (ref 8.6–10.5)
CANNABINOIDS SERPL QL: NEGATIVE
CHLORIDE SERPL-SCNC: 99 MMOL/L (ref 98–107)
CO2 SERPL-SCNC: 25 MMOL/L (ref 22–29)
COCAINE UR QL: NEGATIVE
CREAT SERPL-MCNC: 0.82 MG/DL (ref 0.76–1.27)
DEPRECATED RDW RBC AUTO: 45.2 FL (ref 37–54)
EGFRCR SERPLBLD CKD-EPI 2021: 93.3 ML/MIN/1.73
EOSINOPHIL # BLD AUTO: 0.2 10*3/MM3 (ref 0–0.4)
EOSINOPHIL NFR BLD AUTO: 2.5 % (ref 0.3–6.2)
ERYTHROCYTE [DISTWIDTH] IN BLOOD BY AUTOMATED COUNT: 13.3 % (ref 12.3–15.4)
FENTANYL UR-MCNC: POSITIVE NG/ML
GLOBULIN UR ELPH-MCNC: 2.1 GM/DL
GLUCOSE SERPL-MCNC: 99 MG/DL (ref 65–99)
HCT VFR BLD AUTO: 38.6 % (ref 37.5–51)
HGB BLD-MCNC: 12.9 G/DL (ref 13–17.7)
IMM GRANULOCYTES # BLD AUTO: 0.03 10*3/MM3 (ref 0–0.05)
IMM GRANULOCYTES NFR BLD AUTO: 0.4 % (ref 0–0.5)
LYMPHOCYTES # BLD AUTO: 2.01 10*3/MM3 (ref 0.7–3.1)
LYMPHOCYTES NFR BLD AUTO: 25.4 % (ref 19.6–45.3)
MAGNESIUM SERPL-MCNC: 2 MG/DL (ref 1.6–2.4)
MAGNESIUM SERPL-MCNC: 2.1 MG/DL (ref 1.6–2.4)
MCH RBC QN AUTO: 30.9 PG (ref 26.6–33)
MCHC RBC AUTO-ENTMCNC: 33.4 G/DL (ref 31.5–35.7)
MCV RBC AUTO: 92.6 FL (ref 79–97)
METHADONE UR QL SCN: NEGATIVE
MONOCYTES # BLD AUTO: 0.84 10*3/MM3 (ref 0.1–0.9)
MONOCYTES NFR BLD AUTO: 10.6 % (ref 5–12)
NEUTROPHILS NFR BLD AUTO: 4.78 10*3/MM3 (ref 1.7–7)
NEUTROPHILS NFR BLD AUTO: 60.6 % (ref 42.7–76)
NRBC BLD AUTO-RTO: 0 /100 WBC (ref 0–0.2)
OPIATES UR QL: NEGATIVE
OXYCODONE UR QL SCN: NEGATIVE
PA ADP PRP-ACNC: 191 PRU
PCP UR QL SCN: NEGATIVE
PLATELET # BLD AUTO: 217 10*3/MM3 (ref 140–450)
PMV BLD AUTO: 9.7 FL (ref 6–12)
POTASSIUM SERPL-SCNC: 3.6 MMOL/L (ref 3.5–5.2)
PROT SERPL-MCNC: 5.8 G/DL (ref 6–8.5)
RBC # BLD AUTO: 4.17 10*6/MM3 (ref 4.14–5.8)
SODIUM SERPL-SCNC: 133 MMOL/L (ref 136–145)
TRICYCLICS UR QL SCN: NEGATIVE
UFH PPP CHRO-ACNC: 0.11 IU/ML (ref 0.3–0.7)
UFH PPP CHRO-ACNC: 0.23 IU/ML (ref 0.3–0.7)
UFH PPP CHRO-ACNC: 0.24 IU/ML (ref 0.3–0.7)
WBC NRBC COR # BLD AUTO: 7.9 10*3/MM3 (ref 3.4–10.8)

## 2024-04-06 PROCEDURE — 85576 BLOOD PLATELET AGGREGATION: CPT | Performed by: STUDENT IN AN ORGANIZED HEALTH CARE EDUCATION/TRAINING PROGRAM

## 2024-04-06 PROCEDURE — 80307 DRUG TEST PRSMV CHEM ANLYZR: CPT | Performed by: STUDENT IN AN ORGANIZED HEALTH CARE EDUCATION/TRAINING PROGRAM

## 2024-04-06 PROCEDURE — 80305 DRUG TEST PRSMV DIR OPT OBS: CPT | Performed by: STUDENT IN AN ORGANIZED HEALTH CARE EDUCATION/TRAINING PROGRAM

## 2024-04-06 PROCEDURE — 93880 EXTRACRANIAL BILAT STUDY: CPT

## 2024-04-06 PROCEDURE — 85520 HEPARIN ASSAY: CPT

## 2024-04-06 PROCEDURE — 83735 ASSAY OF MAGNESIUM: CPT | Performed by: INTERNAL MEDICINE

## 2024-04-06 PROCEDURE — 80053 COMPREHEN METABOLIC PANEL: CPT | Performed by: INTERNAL MEDICINE

## 2024-04-06 PROCEDURE — 99222 1ST HOSP IP/OBS MODERATE 55: CPT | Performed by: STUDENT IN AN ORGANIZED HEALTH CARE EDUCATION/TRAINING PROGRAM

## 2024-04-06 PROCEDURE — 93306 TTE W/DOPPLER COMPLETE: CPT

## 2024-04-06 PROCEDURE — 25010000002 HEPARIN (PORCINE) PER 1000 UNITS

## 2024-04-06 PROCEDURE — 93880 EXTRACRANIAL BILAT STUDY: CPT | Performed by: INTERNAL MEDICINE

## 2024-04-06 PROCEDURE — 83735 ASSAY OF MAGNESIUM: CPT

## 2024-04-06 PROCEDURE — 94010 BREATHING CAPACITY TEST: CPT

## 2024-04-06 PROCEDURE — 25010000002 HEPARIN (PORCINE) 25000-0.45 UT/250ML-% SOLUTION

## 2024-04-06 PROCEDURE — 93306 TTE W/DOPPLER COMPLETE: CPT | Performed by: INTERNAL MEDICINE

## 2024-04-06 PROCEDURE — 85025 COMPLETE CBC W/AUTO DIFF WBC: CPT | Performed by: INTERNAL MEDICINE

## 2024-04-06 RX ORDER — POTASSIUM CHLORIDE 750 MG/1
40 CAPSULE, EXTENDED RELEASE ORAL EVERY 4 HOURS
Status: COMPLETED | OUTPATIENT
Start: 2024-04-06 | End: 2024-04-06

## 2024-04-06 RX ORDER — HEPARIN SODIUM 1000 [USP'U]/ML
2000 INJECTION, SOLUTION INTRAVENOUS; SUBCUTANEOUS ONCE
Status: COMPLETED | OUTPATIENT
Start: 2024-04-06 | End: 2024-04-06

## 2024-04-06 RX ORDER — BISOPROLOL FUMARATE 5 MG/1
2.5 TABLET, FILM COATED ORAL
Status: DISCONTINUED | OUTPATIENT
Start: 2024-04-06 | End: 2024-04-13

## 2024-04-06 RX ORDER — LOSARTAN POTASSIUM 50 MG/1
100 TABLET ORAL
Status: DISCONTINUED | OUTPATIENT
Start: 2024-04-06 | End: 2024-04-17 | Stop reason: HOSPADM

## 2024-04-06 RX ORDER — HYDROCHLOROTHIAZIDE 12.5 MG/1
12.5 TABLET ORAL
Status: DISCONTINUED | OUTPATIENT
Start: 2024-04-06 | End: 2024-04-17 | Stop reason: HOSPADM

## 2024-04-06 RX ADMIN — HEPARIN SODIUM 2000 UNITS: 1000 INJECTION, SOLUTION INTRAVENOUS; SUBCUTANEOUS at 04:48

## 2024-04-06 RX ADMIN — LOSARTAN POTASSIUM 100 MG: 50 TABLET, FILM COATED ORAL at 09:38

## 2024-04-06 RX ADMIN — HYDROCHLOROTHIAZIDE 12.5 MG: 12.5 CAPSULE ORAL at 09:37

## 2024-04-06 RX ADMIN — POTASSIUM CHLORIDE 40 MEQ: 750 CAPSULE, EXTENDED RELEASE ORAL at 15:03

## 2024-04-06 RX ADMIN — Medication 10 ML: at 20:17

## 2024-04-06 RX ADMIN — BISOPROLOL FUMARATE 2.5 MG: 5 TABLET, FILM COATED ORAL at 09:36

## 2024-04-06 RX ADMIN — ROSUVASTATIN 40 MG: 20 TABLET, FILM COATED ORAL at 20:16

## 2024-04-06 RX ADMIN — SENNOSIDES AND DOCUSATE SODIUM 2 TABLET: 8.6; 5 TABLET ORAL at 20:16

## 2024-04-06 RX ADMIN — Medication 10 ML: at 09:32

## 2024-04-06 RX ADMIN — ASPIRIN 325 MG: 325 TABLET ORAL at 09:30

## 2024-04-06 RX ADMIN — HEPARIN SODIUM 14 UNITS/KG/HR: 10000 INJECTION, SOLUTION INTRAVENOUS at 17:44

## 2024-04-06 RX ADMIN — Medication 5 MG: at 20:16

## 2024-04-06 RX ADMIN — POTASSIUM CHLORIDE 40 MEQ: 750 CAPSULE, EXTENDED RELEASE ORAL at 18:32

## 2024-04-06 NOTE — PLAN OF CARE
Goal Outcome Evaluation:              Outcome Evaluation: VSS; A/O x4; O2 > 90% on room air; pt resting in bed; family at bedside; call light within reach.

## 2024-04-06 NOTE — PROGRESS NOTES
HEPARIN INFUSION  Khalif Joshua is a  73 y.o. male receiving heparin infusion.     Therapy for (VTE/Cardiac):   Cardiac  Patient Weight: 90.7 kg  Initial Bolus (Y/N):   N  Any Bolus (Y/N):   Y        Signs or Symptoms of Bleeding: None noted    Cardiac or Other (Not VTE)  Initial rate: 12 units/kg/hr (Max 1,000 units/hr)   Anti Xa Rebolus Infusion Hold time Change infusion Dose (Units/kg/hr) Next Anti Xa or aPTT Level Due   < 0.11 50 Units/kg  (4000 Units Max) None Increase by  3 Units/kg/hr 6 hours   0.11- 0.19 25 Units/kg  (2000 Units Max) None Increase by  2 Units/kg/hr 6 hours   0.2 - 0.29 0 None Increase by  1 Units/kg/hr 6 hours   0.3 - 0.5 0 None No Change 6 hours (after 2 consecutive levels in range check qAM)   0.51 - 0.6 0 None Decrease by  1 Units/kg/hr 6 hours   0.61 - 0.8 0 30 Minutes Decrease by  2 Units/kg/hr 6 hours   0.81 - 1 0 60 Minutes Decrease by  3 Units/kg/hr 6 hours   >1 0 Hold  After Anti Xa less than 0.5 decrease previous rate by  4 Units/kg/hr  Every 2 hours until Anti Xa  less than 0.5 then when infusion restarts in 6 hours     Recommend Xa every 6 hours.     Results from last 7 days   Lab Units 04/06/24  0347 04/05/24  1810 04/05/24  1027   INR   --  0.95  --    HEMOGLOBIN g/dL 12.9*  --  14.2   HEMATOCRIT % 38.6  --  42.8   PLATELETS 10*3/mm3 217  --  240     Planning CABG       Date   Time   Anti-Xa Current Rate (Unit/kg/hr) Bolus   (Units) Rate Change   (Unit/kg/hr) New Rate (Unit/kg/hr) Next   Anti-Xa Comments  Pump Check Daily   4/5 1730 0.1 New Start -- +11 11 0400 D/w [place]. TR band still on. Will call when starting infusion.    4/6 0347 0.11 11 2000 +2 13 1100 Discussed w/ nurse   4/6 0800  13 -- -- 13  Pump check   4/6 1041 0.23 13 -- +1 14 1800 Renny Olmos RN

## 2024-04-06 NOTE — PLAN OF CARE
Problem: Adult Inpatient Plan of Care  Goal: Absence of Hospital-Acquired Illness or Injury  Intervention: Identify and Manage Fall Risk  Recent Flowsheet Documentation  Taken 4/6/2024 0603 by Jean Hawk RN  Safety Promotion/Fall Prevention: safety round/check completed  Taken 4/6/2024 0405 by Jean Hawk RN  Safety Promotion/Fall Prevention: safety round/check completed  Taken 4/6/2024 0205 by Jean Hawk RN  Safety Promotion/Fall Prevention: safety round/check completed  Taken 4/6/2024 0005 by Jean Hawk RN  Safety Promotion/Fall Prevention: safety round/check completed  Taken 4/5/2024 2205 by Jean Hawk RN  Safety Promotion/Fall Prevention: safety round/check completed  Taken 4/5/2024 2005 by Jean Hawk RN  Safety Promotion/Fall Prevention: safety round/check completed  Intervention: Prevent Skin Injury  Recent Flowsheet Documentation  Taken 4/6/2024 0603 by Jean Hawk RN  Body Position: position changed independently  Skin Protection: adhesive use limited  Taken 4/6/2024 0405 by Jean Hawk RN  Body Position: position changed independently  Skin Protection: adhesive use limited  Taken 4/6/2024 0205 by Jean Hawk RN  Body Position: position changed independently  Skin Protection: adhesive use limited  Taken 4/6/2024 0005 by Jean Hawk RN  Body Position: position changed independently  Skin Protection: adhesive use limited  Taken 4/5/2024 2205 by Jean Hawk RN  Body Position: position changed independently  Skin Protection: adhesive use limited  Taken 4/5/2024 2005 by Jean Hawk RN  Body Position: position changed independently  Skin Protection: adhesive use limited  Intervention: Prevent and Manage VTE (Venous Thromboembolism) Risk  Recent Flowsheet Documentation  Taken 4/5/2024 2005 by Jean Hawk RN  Activity Management: activity minimized  Range of Motion: active ROM (range of motion) encouraged  Goal: Optimal  Comfort and Wellbeing  Intervention: Provide Person-Centered Care  Recent Flowsheet Documentation  Taken 4/5/2024 2005 by Jean Hawk, RN  Trust Relationship/Rapport:   care explained   questions answered   questions encouraged   reassurance provided   thoughts/feelings acknowledged   Goal Outcome Evaluation:

## 2024-04-06 NOTE — PROGRESS NOTES
Khalif Joshua  [id number]  1950   LOS: 0 days   Patient Care Team:  Renee Liriano MD as PCP - General (Internal Medicine)    Chief Complaint: Follow-up on NSTEMI, MV CAD    Subjective The patient said that he had a little bit of chest pain last night when he was lying down but it got better when he sat up.  He denies any increased shortness of breath, edema, palpitations, or dizziness.  Patient currently getting a carotid duplex.  He has not yet had his echocardiogram.  He has some mild tenderness to his right radial artery cath site.    Objective     Vital Sign Min/Max for last 24 hours  Temp  Min: 97.9 °F (36.6 °C)  Max: 98.6 °F (37 °C)   BP  Min: 107/63  Max: 153/88   Pulse  Min: 83  Max: 105   Resp  Min: 16  Max: 22   SpO2  Min: 94 %  Max: 99 %   No data recorded   Weight  Min: 90.7 kg (200 lb)  Max: 90.7 kg (200 lb)         04/05/24  1014   Weight: 90.7 kg (200 lb)         Intake/Output Summary (Last 24 hours) at 4/6/2024 0912  Last data filed at 4/6/2024 0519  Gross per 24 hour   Intake 480 ml   Output --   Net 480 ml       Physical Exam:     General Appearance:    Alert, cooperative, in no acute distress   Lungs:     Clear to auscultation,respirations regular, even and                unlabored    Heart:    Regular and normal rate, normal S1 and S2, no            murmur, no gallop, no rub, no click   Abdomen:  Extremities:   Soft, nontender, bowel sounds audible x4    No edema, normal range of motion   Pulses:   Pulses palpable and equal bilaterally    Right radial artery cath site CDI no evidence of hematoma  Results Review:   Results from last 7 days   Lab Units 04/06/24  0347 04/05/24  1027   SODIUM mmol/L 133* 135*   POTASSIUM mmol/L 3.6 4.3   CHLORIDE mmol/L 99 100   CO2 mmol/L 25.0 26.0   BUN mg/dL 20 22   CREATININE mg/dL 0.82 0.83   GLUCOSE mg/dL 99 90   CALCIUM mg/dL 9.2 10.2     Results from last 7 days   Lab Units 04/06/24  0347 04/05/24  1027   WBC 10*3/mm3 7.90 8.53   HEMOGLOBIN g/dL  12.9* 14.2   HEMATOCRIT % 38.6 42.8   PLATELETS 10*3/mm3 217 240             Results from last 7 days   Lab Units 04/05/24  1247 04/05/24  1027   HSTROP T ng/L 53* 54*   Left heart catheterization 4/05/2024:  1) Severe, calcified coronary disease involving the LM, LAD, D1, Ramus, LCX and RCA  2) Normal LVEDP of 13mmHg   3) Peak-to-peak gradient of 33mmHg on pullback across the AV   Consult to CTS for consideration of CABG (LAD, D1, ramus, OM1, RCA)     ECG 4/5/2024:  Normal sinus rhythm  Inferior infarct (cited on or before 05-APR-2024)  Cannot rule out Anterior infarct (cited on or before 05-APR-2024)  Abnormal ECG  When compared with ECG of 05-APR-2024 10:18,  No significant change was found  Confirmed by ORLIN LOZANO MD ([id number]) on 4/5/2024 3:41:32 PM    Chest x-ray 4/5/2024:  No acute process.     CTA chest/abdomen/pelvis 4/5/2024:  Negative exam for pulmonary embolism. No acute process in the chest.   Cholelithiasis without acute cholecystitis. Chronic findings as detailed. No acute process of the abdomen or pelvis.    Medication Review: Reviewed, heparin GTT at 13 units/kg/h    Assessment & Plan   Patient with NSTEMI with heart cath showing MV CAD with recommendations for CABG, to be performed early next week per CTS schedule.  Patient has pending echocardiogram and carotid duplex.  Continue aspirin 325 mg daily, losartan 100 mg daily, rosuvastatin 40 mg nightly, heparin gtt. Will decrease hydrochlorothiazide to 12.5 mg daily and add bisoprolol 2.5 mg daily.      NSTEMI (non-ST elevated myocardial infarction)    Electronically signed by MISTI Lowry, 04/06/24, 9:23 AM EDT.     04/06/24  09:12 EDT

## 2024-04-06 NOTE — CONSULTS
Cardiothoracic Surgery History & Physical           Chief complaint: Chest pain, NSTEMI    HPI: Khalif Coats is a 73-year-old male, never smoker, with past medical history of hypertension, hyperlipidemia, and multiple abdominal surgeries including colostomy, ileostomy, and bowel resection, who presented to the emergency department on 4/5 with complaint of several weeks of chest pain radiating to the left arm mostly with lying down.  He reports that he has not had any issues with normal activities.  He reports that he walked several miles earlier this week without any difficulty.  However on Thursday of this week the chest pain was so severe that he felt the need to come to the emergency department.  In the emergency department he was found to have an elevated troponin.  Cardiology was consulted and the patient underwent cardiac cath which showed multivessel coronary artery disease, involving the left main.  Our service was then consulted for possible CABG. he denies taking any blood thinners at home.  He denies any lower extremity venous stripping.      Past Medical History:   Diagnosis Date    Diverticulitis     Hyperlipemia     Hypertension     Osteoarthritis      Past Surgical History:   Procedure Laterality Date    COLOSTOMY CLOSURE N/A 4/21/2022    Procedure: COLOSTOMY TAKEDOWN OPEN;  Surgeon: Jose Ferguson MD;  Location: [facility] OR;  Service: General;  Laterality: N/A;    EXPLORATORY LAPAROTOMY N/A 10/30/2021    Procedure: LAPAROTOMY EXPLORATORY, SIGMOID COLECTOMY,  END COLOSTOMY;  Surgeon: Jose Ferguson MD;  Location: [facility] OR;  Service: General;  Laterality: N/A;    KNEE ARTHROSCOPY Bilateral 2001     Family History   Problem Relation Age of Onset    Heart disease Father      Social History     Tobacco Use    Smoking status: Never    Smokeless tobacco: Never   Vaping Use    Vaping status: Never Used   Substance Use Topics    Alcohol use: Not Currently    Drug use: Never       Medications Prior to  Admission   Medication Sig Dispense Refill Last Dose    losartan-hydrochlorothiazide (HYZAAR) 100-25 MG per tablet Take 1 tablet by mouth Daily.       ondansetron (ZOFRAN) 4 MG tablet Take 1 tablet by mouth Every 8 (Eight) Hours As Needed for Nausea or Vomiting. 12 tablet 0     oxyCODONE-acetaminophen (Percocet) 5-325 MG per tablet Take 1 tablet by mouth Every 4 (Four) Hours As Needed for Moderate Pain . 14 tablet 0     rosuvastatin (CRESTOR) 40 MG tablet Take 40 mg by mouth Daily.        Allergies:  Patient has no known allergies.    Review of Systems:  A comprehensive review of systems was negative except for:   Cardiovascular: Chest pain    All other systems were reviewed and were negative.    Vital Signs:  Temp:  [97.9 °F (36.6 °C)-98.6 °F (37 °C)] 98.6 °F (37 °C)  Heart Rate:  [] 97  Resp:  [16-22] 18  BP: (107-153)/(63-96) 107/63    Physical Exam:  Physical Exam  Constitutional:       Appearance: Normal appearance.   HENT:      Head: Normocephalic and atraumatic.   Eyes:      Extraocular Movements: Extraocular movements intact.   Cardiovascular:      Rate and Rhythm: Normal rate and regular rhythm.      Pulses: Normal pulses.           Dorsalis pedis pulses are 2+ on the right side and 2+ on the left side.        Posterior tibial pulses are 2+ on the right side and 2+ on the left side.      Heart sounds: No murmur heard.  Pulmonary:      Effort: Pulmonary effort is normal. No respiratory distress.      Breath sounds: Normal breath sounds. No wheezing.   Abdominal:      General: There is no distension.      Palpations: Abdomen is soft.      Tenderness: There is no abdominal tenderness.      Comments: Previous ostomy scars well-healed   Musculoskeletal:      Right lower leg: No edema.      Left lower leg: No edema.   Neurological:      Mental Status: He is alert and oriented to person, place, and time.   Psychiatric:         Mood and Affect: Mood normal.         Labs:  Results from last 7 days   Lab Units  04/06/24  0347   WBC 10*3/mm3 7.90   HEMOGLOBIN g/dL 12.9*   HEMATOCRIT % 38.6   PLATELETS 10*3/mm3 217     Results from last 7 days   Lab Units 04/06/24  0347   SODIUM mmol/L 133*   POTASSIUM mmol/L 3.6   CHLORIDE mmol/L 99   CO2 mmol/L 25.0   BUN mg/dL 20   CREATININE mg/dL 0.82   GLUCOSE mg/dL 99   CALCIUM mg/dL 9.2         Coagulation:   INR   Date Value Ref Range Status   04/05/2024 0.95 0.89 - 1.12 Final     Cardiac markers:   Results from last 7 days   Lab Units 04/05/24  1247   HSTROP T ng/L 53*     Imaging:   Cardiac cath 4/5:  Angiographic Findings:  Catheters: 5F JR4, 5F JL 3.5  Right coronary dominance  LM: Large caliber, trifurcates, calcified distal 30-40% narrows with ventricularization of wave forms on engagement  LAD: Large caliber, wraps around the apex, severely calcified, gives rise to 1 diagonal branches, there is [id number] 70% narrowing along with ostial 70% D1 disease  Ramus: Moderate caliber, reaches the apex, proximal 70-80% disease  LCX: Large caliber, non-dominant, gives rise to 1 obtuse marginal branches, proximal 70% disease  RCA: Large caliber, dominant, gives rise to a rPDA and rPLB, severely calcified throughout with subtotal occlusion proximally. There is dampening upon engagement, there is a 80-90% mid segment lesion, the distal territory is supplied by left-to-right collaterals, given severe dampening upon engagement only one angiographic cine was taken    FINAL IMPRESSION:  1) Severe, calcified coronary disease involving the LM, LAD, D1, Ramus, LCX and RCA  2) Normal LVEDP of 13mmHg   3) Peak-to-peak gradient of 33mmHg on pullback across the AV       Assessment:     NSTEMI (non-ST elevated myocardial infarction)         Plan:   Continue heparin drip  Begin preoperative workup  Plan for CABG early next week pending preop workup        Destiny Byrd PA-C  04/06/24  06:35 EDT

## 2024-04-07 ENCOUNTER — APPOINTMENT (OUTPATIENT)
Dept: CARDIOLOGY | Facility: HOSPITAL | Age: 74
End: 2024-04-07
Payer: MEDICARE

## 2024-04-07 LAB
BH CV ECHO MEAS - AO MAX PG: 6.6 MMHG
BH CV ECHO MEAS - AO MEAN PG: 3 MMHG
BH CV ECHO MEAS - AO ROOT DIAM: 3.1 CM
BH CV ECHO MEAS - AO V2 MAX: 128 CM/SEC
BH CV ECHO MEAS - AO V2 VTI: 22.1 CM
BH CV ECHO MEAS - AVA(I,D): 2.23 CM2
BH CV ECHO MEAS - EDV(CUBED): 102.5 ML
BH CV ECHO MEAS - EDV(MOD-SP2): 109 ML
BH CV ECHO MEAS - EDV(MOD-SP2): 114 ML
BH CV ECHO MEAS - EDV(MOD-SP4): 113 ML
BH CV ECHO MEAS - EDV(MOD-SP4): 115 ML
BH CV ECHO MEAS - EF(MOD-BP): 46.9 %
BH CV ECHO MEAS - EF(MOD-BP): 56.7 %
BH CV ECHO MEAS - EF(MOD-SP2): 40.9 %
BH CV ECHO MEAS - EF(MOD-SP2): 56.8 %
BH CV ECHO MEAS - EF(MOD-SP4): 53.3 %
BH CV ECHO MEAS - EF(MOD-SP4): 55.3 %
BH CV ECHO MEAS - ESV(CUBED): 45.4 ML
BH CV ECHO MEAS - ESV(MOD-SP2): 49.2 ML
BH CV ECHO MEAS - ESV(MOD-SP2): 64.4 ML
BH CV ECHO MEAS - ESV(MOD-SP4): 51.4 ML
BH CV ECHO MEAS - ESV(MOD-SP4): 52.8 ML
BH CV ECHO MEAS - FS: 23.8 %
BH CV ECHO MEAS - IVS/LVPW: 1.09 CM
BH CV ECHO MEAS - IVSD: 0.98 CM
BH CV ECHO MEAS - LA DIMENSION: 3.4 CM
BH CV ECHO MEAS - LAT PEAK E' VEL: 8.4 CM/SEC
BH CV ECHO MEAS - LV MASS(C)D: 149 GRAMS
BH CV ECHO MEAS - LV MAX PG: 3.1 MMHG
BH CV ECHO MEAS - LV MEAN PG: 2 MMHG
BH CV ECHO MEAS - LV V1 MAX: 88.7 CM/SEC
BH CV ECHO MEAS - LV V1 VTI: 15.6 CM
BH CV ECHO MEAS - LVIDD: 4.7 CM
BH CV ECHO MEAS - LVIDS: 3.6 CM
BH CV ECHO MEAS - LVOT AREA: 3.2 CM2
BH CV ECHO MEAS - LVOT DIAM: 2.01 CM
BH CV ECHO MEAS - LVPWD: 0.89 CM
BH CV ECHO MEAS - MED PEAK E' VEL: 6.6 CM/SEC
BH CV ECHO MEAS - MV A MAX VEL: 67.7 CM/SEC
BH CV ECHO MEAS - MV DEC SLOPE: 318.5 CM/SEC2
BH CV ECHO MEAS - MV DEC TIME: 0.21 SEC
BH CV ECHO MEAS - MV E MAX VEL: 49.3 CM/SEC
BH CV ECHO MEAS - MV E/A: 0.73
BH CV ECHO MEAS - MV MAX PG: 3.7 MMHG
BH CV ECHO MEAS - MV MEAN PG: 1.58 MMHG
BH CV ECHO MEAS - MV P1/2T: 71.2 MSEC
BH CV ECHO MEAS - MV V2 VTI: 23.5 CM
BH CV ECHO MEAS - MVA(P1/2T): 3.1 CM2
BH CV ECHO MEAS - MVA(VTI): 2.1 CM2
BH CV ECHO MEAS - PA ACC TIME: 0.15 SEC
BH CV ECHO MEAS - RAP SYSTOLE: 8 MMHG
BH CV ECHO MEAS - RVSP: 22 MMHG
BH CV ECHO MEAS - SV(LVOT): 49.3 ML
BH CV ECHO MEAS - SV(MOD-SP2): 44.6 ML
BH CV ECHO MEAS - SV(MOD-SP2): 64.8 ML
BH CV ECHO MEAS - SV(MOD-SP4): 60.2 ML
BH CV ECHO MEAS - SV(MOD-SP4): 63.6 ML
BH CV ECHO MEAS - TAPSE (>1.6): 2.34 CM
BH CV ECHO MEAS - TR MAX PG: 14.1 MMHG
BH CV ECHO MEAS - TR MAX VEL: 188 CM/SEC
BH CV ECHO MEASUREMENTS AVERAGE E/E' RATIO: 6.57
BH CV XLRA - RV BASE: 4.3 CM
BH CV XLRA - RV LENGTH: 7.7 CM
BH CV XLRA - RV MID: 3.3 CM
BH CV XLRA - TDI S': 14.6 CM/SEC
BH CV XLRA MEAS LEFT DIST CCA EDV: 21.7 CM/SEC
BH CV XLRA MEAS LEFT DIST CCA PSV: 128 CM/SEC
BH CV XLRA MEAS LEFT DIST ICA EDV: 22.4 CM/SEC
BH CV XLRA MEAS LEFT DIST ICA PSV: 68 CM/SEC
BH CV XLRA MEAS LEFT ICA/CCA RATIO: 0.8
BH CV XLRA MEAS LEFT MID CCA EDV: 20.8 CM/SEC
BH CV XLRA MEAS LEFT MID CCA PSV: 115 CM/SEC
BH CV XLRA MEAS LEFT MID ICA EDV: 39.2 CM/SEC
BH CV XLRA MEAS LEFT MID ICA PSV: 102 CM/SEC
BH CV XLRA MEAS LEFT PROX CCA EDV: 17.6 CM/SEC
BH CV XLRA MEAS LEFT PROX CCA PSV: 132 CM/SEC
BH CV XLRA MEAS LEFT PROX ECA EDV: 15.6 CM/SEC
BH CV XLRA MEAS LEFT PROX ECA PSV: 125 CM/SEC
BH CV XLRA MEAS LEFT PROX ICA EDV: 22.1 CM/SEC
BH CV XLRA MEAS LEFT PROX ICA PSV: 70.3 CM/SEC
BH CV XLRA MEAS LEFT PROX SCLA PSV: 115 CM/SEC
BH CV XLRA MEAS LEFT VERTEBRAL A EDV: 12.2 CM/SEC
BH CV XLRA MEAS LEFT VERTEBRAL A PSV: 44.7 CM/SEC
BH CV XLRA MEAS RIGHT DIST CCA EDV: 14.9 CM/SEC
BH CV XLRA MEAS RIGHT DIST CCA PSV: 91.3 CM/SEC
BH CV XLRA MEAS RIGHT DIST ICA EDV: 24.2 CM/SEC
BH CV XLRA MEAS RIGHT DIST ICA PSV: 80.8 CM/SEC
BH CV XLRA MEAS RIGHT ICA/CCA RATIO: 0.93
BH CV XLRA MEAS RIGHT MID CCA EDV: 19.1 CM/SEC
BH CV XLRA MEAS RIGHT MID CCA PSV: 121 CM/SEC
BH CV XLRA MEAS RIGHT MID ICA EDV: 17.4 CM/SEC
BH CV XLRA MEAS RIGHT MID ICA PSV: 77.7 CM/SEC
BH CV XLRA MEAS RIGHT PROX CCA EDV: 20.8 CM/SEC
BH CV XLRA MEAS RIGHT PROX CCA PSV: 116 CM/SEC
BH CV XLRA MEAS RIGHT PROX ECA EDV: 7.8 CM/SEC
BH CV XLRA MEAS RIGHT PROX ECA PSV: 115 CM/SEC
BH CV XLRA MEAS RIGHT PROX ICA EDV: 15.5 CM/SEC
BH CV XLRA MEAS RIGHT PROX ICA PSV: 85.1 CM/SEC
BH CV XLRA MEAS RIGHT PROX SCLA PSV: 210 CM/SEC
BH CV XLRA MEAS RIGHT VERTEBRAL A EDV: 21.6 CM/SEC
BH CV XLRA MEAS RIGHT VERTEBRAL A PSV: 65.9 CM/SEC
LEFT ARM BP: NORMAL MMHG
LEFT ATRIUM VOLUME INDEX: 21 ML/M2
POTASSIUM SERPL-SCNC: 4.2 MMOL/L (ref 3.5–5.2)
UFH PPP CHRO-ACNC: 0.19 IU/ML (ref 0.3–0.7)
UFH PPP CHRO-ACNC: 0.33 IU/ML (ref 0.3–0.7)
UFH PPP CHRO-ACNC: 0.46 IU/ML (ref 0.3–0.7)

## 2024-04-07 PROCEDURE — 25010000002 HEPARIN (PORCINE) 25000-0.45 UT/250ML-% SOLUTION

## 2024-04-07 PROCEDURE — 84132 ASSAY OF SERUM POTASSIUM: CPT

## 2024-04-07 PROCEDURE — 85520 HEPARIN ASSAY: CPT

## 2024-04-07 PROCEDURE — 93308 TTE F-UP OR LMTD: CPT | Performed by: INTERNAL MEDICINE

## 2024-04-07 PROCEDURE — 25010000002 SULFUR HEXAFLUORIDE MICROSPH 60.7-25 MG RECONSTITUTED SUSPENSION: Performed by: INTERNAL MEDICINE

## 2024-04-07 PROCEDURE — 99231 SBSQ HOSP IP/OBS SF/LOW 25: CPT | Performed by: STUDENT IN AN ORGANIZED HEALTH CARE EDUCATION/TRAINING PROGRAM

## 2024-04-07 PROCEDURE — 99231 SBSQ HOSP IP/OBS SF/LOW 25: CPT | Performed by: NURSE PRACTITIONER

## 2024-04-07 PROCEDURE — 25010000002 HEPARIN (PORCINE) PER 1000 UNITS

## 2024-04-07 PROCEDURE — 93308 TTE F-UP OR LMTD: CPT

## 2024-04-07 RX ORDER — HEPARIN SODIUM 1000 [USP'U]/ML
2000 INJECTION, SOLUTION INTRAVENOUS; SUBCUTANEOUS ONCE
Status: COMPLETED | OUTPATIENT
Start: 2024-04-07 | End: 2024-04-07

## 2024-04-07 RX ADMIN — HEPARIN SODIUM 17 UNITS/KG/HR: 10000 INJECTION, SOLUTION INTRAVENOUS at 09:37

## 2024-04-07 RX ADMIN — LOSARTAN POTASSIUM 100 MG: 50 TABLET, FILM COATED ORAL at 08:38

## 2024-04-07 RX ADMIN — SENNOSIDES AND DOCUSATE SODIUM 2 TABLET: 8.6; 5 TABLET ORAL at 20:35

## 2024-04-07 RX ADMIN — ROSUVASTATIN 40 MG: 20 TABLET, FILM COATED ORAL at 20:35

## 2024-04-07 RX ADMIN — ASPIRIN 325 MG: 325 TABLET ORAL at 08:39

## 2024-04-07 RX ADMIN — HYDROCHLOROTHIAZIDE 12.5 MG: 12.5 CAPSULE ORAL at 08:39

## 2024-04-07 RX ADMIN — HEPARIN SODIUM 2000 UNITS: 1000 INJECTION, SOLUTION INTRAVENOUS; SUBCUTANEOUS at 03:57

## 2024-04-07 RX ADMIN — BISOPROLOL FUMARATE 2.5 MG: 5 TABLET, FILM COATED ORAL at 08:38

## 2024-04-07 RX ADMIN — Medication 10 ML: at 20:36

## 2024-04-07 RX ADMIN — Medication 5 MG: at 20:35

## 2024-04-07 RX ADMIN — NITROGLYCERIN 0.4 MG: 0.4 TABLET SUBLINGUAL at 18:37

## 2024-04-07 RX ADMIN — SULFUR HEXAFLUORIDE 2 ML: KIT at 17:07

## 2024-04-07 NOTE — PROGRESS NOTES
Khalif Joshua  [id number]  1950   LOS: 1 day   Patient Care Team:  Renee Liriano MD as PCP - General (Internal Medicine)    Chief Complaint:  Follow-up on NSTEMI, MV CAD     Subjective Patient with upcoming CABG.  He had a little chest pain last night when he was lying down but it gets better when he sits up.  He denies any nitroglycerin use for the chest pain.  He also denies any shortness of breath, palpitations, or dizziness.  He is hoping that his CABG will be tomorrow.    Objective     Vital Sign Min/Max for last 24 hours  Temp  Min: 97.9 °F (36.6 °C)  Max: 98.6 °F (37 °C)   BP  Min: 100/64  Max: 138/93   Pulse  Min: 71  Max: 82   Resp  Min: 14  Max: 18   SpO2  Min: 92 %  Max: 95 %   No data recorded   Weight  Min: 90.7 kg (199 lb 15.3 oz)  Max: 90.7 kg (200 lb)         04/06/24  0936 04/06/24  1434   Weight: 90.7 kg (200 lb) 90.7 kg (199 lb 15.3 oz)         Intake/Output Summary (Last 24 hours) at 4/7/2024 0729  Last data filed at 4/7/2024 0532  Gross per 24 hour   Intake 840 ml   Output --   Net 840 ml       Physical Exam:     General Appearance:    Alert, cooperative, in no acute distress   Lungs:     Clear to auscultation,respirations regular, even and                unlabored    Heart:    Regular and normal rate, normal S1 and S2, no            murmur, no gallop, no rub, no click   Abdomen:  Extremities:   Soft, nontender, bowel sounds audible x4    No edema, normal range of motion   Pulses:   Pulses palpable and equal bilaterally    Right radial artery cath site CDI no evidence of hematoma   Results Review:   Results from last 7 days   Lab Units 04/07/24  0109 04/06/24  0347 04/05/24  1027   SODIUM mmol/L  --  133* 135*   POTASSIUM mmol/L 4.2 3.6 4.3   CHLORIDE mmol/L  --  99 100   CO2 mmol/L  --  25.0 26.0   BUN mg/dL  --  20 22   CREATININE mg/dL  --  0.82 0.83   GLUCOSE mg/dL  --  99 90   CALCIUM mg/dL  --  9.2 10.2     Results from last 7 days   Lab Units 04/06/24  0347 04/05/24  1027   WBC  Received fmla forms for anxiety and depression     10*3/mm3 7.90 8.53   HEMOGLOBIN g/dL 12.9* 14.2   HEMATOCRIT % 38.6 42.8   PLATELETS 10*3/mm3 217 240             Results from last 7 days   Lab Units 04/05/24  1247 04/05/24  1027   HSTROP T ng/L 53* 54*   Left heart catheterization 4/05/2024:  1) Severe, calcified coronary disease involving the LM, LAD, D1, Ramus, LCX and RCA  2) Normal LVEDP of 13mmHg   3) Peak-to-peak gradient of 33mmHg on pullback across the AV   Consult to CTS for consideration of CABG (LAD, D1, ramus, OM1, RCA)     Echocardiogram and carotid duplex 4/6/2024 pending    Chest x-ray 4/5/2024:  No acute process.     ECG 4/5/2024:  Normal sinus rhythm  Inferior infarct (cited on or before 05-APR-2024)  Cannot rule out Anterior infarct (cited on or before 05-APR-2024)  Abnormal ECG  When compared with ECG of 05-APR-2024 10:18,  No significant change was found  Confirmed by ORLIN LOZANO MD ([id number]) on 4/5/2024 3:41:32 PM    Medication Review: Reviewed, heparin GTT at 17 units/kg/h    Assessment & Plan   Patient with NSTEMI with heart cath showing MV CAD with recommendations for CABG, to be performed early this week per CTS schedule.  Continue aspirin 325 mg daily, losartan 100 mg daily, rosuvastatin 40 mg nightly, HCTZ 12.5 mg daily, bisoprolol 2.5 mg daily, heparin gtt. Will review echocardiogram and carotid duplex results when available.      NSTEMI (non-ST elevated myocardial infarction)    Electronically signed by MISTI Lowry, 04/07/24, 7:38 AM EDT.     04/07/24  07:29 EDT

## 2024-04-07 NOTE — PROGRESS NOTES
HEPARIN INFUSION  Khalif Joshua is a  73 y.o. male receiving heparin infusion.     Therapy for (VTE/Cardiac):   Cardiac  Patient Weight: 90.7 kg  Initial Bolus (Y/N):   N  Any Bolus (Y/N):   Yes  Signs or Symptoms of Bleeding: None noted  Cardiac or Other (Not VTE)  Initial rate: 12 units/kg/hr (Max 1,000 units/hr)   Anti Xa Rebolus Infusion Hold time Change infusion Dose (Units/kg/hr) Next Anti Xa or aPTT Level Due   < 0.11 50 Units/kg  (4000 Units Max) None Increase by  3 Units/kg/hr 6 hours   0.11- 0.19 25 Units/kg  (2000 Units Max) None Increase by  2 Units/kg/hr 6 hours   0.2 - 0.29 0 None Increase by  1 Units/kg/hr 6 hours   0.3 - 0.5 0 None No Change 6 hours (after 2 consecutive levels in range check qAM)   0.51 - 0.6 0 None Decrease by  1 Units/kg/hr 6 hours   0.61 - 0.8 0 30 Minutes Decrease by  2 Units/kg/hr 6 hours   0.81 - 1 0 60 Minutes Decrease by  3 Units/kg/hr 6 hours   >1 0 Hold  After Anti Xa less than 0.5 decrease previous rate by  4 Units/kg/hr  Every 2 hours until Anti Xa  less than 0.5 then when infusion restarts in 6 hours     Recommend Xa every 6 hours.  Results from last 7 days   Lab Units 04/06/24  0347 04/05/24  1810 04/05/24  1027   INR   --  0.95  --    HEMOGLOBIN g/dL 12.9*  --  14.2   HEMATOCRIT % 38.6  --  42.8   PLATELETS 10*3/mm3 217  --  240        Date   Time   Anti-Xa Current Rate (Unit/kg/hr) Bolus   (Units) Rate Change   (Unit/kg/hr) New Rate (Unit/kg/hr) Next   Anti-Xa Comments  Pump Check Daily   4/5 1730 0.1 New Start -- +11 11 0400 D/w Toneya. TR band still on. Will call when starting infusion.    4/6 0347 0.11 11 2000 +2 13 1100 Discussed w/ nurse   4/6 0800  13 -- -- 13  Pump check   4/6 1041 0.23 13 -- +1 14 1800 Renny Olmos RN   4/6 1816 0.24 14 -- +1 15 0100 D/w RN   4/7 0109 0.19 15 2000 +2 17 1000 Nurse confirmed pump   4/7 1018 0.46 17 -- -- 17 1700 Renny Monique RN;      Pump check

## 2024-04-07 NOTE — PROGRESS NOTES
Cardiothoracic Surgery Progress Note    CC: chest pain, CAD     LOS: 1 day      Subjective:  Reports Some mild, intermittent chest pain overnight when lying flat but not states severe enough for him to request nitroglycerin    Objective:  Vital Signs  Temp:  [97.9 °F (36.6 °C)-98.6 °F (37 °C)] 98.6 °F (37 °C)  Heart Rate:  [71-82] 71  Resp:  [14-18] 14  BP: (100-138)/(64-95) 137/95    Physical Exam:   General Appearance: alert, appears stated age and cooperative   Lungs: clear to auscultation, respirations regular, respirations even, and respirations unlabored   Heart: regular rhythm & normal rate, normal S1, S2, no murmur, no gallop, no rub, and no click   Skin: warm, well perfused     Results:    Results from last 7 days   Lab Units 04/06/24  0347   WBC 10*3/mm3 7.90   HEMOGLOBIN g/dL 12.9*   HEMATOCRIT % 38.6   PLATELETS 10*3/mm3 217     Results from last 7 days   Lab Units 04/07/24  0109 04/06/24  0347   SODIUM mmol/L  --  133*   POTASSIUM mmol/L 4.2 3.6   CHLORIDE mmol/L  --  99   CO2 mmol/L  --  25.0   BUN mg/dL  --  20   CREATININE mg/dL  --  0.82   GLUCOSE mg/dL  --  99   CALCIUM mg/dL  --  9.2       Assessment:  CAD  NSTEMI    Plan:  Continue heparin drip  Continue preoperative workup  Tentatively plan for CABG with Dr. Barlow this week    Destiny Byrd PA-C  04/07/24  06:23 EDT

## 2024-04-07 NOTE — PLAN OF CARE
Goal Outcome Evaluation:              Outcome Evaluation: Heparin gtt at 17u/kg/min, VSS, RA, SR. Pt awaiting CABG pending workup completion. A&Ox3.

## 2024-04-08 PROBLEM — I25.10 CORONARY ARTERY DISEASE INVOLVING NATIVE HEART: Status: ACTIVE | Noted: 2024-04-05

## 2024-04-08 LAB
UFH PPP CHRO-ACNC: 0.35 IU/ML (ref 0.3–0.7)
UFH PPP CHRO-ACNC: 0.36 IU/ML (ref 0.3–0.7)

## 2024-04-08 PROCEDURE — 85520 HEPARIN ASSAY: CPT

## 2024-04-08 PROCEDURE — 99231 SBSQ HOSP IP/OBS SF/LOW 25: CPT | Performed by: THORACIC SURGERY (CARDIOTHORACIC VASCULAR SURGERY)

## 2024-04-08 PROCEDURE — 25010000002 HEPARIN (PORCINE) 25000-0.45 UT/250ML-% SOLUTION

## 2024-04-08 RX ORDER — ASPIRIN 325 MG
325 TABLET, DELAYED RELEASE (ENTERIC COATED) ORAL DAILY
COMMUNITY
End: 2024-04-17 | Stop reason: HOSPADM

## 2024-04-08 RX ORDER — TADALAFIL 5 MG/1
5 TABLET ORAL DAILY
COMMUNITY

## 2024-04-08 RX ORDER — METFORMIN HYDROCHLORIDE 500 MG/1
500 TABLET, EXTENDED RELEASE ORAL
COMMUNITY

## 2024-04-08 RX ORDER — MULTIVITAMIN WITH IRON
1 TABLET ORAL DAILY
COMMUNITY

## 2024-04-08 RX ORDER — DICLOFENAC SODIUM 75 MG/1
75 TABLET, DELAYED RELEASE ORAL 2 TIMES DAILY
COMMUNITY

## 2024-04-08 RX ORDER — ELECTROLYTES/DEXTROSE
1 SOLUTION, ORAL ORAL DAILY
COMMUNITY

## 2024-04-08 RX ADMIN — SENNOSIDES AND DOCUSATE SODIUM 2 TABLET: 8.6; 5 TABLET ORAL at 20:56

## 2024-04-08 RX ADMIN — BISOPROLOL FUMARATE 2.5 MG: 5 TABLET, FILM COATED ORAL at 08:50

## 2024-04-08 RX ADMIN — HYDROCHLOROTHIAZIDE 12.5 MG: 12.5 CAPSULE ORAL at 08:50

## 2024-04-08 RX ADMIN — NITROGLYCERIN 0.4 MG: 0.4 TABLET SUBLINGUAL at 01:48

## 2024-04-08 RX ADMIN — Medication 10 ML: at 20:56

## 2024-04-08 RX ADMIN — HEPARIN SODIUM 17 UNITS/KG/HR: 10000 INJECTION, SOLUTION INTRAVENOUS at 18:08

## 2024-04-08 RX ADMIN — ASPIRIN 325 MG: 325 TABLET ORAL at 08:50

## 2024-04-08 RX ADMIN — LOSARTAN POTASSIUM 100 MG: 50 TABLET, FILM COATED ORAL at 08:50

## 2024-04-08 RX ADMIN — Medication 10 ML: at 08:52

## 2024-04-08 RX ADMIN — NITROGLYCERIN 0.4 MG: 0.4 TABLET SUBLINGUAL at 07:07

## 2024-04-08 RX ADMIN — HEPARIN SODIUM 17 UNITS/KG/HR: 10000 INJECTION, SOLUTION INTRAVENOUS at 01:45

## 2024-04-08 RX ADMIN — ROSUVASTATIN 40 MG: 20 TABLET, FILM COATED ORAL at 20:56

## 2024-04-08 NOTE — PLAN OF CARE
Problem: Adult Inpatient Plan of Care  Goal: Plan of Care Review  Outcome: Ongoing, Progressing  Goal: Patient-Specific Goal (Individualized)  Outcome: Ongoing, Progressing  Goal: Absence of Hospital-Acquired Illness or Injury  Outcome: Ongoing, Progressing  Intervention: Identify and Manage Fall Risk  Recent Flowsheet Documentation  Taken 4/8/2024 1935 by Radha Gupta RN  Safety Promotion/Fall Prevention: activity supervised  Intervention: Prevent Skin Injury  Recent Flowsheet Documentation  Taken 4/8/2024 1935 by Radha Gupta RN  Body Position: position changed independently  Skin Protection: adhesive use limited  Intervention: Prevent and Manage VTE (Venous Thromboembolism) Risk  Recent Flowsheet Documentation  Taken 4/8/2024 1935 by Radha Gupta RN  Activity Management: activity encouraged  Range of Motion: active ROM (range of motion) encouraged  Intervention: Prevent Infection  Recent Flowsheet Documentation  Taken 4/8/2024 1935 by Radha Gupta RN  Infection Prevention: hand hygiene promoted  Goal: Optimal Comfort and Wellbeing  Outcome: Ongoing, Progressing  Goal: Readiness for Transition of Care  Outcome: Ongoing, Progressing     Problem: Activity Intolerance (Cardiovascular Surgery)  Goal: Improved Activity Tolerance  Outcome: Ongoing, Progressing  Intervention: Optimize Tolerance for Activity  Recent Flowsheet Documentation  Taken 4/8/2024 1935 by Radha Gupta RN  Environmental Support: calm environment promoted     Problem: Adjustment to Surgery (Cardiovascular Surgery)  Goal: Optimal Coping with Heart Surgery  Outcome: Ongoing, Progressing  Intervention: Support Psychosocial Response to Surgery  Recent Flowsheet Documentation  Taken 4/8/2024 1935 by Radha Gupta RN  Supportive Measures: active listening utilized     Problem: Bleeding (Cardiovascular Surgery)  Goal: Bleeding (Cardiovascular Surgery)  Outcome: Ongoing, Progressing     Problem: Bowel Motility Impaired  (Cardiovascular Surgery)  Goal: Effective Bowel Elimination (Cardiovascular Surgery)  Outcome: Ongoing, Progressing  Intervention: Enhance Bowel Motility and Elimination  Recent Flowsheet Documentation  Taken 4/8/2024 1935 by Radha Gupta RN  Bowel Elimination Management: toileting offered     Problem: Cardiac Function Impaired (Cardiovascular Surgery)  Goal: Effective Cardiac Function  Outcome: Ongoing, Progressing     Problem: Cerebral Tissue Perfusion (Cardiovascular Surgery)  Goal: Optimal Cerebral Tissue Perfusion (Cardiovascular Surgery)  Outcome: Ongoing, Progressing  Intervention: Protect and Optimize Cerebral Perfusion  Recent Flowsheet Documentation  Taken 4/8/2024 1935 by Radha Gupta RN  Head of Bed (HOB) Positioning: HOB elevated     Problem: Fluid and Electrolyte Imbalance (Cardiovascular Surgery)  Goal: Fluid and Electrolyte Balance (Cardiovascular Surgery)  Outcome: Ongoing, Progressing     Problem: Glycemic Control Impaired (Cardiovascular Surgery)  Goal: Blood Glucose Level Within Targeted Range (Cardiovascular Surgery)  Outcome: Ongoing, Progressing     Problem: Infection (Cardiovascular Surgery)  Goal: Absence of Infection Signs and Symptoms  Outcome: Ongoing, Progressing  Intervention: Prevent or Manage Infection  Recent Flowsheet Documentation  Taken 4/8/2024 1935 by Radha Gupta RN  Infection Prevention: hand hygiene promoted     Problem: Ongoing Anesthesia Effects (Cardiovascular Surgery)  Goal: Anesthesia/Sedation Recovery  Outcome: Ongoing, Progressing  Intervention: Optimize Anesthesia Recovery  Recent Flowsheet Documentation  Taken 4/8/2024 1935 by Radha Gupta RN  Safety Promotion/Fall Prevention: activity supervised     Problem: Pain (Cardiovascular Surgery)  Goal: Acceptable Pain Control  Outcome: Ongoing, Progressing     Problem: Postoperative Nausea and Vomiting (Cardiovascular Surgery)  Goal: Nausea and Vomiting Relief (Cardiovascular Surgery)  Outcome: Ongoing,  Progressing     Problem: Postoperative Urinary Retention (Cardiovascular Surgery)  Goal: Effective Urinary Elimination (Cardiovascular Surgery)  Outcome: Ongoing, Progressing  Intervention: Monitor and Manage Urinary Retention  Recent Flowsheet Documentation  Taken 4/8/2024 1935 by Radha Gupta RN  Urinary Elimination Promotion: toileting offered     Problem: Respiratory Compromise (Cardiovascular Surgery)  Goal: Effective Oxygenation and Ventilation (Cardiovascular Surgery)  Outcome: Ongoing, Progressing     Problem: Asthma Comorbidity  Goal: Maintenance of Asthma Control  Outcome: Ongoing, Progressing  Intervention: Maintain Asthma Symptom Control  Recent Flowsheet Documentation  Taken 4/8/2024 1935 by Radha Gupta RN  Medication Review/Management: medications reviewed     Problem: Behavioral Health Comorbidity  Goal: Maintenance of Behavioral Health Symptom Control  Outcome: Ongoing, Progressing  Intervention: Maintain Behavioral Health Symptom Control  Recent Flowsheet Documentation  Taken 4/8/2024 1935 by Radha Gupta RN  Medication Review/Management: medications reviewed     Problem: COPD (Chronic Obstructive Pulmonary Disease) Comorbidity  Goal: Maintenance of COPD Symptom Control  Outcome: Ongoing, Progressing  Intervention: Maintain COPD-Symptom Control  Recent Flowsheet Documentation  Taken 4/8/2024 1935 by Radha Gupta RN  Supportive Measures: active listening utilized  Medication Review/Management: medications reviewed     Problem: Diabetes Comorbidity  Goal: Blood Glucose Level Within Targeted Range  Outcome: Ongoing, Progressing     Problem: Heart Failure Comorbidity  Goal: Maintenance of Heart Failure Symptom Control  Outcome: Ongoing, Progressing  Intervention: Maintain Heart Failure-Management  Recent Flowsheet Documentation  Taken 4/8/2024 1935 by Radha Gupta RN  Medication Review/Management: medications reviewed     Problem: Hypertension Comorbidity  Goal: Blood Pressure  in Desired Range  Outcome: Ongoing, Progressing  Intervention: Maintain Blood Pressure Management  Recent Flowsheet Documentation  Taken 4/8/2024 1935 by Radha Gupta RN  Medication Review/Management: medications reviewed     Problem: Obstructive Sleep Apnea Risk or Actual Comorbidity Management  Goal: Unobstructed Breathing During Sleep  Outcome: Ongoing, Progressing     Problem: Osteoarthritis Comorbidity  Goal: Maintenance of Osteoarthritis Symptom Control  Outcome: Ongoing, Progressing  Intervention: Maintain Osteoarthritis Symptom Control  Recent Flowsheet Documentation  Taken 4/8/2024 1935 by Radha Gupta RN  Activity Management: activity encouraged  Medication Review/Management: medications reviewed     Problem: Pain Chronic (Persistent) (Comorbidity Management)  Goal: Acceptable Pain Control and Functional Ability  Outcome: Ongoing, Progressing  Intervention: Manage Persistent Pain  Recent Flowsheet Documentation  Taken 4/8/2024 1935 by Radha Gupta RN  Bowel Elimination Promotion: adequate fluid intake promoted  Medication Review/Management: medications reviewed  Intervention: Optimize Psychosocial Wellbeing  Recent Flowsheet Documentation  Taken 4/8/2024 1935 by Radha Gupta RN  Supportive Measures: active listening utilized     Problem: Seizure Disorder Comorbidity  Goal: Maintenance of Seizure Control  Outcome: Ongoing, Progressing   Goal Outcome Evaluation:

## 2024-04-08 NOTE — CASE MANAGEMENT/SOCIAL WORK
Discharge Planning Assessment  [facility]     Patient Name: Khalif Joshua  MRN: [id number]  Today's Date: 4/8/2024    Admit Date: 4/5/2024    Plan: Home   Discharge Needs Assessment       Row Name 04/08/24 0919       Living Environment    People in Home spouse    Name(s) of People in Home Eva Joshua    Current Living Arrangements home    Primary Care Provided by self    Provides Primary Care For no one    Family Caregiver if Needed spouse    Family Caregiver Names Eva Joshua    Quality of Family Relationships involved;supportive    Able to Return to Prior Arrangements yes       Transition Planning    Patient/Family Anticipates Transition to home with family    Patient/Family Anticipated Services at Transition none    Transportation Anticipated family or friend will provide       Discharge Needs Assessment    Readmission Within the Last 30 Days no previous admission in last 30 days    Equipment Currently Used at Home bp cuff                   Discharge Plan       Row Name 04/08/24 0920       Plan    Plan Home    Patient/Family in Agreement with Plan yes    Plan Comments Spoke with patient and spouse at bedside to initiate discharge planning, Patient lives with his wife in [facility]. Prior to admission, was not current with any home health agencies. Mr. Joshua is self emplyed and independent with all ADL's. Has a Bp cuff in home, but no other DME. Confirmed that PCP is Renee Liriano. Verified insurance is Medicare A&B. Patient has prescription coverage and has medications filled at [facility] pharmacy. Patient's plan is to return home at discharge, and spouse will trasnport. CM will continue to follow.    Final Discharge Disposition Code 01 - home or self-care                  Continued Care and Services - Admitted Since 4/5/2024    No active coordination exists for this encounter.          Demographic Summary       Row Name 04/08/24 0918       General Information    Admission Type inpatient    Arrived From  emergency department    Reason for Consult discharge planning    Preferred Language English       Contact Information    Permission Granted to Share Info With ;family/designee                   Functional Status       Row Name 04/08/24 0919       Functional Status    Usual Activity Tolerance good    Current Activity Tolerance moderate       Functional Status, IADL    Medications independent    Meal Preparation independent    Housekeeping independent    Laundry independent    Shopping independent       Employment/    Employment Status self-employed                   Psychosocial    No documentation.                  Abuse/Neglect    No documentation.                  Legal    No documentation.                  Substance Abuse    No documentation.                  Patient Forms    No documentation.                     Roc Arcos RN

## 2024-04-08 NOTE — PLAN OF CARE
Problem: Adult Inpatient Plan of Care  Goal: Absence of Hospital-Acquired Illness or Injury  Intervention: Identify and Manage Fall Risk  Recent Flowsheet Documentation  Taken 4/8/2024 0415 by Jean Hawk RN  Safety Promotion/Fall Prevention: safety round/check completed  Taken 4/8/2024 0215 by Jean Hawk RN  Safety Promotion/Fall Prevention: safety round/check completed  Taken 4/8/2024 0015 by Jean Hawk RN  Safety Promotion/Fall Prevention: safety round/check completed  Taken 4/7/2024 2215 by Jean Hawk RN  Safety Promotion/Fall Prevention: safety round/check completed  Taken 4/7/2024 2015 by Jean Hawk RN  Safety Promotion/Fall Prevention: safety round/check completed  Intervention: Prevent Skin Injury  Recent Flowsheet Documentation  Taken 4/8/2024 0415 by Jean Hawk RN  Body Position: position changed independently  Skin Protection: adhesive use limited  Taken 4/8/2024 0215 by Jean Hawk RN  Body Position: position changed independently  Skin Protection: adhesive use limited  Taken 4/8/2024 0015 by Jean Hawk RN  Body Position: position changed independently  Skin Protection: adhesive use limited  Taken 4/7/2024 2215 by Jean Hawk RN  Body Position: position changed independently  Skin Protection: adhesive use limited  Taken 4/7/2024 2015 by Jean Hawk RN  Body Position: position changed independently  Skin Protection: adhesive use limited  Intervention: Prevent and Manage VTE (Venous Thromboembolism) Risk  Recent Flowsheet Documentation  Taken 4/7/2024 2015 by Jean Hawk RN  Activity Management: up in chair  Range of Motion: active ROM (range of motion) encouraged  Goal: Optimal Comfort and Wellbeing  Intervention: Provide Person-Centered Care  Recent Flowsheet Documentation  Taken 4/7/2024 2015 by Jean Hawk RN  Trust Relationship/Rapport:   care explained   questions answered   reassurance provided    thoughts/feelings acknowledged     Problem: Activity Intolerance (Cardiovascular Surgery)  Goal: Improved Activity Tolerance  Intervention: Optimize Tolerance for Activity  Recent Flowsheet Documentation  Taken 4/7/2024 2015 by Jean Hawk RN  Environmental Support: calm environment promoted     Problem: Adjustment to Surgery (Cardiovascular Surgery)  Goal: Optimal Coping with Heart Surgery  Intervention: Support Psychosocial Response to Surgery  Recent Flowsheet Documentation  Taken 4/7/2024 2015 by Jean Hawk RN  Supportive Measures: active listening utilized  Family/Support System Care:   presence promoted   support provided     Problem: Cerebral Tissue Perfusion (Cardiovascular Surgery)  Goal: Optimal Cerebral Tissue Perfusion (Cardiovascular Surgery)  Intervention: Protect and Optimize Cerebral Perfusion  Recent Flowsheet Documentation  Taken 4/8/2024 0415 by Jean Hawk RN  Head of Bed (HOB) Positioning: HOB elevated  Taken 4/8/2024 0215 by Jean Hawk RN  Head of Bed (HOB) Positioning: HOB elevated  Taken 4/8/2024 0015 by Jean Hawk RN  Head of Bed (HOB) Positioning: HOB elevated  Taken 4/7/2024 2215 by Jean Hawk RN  Head of Bed (HOB) Positioning: HOB elevated  Taken 4/7/2024 2015 by Jean Hawk RN  Head of Bed (HOB) Positioning: HOB at 60-90 degrees     Problem: Ongoing Anesthesia Effects (Cardiovascular Surgery)  Goal: Anesthesia/Sedation Recovery  Intervention: Optimize Anesthesia Recovery  Recent Flowsheet Documentation  Taken 4/8/2024 0415 by Jean Hawk RN  Safety Promotion/Fall Prevention: safety round/check completed  Taken 4/8/2024 0215 by Jean Hawk RN  Safety Promotion/Fall Prevention: safety round/check completed  Taken 4/8/2024 0015 by Jean Hawk RN  Safety Promotion/Fall Prevention: safety round/check completed  Taken 4/7/2024 2215 by Jean Hawk RN  Safety Promotion/Fall Prevention: safety round/check  completed  Taken 4/7/2024 2015 by Jean Hawk RN  Safety Promotion/Fall Prevention: safety round/check completed     Problem: Pain (Cardiovascular Surgery)  Goal: Acceptable Pain Control  Intervention: Prevent or Manage Pain  Recent Flowsheet Documentation  Taken 4/7/2024 2015 by Jean Hawk RN  Diversional Activities:   smartphone   tablet   television     Problem: Respiratory Compromise (Cardiovascular Surgery)  Goal: Effective Oxygenation and Ventilation (Cardiovascular Surgery)  Intervention: Promote Airway Secretion Clearance  Recent Flowsheet Documentation  Taken 4/7/2024 2015 by Jean Hawk RN  Cough And Deep Breathing: done independently per patient     Problem: COPD (Chronic Obstructive Pulmonary Disease) Comorbidity  Goal: Maintenance of COPD Symptom Control  Intervention: Maintain COPD-Symptom Control  Recent Flowsheet Documentation  Taken 4/7/2024 2015 by Jean Hawk RN  Supportive Measures: active listening utilized     Problem: Osteoarthritis Comorbidity  Goal: Maintenance of Osteoarthritis Symptom Control  Intervention: Maintain Osteoarthritis Symptom Control  Recent Flowsheet Documentation  Taken 4/7/2024 2015 by Jean Hawk RN  Activity Management: up in chair     Problem: Pain Chronic (Persistent) (Comorbidity Management)  Goal: Acceptable Pain Control and Functional Ability  Intervention: Manage Persistent Pain  Recent Flowsheet Documentation  Taken 4/7/2024 2015 by Jean Hawk RN  Sleep/Rest Enhancement:   awakenings minimized   consistent schedule promoted  Intervention: Optimize Psychosocial Wellbeing  Recent Flowsheet Documentation  Taken 4/7/2024 2015 by Jean Hawk RN  Supportive Measures: active listening utilized  Diversional Activities:   smartphone   tablet   television  Family/Support System Care:   presence promoted   support provided   Goal Outcome Evaluation:

## 2024-04-08 NOTE — PLAN OF CARE
Goal Outcome Evaluation:   Pt is in NSR on monitor, vitals stable on room air. Heparin infusing at 17 units/kg/hr. Pt had bowel movement today.

## 2024-04-08 NOTE — PROGRESS NOTES
CTS Progress Note       LOS: 2 days   Patient Care Team:  Renee Liriano MD as PCP - General (Internal Medicine)    Chief Complaint: NSTEMI (non-ST elevated myocardial infarction)    Vital Signs:  Temp:  [97.6 °F (36.4 °C)-98.5 °F (36.9 °C)] 97.6 °F (36.4 °C)  Heart Rate:  [72-86] 76  Resp:  [16-18] 16  BP: (115-131)/(72-83) 120/74    Physical Exam: Pain-free alert conversant       Results:     Results from last 7 days   Lab Units 04/06/24  0347   WBC 10*3/mm3 7.90   HEMOGLOBIN g/dL 12.9*   HEMATOCRIT % 38.6   PLATELETS 10*3/mm3 217     Results from last 7 days   Lab Units 04/07/24  0109 04/06/24  0347   SODIUM mmol/L  --  133*   POTASSIUM mmol/L 4.2 3.6   CHLORIDE mmol/L  --  99   CO2 mmol/L  --  25.0   BUN mg/dL  --  20   CREATININE mg/dL  --  0.82   GLUCOSE mg/dL  --  99   CALCIUM mg/dL  --  9.2           Imaging Results (Last 24 Hours)       ** No results found for the last 24 hours. **            Assessment      NSTEMI (non-ST elevated myocardial infarction)    Coronary artery disease involving native heart    I spoke with patient at length regarding his upcoming coronary surgery risk of stroke bleed infection death and agrees to proceed.  We will continue the workup as above    Plan   As above    Please note that portions of this note were completed with a voice recognition program. Efforts were made to edit the dictations, but occasionally words are mistranscribed.    Jermaine Barlow MD  04/08/24  10:21 EDT

## 2024-04-08 NOTE — PROGRESS NOTES
HEPARIN INFUSION  Khalif Joshua is a  73 y.o. male receiving heparin infusion.     Therapy for (VTE/Cardiac):   Cardiac  Patient Weight: 90.7 kg  Initial Bolus (Y/N):   N  Any Bolus (Y/N):   Yes  Signs or Symptoms of Bleeding: None noted  Cardiac or Other (Not VTE)  Initial rate: 12 units/kg/hr (Max 1,000 units/hr)   Anti Xa Rebolus Infusion Hold time Change infusion Dose (Units/kg/hr) Next Anti Xa or aPTT Level Due   < 0.11 50 Units/kg  (4000 Units Max) None Increase by  3 Units/kg/hr 6 hours   0.11- 0.19 25 Units/kg  (2000 Units Max) None Increase by  2 Units/kg/hr 6 hours   0.2 - 0.29 0 None Increase by  1 Units/kg/hr 6 hours   0.3 - 0.5 0 None No Change 6 hours (after 2 consecutive levels in range check qAM)   0.51 - 0.6 0 None Decrease by  1 Units/kg/hr 6 hours   0.61 - 0.8 0 30 Minutes Decrease by  2 Units/kg/hr 6 hours   0.81 - 1 0 60 Minutes Decrease by  3 Units/kg/hr 6 hours   >1 0 Hold  After Anti Xa less than 0.5 decrease previous rate by  4 Units/kg/hr  Every 2 hours until Anti Xa  less than 0.5 then when infusion restarts in 6 hours     Recommend Xa every 6 hours.  Results from last 7 days   Lab Units 04/06/24  0347 04/05/24  1810 04/05/24  1027   INR   --  0.95  --    HEMOGLOBIN g/dL 12.9*  --  14.2   HEMATOCRIT % 38.6  --  42.8   PLATELETS 10*3/mm3 217  --  240        Date   Time   Anti-Xa Current Rate (Unit/kg/hr) Bolus   (Units) Rate Change   (Unit/kg/hr) New Rate (Unit/kg/hr) Next   Anti-Xa Comments  Pump Check Daily   4/5 1730 0.1 New Start -- +11 11 0400 D/w Toneya. TR band still on. Will call when starting infusion.    4/6 0347 0.11 11 2000 +2 13 1100 Discussed w/ nurse   4/6 0800  13 -- -- 13  Pump check   4/6 1041 0.23 13 -- +1 14 1800 Renny Olmos RN   4/6 1816 0.24 14 -- +1 15 0100 D/w RN   4/7 0109 0.19 15 2000 +2 17 1000 Nurse confirmed pump   4/7 1018 0.46 17 -- -- 17 1700 Renny Monique RN;      Pump check   4/7 1824 0.33 17 -- -- 17 0100 D/w RN   4/8 0044 0.35 17 -- -- 17 0600 Jean 3475  -[facility]   4/8 0630 0.36 17 -- -- 17 0600 CRUZ RN

## 2024-04-08 NOTE — PROGRESS NOTES
Pt. Referred for Phase II Cardiac Rehab. Staff discussed benefits of exercise, program protocol, and educational material provided. Teach back verified.  Patient is scheduled for CABG. Staff will follow-up post CABG to schedule Cardiac Rehab.

## 2024-04-09 ENCOUNTER — ANESTHESIA EVENT (OUTPATIENT)
Dept: PERIOP | Facility: HOSPITAL | Age: 74
End: 2024-04-09
Payer: MEDICARE

## 2024-04-09 LAB
ABO GROUP BLD: NORMAL
ABO GROUP BLD: NORMAL
BASOPHILS # BLD AUTO: 0.06 10*3/MM3 (ref 0–0.2)
BASOPHILS NFR BLD AUTO: 0.8 % (ref 0–1.5)
BLD GP AB SCN SERPL QL: NEGATIVE
COTININE UR QL SCN: NEGATIVE NG/ML
DEPRECATED RDW RBC AUTO: 46.1 FL (ref 37–54)
EOSINOPHIL # BLD AUTO: 0.15 10*3/MM3 (ref 0–0.4)
EOSINOPHIL NFR BLD AUTO: 1.9 % (ref 0.3–6.2)
ERYTHROCYTE [DISTWIDTH] IN BLOOD BY AUTOMATED COUNT: 13.3 % (ref 12.3–15.4)
HCT VFR BLD AUTO: 44 % (ref 37.5–51)
HGB BLD-MCNC: 14.5 G/DL (ref 13–17.7)
IMM GRANULOCYTES # BLD AUTO: 0.05 10*3/MM3 (ref 0–0.05)
IMM GRANULOCYTES NFR BLD AUTO: 0.6 % (ref 0–0.5)
LYMPHOCYTES # BLD AUTO: 2.75 10*3/MM3 (ref 0.7–3.1)
LYMPHOCYTES NFR BLD AUTO: 35.4 % (ref 19.6–45.3)
Lab: NORMAL
MCH RBC QN AUTO: 31.1 PG (ref 26.6–33)
MCHC RBC AUTO-ENTMCNC: 33 G/DL (ref 31.5–35.7)
MCV RBC AUTO: 94.4 FL (ref 79–97)
MONOCYTES # BLD AUTO: 0.77 10*3/MM3 (ref 0.1–0.9)
MONOCYTES NFR BLD AUTO: 9.9 % (ref 5–12)
NEUTROPHILS NFR BLD AUTO: 3.99 10*3/MM3 (ref 1.7–7)
NEUTROPHILS NFR BLD AUTO: 51.4 % (ref 42.7–76)
NRBC BLD AUTO-RTO: 0 /100 WBC (ref 0–0.2)
PA ADP PRP-ACNC: 189 PRU
PLATELET # BLD AUTO: 244 10*3/MM3 (ref 140–450)
PMV BLD AUTO: 10.2 FL (ref 6–12)
RBC # BLD AUTO: 4.66 10*6/MM3 (ref 4.14–5.8)
RH BLD: NEGATIVE
RH BLD: NEGATIVE
T&S EXPIRATION DATE: NORMAL
UFH PPP CHRO-ACNC: 0.27 IU/ML (ref 0.3–0.7)
UFH PPP CHRO-ACNC: 0.27 IU/ML (ref 0.3–0.7)
UFH PPP CHRO-ACNC: 0.28 IU/ML (ref 0.3–0.7)
WBC NRBC COR # BLD AUTO: 7.77 10*3/MM3 (ref 3.4–10.8)

## 2024-04-09 PROCEDURE — 85025 COMPLETE CBC W/AUTO DIFF WBC: CPT | Performed by: INTERNAL MEDICINE

## 2024-04-09 PROCEDURE — 86900 BLOOD TYPING SEROLOGIC ABO: CPT | Performed by: NURSE PRACTITIONER

## 2024-04-09 PROCEDURE — 86901 BLOOD TYPING SEROLOGIC RH(D): CPT | Performed by: NURSE PRACTITIONER

## 2024-04-09 PROCEDURE — 86923 COMPATIBILITY TEST ELECTRIC: CPT

## 2024-04-09 PROCEDURE — 85576 BLOOD PLATELET AGGREGATION: CPT | Performed by: NURSE PRACTITIONER

## 2024-04-09 PROCEDURE — 86900 BLOOD TYPING SEROLOGIC ABO: CPT

## 2024-04-09 PROCEDURE — 99232 SBSQ HOSP IP/OBS MODERATE 35: CPT | Performed by: INTERNAL MEDICINE

## 2024-04-09 PROCEDURE — 99024 POSTOP FOLLOW-UP VISIT: CPT | Performed by: THORACIC SURGERY (CARDIOTHORACIC VASCULAR SURGERY)

## 2024-04-09 PROCEDURE — 86901 BLOOD TYPING SEROLOGIC RH(D): CPT

## 2024-04-09 PROCEDURE — 85520 HEPARIN ASSAY: CPT

## 2024-04-09 PROCEDURE — 86850 RBC ANTIBODY SCREEN: CPT | Performed by: NURSE PRACTITIONER

## 2024-04-09 PROCEDURE — 25010000002 HEPARIN (PORCINE) 25000-0.45 UT/250ML-% SOLUTION

## 2024-04-09 RX ORDER — CHLORHEXIDINE GLUCONATE ORAL RINSE 1.2 MG/ML
15 SOLUTION DENTAL EVERY 12 HOURS
Status: COMPLETED | OUTPATIENT
Start: 2024-04-09 | End: 2024-04-10

## 2024-04-09 RX ORDER — SODIUM CHLORIDE 9 MG/ML
40 INJECTION, SOLUTION INTRAVENOUS AS NEEDED
Status: CANCELLED | OUTPATIENT
Start: 2024-04-09

## 2024-04-09 RX ORDER — FAMOTIDINE 10 MG/ML
20 INJECTION, SOLUTION INTRAVENOUS ONCE
Status: CANCELLED | OUTPATIENT
Start: 2024-04-09 | End: 2024-04-09

## 2024-04-09 RX ORDER — CHLORHEXIDINE GLUCONATE 500 MG/1
CLOTH TOPICAL EVERY 12 HOURS PRN
Status: DISCONTINUED | OUTPATIENT
Start: 2024-04-09 | End: 2024-04-10

## 2024-04-09 RX ORDER — SODIUM CHLORIDE 0.9 % (FLUSH) 0.9 %
10 SYRINGE (ML) INJECTION EVERY 12 HOURS SCHEDULED
Status: CANCELLED | OUTPATIENT
Start: 2024-04-09

## 2024-04-09 RX ORDER — IPRATROPIUM BROMIDE AND ALBUTEROL SULFATE 2.5; .5 MG/3ML; MG/3ML
3 SOLUTION RESPIRATORY (INHALATION) EVERY 4 HOURS PRN
Status: DISCONTINUED | OUTPATIENT
Start: 2024-04-09 | End: 2024-04-17 | Stop reason: HOSPADM

## 2024-04-09 RX ADMIN — HEPARIN SODIUM 17 UNITS/KG/HR: 10000 INJECTION, SOLUTION INTRAVENOUS at 07:38

## 2024-04-09 RX ADMIN — BISOPROLOL FUMARATE 2.5 MG: 5 TABLET, FILM COATED ORAL at 09:27

## 2024-04-09 RX ADMIN — 0.12% CHLORHEXIDINE GLUCONATE 15 ML: 1.2 RINSE ORAL at 18:09

## 2024-04-09 RX ADMIN — LOSARTAN POTASSIUM 100 MG: 50 TABLET, FILM COATED ORAL at 09:27

## 2024-04-09 RX ADMIN — Medication 10 ML: at 09:32

## 2024-04-09 RX ADMIN — ASPIRIN 325 MG: 325 TABLET ORAL at 09:27

## 2024-04-09 RX ADMIN — HEPARIN SODIUM 18 UNITS/KG/HR: 10000 INJECTION, SOLUTION INTRAVENOUS at 09:30

## 2024-04-09 RX ADMIN — HYDROCHLOROTHIAZIDE 12.5 MG: 12.5 CAPSULE ORAL at 09:27

## 2024-04-09 RX ADMIN — MUPIROCIN 1 APPLICATION: 20 OINTMENT TOPICAL at 18:09

## 2024-04-09 RX ADMIN — ROSUVASTATIN 40 MG: 20 TABLET, FILM COATED ORAL at 21:11

## 2024-04-09 RX ADMIN — SENNOSIDES AND DOCUSATE SODIUM 2 TABLET: 8.6; 5 TABLET ORAL at 09:27

## 2024-04-09 NOTE — PROGRESS NOTES
HEPARIN INFUSION  Khalif Joshua is a  73 y.o. male receiving heparin infusion.     Therapy for (VTE/Cardiac):   Cardiac  Patient Weight: 90.7 kg  Initial Bolus (Y/N):   N  Any Bolus (Y/N):   Yes  Signs or Symptoms of Bleeding: None noted  Cardiac or Other (Not VTE)  Initial rate: 12 units/kg/hr (Max 1,000 units/hr)   Anti Xa Rebolus Infusion Hold time Change infusion Dose (Units/kg/hr) Next Anti Xa or aPTT Level Due   < 0.11 50 Units/kg  (4000 Units Max) None Increase by  3 Units/kg/hr 6 hours   0.11- 0.19 25 Units/kg  (2000 Units Max) None Increase by  2 Units/kg/hr 6 hours   0.2 - 0.29 0 None Increase by  1 Units/kg/hr 6 hours   0.3 - 0.5 0 None No Change 6 hours (after 2 consecutive levels in range check qAM)   0.51 - 0.6 0 None Decrease by  1 Units/kg/hr 6 hours   0.61 - 0.8 0 30 Minutes Decrease by  2 Units/kg/hr 6 hours   0.81 - 1 0 60 Minutes Decrease by  3 Units/kg/hr 6 hours   >1 0 Hold  After Anti Xa less than 0.5 decrease previous rate by  4 Units/kg/hr  Every 2 hours until Anti Xa  less than 0.5 then when infusion restarts in 6 hours     Recommend Xa every 6 hours.  Results from last 7 days   Lab Units 04/06/24  0347 04/05/24  1810 04/05/24  1027   INR   --  0.95  --    HEMOGLOBIN g/dL 12.9*  --  14.2   HEMATOCRIT % 38.6  --  42.8   PLATELETS 10*3/mm3 217  --  240        Date   Time   Anti-Xa Current Rate (Unit/kg/hr) Bolus   (Units) Rate Change   (Unit/kg/hr) New Rate (Unit/kg/hr) Next   Anti-Xa Comments  Pump Check Daily   4/5 1730 0.1 New Start -- +11 11 0400 D/w Toneya. TR band still on. Will call when starting infusion.    4/6 0347 0.11 11 2000 +2 13 1100 Discussed w/ nurse   4/6 0800  13 -- -- 13  Pump check   4/6 1041 0.23 13 -- +1 14 1800 Renny Olmos RN   4/6 1816 0.24 14 -- +1 15 0100 D/w RN   4/7 0109 0.19 15 2000 +2 17 1000 Nurse confirmed pump   4/7 1018 0.46 17 -- -- 17 1700 Renny Monique RN;      Pump check   4/7 1824 0.33 17 -- -- 17 0100 D/w RN   4/8 0044 0.35 17 -- -- 17 0600 Jean 3094  -[facility]   4/8 0630 0.36 17 -- -- 17 0600 DW RN   4/9 0841 0.27 17 -- +1 18 1600 D/w RN

## 2024-04-09 NOTE — PLAN OF CARE
Goal Outcome Evaluation:              Outcome Evaluation: VSS; O2 > 90% on room air; A/O x4; heparine gtt @18.  Pt resting in chair; family at bedside; call light within reach.

## 2024-04-09 NOTE — STS RISK SCORE
Procedure Type: Isolated CABG  PERIOPERATIVE OUTCOME ESTIMATE %  Operative Mortality 1.53%  Morbidity & Mortality 7.17%  Stroke 1.28%  Renal Failure 0.871%  Reoperation 2.5%  Prolonged Ventilation 3.83%  Deep Sternal Wound Infection 0.135%  Long Hospital Stay (>14 days) 4.15%  Short Hospital Stay (<6 days)* 48.9%        Chronic Comorbid Conditions relative to CABG include:  Cardiovascular: Coronary Artery Disease, HFrEF, Hypertension, and NYHA Class I- No Symptoms  Respiratory: None  Endocrine: Type 2 Diabetes  Nephrology: None  Hematology: None   Other: None

## 2024-04-09 NOTE — PROGRESS NOTES
Khalif Joshua  [id number]  1950   LOS: 3 days   Patient Care Team:  Renee Liriano MD as PCP - General (Internal Medicine)    Chief Complaint:  Follow-up on NSTEMI, MV CAD     Subjective Patient with upcoming CABG.  He had a little chest pain last night when he was lying down but it gets better when he sits up.  He denies any nitroglycerin use for the chest pain.  He also denies any shortness of breath, palpitations, or dizziness.  He is hoping that his CABG will be tomorrow.    Update 4/9/24  CABG planned for tomorrow.      Objective     Vital Sign Min/Max for last 24 hours  Temp  Min: 97.2 °F (36.2 °C)  Max: 97.9 °F (36.6 °C)   BP  Min: 109/72  Max: 125/69   Pulse  Min: 72  Max: 82   Resp  Min: 16  Max: 18   No data recorded   No data recorded   No data recorded         04/06/24  1434 04/07/24  1703   Weight: 90.7 kg (199 lb 15.3 oz) 90.7 kg (199 lb 15.3 oz)       No intake or output data in the 24 hours ending 04/09/24 1240      Physical Exam:     General Appearance:    Alert, cooperative, in no acute distress   Lungs:     Clear to auscultation,respirations regular, even and                unlabored    Heart:    Regular and normal rate, normal S1 and S2, no            murmur, no gallop, no rub, no click   Abdomen:  Extremities:   Soft, nontender, bowel sounds audible x4    No edema, normal range of motion   Pulses:   Pulses palpable and equal bilaterally    Right radial artery cath site CDI no evidence of hematoma   Results Review:   Results from last 7 days   Lab Units 04/07/24  0109 04/06/24  0347 04/05/24  1027   SODIUM mmol/L  --  133* 135*   POTASSIUM mmol/L 4.2 3.6 4.3   CHLORIDE mmol/L  --  99 100   CO2 mmol/L  --  25.0 26.0   BUN mg/dL  --  20 22   CREATININE mg/dL  --  0.82 0.83   GLUCOSE mg/dL  --  99 90   CALCIUM mg/dL  --  9.2 10.2     Results from last 7 days   Lab Units 04/09/24  0727 04/06/24  0347 04/05/24  1027   WBC 10*3/mm3 7.77 7.90 8.53   HEMOGLOBIN g/dL 14.5 12.9* 14.2   HEMATOCRIT  % 44.0 38.6 42.8   PLATELETS 10*3/mm3 244 217 240             Results from last 7 days   Lab Units 04/05/24  1247 04/05/24  1027   HSTROP T ng/L 53* 54*   Left heart catheterization 4/05/2024:  1) Severe, calcified coronary disease involving the LM, LAD, D1, Ramus, LCX and RCA  2) Normal LVEDP of 13mmHg   3) Peak-to-peak gradient of 33mmHg on pullback across the AV   Consult to CTS for consideration of CABG (LAD, D1, ramus, OM1, RCA)     Echocardiogram and carotid duplex 4/6/2024 pending    Chest x-ray 4/5/2024:  No acute process.     ECG 4/5/2024:  Normal sinus rhythm  Inferior infarct (cited on or before 05-APR-2024)  Cannot rule out Anterior infarct (cited on or before 05-APR-2024)  Abnormal ECG  When compared with ECG of 05-APR-2024 10:18,  No significant change was found  Confirmed by ORLIN LOZANO MD ([id number]) on 4/5/2024 3:41:32 PM    Medication Review: Reviewed, heparin GTT at 17 units/kg/h    Assessment & Plan   Patient with NSTEMI with heart cath showing MV CAD with recommendations for CABG, to be performed early this week per CTS schedule.  Continue aspirin 325 mg daily, losartan 100 mg daily, rosuvastatin 40 mg nightly, HCTZ 12.5 mg daily, bisoprolol 2.5 mg daily, heparin gtt.

## 2024-04-09 NOTE — PROGRESS NOTES
[facility] Cardiothoracic Surgery In-Patient Progress Note       LOS: 3 days     Chief Complaint: Coronary artery disease    Subjective  Up in chair.  No current chest pain.  Remains on heparin gtt.        Objective  Vital Signs  Temp:  [97.9 °F (36.6 °C)-98 °F (36.7 °C)] 97.9 °F (36.6 °C)  Heart Rate:  [67-79] 78  Resp:  [16-18] 16  BP: (101-120)/(64-78) 109/72        Physical Exam:   General Appearance: alert, appears stated age and cooperative   Lungs: clear to auscultation, respirations regular, respirations even, and respirations unlabored   Heart: regular rhythm & normal rate, normal S1, S2, no murmur, no gallop, no rub, and no click   Extr:  +2/4 bilateral pedal pulses.  No peripheral edema.  Superficial ecchymosis @ right radial cath site.  Radial pulses +2 bilaterally.       Results     Results from last 7 days   Lab Units 04/06/24  0347   WBC 10*3/mm3 7.90   HEMOGLOBIN g/dL 12.9*   HEMATOCRIT % 38.6   PLATELETS 10*3/mm3 217     Results from last 7 days   Lab Units 04/07/24  0109 04/06/24  0347   SODIUM mmol/L  --  133*   POTASSIUM mmol/L 4.2 3.6   CHLORIDE mmol/L  --  99   CO2 mmol/L  --  25.0   BUN mg/dL  --  20   CREATININE mg/dL  --  0.82   GLUCOSE mg/dL  --  99   CALCIUM mg/dL  --  9.2     Cardiac Cath 4/5/24 Conclusion (Dr. Pineda)     Severe, calcified coronary disease involving the LM, LAD, D1, Ramus, LCX and RCA    Normal LVEDP of 13mmHg    Peak-to-peak gradient of 33mmHg on pullback across the AV    Consult to CTS for consideration of CABG (LAD, D1, ramus, OM1, RCA)    TTE 4/7/24 Interpretation Summary     Left ventricular systolic function is mildly decreased. Calculated left ventricular EF = 46.9% Left ventricular ejection fraction appears to be 46 - 50%.  wall motion not well seen and endocardial border not well seen.  would recomend limited contrast echo    Left ventricular diastolic function is consistent with (grade I) impaired relaxation.    The right ventricular cavity is mildly  dilated.    Estimated right ventricular systolic pressure from tricuspid regurgitation is normal (<35 mmHg).    Limited TTE 4/7/24 Interpretation Summary     Left ventricular systolic function is normal. Calculated left ventricular EF = 56.7%    Carotid Duplex 4/7/24 Interpretation Summary     Right internal carotid artery demonstrates a less than 50% stenosis.    Left internal carotid artery demonstrates a less than 50% stenosis.    Mild heterogeneous carotid plaque bilaterally.    Antegrade vetebral flow bilaterally.    P2Y12 4/6/24  P2Y12 Reactivity Unit     191      Assessment    NSTEMI (non-ST elevated myocardial infarction)    Coronary artery disease involving native heart      Plan   Preop for CABG tomorrow with Dr. Barlow  Reviewed post operative expectations  N.p.o. after midnight    Sayra Wallace, MISTI  04/09/24  07:24 EDT

## 2024-04-10 ENCOUNTER — ANESTHESIA (OUTPATIENT)
Dept: PERIOP | Facility: HOSPITAL | Age: 74
End: 2024-04-10
Payer: MEDICARE

## 2024-04-10 ENCOUNTER — ANESTHESIA EVENT CONVERTED (OUTPATIENT)
Dept: ANESTHESIOLOGY | Facility: HOSPITAL | Age: 74
End: 2024-04-10
Payer: MEDICARE

## 2024-04-10 ENCOUNTER — APPOINTMENT (OUTPATIENT)
Dept: GENERAL RADIOLOGY | Facility: HOSPITAL | Age: 74
End: 2024-04-10
Payer: MEDICARE

## 2024-04-10 PROBLEM — I10 ESSENTIAL HYPERTENSION: Chronic | Status: ACTIVE | Noted: 2021-10-30

## 2024-04-10 PROBLEM — E78.5 DYSLIPIDEMIA: Chronic | Status: ACTIVE | Noted: 2021-10-30

## 2024-04-10 PROBLEM — I50.40 COMBINED SYSTOLIC AND DIASTOLIC CONGESTIVE HEART FAILURE: Status: ACTIVE | Noted: 2024-04-10

## 2024-04-10 PROBLEM — E11.9 T2DM (TYPE 2 DIABETES MELLITUS): Chronic | Status: ACTIVE | Noted: 2024-04-10

## 2024-04-10 LAB
ACT BLD: 142 SECONDS (ref 82–152)
ALBUMIN SERPL-MCNC: 4.1 G/DL (ref 3.5–5.2)
ALBUMIN SERPL-MCNC: 4.4 G/DL (ref 3.5–5.2)
AMPHET+METHAMPHET UR QL: NEGATIVE
AMPHETAMINES UR QL: NEGATIVE
ANION GAP SERPL CALCULATED.3IONS-SCNC: 12 MMOL/L (ref 5–15)
ANION GAP SERPL CALCULATED.3IONS-SCNC: 15 MMOL/L (ref 5–15)
ANION GAP SERPL CALCULATED.3IONS-SCNC: 19 MMOL/L (ref 5–15)
APTT PPP: 31.9 SECONDS (ref 22–39)
ARTERIAL PATENCY WRIST A: ABNORMAL
ARTERIAL PATENCY WRIST A: ABNORMAL
ATMOSPHERIC PRESS: ABNORMAL MM[HG]
ATMOSPHERIC PRESS: ABNORMAL MM[HG]
BARBITURATES UR QL SCN: NEGATIVE
BASE EXCESS BLDA CALC-SCNC: -1.6 MMOL/L (ref 0–2)
BASE EXCESS BLDA CALC-SCNC: -2.7 MMOL/L (ref 0–2)
BDY SITE: ABNORMAL
BDY SITE: ABNORMAL
BENZODIAZ UR QL SCN: NEGATIVE
BODY TEMPERATURE: 37
BODY TEMPERATURE: 37
BUN SERPL-MCNC: 21 MG/DL (ref 8–23)
BUN SERPL-MCNC: 21 MG/DL (ref 8–23)
BUN SERPL-MCNC: 23 MG/DL (ref 8–23)
BUN/CREAT SERPL: 17.6 (ref 7–25)
BUN/CREAT SERPL: 18.3 (ref 7–25)
BUN/CREAT SERPL: 28 (ref 7–25)
BUPRENORPHINE SERPL-MCNC: NEGATIVE NG/ML
CA-I SERPL ISE-MCNC: 1.2 MMOL/L (ref 1.12–1.32)
CALCIUM SPEC-SCNC: 9.1 MG/DL (ref 8.6–10.5)
CALCIUM SPEC-SCNC: 9.3 MG/DL (ref 8.6–10.5)
CALCIUM SPEC-SCNC: 9.6 MG/DL (ref 8.6–10.5)
CANNABINOIDS SERPL QL: NEGATIVE
CHLORIDE SERPL-SCNC: 101 MMOL/L (ref 98–107)
CHLORIDE SERPL-SCNC: 104 MMOL/L (ref 98–107)
CHLORIDE SERPL-SCNC: 96 MMOL/L (ref 98–107)
CO2 BLDA-SCNC: 23.7 MMOL/L (ref 22–33)
CO2 BLDA-SCNC: 24.1 MMOL/L (ref 22–33)
CO2 SERPL-SCNC: 21 MMOL/L (ref 22–29)
CO2 SERPL-SCNC: 21 MMOL/L (ref 22–29)
CO2 SERPL-SCNC: 24 MMOL/L (ref 22–29)
COCAINE UR QL: NEGATIVE
COHGB MFR BLD: 0.8 % (ref 0–2)
COHGB MFR BLD: 1.1 % (ref 0–2)
CREAT SERPL-MCNC: 0.82 MG/DL (ref 0.76–1.27)
CREAT SERPL-MCNC: 1.15 MG/DL (ref 0.76–1.27)
CREAT SERPL-MCNC: 1.19 MG/DL (ref 0.76–1.27)
DEPRECATED RDW RBC AUTO: 46.5 FL (ref 37–54)
DEPRECATED RDW RBC AUTO: 46.5 FL (ref 37–54)
DEPRECATED RDW RBC AUTO: 48.2 FL (ref 37–54)
EGFRCR SERPLBLD CKD-EPI 2021: 64.9 ML/MIN/1.73
EGFRCR SERPLBLD CKD-EPI 2021: 67.6 ML/MIN/1.73
EGFRCR SERPLBLD CKD-EPI 2021: 93.3 ML/MIN/1.73
EPAP: 0
EPAP: 0
ERYTHROCYTE [DISTWIDTH] IN BLOOD BY AUTOMATED COUNT: 13.4 % (ref 12.3–15.4)
ERYTHROCYTE [DISTWIDTH] IN BLOOD BY AUTOMATED COUNT: 13.6 % (ref 12.3–15.4)
ERYTHROCYTE [DISTWIDTH] IN BLOOD BY AUTOMATED COUNT: 13.7 % (ref 12.3–15.4)
FENTANYL UR-MCNC: NEGATIVE NG/ML
GLUCOSE BLDC GLUCOMTR-MCNC: 104 MG/DL (ref 70–130)
GLUCOSE BLDC GLUCOMTR-MCNC: 112 MG/DL (ref 70–130)
GLUCOSE BLDC GLUCOMTR-MCNC: 118 MG/DL (ref 70–130)
GLUCOSE BLDC GLUCOMTR-MCNC: 138 MG/DL (ref 70–130)
GLUCOSE BLDC GLUCOMTR-MCNC: 154 MG/DL (ref 70–130)
GLUCOSE BLDC GLUCOMTR-MCNC: 89 MG/DL (ref 70–130)
GLUCOSE BLDC GLUCOMTR-MCNC: 99 MG/DL (ref 70–130)
GLUCOSE SERPL-MCNC: 101 MG/DL (ref 65–99)
GLUCOSE SERPL-MCNC: 107 MG/DL (ref 65–99)
GLUCOSE SERPL-MCNC: 140 MG/DL (ref 65–99)
HCO3 BLDA-SCNC: 22.5 MMOL/L (ref 20–26)
HCO3 BLDA-SCNC: 22.9 MMOL/L (ref 20–26)
HCT VFR BLD AUTO: 32.3 % (ref 37.5–51)
HCT VFR BLD AUTO: 32.7 % (ref 37.5–51)
HCT VFR BLD AUTO: 41.1 % (ref 37.5–51)
HCT VFR BLD CALC: 32.1 % (ref 38–51)
HCT VFR BLD CALC: 32.5 % (ref 38–51)
HGB BLD-MCNC: 10.8 G/DL (ref 13–17.7)
HGB BLD-MCNC: 11 G/DL (ref 13–17.7)
HGB BLD-MCNC: 13.6 G/DL (ref 13–17.7)
HGB BLDA-MCNC: 10.5 G/DL (ref 13.5–17.5)
HGB BLDA-MCNC: 10.6 G/DL (ref 13.5–17.5)
INHALED O2 CONCENTRATION: 100 %
INHALED O2 CONCENTRATION: 40 %
INR PPP: 1.46 (ref 0.89–1.12)
IPAP: 0
IPAP: 0
MAGNESIUM SERPL-MCNC: 2.1 MG/DL (ref 1.6–2.4)
MAGNESIUM SERPL-MCNC: 2.1 MG/DL (ref 1.6–2.4)
MCH RBC QN AUTO: 31 PG (ref 26.6–33)
MCH RBC QN AUTO: 31.4 PG (ref 26.6–33)
MCH RBC QN AUTO: 32.3 PG (ref 26.6–33)
MCHC RBC AUTO-ENTMCNC: 33.1 G/DL (ref 31.5–35.7)
MCHC RBC AUTO-ENTMCNC: 33.4 G/DL (ref 31.5–35.7)
MCHC RBC AUTO-ENTMCNC: 33.6 G/DL (ref 31.5–35.7)
MCV RBC AUTO: 93.6 FL (ref 79–97)
MCV RBC AUTO: 93.9 FL (ref 79–97)
MCV RBC AUTO: 95.9 FL (ref 79–97)
METHADONE UR QL SCN: NEGATIVE
METHGB BLD QL: 0.5 % (ref 0–1.5)
METHGB BLD QL: 0.6 % (ref 0–1.5)
MODALITY: ABNORMAL
MODALITY: ABNORMAL
OPIATES UR QL: NEGATIVE
OXYCODONE UR QL SCN: NEGATIVE
OXYHGB MFR BLDV: 97.5 % (ref 94–99)
OXYHGB MFR BLDV: 98.9 % (ref 94–99)
PA ADP PRP-ACNC: 181 PRU
PAW @ PEAK INSP FLOW SETTING VENT: 0 CMH2O
PAW @ PEAK INSP FLOW SETTING VENT: 0 CMH2O
PCO2 BLDA: 37.3 MM HG (ref 35–45)
PCO2 BLDA: 39.4 MM HG (ref 35–45)
PCO2 TEMP ADJ BLD: 37.3 MM HG (ref 35–48)
PCO2 TEMP ADJ BLD: 39.4 MM HG (ref 35–48)
PCP UR QL SCN: NEGATIVE
PEEP RESPIRATORY: 5 CM[H2O]
PEEP RESPIRATORY: 5 CM[H2O]
PH BLDA: 7.37 PH UNITS (ref 7.35–7.45)
PH BLDA: 7.4 PH UNITS (ref 7.35–7.45)
PH, TEMP CORRECTED: 7.37 PH UNITS
PH, TEMP CORRECTED: 7.4 PH UNITS
PHOSPHATE SERPL-MCNC: 3.9 MG/DL (ref 2.5–4.5)
PHOSPHATE SERPL-MCNC: 4.2 MG/DL (ref 2.5–4.5)
PLATELET # BLD AUTO: 200 10*3/MM3 (ref 140–450)
PLATELET # BLD AUTO: 236 10*3/MM3 (ref 140–450)
PLATELET # BLD AUTO: 265 10*3/MM3 (ref 140–450)
PMV BLD AUTO: 10 FL (ref 6–12)
PMV BLD AUTO: 10.1 FL (ref 6–12)
PMV BLD AUTO: 10.4 FL (ref 6–12)
PO2 BLDA: 121 MM HG (ref 83–108)
PO2 BLDA: 251 MM HG (ref 83–108)
PO2 TEMP ADJ BLD: 121 MM HG (ref 83–108)
PO2 TEMP ADJ BLD: 251 MM HG (ref 83–108)
POTASSIUM SERPL-SCNC: 4.1 MMOL/L (ref 3.5–5.2)
POTASSIUM SERPL-SCNC: 4.2 MMOL/L (ref 3.5–5.2)
POTASSIUM SERPL-SCNC: 4.3 MMOL/L (ref 3.5–5.2)
PROTHROMBIN TIME: 17.9 SECONDS (ref 12.2–14.5)
PSV: 10 CMH2O
PSV: 10 CMH2O
RBC # BLD AUTO: 3.41 10*6/MM3 (ref 4.14–5.8)
RBC # BLD AUTO: 3.44 10*6/MM3 (ref 4.14–5.8)
RBC # BLD AUTO: 4.39 10*6/MM3 (ref 4.14–5.8)
SODIUM SERPL-SCNC: 135 MMOL/L (ref 136–145)
SODIUM SERPL-SCNC: 137 MMOL/L (ref 136–145)
SODIUM SERPL-SCNC: 141 MMOL/L (ref 136–145)
TOTAL RATE: 14 BREATHS/MINUTE
TOTAL RATE: 14 BREATHS/MINUTE
TRICYCLICS UR QL SCN: NEGATIVE
VENTILATOR MODE: ABNORMAL
VENTILATOR MODE: ABNORMAL
VT ON VENT VENT: 0.75 ML
WBC NRBC COR # BLD AUTO: 18.67 10*3/MM3 (ref 3.4–10.8)
WBC NRBC COR # BLD AUTO: 23.08 10*3/MM3 (ref 3.4–10.8)
WBC NRBC COR # BLD AUTO: 8.14 10*3/MM3 (ref 3.4–10.8)

## 2024-04-10 PROCEDURE — 94799 UNLISTED PULMONARY SVC/PX: CPT

## 2024-04-10 PROCEDURE — 5A1221Z PERFORMANCE OF CARDIAC OUTPUT, CONTINUOUS: ICD-10-PCS | Performed by: THORACIC SURGERY (CARDIOTHORACIC VASCULAR SURGERY)

## 2024-04-10 PROCEDURE — 25810000003 SODIUM CHLORIDE PER 500 ML: Performed by: THORACIC SURGERY (CARDIOTHORACIC VASCULAR SURGERY)

## 2024-04-10 PROCEDURE — 85347 COAGULATION TIME ACTIVATED: CPT

## 2024-04-10 PROCEDURE — 85014 HEMATOCRIT: CPT

## 2024-04-10 PROCEDURE — 80305 DRUG TEST PRSMV DIR OPT OBS: CPT | Performed by: NURSE PRACTITIONER

## 2024-04-10 PROCEDURE — 25010000002 MIDAZOLAM PER 1 MG: Performed by: ANESTHESIOLOGY

## 2024-04-10 PROCEDURE — 25010000002 PROTAMINE SULFATE PER 10 MG

## 2024-04-10 PROCEDURE — 80307 DRUG TEST PRSMV CHEM ANLYZR: CPT | Performed by: NURSE PRACTITIONER

## 2024-04-10 PROCEDURE — 93005 ELECTROCARDIOGRAM TRACING: CPT | Performed by: PHYSICIAN ASSISTANT

## 2024-04-10 PROCEDURE — 82330 ASSAY OF CALCIUM: CPT

## 2024-04-10 PROCEDURE — 94010 BREATHING CAPACITY TEST: CPT | Performed by: INTERNAL MEDICINE

## 2024-04-10 PROCEDURE — 25810000003 LACTATED RINGERS PER 1000 ML: Performed by: ANESTHESIOLOGY

## 2024-04-10 PROCEDURE — 33533 CABG ARTERIAL SINGLE: CPT | Performed by: THORACIC SURGERY (CARDIOTHORACIC VASCULAR SURGERY)

## 2024-04-10 PROCEDURE — 25010000002 PROTAMINE SULFATE PER 10 MG: Performed by: ANESTHESIOLOGY

## 2024-04-10 PROCEDURE — 82948 REAGENT STRIP/BLOOD GLUCOSE: CPT

## 2024-04-10 PROCEDURE — 25010000002 FENTANYL CITRATE (PF) 50 MCG/ML SOLUTION: Performed by: THORACIC SURGERY (CARDIOTHORACIC VASCULAR SURGERY)

## 2024-04-10 PROCEDURE — 25010000002 ALBUMIN HUMAN 5% PER 50 ML

## 2024-04-10 PROCEDURE — 25010000002 GENTAMICIN PER 80 MG: Performed by: THORACIC SURGERY (CARDIOTHORACIC VASCULAR SURGERY)

## 2024-04-10 PROCEDURE — 25010000002 HEPARIN (PORCINE) PER 1000 UNITS: Performed by: THORACIC SURGERY (CARDIOTHORACIC VASCULAR SURGERY)

## 2024-04-10 PROCEDURE — P9100 PATHOGEN TEST FOR PLATELETS: HCPCS

## 2024-04-10 PROCEDURE — 25010000002 PAPAVERINE PER 60 MG: Performed by: THORACIC SURGERY (CARDIOTHORACIC VASCULAR SURGERY)

## 2024-04-10 PROCEDURE — 99233 SBSQ HOSP IP/OBS HIGH 50: CPT | Performed by: INTERNAL MEDICINE

## 2024-04-10 PROCEDURE — 25010000002 CEFAZOLIN PER 500 MG: Performed by: ANESTHESIOLOGY

## 2024-04-10 PROCEDURE — 83050 HGB METHEMOGLOBIN QUAN: CPT

## 2024-04-10 PROCEDURE — 33508 ENDOSCOPIC VEIN HARVEST: CPT | Performed by: PHYSICIAN ASSISTANT

## 2024-04-10 PROCEDURE — 82330 ASSAY OF CALCIUM: CPT | Performed by: PHYSICIAN ASSISTANT

## 2024-04-10 PROCEDURE — 021209W BYPASS CORONARY ARTERY, THREE ARTERIES FROM AORTA WITH AUTOLOGOUS VENOUS TISSUE, OPEN APPROACH: ICD-10-PCS | Performed by: THORACIC SURGERY (CARDIOTHORACIC VASCULAR SURGERY)

## 2024-04-10 PROCEDURE — 25010000002 CEFAZOLIN PER 500 MG: Performed by: THORACIC SURGERY (CARDIOTHORACIC VASCULAR SURGERY)

## 2024-04-10 PROCEDURE — 33508 ENDOSCOPIC VEIN HARVEST: CPT | Performed by: THORACIC SURGERY (CARDIOTHORACIC VASCULAR SURGERY)

## 2024-04-10 PROCEDURE — P9037 PLATE PHERES LEUKOREDU IRRAD: HCPCS

## 2024-04-10 PROCEDURE — 25010000002 FENTANYL CITRATE (PF) 1000 MCG/20ML SOLUTION: Performed by: ANESTHESIOLOGY

## 2024-04-10 PROCEDURE — 85730 THROMBOPLASTIN TIME PARTIAL: CPT | Performed by: PHYSICIAN ASSISTANT

## 2024-04-10 PROCEDURE — 82375 ASSAY CARBOXYHB QUANT: CPT

## 2024-04-10 PROCEDURE — 71045 X-RAY EXAM CHEST 1 VIEW: CPT

## 2024-04-10 PROCEDURE — 25810000003 SODIUM CHLORIDE 0.9 % SOLUTION: Performed by: ANESTHESIOLOGY

## 2024-04-10 PROCEDURE — 82805 BLOOD GASES W/O2 SATURATION: CPT

## 2024-04-10 PROCEDURE — 85027 COMPLETE CBC AUTOMATED: CPT | Performed by: NURSE PRACTITIONER

## 2024-04-10 PROCEDURE — 33519 CABG ARTERY-VEIN THREE: CPT | Performed by: PHYSICIAN ASSISTANT

## 2024-04-10 PROCEDURE — 36430 TRANSFUSION BLD/BLD COMPNT: CPT

## 2024-04-10 PROCEDURE — 33533 CABG ARTERIAL SINGLE: CPT | Performed by: PHYSICIAN ASSISTANT

## 2024-04-10 PROCEDURE — 80048 BASIC METABOLIC PNL TOTAL CA: CPT | Performed by: NURSE PRACTITIONER

## 2024-04-10 PROCEDURE — P9041 ALBUMIN (HUMAN),5%, 50ML: HCPCS

## 2024-04-10 PROCEDURE — 85576 BLOOD PLATELET AGGREGATION: CPT | Performed by: NURSE PRACTITIONER

## 2024-04-10 PROCEDURE — 33519 CABG ARTERY-VEIN THREE: CPT | Performed by: THORACIC SURGERY (CARDIOTHORACIC VASCULAR SURGERY)

## 2024-04-10 PROCEDURE — C1894 INTRO/SHEATH, NON-LASER: HCPCS | Performed by: THORACIC SURGERY (CARDIOTHORACIC VASCULAR SURGERY)

## 2024-04-10 PROCEDURE — 25010000002 PHENYLEPHRINE 10 MG/ML SOLUTION: Performed by: ANESTHESIOLOGY

## 2024-04-10 PROCEDURE — 80069 RENAL FUNCTION PANEL: CPT | Performed by: PHYSICIAN ASSISTANT

## 2024-04-10 PROCEDURE — 02100Z9 BYPASS CORONARY ARTERY, ONE ARTERY FROM LEFT INTERNAL MAMMARY, OPEN APPROACH: ICD-10-PCS | Performed by: THORACIC SURGERY (CARDIOTHORACIC VASCULAR SURGERY)

## 2024-04-10 PROCEDURE — 94002 VENT MGMT INPAT INIT DAY: CPT

## 2024-04-10 PROCEDURE — 25010000002 CEFAZOLIN PER 500 MG: Performed by: PHYSICIAN ASSISTANT

## 2024-04-10 PROCEDURE — 25010000002 ALBUMIN HUMAN 5% PER 50 ML: Performed by: PHYSICIAN ASSISTANT

## 2024-04-10 PROCEDURE — P9035 PLATELET PHERES LEUKOREDUCED: HCPCS

## 2024-04-10 PROCEDURE — P9041 ALBUMIN (HUMAN),5%, 50ML: HCPCS | Performed by: ANESTHESIOLOGY

## 2024-04-10 PROCEDURE — 25010000002 CEFAZOLIN PER 500 MG: Performed by: NURSE PRACTITIONER

## 2024-04-10 PROCEDURE — 25810000003 DEXTROSE 5 % WITH KCL 20 MEQ 20 MEQ/L SOLUTION: Performed by: PHYSICIAN ASSISTANT

## 2024-04-10 PROCEDURE — 85027 COMPLETE CBC AUTOMATED: CPT | Performed by: PHYSICIAN ASSISTANT

## 2024-04-10 PROCEDURE — 25010000002 MORPHINE PER 10 MG: Performed by: THORACIC SURGERY (CARDIOTHORACIC VASCULAR SURGERY)

## 2024-04-10 PROCEDURE — 25010000002 ACETAMINOPHEN 10 MG/ML SOLUTION: Performed by: PHYSICIAN ASSISTANT

## 2024-04-10 PROCEDURE — C1751 CATH, INF, PER/CENT/MIDLINE: HCPCS | Performed by: ANESTHESIOLOGY

## 2024-04-10 PROCEDURE — 84132 ASSAY OF SERUM POTASSIUM: CPT

## 2024-04-10 PROCEDURE — 25010000002 PROPOFOL 10 MG/ML EMULSION: Performed by: PHYSICIAN ASSISTANT

## 2024-04-10 PROCEDURE — 82947 ASSAY GLUCOSE BLOOD QUANT: CPT

## 2024-04-10 PROCEDURE — 84295 ASSAY OF SERUM SODIUM: CPT

## 2024-04-10 PROCEDURE — 25010000002 PROTAMINE SULFATE PER 10 MG: Performed by: THORACIC SURGERY (CARDIOTHORACIC VASCULAR SURGERY)

## 2024-04-10 PROCEDURE — 25010000002 ALBUMIN HUMAN 5% PER 50 ML: Performed by: ANESTHESIOLOGY

## 2024-04-10 PROCEDURE — 99024 POSTOP FOLLOW-UP VISIT: CPT | Performed by: NURSE PRACTITIONER

## 2024-04-10 PROCEDURE — 83735 ASSAY OF MAGNESIUM: CPT | Performed by: PHYSICIAN ASSISTANT

## 2024-04-10 PROCEDURE — 93010 ELECTROCARDIOGRAM REPORT: CPT | Performed by: INTERNAL MEDICINE

## 2024-04-10 PROCEDURE — 25010000002 VANCOMYCIN 1 G RECONSTITUTED SOLUTION 1 EACH VIAL: Performed by: THORACIC SURGERY (CARDIOTHORACIC VASCULAR SURGERY)

## 2024-04-10 PROCEDURE — 06BQ4ZZ EXCISION OF LEFT SAPHENOUS VEIN, PERCUTANEOUS ENDOSCOPIC APPROACH: ICD-10-PCS | Performed by: THORACIC SURGERY (CARDIOTHORACIC VASCULAR SURGERY)

## 2024-04-10 PROCEDURE — 25010000002 HEPARIN (PORCINE) PER 1000 UNITS: Performed by: ANESTHESIOLOGY

## 2024-04-10 PROCEDURE — 82803 BLOOD GASES ANY COMBINATION: CPT

## 2024-04-10 PROCEDURE — P9041 ALBUMIN (HUMAN),5%, 50ML: HCPCS | Performed by: PHYSICIAN ASSISTANT

## 2024-04-10 PROCEDURE — 25010000002 PROPOFOL 1000 MG/100ML EMULSION: Performed by: ANESTHESIOLOGY

## 2024-04-10 PROCEDURE — 25010000002 NICARDIPINE 2.5 MG/ML SOLUTION: Performed by: ANESTHESIOLOGY

## 2024-04-10 PROCEDURE — 25010000002 AMIODARONE IN DEXTROSE 5% 360-4.14 MG/200ML-% SOLUTION: Performed by: ANESTHESIOLOGY

## 2024-04-10 PROCEDURE — 25010000002 GLYCOPYRROLATE 0.4 MG/2ML SOLUTION: Performed by: ANESTHESIOLOGY

## 2024-04-10 PROCEDURE — 25010000002 ACETAMINOPHEN 10 MG/ML SOLUTION: Performed by: ANESTHESIOLOGY

## 2024-04-10 PROCEDURE — 85610 PROTHROMBIN TIME: CPT | Performed by: PHYSICIAN ASSISTANT

## 2024-04-10 DEVICE — SEALANT HEMO TACHOSIL FIBRIN PTCH 9.5X4.8CM: Type: IMPLANTABLE DEVICE | Site: HEART | Status: FUNCTIONAL

## 2024-04-10 DEVICE — TEMP PACING WIRE
Type: IMPLANTABLE DEVICE | Site: CHEST | Status: FUNCTIONAL
Brand: MYO/WIRE

## 2024-04-10 RX ORDER — PROTAMINE SULFATE 10 MG/ML
50 INJECTION, SOLUTION INTRAVENOUS ONCE
Status: COMPLETED | OUTPATIENT
Start: 2024-04-10 | End: 2024-04-10

## 2024-04-10 RX ORDER — ROCURONIUM BROMIDE 10 MG/ML
INJECTION, SOLUTION INTRAVENOUS AS NEEDED
Status: DISCONTINUED | OUTPATIENT
Start: 2024-04-10 | End: 2024-04-10 | Stop reason: SURG

## 2024-04-10 RX ORDER — FENTANYL CITRATE 0.05 MG/ML
INJECTION, SOLUTION INTRAMUSCULAR; INTRAVENOUS AS NEEDED
Status: DISCONTINUED | OUTPATIENT
Start: 2024-04-10 | End: 2024-04-10 | Stop reason: SURG

## 2024-04-10 RX ORDER — FENTANYL CITRATE 50 UG/ML
25 INJECTION, SOLUTION INTRAMUSCULAR; INTRAVENOUS
Status: DISCONTINUED | OUTPATIENT
Start: 2024-04-10 | End: 2024-04-11

## 2024-04-10 RX ORDER — NITROGLYCERIN 20 MG/100ML
5-200 INJECTION INTRAVENOUS CONTINUOUS PRN
Status: DISCONTINUED | OUTPATIENT
Start: 2024-04-10 | End: 2024-04-12

## 2024-04-10 RX ORDER — PHENYLEPHRINE HYDROCHLORIDE 10 MG/ML
INJECTION INTRAVENOUS AS NEEDED
Status: DISCONTINUED | OUTPATIENT
Start: 2024-04-10 | End: 2024-04-10 | Stop reason: SURG

## 2024-04-10 RX ORDER — HYDROCODONE BITARTRATE AND ACETAMINOPHEN 7.5; 325 MG/1; MG/1
1 TABLET ORAL EVERY 4 HOURS PRN
Status: DISCONTINUED | OUTPATIENT
Start: 2024-04-10 | End: 2024-04-17 | Stop reason: HOSPADM

## 2024-04-10 RX ORDER — CEFAZOLIN SODIUM 1 G/3ML
INJECTION, POWDER, FOR SOLUTION INTRAMUSCULAR; INTRAVENOUS AS NEEDED
Status: DISCONTINUED | OUTPATIENT
Start: 2024-04-10 | End: 2024-04-10 | Stop reason: SURG

## 2024-04-10 RX ORDER — POTASSIUM CHLORIDE, DEXTROSE MONOHYDRATE 150; 5 MG/100ML; G/100ML
30 INJECTION, SOLUTION INTRAVENOUS CONTINUOUS
Status: DISCONTINUED | OUTPATIENT
Start: 2024-04-10 | End: 2024-04-17 | Stop reason: HOSPADM

## 2024-04-10 RX ORDER — DEXMEDETOMIDINE HYDROCHLORIDE 4 UG/ML
.2-1.5 INJECTION, SOLUTION INTRAVENOUS CONTINUOUS PRN
Status: DISCONTINUED | OUTPATIENT
Start: 2024-04-10 | End: 2024-04-11

## 2024-04-10 RX ORDER — GABAPENTIN 100 MG/1
100 CAPSULE ORAL EVERY 8 HOURS
Qty: 15 CAPSULE | Refills: 0 | Status: DISCONTINUED | OUTPATIENT
Start: 2024-04-10 | End: 2024-04-13

## 2024-04-10 RX ORDER — SODIUM CHLORIDE 9 MG/ML
INJECTION, SOLUTION INTRAVENOUS AS NEEDED
Status: DISCONTINUED | OUTPATIENT
Start: 2024-04-10 | End: 2024-04-10 | Stop reason: HOSPADM

## 2024-04-10 RX ORDER — NICARDIPINE HYDROCHLORIDE 2.5 MG/ML
INJECTION INTRAVENOUS CONTINUOUS PRN
Status: DISCONTINUED | OUTPATIENT
Start: 2024-04-10 | End: 2024-04-10 | Stop reason: SURG

## 2024-04-10 RX ORDER — GABAPENTIN 300 MG/1
300 CAPSULE ORAL ONCE
Status: COMPLETED | OUTPATIENT
Start: 2024-04-10 | End: 2024-04-10

## 2024-04-10 RX ORDER — ALBUMIN, HUMAN INJ 5% 5 %
250 SOLUTION INTRAVENOUS AS NEEDED
Status: DISCONTINUED | OUTPATIENT
Start: 2024-04-10 | End: 2024-04-11

## 2024-04-10 RX ORDER — AMINOCAPROIC ACID 250 MG/ML
INJECTION, SOLUTION INTRAVENOUS AS NEEDED
Status: DISCONTINUED | OUTPATIENT
Start: 2024-04-10 | End: 2024-04-10 | Stop reason: SURG

## 2024-04-10 RX ORDER — DOPAMINE HYDROCHLORIDE 160 MG/100ML
2-20 INJECTION, SOLUTION INTRAVENOUS CONTINUOUS PRN
Status: DISCONTINUED | OUTPATIENT
Start: 2024-04-10 | End: 2024-04-11

## 2024-04-10 RX ORDER — IBUPROFEN 600 MG/1
1 TABLET ORAL
Status: DISCONTINUED | OUTPATIENT
Start: 2024-04-10 | End: 2024-04-12

## 2024-04-10 RX ORDER — SODIUM CHLORIDE 9 MG/ML
INJECTION, SOLUTION INTRAVENOUS CONTINUOUS PRN
Status: DISCONTINUED | OUTPATIENT
Start: 2024-04-10 | End: 2024-04-10 | Stop reason: SURG

## 2024-04-10 RX ORDER — SODIUM CHLORIDE 0.9 % (FLUSH) 0.9 %
10 SYRINGE (ML) INJECTION AS NEEDED
Status: DISCONTINUED | OUTPATIENT
Start: 2024-04-10 | End: 2024-04-10 | Stop reason: HOSPADM

## 2024-04-10 RX ORDER — HEPARIN SODIUM 1000 [USP'U]/ML
INJECTION, SOLUTION INTRAVENOUS; SUBCUTANEOUS AS NEEDED
Status: DISCONTINUED | OUTPATIENT
Start: 2024-04-10 | End: 2024-04-10 | Stop reason: SURG

## 2024-04-10 RX ORDER — LIDOCAINE HYDROCHLORIDE 10 MG/ML
0.5 INJECTION, SOLUTION EPIDURAL; INFILTRATION; INTRACAUDAL; PERINEURAL ONCE AS NEEDED
Status: DISCONTINUED | OUTPATIENT
Start: 2024-04-10 | End: 2024-04-10 | Stop reason: HOSPADM

## 2024-04-10 RX ORDER — GLYCOPYRROLATE 0.2 MG/ML
INJECTION INTRAMUSCULAR; INTRAVENOUS AS NEEDED
Status: DISCONTINUED | OUTPATIENT
Start: 2024-04-10 | End: 2024-04-10 | Stop reason: SURG

## 2024-04-10 RX ORDER — CALCIUM CHLORIDE 100 MG/ML
INJECTION INTRAVENOUS; INTRAVENTRICULAR AS NEEDED
Status: DISCONTINUED | OUTPATIENT
Start: 2024-04-10 | End: 2024-04-10 | Stop reason: SURG

## 2024-04-10 RX ORDER — MIDAZOLAM HYDROCHLORIDE 1 MG/ML
0.5 INJECTION INTRAMUSCULAR; INTRAVENOUS
Status: DISCONTINUED | OUTPATIENT
Start: 2024-04-10 | End: 2024-04-10 | Stop reason: HOSPADM

## 2024-04-10 RX ORDER — ONDANSETRON 2 MG/ML
4 INJECTION INTRAMUSCULAR; INTRAVENOUS EVERY 6 HOURS PRN
Status: DISCONTINUED | OUTPATIENT
Start: 2024-04-10 | End: 2024-04-11

## 2024-04-10 RX ORDER — ACETAMINOPHEN 325 MG/1
650 TABLET ORAL EVERY 8 HOURS
Qty: 42 TABLET | Refills: 0 | Status: DISCONTINUED | OUTPATIENT
Start: 2024-04-11 | End: 2024-04-17 | Stop reason: HOSPADM

## 2024-04-10 RX ORDER — ALBUTEROL SULFATE 2.5 MG/3ML
2.5 SOLUTION RESPIRATORY (INHALATION) EVERY 4 HOURS PRN
Status: ACTIVE | OUTPATIENT
Start: 2024-04-10 | End: 2024-04-11

## 2024-04-10 RX ORDER — ASPIRIN 325 MG
325 TABLET ORAL ONCE
Status: DISCONTINUED | OUTPATIENT
Start: 2024-04-10 | End: 2024-04-10

## 2024-04-10 RX ORDER — PROTAMINE SULFATE 10 MG/ML
INJECTION, SOLUTION INTRAVENOUS
Status: COMPLETED
Start: 2024-04-10 | End: 2024-04-10

## 2024-04-10 RX ORDER — ALBUMIN, HUMAN INJ 5% 5 %
SOLUTION INTRAVENOUS CONTINUOUS PRN
Status: DISCONTINUED | OUTPATIENT
Start: 2024-04-10 | End: 2024-04-10 | Stop reason: SURG

## 2024-04-10 RX ORDER — CHLORHEXIDINE GLUCONATE ORAL RINSE 1.2 MG/ML
15 SOLUTION DENTAL EVERY 12 HOURS SCHEDULED
Status: DISPENSED | OUTPATIENT
Start: 2024-04-10 | End: 2024-04-12

## 2024-04-10 RX ORDER — ACETAMINOPHEN 10 MG/ML
INJECTION, SOLUTION INTRAVENOUS AS NEEDED
Status: DISCONTINUED | OUTPATIENT
Start: 2024-04-10 | End: 2024-04-10 | Stop reason: SURG

## 2024-04-10 RX ORDER — METOPROLOL TARTRATE 1 MG/ML
2.5 INJECTION, SOLUTION INTRAVENOUS EVERY 6 HOURS SCHEDULED
Status: DISCONTINUED | OUTPATIENT
Start: 2024-04-10 | End: 2024-04-11

## 2024-04-10 RX ORDER — ETOMIDATE 2 MG/ML
INJECTION INTRAVENOUS AS NEEDED
Status: DISCONTINUED | OUTPATIENT
Start: 2024-04-10 | End: 2024-04-10 | Stop reason: SURG

## 2024-04-10 RX ORDER — ACETAMINOPHEN 10 MG/ML
1000 INJECTION, SOLUTION INTRAVENOUS ONCE
Status: COMPLETED | OUTPATIENT
Start: 2024-04-10 | End: 2024-04-10

## 2024-04-10 RX ORDER — FAMOTIDINE 20 MG/1
20 TABLET, FILM COATED ORAL ONCE
Status: COMPLETED | OUTPATIENT
Start: 2024-04-10 | End: 2024-04-10

## 2024-04-10 RX ORDER — BISACODYL 10 MG
10 SUPPOSITORY, RECTAL RECTAL DAILY PRN
Status: DISCONTINUED | OUTPATIENT
Start: 2024-04-10 | End: 2024-04-17 | Stop reason: HOSPADM

## 2024-04-10 RX ORDER — DEXTROSE MONOHYDRATE 25 G/50ML
10-50 INJECTION, SOLUTION INTRAVENOUS
Status: DISCONTINUED | OUTPATIENT
Start: 2024-04-10 | End: 2024-04-12

## 2024-04-10 RX ORDER — ALPRAZOLAM 0.25 MG/1
0.25 TABLET ORAL ONCE
Status: COMPLETED | OUTPATIENT
Start: 2024-04-10 | End: 2024-04-10

## 2024-04-10 RX ORDER — LIDOCAINE HYDROCHLORIDE 10 MG/ML
INJECTION, SOLUTION EPIDURAL; INFILTRATION; INTRACAUDAL; PERINEURAL AS NEEDED
Status: DISCONTINUED | OUTPATIENT
Start: 2024-04-10 | End: 2024-04-10 | Stop reason: SURG

## 2024-04-10 RX ORDER — ASPIRIN 325 MG
325 TABLET, DELAYED RELEASE (ENTERIC COATED) ORAL DAILY
Status: DISCONTINUED | OUTPATIENT
Start: 2024-04-11 | End: 2024-04-14

## 2024-04-10 RX ORDER — PROPOFOL 10 MG/ML
INJECTION, EMULSION INTRAVENOUS CONTINUOUS PRN
Status: DISCONTINUED | OUTPATIENT
Start: 2024-04-10 | End: 2024-04-10 | Stop reason: SURG

## 2024-04-10 RX ORDER — NITROGLYCERIN 0.4 MG/1
0.4 TABLET SUBLINGUAL
Status: DISCONTINUED | OUTPATIENT
Start: 2024-04-10 | End: 2024-04-12

## 2024-04-10 RX ORDER — MIDAZOLAM HYDROCHLORIDE 1 MG/ML
2 INJECTION INTRAMUSCULAR; INTRAVENOUS ONCE
Status: DISCONTINUED | OUTPATIENT
Start: 2024-04-10 | End: 2024-04-10 | Stop reason: HOSPADM

## 2024-04-10 RX ORDER — ATORVASTATIN CALCIUM 40 MG/1
40 TABLET, FILM COATED ORAL NIGHTLY
Status: DISCONTINUED | OUTPATIENT
Start: 2024-04-10 | End: 2024-04-10

## 2024-04-10 RX ORDER — ALBUMIN, HUMAN INJ 5% 5 %
SOLUTION INTRAVENOUS
Status: COMPLETED
Start: 2024-04-10 | End: 2024-04-10

## 2024-04-10 RX ORDER — MEPERIDINE HYDROCHLORIDE 25 MG/ML
25 INJECTION INTRAMUSCULAR; INTRAVENOUS; SUBCUTANEOUS EVERY 4 HOURS PRN
Status: DISCONTINUED | OUTPATIENT
Start: 2024-04-10 | End: 2024-04-11

## 2024-04-10 RX ORDER — OXYCODONE HYDROCHLORIDE 10 MG/1
10 TABLET ORAL EVERY 4 HOURS PRN
Status: DISCONTINUED | OUTPATIENT
Start: 2024-04-10 | End: 2024-04-17 | Stop reason: HOSPADM

## 2024-04-10 RX ORDER — AMOXICILLIN 250 MG
2 CAPSULE ORAL 2 TIMES DAILY
Status: DISCONTINUED | OUTPATIENT
Start: 2024-04-10 | End: 2024-04-17 | Stop reason: HOSPADM

## 2024-04-10 RX ORDER — SODIUM CHLORIDE, SODIUM LACTATE, POTASSIUM CHLORIDE, CALCIUM CHLORIDE 600; 310; 30; 20 MG/100ML; MG/100ML; MG/100ML; MG/100ML
9 INJECTION, SOLUTION INTRAVENOUS CONTINUOUS
Status: DISCONTINUED | OUTPATIENT
Start: 2024-04-10 | End: 2024-04-11

## 2024-04-10 RX ORDER — DIAZEPAM 5 MG/1
5 TABLET ORAL EVERY 6 HOURS PRN
Status: ACTIVE | OUTPATIENT
Start: 2024-04-10 | End: 2024-04-15

## 2024-04-10 RX ORDER — VECURONIUM BROMIDE 1 MG/ML
INJECTION, POWDER, LYOPHILIZED, FOR SOLUTION INTRAVENOUS AS NEEDED
Status: DISCONTINUED | OUTPATIENT
Start: 2024-04-10 | End: 2024-04-10 | Stop reason: SURG

## 2024-04-10 RX ORDER — NICOTINE POLACRILEX 4 MG
15 LOZENGE BUCCAL
Status: DISCONTINUED | OUTPATIENT
Start: 2024-04-10 | End: 2024-04-12

## 2024-04-10 RX ORDER — PAPAVERINE HYDROCHLORIDE 30 MG/ML
INJECTION INTRAMUSCULAR; INTRAVENOUS AS NEEDED
Status: DISCONTINUED | OUTPATIENT
Start: 2024-04-10 | End: 2024-04-10 | Stop reason: HOSPADM

## 2024-04-10 RX ORDER — PROTAMINE SULFATE 10 MG/ML
INJECTION, SOLUTION INTRAVENOUS AS NEEDED
Status: DISCONTINUED | OUTPATIENT
Start: 2024-04-10 | End: 2024-04-10 | Stop reason: SURG

## 2024-04-10 RX ORDER — MORPHINE SULFATE 2 MG/ML
2 INJECTION, SOLUTION INTRAMUSCULAR; INTRAVENOUS
Status: DISCONTINUED | OUTPATIENT
Start: 2024-04-10 | End: 2024-04-17 | Stop reason: HOSPADM

## 2024-04-10 RX ORDER — NOREPINEPHRINE BITARTRATE 0.03 MG/ML
.02-.3 INJECTION, SOLUTION INTRAVENOUS CONTINUOUS PRN
Status: DISCONTINUED | OUTPATIENT
Start: 2024-04-10 | End: 2024-04-12

## 2024-04-10 RX ORDER — NOREPINEPHRINE BITARTRATE 0.03 MG/ML
INJECTION, SOLUTION INTRAVENOUS CONTINUOUS PRN
Status: DISCONTINUED | OUTPATIENT
Start: 2024-04-10 | End: 2024-04-10 | Stop reason: SURG

## 2024-04-10 RX ORDER — MIDAZOLAM HYDROCHLORIDE 1 MG/ML
INJECTION INTRAMUSCULAR; INTRAVENOUS AS NEEDED
Status: DISCONTINUED | OUTPATIENT
Start: 2024-04-10 | End: 2024-04-10 | Stop reason: SURG

## 2024-04-10 RX ORDER — POLYETHYLENE GLYCOL 3350 17 G/17G
17 POWDER, FOR SOLUTION ORAL DAILY PRN
Status: DISCONTINUED | OUTPATIENT
Start: 2024-04-10 | End: 2024-04-17 | Stop reason: HOSPADM

## 2024-04-10 RX ADMIN — GABAPENTIN 100 MG: 100 CAPSULE ORAL at 20:08

## 2024-04-10 RX ADMIN — LIDOCAINE HYDROCHLORIDE 100 MG: 10 INJECTION, SOLUTION EPIDURAL; INFILTRATION; INTRACAUDAL; PERINEURAL at 13:27

## 2024-04-10 RX ADMIN — ETOMIDATE 8 MG: 2 INJECTION, SOLUTION INTRAVENOUS at 13:27

## 2024-04-10 RX ADMIN — NICARDIPINE HYDROCHLORIDE 10 MG/HR: 2.5 INJECTION, SOLUTION INTRAVENOUS at 16:27

## 2024-04-10 RX ADMIN — GLYCOPYRROLATE 0.2 MG: 0.2 INJECTION INTRAMUSCULAR; INTRAVENOUS at 16:22

## 2024-04-10 RX ADMIN — ACETAMINOPHEN 1000 MG: 10 INJECTION INTRAVENOUS at 18:30

## 2024-04-10 RX ADMIN — SODIUM CHLORIDE, POTASSIUM CHLORIDE, SODIUM LACTATE AND CALCIUM CHLORIDE 9 ML/HR: 600; 310; 30; 20 INJECTION, SOLUTION INTRAVENOUS at 11:55

## 2024-04-10 RX ADMIN — MORPHINE SULFATE 2 MG: 2 INJECTION, SOLUTION INTRAMUSCULAR; INTRAVENOUS at 21:03

## 2024-04-10 RX ADMIN — HYDROCHLOROTHIAZIDE 12.5 MG: 12.5 CAPSULE ORAL at 09:26

## 2024-04-10 RX ADMIN — ALBUMIN (HUMAN) 250 ML: 12.5 INJECTION, SOLUTION INTRAVENOUS at 21:22

## 2024-04-10 RX ADMIN — OXYCODONE HYDROCHLORIDE 10 MG: 10 TABLET ORAL at 20:09

## 2024-04-10 RX ADMIN — ASPIRIN 325 MG: 325 TABLET ORAL at 09:27

## 2024-04-10 RX ADMIN — ACETAMINOPHEN 1000 MG: 10 INJECTION INTRAVENOUS at 16:32

## 2024-04-10 RX ADMIN — Medication 10 ML: at 09:28

## 2024-04-10 RX ADMIN — FAMOTIDINE 20 MG: 20 TABLET, FILM COATED ORAL at 12:18

## 2024-04-10 RX ADMIN — DEXMEDETOMIDINE HYDROCHLORIDE 0.2 MCG/KG/HR: 4 INJECTION, SOLUTION INTRAVENOUS at 20:44

## 2024-04-10 RX ADMIN — HEPARIN SODIUM 43000 UNITS: 1000 INJECTION INTRAVENOUS; SUBCUTANEOUS at 14:31

## 2024-04-10 RX ADMIN — PROTAMINE SULFATE 50 MG: 10 INJECTION, SOLUTION INTRAVENOUS at 18:49

## 2024-04-10 RX ADMIN — AMIODARONE HYDROCHLORIDE 1 MG/MIN: 1.8 INJECTION, SOLUTION INTRAVENOUS at 16:25

## 2024-04-10 RX ADMIN — PROPOFOL 50 MCG/KG/MIN: 10 INJECTION, EMULSION INTRAVENOUS at 19:52

## 2024-04-10 RX ADMIN — BISOPROLOL FUMARATE 2.5 MG: 5 TABLET, FILM COATED ORAL at 09:26

## 2024-04-10 RX ADMIN — GABAPENTIN 300 MG: 300 CAPSULE ORAL at 12:18

## 2024-04-10 RX ADMIN — SODIUM CHLORIDE 2000 MG: 900 INJECTION INTRAVENOUS at 13:27

## 2024-04-10 RX ADMIN — ALBUMIN (HUMAN) 250 ML: 12.5 INJECTION, SOLUTION INTRAVENOUS at 18:10

## 2024-04-10 RX ADMIN — FENTANYL CITRATE 250 MCG: 0.05 INJECTION, SOLUTION INTRAMUSCULAR; INTRAVENOUS at 14:13

## 2024-04-10 RX ADMIN — PROTAMINE SULFATE 50 MG: 10 INJECTION, SOLUTION INTRAVENOUS at 17:45

## 2024-04-10 RX ADMIN — FENTANYL CITRATE 100 MCG: 0.05 INJECTION, SOLUTION INTRAMUSCULAR; INTRAVENOUS at 16:34

## 2024-04-10 RX ADMIN — HEPARIN SODIUM 5000 UNITS: 1000 INJECTION INTRAVENOUS; SUBCUTANEOUS at 14:46

## 2024-04-10 RX ADMIN — ROSUVASTATIN 40 MG: 20 TABLET, FILM COATED ORAL at 20:09

## 2024-04-10 RX ADMIN — SODIUM CHLORIDE: 9 INJECTION, SOLUTION INTRAVENOUS at 13:27

## 2024-04-10 RX ADMIN — VECURONIUM BROMIDE 10 MG: 10 INJECTION, POWDER, LYOPHILIZED, FOR SOLUTION INTRAVENOUS at 15:10

## 2024-04-10 RX ADMIN — PROTAMINE SULFATE 500 MG: 10 INJECTION, SOLUTION INTRAVENOUS at 16:28

## 2024-04-10 RX ADMIN — PHENYLEPHRINE HYDROCHLORIDE 100 MCG: 10 INJECTION INTRAVENOUS at 13:39

## 2024-04-10 RX ADMIN — AMINOCAPROIC ACID 10 G: 250 INJECTION, SOLUTION INTRAVENOUS at 16:28

## 2024-04-10 RX ADMIN — ALBUMIN (HUMAN) 250 ML: 12.5 INJECTION, SOLUTION INTRAVENOUS at 17:40

## 2024-04-10 RX ADMIN — 0.12% CHLORHEXIDINE GLUCONATE 15 ML: 1.2 RINSE ORAL at 06:45

## 2024-04-10 RX ADMIN — FENTANYL CITRATE 250 MCG: 0.05 INJECTION, SOLUTION INTRAMUSCULAR; INTRAVENOUS at 13:59

## 2024-04-10 RX ADMIN — ROCURONIUM BROMIDE 100 MG: 10 INJECTION INTRAVENOUS at 13:27

## 2024-04-10 RX ADMIN — FENTANYL CITRATE 100 MCG: 0.05 INJECTION, SOLUTION INTRAMUSCULAR; INTRAVENOUS at 15:10

## 2024-04-10 RX ADMIN — FENTANYL CITRATE 25 MCG: 50 INJECTION, SOLUTION INTRAMUSCULAR; INTRAVENOUS at 20:27

## 2024-04-10 RX ADMIN — CALCIUM CHLORIDE 0.5 G: 100 INJECTION INTRAVENOUS; INTRAVENTRICULAR at 16:28

## 2024-04-10 RX ADMIN — NOREPINEPHRINE BITARTRATE 0.08 MCG/KG/MIN: 0.03 INJECTION, SOLUTION INTRAVENOUS at 16:40

## 2024-04-10 RX ADMIN — FENTANYL CITRATE 25 MCG: 50 INJECTION, SOLUTION INTRAMUSCULAR; INTRAVENOUS at 18:56

## 2024-04-10 RX ADMIN — CEFAZOLIN 2 G: 1 INJECTION, POWDER, FOR SOLUTION INTRAMUSCULAR; INTRAVENOUS at 16:28

## 2024-04-10 RX ADMIN — MORPHINE SULFATE 2 MG: 2 INJECTION, SOLUTION INTRAMUSCULAR; INTRAVENOUS at 23:47

## 2024-04-10 RX ADMIN — ALBUMIN (HUMAN): 12.5 INJECTION, SOLUTION INTRAVENOUS at 17:13

## 2024-04-10 RX ADMIN — FENTANYL CITRATE 50 MCG: 0.05 INJECTION, SOLUTION INTRAMUSCULAR; INTRAVENOUS at 16:22

## 2024-04-10 RX ADMIN — SENNOSIDES AND DOCUSATE SODIUM 2 TABLET: 8.6; 5 TABLET ORAL at 09:26

## 2024-04-10 RX ADMIN — HYDROCODONE BITARTRATE AND ACETAMINOPHEN 1 TABLET: 7.5; 325 TABLET ORAL at 23:56

## 2024-04-10 RX ADMIN — FENTANYL CITRATE 250 MCG: 0.05 INJECTION, SOLUTION INTRAMUSCULAR; INTRAVENOUS at 13:27

## 2024-04-10 RX ADMIN — POTASSIUM CHLORIDE AND DEXTROSE MONOHYDRATE 30 ML/HR: 150; 5 INJECTION, SOLUTION INTRAVENOUS at 17:28

## 2024-04-10 RX ADMIN — MUPIROCIN 1 APPLICATION: 20 OINTMENT TOPICAL at 06:45

## 2024-04-10 RX ADMIN — AMINOCAPROIC ACID 10 G: 250 INJECTION, SOLUTION INTRAVENOUS at 13:37

## 2024-04-10 RX ADMIN — PHENYLEPHRINE HYDROCHLORIDE 100 MCG: 10 INJECTION INTRAVENOUS at 13:32

## 2024-04-10 RX ADMIN — ALPRAZOLAM 0.25 MG: 0.25 TABLET ORAL at 09:27

## 2024-04-10 RX ADMIN — 0.12% CHLORHEXIDINE GLUCONATE 15 ML: 1.2 RINSE ORAL at 20:09

## 2024-04-10 RX ADMIN — LOSARTAN POTASSIUM 100 MG: 50 TABLET, FILM COATED ORAL at 09:26

## 2024-04-10 RX ADMIN — CHLORHEXIDINE GLUCONATE: 500 CLOTH TOPICAL at 05:54

## 2024-04-10 RX ADMIN — PROPOFOL 50 MCG/KG/MIN: 10 INJECTION, EMULSION INTRAVENOUS at 16:57

## 2024-04-10 RX ADMIN — SODIUM CHLORIDE 2 G: 900 INJECTION INTRAVENOUS at 21:09

## 2024-04-10 RX ADMIN — MIDAZOLAM HYDROCHLORIDE 2 MG: 1 INJECTION, SOLUTION INTRAMUSCULAR; INTRAVENOUS at 13:27

## 2024-04-10 RX ADMIN — SENNOSIDES AND DOCUSATE SODIUM 2 TABLET: 8.6; 5 TABLET ORAL at 20:09

## 2024-04-10 RX ADMIN — Medication 10 ML: at 11:55

## 2024-04-10 NOTE — PROGRESS NOTES
[facility] Cardiothoracic Surgery In-Patient Progress Note     LOS: 4 days     Chief Complaint: CAD    Subjective:  No current chest pain.  Sinus rhythm per telemetry.  Family @ bedside.      Objective  Vital Signs  Temp:  [96.9 °F (36.1 °C)-97.9 °F (36.6 °C)] 96.9 °F (36.1 °C)  Heart Rate:  [73-82] 77  Resp:  [16-18] 16  BP: (103-128)/(69-83) 128/83      Physical Exam:   General Appearance: alert, appears stated age and cooperative   Lungs: clear to auscultation, respirations regular, respirations even, and respirations unlabored   Heart: regular rhythm & normal rate, normal S1, S2, no murmur, no gallop, no rub, and no click          Results   Results from last 7 days   Lab Units 04/10/24  0002   WBC 10*3/mm3 8.14   HEMOGLOBIN g/dL 13.6   HEMATOCRIT % 41.1   PLATELETS 10*3/mm3 265     Results from last 7 days   Lab Units 04/10/24  0002   SODIUM mmol/L 137   POTASSIUM mmol/L 4.1   CHLORIDE mmol/L 104   CO2 mmol/L 21.0*   BUN mg/dL 23   CREATININE mg/dL 0.82   GLUCOSE mg/dL 101*   CALCIUM mg/dL 9.3     Imaging Results (Last 24 Hours)       ** No results found for the last 24 hours. **            Assessment    NSTEMI (non-ST elevated myocardial infarction)    Coronary artery disease involving native heart      Plan   NPO for CABG today per Dr. Cain Alegre, APRN  04/10/24  09:14 EDT

## 2024-04-10 NOTE — ANESTHESIA PROCEDURE NOTES
Arterial Line    Pre-sedation assessment completed: 4/10/2024 12:10 PM    Patient reassessed immediately prior to procedure    Patient location during procedure: pre-op  Stop Time:4/10/2024 12:15 PM       Line placed for hemodynamic monitoring.  Performed By   Anesthesiologist: Nolberto Vieira MD   Preanesthetic Checklist  Completed: patient identified, IV checked, site marked, risks and benefits discussed, surgical consent, monitors and equipment checked, pre-op evaluation and timeout performed  Arterial Line Prep    Sterile Tech: cap, gloves and sterile barriers  Prep: ChloraPrep  Patient monitoring: blood pressure monitoring, continuous pulse oximetry and EKG  Arterial Line Procedure   Laterality:right  Location:  radial artery  Catheter size: 20 G   Guidance: ultrasound guided  PROCEDURE NOTE/ULTRASOUND INTERPRETATION.  Using ultrasound guidance the potential vascular sites for insertion of the catheter were visualized to determine the patency of the vessel to be used for vascular access.  After selecting the appropriate site for insertion, the needle was visualized under ultrasound being inserted into the radial artery, followed by ultrasound confirmation of wire and catheter placement. There were no abnormalities seen on ultrasound; an image was taken; and the patient tolerated the procedure with no complications.   Number of attempts: 1  Successful placement: yes   Post Assessment   Dressing Type: line sutured, occlusive dressing applied, secured with tape and wrist guard applied.   Complications no  Circ/Move/Sens Assessment: normal and unchanged.   Patient Tolerance: patient tolerated the procedure well with no apparent complications

## 2024-04-10 NOTE — ANESTHESIA PROCEDURE NOTES
Central Line    Pre-sedation assessment completed: 4/10/2024 1:20 PM    Patient reassessed immediately prior to procedure    Patient location during procedure: OR  Start time: 4/10/2024 1:20 PM  Stop Time:4/10/2024 1:27 PM  Indications: vascular access  Staff  Anesthesiologist: Nolberto Vieira MD  Preanesthetic Checklist  Completed: patient identified, IV checked, site marked, risks and benefits discussed, surgical consent, monitors and equipment checked, pre-op evaluation and timeout performed  Central Line Prep  Sterile Tech:cap, gloves, gown, mask and sterile barriers  Prep: chloraprep  Patient monitoring: blood pressure monitoring, continuous pulse oximetry and EKG  Central Line Procedure  Laterality:right  Location:internal jugular  Catheter Type:Cordis and double lumen (+ SLIC)  Catheter Size:9 Fr  Guidance:landmark technique and palpation technique  PROCEDURE NOTE/ULTRASOUND INTERPRETATION.  Using ultrasound guidance the potential vascular sites for insertion of the catheter were visualized to determine the patency of the vessel to be used for vascular access.  After selecting the appropriate site for insertion, the needle was visualized under ultrasound being inserted into the internal jugular vein, followed by ultrasound confirmation of wire and catheter placement. There were no abnormalities seen on ultrasound; an image was taken; and the patient tolerated the procedure with no complications. Images: still images obtained, printed/placed on chart  Assessment  Post procedure:biopatch applied, line sutured, occlusive dressing applied and secured with tape  Assessement:blood return through all ports, free fluid flow, chest x-ray ordered and Rohit Test  Complications:no  Patient Tolerance:patient tolerated the procedure well with no apparent complications

## 2024-04-10 NOTE — ANESTHESIA POSTPROCEDURE EVALUATION
Patient: Khalif Joshua    Procedure Summary       Date: 04/10/24 Room / Location:  [facility] [address] [place] [address] [facility]    Anesthesia Start: 1309 Anesthesia Stop: 1725    Procedure: MEDIAN STERNTOMY, CORONARY ARTERY BYPASS GRAFTING X4 , [place] ,EVH OF THE LEFT GREATER SAPHENOUS VEIN WITH EVH EXPLORATION OF THE RIGHT LEG (Chest) Diagnosis:       Coronary artery disease involving native heart, unspecified vessel or lesion type, unspecified whether angina present      (Coronary artery disease involving native heart, unspecified vessel or lesion type, unspecified whether angina present [I25.10])    Surgeons: Jermaine Barlow MD Provider: Nolberto Vieira MD    Anesthesia Type: general, [place], CVL ASA Status: 4            Anesthesia Type: general, [place], CVL    Vitals  No vitals data found for the desired time range.          Post Anesthesia Care and Evaluation    Patient location during evaluation: ICU  Patient participation: complete - patient cannot participate  Level of consciousness: lethargic  Pain score: 0  Pain management: adequate    Airway patency: patent  Anesthetic complications: No anesthetic complications  PONV Status: none  Cardiovascular status: acceptable  Respiratory status: acceptable  Hydration status: acceptable

## 2024-04-10 NOTE — PLAN OF CARE
Problem: Adult Inpatient Plan of Care  Goal: Plan of Care Review  4/9/2024 2033 by Radha Gupta RN  Outcome: Ongoing, Progressing  4/9/2024 2032 by Radha Gupta RN  Outcome: Ongoing, Progressing  Goal: Patient-Specific Goal (Individualized)  4/9/2024 2033 by Radha Gupta RN  Outcome: Ongoing, Progressing  4/9/2024 2032 by Radha Gupta RN  Outcome: Ongoing, Progressing  Goal: Absence of Hospital-Acquired Illness or Injury  4/9/2024 2033 by Radha Gupta RN  Outcome: Ongoing, Progressing  4/9/2024 2032 by Radha Gupta RN  Outcome: Ongoing, Progressing  Goal: Optimal Comfort and Wellbeing  4/9/2024 2033 by Radha Gupta RN  Outcome: Ongoing, Progressing  4/9/2024 2032 by Radha Gupta RN  Outcome: Ongoing, Progressing  Goal: Readiness for Transition of Care  4/9/2024 2033 by Radha Gupta RN  Outcome: Ongoing, Progressing  4/9/2024 2032 by Radha Gupta RN  Outcome: Ongoing, Progressing     Problem: Activity Intolerance (Cardiovascular Surgery)  Goal: Improved Activity Tolerance  4/9/2024 2033 by Radha Gupta RN  Outcome: Ongoing, Progressing  4/9/2024 2032 by Radha Gupta RN  Outcome: Ongoing, Progressing     Problem: Adjustment to Surgery (Cardiovascular Surgery)  Goal: Optimal Coping with Heart Surgery  4/9/2024 2033 by Radha Gupta RN  Outcome: Ongoing, Progressing  4/9/2024 2032 by Radha Gupta RN  Outcome: Ongoing, Progressing     Problem: Bleeding (Cardiovascular Surgery)  Goal: Bleeding (Cardiovascular Surgery)  4/9/2024 2033 by Radha Gupta RN  Outcome: Ongoing, Progressing  4/9/2024 2032 by Radha Gupta RN  Outcome: Ongoing, Progressing     Problem: Bowel Motility Impaired (Cardiovascular Surgery)  Goal: Effective Bowel Elimination (Cardiovascular Surgery)  4/9/2024 2033 by Radha Gupta RN  Outcome: Ongoing, Progressing  4/9/2024 2032 by Radha Gupta, RN  Outcome: Ongoing, Progressing     Problem: Cardiac Function Impaired  (Cardiovascular Surgery)  Goal: Effective Cardiac Function  4/9/2024 2033 by Radha Gupta RN  Outcome: Ongoing, Progressing  4/9/2024 2032 by Radha Gupta RN  Outcome: Ongoing, Progressing     Problem: Cerebral Tissue Perfusion (Cardiovascular Surgery)  Goal: Optimal Cerebral Tissue Perfusion (Cardiovascular Surgery)  4/9/2024 2033 by Radha Gupta RN  Outcome: Ongoing, Progressing  4/9/2024 2032 by Radha Gupta RN  Outcome: Ongoing, Progressing     Problem: Fluid and Electrolyte Imbalance (Cardiovascular Surgery)  Goal: Fluid and Electrolyte Balance (Cardiovascular Surgery)  4/9/2024 2033 by Radha Gupta RN  Outcome: Ongoing, Progressing  4/9/2024 2032 by Radha Gupta RN  Outcome: Ongoing, Progressing     Problem: Glycemic Control Impaired (Cardiovascular Surgery)  Goal: Blood Glucose Level Within Targeted Range (Cardiovascular Surgery)  4/9/2024 2033 by Radha Gupta RN  Outcome: Ongoing, Progressing  4/9/2024 2032 by Radha Gupta RN  Outcome: Ongoing, Progressing     Problem: Infection (Cardiovascular Surgery)  Goal: Absence of Infection Signs and Symptoms  4/9/2024 2033 by Radha Gupta RN  Outcome: Ongoing, Progressing  4/9/2024 2032 by Radha Gupta RN  Outcome: Ongoing, Progressing     Problem: Ongoing Anesthesia Effects (Cardiovascular Surgery)  Goal: Anesthesia/Sedation Recovery  4/9/2024 2033 by Radha Gupta RN  Outcome: Ongoing, Progressing  4/9/2024 2032 by Radha Gupta RN  Outcome: Ongoing, Progressing     Problem: Pain (Cardiovascular Surgery)  Goal: Acceptable Pain Control  4/9/2024 2033 by Radha Gupta RN  Outcome: Ongoing, Progressing  4/9/2024 2032 by Radha Gupta RN  Outcome: Ongoing, Progressing     Problem: Postoperative Nausea and Vomiting (Cardiovascular Surgery)  Goal: Nausea and Vomiting Relief (Cardiovascular Surgery)  4/9/2024 2033 by Radha Gupta RN  Outcome: Ongoing, Progressing  4/9/2024 2032 by Radha Gupta  RN  Outcome: Ongoing, Progressing     Problem: Postoperative Urinary Retention (Cardiovascular Surgery)  Goal: Effective Urinary Elimination (Cardiovascular Surgery)  4/9/2024 2033 by Radha Gupta RN  Outcome: Ongoing, Progressing  4/9/2024 2032 by Radha Gupta RN  Outcome: Ongoing, Progressing     Problem: Respiratory Compromise (Cardiovascular Surgery)  Goal: Effective Oxygenation and Ventilation (Cardiovascular Surgery)  4/9/2024 2033 by Radha Gupta RN  Outcome: Ongoing, Progressing  4/9/2024 2032 by Radha Gupta RN  Outcome: Ongoing, Progressing     Problem: Asthma Comorbidity  Goal: Maintenance of Asthma Control  4/9/2024 2033 by Radha Gupta RN  Outcome: Ongoing, Progressing  4/9/2024 2032 by Radha Gupta RN  Outcome: Ongoing, Progressing     Problem: Behavioral Health Comorbidity  Goal: Maintenance of Behavioral Health Symptom Control  4/9/2024 2033 by Radha Gupta RN  Outcome: Ongoing, Progressing  4/9/2024 2032 by Radha Gupta RN  Outcome: Ongoing, Progressing     Problem: COPD (Chronic Obstructive Pulmonary Disease) Comorbidity  Goal: Maintenance of COPD Symptom Control  4/9/2024 2033 by Radha Gupta RN  Outcome: Ongoing, Progressing  4/9/2024 2032 by Radha Gupta RN  Outcome: Ongoing, Progressing     Problem: Diabetes Comorbidity  Goal: Blood Glucose Level Within Targeted Range  4/9/2024 2033 by Radha Gupta RN  Outcome: Ongoing, Progressing  4/9/2024 2032 by Radha Gupta RN  Outcome: Ongoing, Progressing     Problem: Heart Failure Comorbidity  Goal: Maintenance of Heart Failure Symptom Control  4/9/2024 2033 by Radha Gupta RN  Outcome: Ongoing, Progressing  4/9/2024 2032 by Radha Gupta RN  Outcome: Ongoing, Progressing     Problem: Hypertension Comorbidity  Goal: Blood Pressure in Desired Range  4/9/2024 2033 by Radha Gupta RN  Outcome: Ongoing, Progressing  4/9/2024 2032 by Radha Gupta RN  Outcome: Ongoing,  Progressing     Problem: Obstructive Sleep Apnea Risk or Actual Comorbidity Management  Goal: Unobstructed Breathing During Sleep  4/9/2024 2033 by Radha Gupta RN  Outcome: Ongoing, Progressing  4/9/2024 2032 by Radha Gupta RN  Outcome: Ongoing, Progressing     Problem: Osteoarthritis Comorbidity  Goal: Maintenance of Osteoarthritis Symptom Control  4/9/2024 2033 by Radha Gupta RN  Outcome: Ongoing, Progressing  4/9/2024 2032 by Radha Gupta RN  Outcome: Ongoing, Progressing     Problem: Pain Chronic (Persistent) (Comorbidity Management)  Goal: Acceptable Pain Control and Functional Ability  4/9/2024 2033 by Radha Gupta RN  Outcome: Ongoing, Progressing  4/9/2024 2032 by Radha Gupta RN  Outcome: Ongoing, Progressing     Problem: Seizure Disorder Comorbidity  Goal: Maintenance of Seizure Control  4/9/2024 2033 by Radha Gupta RN  Outcome: Ongoing, Progressing  4/9/2024 2032 by Radha Gupta RN  Outcome: Ongoing, Progressing   Goal Outcome Evaluation:

## 2024-04-10 NOTE — PLAN OF CARE
Problem: Adult Inpatient Plan of Care  Goal: Plan of Care Review  Outcome: Ongoing, Progressing  Goal: Patient-Specific Goal (Individualized)  Outcome: Ongoing, Progressing  Goal: Absence of Hospital-Acquired Illness or Injury  Outcome: Ongoing, Progressing  Goal: Optimal Comfort and Wellbeing  Outcome: Ongoing, Progressing  Goal: Readiness for Transition of Care  Outcome: Ongoing, Progressing     Problem: Activity Intolerance (Cardiovascular Surgery)  Goal: Improved Activity Tolerance  Outcome: Ongoing, Progressing     Problem: Adjustment to Surgery (Cardiovascular Surgery)  Goal: Optimal Coping with Heart Surgery  Outcome: Ongoing, Progressing     Problem: Bleeding (Cardiovascular Surgery)  Goal: Bleeding (Cardiovascular Surgery)  Outcome: Ongoing, Progressing     Problem: Bowel Motility Impaired (Cardiovascular Surgery)  Goal: Effective Bowel Elimination (Cardiovascular Surgery)  Outcome: Ongoing, Progressing     Problem: Cardiac Function Impaired (Cardiovascular Surgery)  Goal: Effective Cardiac Function  Outcome: Ongoing, Progressing     Problem: Cerebral Tissue Perfusion (Cardiovascular Surgery)  Goal: Optimal Cerebral Tissue Perfusion (Cardiovascular Surgery)  Outcome: Ongoing, Progressing     Problem: Fluid and Electrolyte Imbalance (Cardiovascular Surgery)  Goal: Fluid and Electrolyte Balance (Cardiovascular Surgery)  Outcome: Ongoing, Progressing     Problem: Glycemic Control Impaired (Cardiovascular Surgery)  Goal: Blood Glucose Level Within Targeted Range (Cardiovascular Surgery)  Outcome: Ongoing, Progressing     Problem: Infection (Cardiovascular Surgery)  Goal: Absence of Infection Signs and Symptoms  Outcome: Ongoing, Progressing     Problem: Ongoing Anesthesia Effects (Cardiovascular Surgery)  Goal: Anesthesia/Sedation Recovery  Outcome: Ongoing, Progressing     Problem: Pain (Cardiovascular Surgery)  Goal: Acceptable Pain Control  Outcome: Ongoing, Progressing     Problem: Postoperative Nausea  and Vomiting (Cardiovascular Surgery)  Goal: Nausea and Vomiting Relief (Cardiovascular Surgery)  Outcome: Ongoing, Progressing     Problem: Postoperative Urinary Retention (Cardiovascular Surgery)  Goal: Effective Urinary Elimination (Cardiovascular Surgery)  Outcome: Ongoing, Progressing     Problem: Respiratory Compromise (Cardiovascular Surgery)  Goal: Effective Oxygenation and Ventilation (Cardiovascular Surgery)  Outcome: Ongoing, Progressing     Problem: Asthma Comorbidity  Goal: Maintenance of Asthma Control  Outcome: Ongoing, Progressing     Problem: Behavioral Health Comorbidity  Goal: Maintenance of Behavioral Health Symptom Control  Outcome: Ongoing, Progressing     Problem: COPD (Chronic Obstructive Pulmonary Disease) Comorbidity  Goal: Maintenance of COPD Symptom Control  Outcome: Ongoing, Progressing     Problem: Diabetes Comorbidity  Goal: Blood Glucose Level Within Targeted Range  Outcome: Ongoing, Progressing     Problem: Heart Failure Comorbidity  Goal: Maintenance of Heart Failure Symptom Control  Outcome: Ongoing, Progressing     Problem: Hypertension Comorbidity  Goal: Blood Pressure in Desired Range  Outcome: Ongoing, Progressing     Problem: Obstructive Sleep Apnea Risk or Actual Comorbidity Management  Goal: Unobstructed Breathing During Sleep  Outcome: Ongoing, Progressing     Problem: Osteoarthritis Comorbidity  Goal: Maintenance of Osteoarthritis Symptom Control  Outcome: Ongoing, Progressing     Problem: Pain Chronic (Persistent) (Comorbidity Management)  Goal: Acceptable Pain Control and Functional Ability  Outcome: Ongoing, Progressing     Problem: Seizure Disorder Comorbidity  Goal: Maintenance of Seizure Control  Outcome: Ongoing, Progressing   Goal Outcome Evaluation:

## 2024-04-10 NOTE — ANESTHESIA PREPROCEDURE EVALUATION
Anesthesia Evaluation     Patient summary reviewed and Nursing notes reviewed   no history of anesthetic complications:   NPO Solid Status: > 8 hours  NPO Liquid Status: > 2 hours           Airway   Mallampati: II  TM distance: >3 FB  Neck ROM: full  No difficulty expected  Dental - normal exam     Pulmonary - negative pulmonary ROS and normal exam    breath sounds clear to auscultation  Cardiovascular - normal exam    ECG reviewed  Rhythm: regular  Rate: normal    (+) hypertension, CAD    ROS comment: 4/7/24 Echo:  ·  Left ventricular systolic function is normal. Calculated left ventricular EF = 56.7%    Neuro/Psych- negative ROS  GI/Hepatic/Renal/Endo    (+) diabetes mellitus type 2    Musculoskeletal     Abdominal    Substance History      OB/GYN          Other   arthritis,                 Anesthesia Plan    ASA 4     general, [place] and CVL     intravenous induction   Postoperative Plan: Expected vent after surgery  Anesthetic plan, risks, benefits, and alternatives have been provided, discussed and informed consent has been obtained with: patient.    CODE STATUS:    Code Status (Patient has no pulse and is not breathing): CPR (Attempt to Resuscitate)  Medical Interventions (Patient has pulse or is breathing): Full Support

## 2024-04-10 NOTE — PROGRESS NOTES
Khalif Joshua  [id number]  1950   LOS: 4 days   Patient Care Team:  Renee Liriano MD as PCP - General (Internal Medicine)    Chief Complaint:  Follow-up on NSTEMI, MV CAD     Subjective Patient with upcoming CABG.  He had a little chest pain last night when he was lying down but it gets better when he sits up.  He denies any nitroglycerin use for the chest pain.  He also denies any shortness of breath, palpitations, or dizziness.  He is hoping that his CABG will be tomorrow.    Update 4/9/24  CABG planned for tomorrow.      Update 4/10/24  CABG this afternoon with Dr. Barlow.      Objective     Vital Sign Min/Max for last 24 hours  Temp  Min: 96.8 °F (36 °C)  Max: 97.9 °F (36.6 °C)   BP  Min: 103/69  Max: 128/83   Pulse  Min: 73  Max: 77   Resp  Min: 16  Max: 18   SpO2  Min: 93 %  Max: 98 %   No data recorded   Weight  Min: 96.1 kg (211 lb 12.8 oz)  Max: 96.1 kg (211 lb 12.8 oz)         04/07/24  1703 04/09/24  2100   Weight: 90.7 kg (199 lb 15.3 oz) 96.1 kg (211 lb 12.8 oz)       No intake or output data in the 24 hours ending 04/10/24 1117      Physical Exam:     General Appearance:    Alert, cooperative, in no acute distress   Lungs:     Clear to auscultation,respirations regular, even and                unlabored    Heart:    Regular and normal rate, normal S1 and S2, no            murmur, no gallop, no rub, no click   Abdomen:  Extremities:   Soft, nontender, bowel sounds audible x4    No edema, normal range of motion   Pulses:   Pulses palpable and equal bilaterally    Right radial artery cath site CDI no evidence of hematoma   Results Review:   Results from last 7 days   Lab Units 04/10/24  0002 04/07/24  0109 04/06/24  0347 04/05/24  1027   SODIUM mmol/L 137  --  133* 135*   POTASSIUM mmol/L 4.1 4.2 3.6 4.3   CHLORIDE mmol/L 104  --  99 100   CO2 mmol/L 21.0*  --  25.0 26.0   BUN mg/dL 23  --  20 22   CREATININE mg/dL 0.82  --  0.82 0.83   GLUCOSE mg/dL 101*  --  99 90   CALCIUM mg/dL 9.3  --   9.2 10.2     Results from last 7 days   Lab Units 04/10/24  0002 04/09/24  0727 04/06/24  0347   WBC 10*3/mm3 8.14 7.77 7.90   HEMOGLOBIN g/dL 13.6 14.5 12.9*   HEMATOCRIT % 41.1 44.0 38.6   PLATELETS 10*3/mm3 265 244 217             Results from last 7 days   Lab Units 04/05/24  1247 04/05/24  1027   HSTROP T ng/L 53* 54*   Left heart catheterization 4/05/2024:  1) Severe, calcified coronary disease involving the LM, LAD, D1, Ramus, LCX and RCA  2) Normal LVEDP of 13mmHg   3) Peak-to-peak gradient of 33mmHg on pullback across the AV   Consult to CTS for consideration of CABG (LAD, D1, ramus, OM1, RCA)     Echocardiogram and carotid duplex 4/6/2024 pending    Chest x-ray 4/5/2024:  No acute process.     ECG 4/5/2024:  Normal sinus rhythm  Inferior infarct (cited on or before 05-APR-2024)  Cannot rule out Anterior infarct (cited on or before 05-APR-2024)  Abnormal ECG  When compared with ECG of 05-APR-2024 10:18,  No significant change was found  Confirmed by ORLIN LOZANO MD ([id number]) on 4/5/2024 3:41:32 PM    Medication Review: Reviewed, heparin GTT at 17 units/kg/h    Assessment & Plan   Patient with NSTEMI with heart cath showing MV CAD with recommendations for CABG, to be performed early this week per CTS schedule.  Continue aspirin 325 mg daily, losartan 100 mg daily, rosuvastatin 40 mg nightly, HCTZ 12.5 mg daily, bisoprolol 2.5 mg daily.  Surgery later today.

## 2024-04-10 NOTE — OP NOTE
Operative Report    Preop Diagnosis: Coronary disease hypertension hyperlipidemia    Results of STS risk score discussed with patient and family    Postoperative Diagnosis: Same      Procedure: Coronary artery bypass grafting x 4 of exploration of the right great saphenous vein however harvesting only of the left great saphenous vein.  Left internal mammary artery to left anterior descending coronary.  Saphenous vein graft to the diagonal branch LAD.  Saphenous vein graft to the posterolateral branch of the right.  Saphenous vein graft to the first obtuse marginal branch of circumflex.        Surgeons: Jermaine Barlow MD      Assistant: Alhaji Santiago PA-C    The Assistant provided services of suctioning, irrigation and retraction.  Also, assisted in suture closure of parts of the skin incision.      Indication: Was catheter facility and had significant coronary disease.  He understood that surgery carries with the risk of stroke bleed infection death and agreed to proceed.  No guarantees were made as to outcome         Description: Supine position.  Sterile prep and drape antibiotics given general endotracheal anesthesia.  Right great saphenous vein was explored endoscopically and it was felt to be small and of poor quality we then explored the left great saphenous vein it was much better quality and it was harvested endoscopically in its entirety.  The median sternotomy was then performed and the left internal mammary was harvested from the chest wall as a pedicle graft and the patient fully heparinized.  Cannulas placed in the ascending aortic aneurysm atrial appendage patient begun on cardiopulmonary bypass aorta crossclamped and antegrade cardioplegia given for saphenous vein graft sutured end-to-side fashion to a heavily calcified first obtuse marginal branch of the circumflex.  Second saphenous vein graft sutured in the same fashion to a heavily calcified post to greater lateral branch of the right.  All the  arteries of this patient's heart were extensively and heavily calcified.  Third saphenous vein graft sutured in end-to-side fashion to the diagonal branch of the LAD.  The second obtuse marginal branch was diffusely diseased when it left the groove it was extremely small calcified and a nongraftable vessel.  The left anterior descending coronary itself was calcified all the way to the apex of the heart with thickened walls and was a very small poor quality vessel.  Left internal mammary was sutured to the left anterior descending coronary.  However once again the target vessels were extremely small and diseased the best target on the heart was the diagonal branch of the LAD.  The 3 proximal vein grafts were sutured to the ascending aorta the patient was then went for bypass without difficulty in his own sinus rhythm and without the need for pressor agents.  The sternum was irrigated with antibiotics protamine was given to reverse the heparin.  Sternum was closed with wire the fashion skin was suture sponge and needle count reported as correct estimated blood loss less than 450 mL and no red cells were transfused there was no apparent early complications      Please note that portions of this note were completed with a voice recognition program. Efforts were made to edit the dictations, but occasionally words are mistranscribed.

## 2024-04-10 NOTE — ANESTHESIA PROCEDURE NOTES
Airway  Urgency: elective    Date/Time: 4/10/2024 1:20 PM  Airway not difficult    General Information and Staff    Patient location during procedure: OR  Anesthesiologist: Nolberto Vieira MD    Indications and Patient Condition  Indications for airway management: airway protection    Preoxygenated: yes  MILS not maintained throughout  Mask difficulty assessment: 1 - vent by mask    Final Airway Details  Final airway type: endotracheal airway      Successful airway: ETT  Cuffed: yes   Successful intubation technique: direct laryngoscopy  Endotracheal tube insertion site: oral  Blade: Jesusita  Blade size: 3  ETT size (mm): 7.5  Cormack-Lehane Classification: grade I - full view of glottis  Placement verified by: chest auscultation and capnometry   Measured from: lips  ETT/EBT  to lips (cm): 20  Number of attempts at approach: 1  Assessment: lips, teeth, and gum same as pre-op and atraumatic intubation    Additional Comments  Negative epigastric sounds, Breath sound equal bilaterally with symmetric chest rise and fall

## 2024-04-10 NOTE — PROGRESS NOTES
Critical Care Note     LOS: 4 days   Patient Care Team:  Renee Liriano MD as PCP - General (Internal Medicine)    Chief Complaint/Reason for visit:    Chief Complaint   Patient presents with    Chest Pain   CABGx4 4/10/24  Diabetes mellitus type 2  History of alcohol use  Hypertension  Dyslipidemia    Subjective     History of Present Illness:   Khalif Joshua is a 73 y.o. male who arrives to ICU from OR today s/p elective CABG per Dr. Barlow.     Patient has a PMH of HTN, T2DM, and dyslipidemia.      He was initially admitted to [facility] on 4/5/24 after presenting to our ED for evaluation of a ~2 week history of intermittent retrosternal chest pain & pressure with workup consistent with NSTEMI. He was evaluated by Cardiology underwent [facility] ultimately revealing MV CAD (70% [id number] LAD, 70% ostial D1, 70-80% proximal ramus, & 70% proximal LCX stenoses with proximal subtotal RCA occlusion and 80-90% mid-segment lesion) not amenable to PCI. TTE demonstrated depressed LVEF of 46-50% with grade I LV diastolic dysfunction and no significant structural or valvular disease noted. CTS was subsequently consulted who deemed patient a candidate for surgical revascularization, to which he was agreeable.     Today Ms. Joshua underwent elective CABG per Dr. Barlow and was transferred to ICU for postoperative care and continued medical management.     Time spent: 7 minutes   Electronically signed by Maggie Andujar DNP, APRN, 04/10/24, 9:46 AM EDT.     Interval History:     Patient arrived in the ICU after bypass x 4, LIMA to LAD, saphenous vein to diagonal, saphenous vein to PL, saphenous vein to OM1.  He received 2 units of weightlifting surgery.  He arrived in the ICU on propofol 50 mics per kilogram per minute, amiodarone 1 mg/min, norepinephrine 0.04 mics per kilogram per minute.  His cardiac output is 6.3, cardiac index 3.  Current ventilator settings rate 14 tidal volume 750 PEEP of 5 pressure reported 10 and 100%.  " He is in sinus rhythm.  There is scant serosanguineous drainage in the Pleur-evac chambers.  He is making clear urine.    Review of Systems:    All systems were reviewed and negative except as noted in subjective.    Medical history, surgical history, social history, family history reviewed    Objective     Intake/Output:    Intake/Output Summary (Last 24 hours) at 4/10/2024 1739  Last data filed at 4/10/2024 1658  Gross per 24 hour   Intake 1034 ml   Output --   Net 1034 ml       Nutrition:  NPO Diet NPO Type: Strict NPO    Infusions:  dexmedetomidine, 0.2-1.5 mcg/kg/hr  dextrose 5 % with KCl 20 mEq, 30 mL/hr  DOBUTamine, 2-20 mcg/kg/min  DOPamine, 2-20 mcg/kg/min  EPINEPHrine, 0.02-0.3 mcg/kg/min  insulin, 0-100 Units/hr  lactated ringers, 9 mL/hr, Last Rate: 9 mL/hr (04/10/24 1155)  niCARdipine, 5-15 mg/hr  nitroglycerin, 5-200 mcg/min  norepinephrine, 0.02-0.3 mcg/kg/min  phenylephrine, 0.5-3 mcg/kg/min  propofol, 5-50 mcg/kg/min        Mechanical Ventilator Settings:            Resp Rate (Set): 14  Pressure Support (cm H2O): 10 cm H20  FiO2 (%): 100 %  PEEP/CPAP (cm H2O): 5 cm H20    Minute Ventilation (L/min) (Obs): 10.6 L/min  Resp Rate (Observed) Vent: 14  I:E Ratio (Set): 1:3.09  I:E Ratio (Obs): 1:3.1    PIP Observed (cm H2O): 25 cm H2O       Telemetry: Sinus rhythm             Vital Signs  Blood pressure 131/86, pulse 75, temperature 97.3 °F (36.3 °C), temperature source Temporal, resp. rate 14, height 180.3 cm (70.98\"), weight 95.7 kg (211 lb), SpO2 97%.    Physical Exam:  General Appearance:  Older gentleman supine in bed sedated   Head:  Atraumatic   Eyes:          Pupils pinpoint, conjunctiva pink   Ears:     Throat: Orally intubated   Neck: Right IJ line   Back:      Lungs:   Sternotomy incision intact.  Mediastinal and pleural drains in place with scant bloody output.  Breath sounds are bilateral without wheeze or rhonchi    Heart:  Regular rhythm, S1, S2 auscultated, loud pericardial rub " "  Abdomen:   Large healed abdominal scar, vertical, hypoactive bowel sounds, nondistended   Rectal:   Deferred   Extremities: Ace wrap left lower extremity, right femoral arterial sheath, right radial arterial line   Pulses:    Skin: Cool and dry   Lymph nodes: No cervical adenopathy   Neurologic: Sedated      Results Review:     I reviewed the patient's new clinical results.   Results from last 7 days   Lab Units 04/10/24  0002 04/07/24  0109 04/06/24  0347 04/05/24  1027   SODIUM mmol/L 137  --  133* 135*   POTASSIUM mmol/L 4.1 4.2 3.6 4.3   CHLORIDE mmol/L 104  --  99 100   CO2 mmol/L 21.0*  --  25.0 26.0   BUN mg/dL 23  --  20 22   CREATININE mg/dL 0.82  --  0.82 0.83   CALCIUM mg/dL 9.3  --  9.2 10.2   BILIRUBIN mg/dL  --   --  0.3 0.3   ALK PHOS U/L  --   --  51 61   ALT (SGPT) U/L  --   --  14 16   AST (SGOT) U/L  --   --  16 22   GLUCOSE mg/dL 101*  --  99 90     Results from last 7 days   Lab Units 04/10/24  0002 04/09/24  0727 04/06/24  0347   WBC 10*3/mm3 8.14 7.77 7.90   HEMOGLOBIN g/dL 13.6 14.5 12.9*   HEMATOCRIT % 41.1 44.0 38.6   PLATELETS 10*3/mm3 265 244 217         Lab Results   Component Value Date    BLOODCX No growth at 5 days 10/30/2021     No results found for: \"URINECX\"    I reviewed the patient's new imaging including images and reports.      All medications reviewed.   acetaminophen, 1,000 mg, Intravenous, Once  acetaminophen, 650 mg, Oral, Q8H  albumin human, , ,   [START ON 4/11/2024] aspirin, 325 mg, Oral, Daily  aspirin, 325 mg, Oral, Daily  aspirin, 325 mg, Oral, Once  atorvastatin, 40 mg, Oral, Nightly  bisoprolol, 2.5 mg, Oral, Q24H  ceFAZolin, 2 g, Intravenous, Q8H  chlorhexidine, 15 mL, Mouth/Throat, Q12H  gabapentin, 100 mg, Oral, Q8H  losartan, 100 mg, Oral, Q24H   And  hydroCHLOROthiazide, 12.5 mg, Oral, Q24H  [START ON 4/11/2024] metoprolol tartrate, 12.5 mg, Oral, Q12H  metoprolol tartrate, 2.5 mg, Intravenous, Q6H  pharmacy consult - MTM, , Does not apply, Daily  protamine, " , ,   protamine, 50 mg, Intravenous, Once  rosuvastatin, 40 mg, Oral, Nightly  senna-docusate sodium, 2 tablet, Oral, BID  senna-docusate sodium, 2 tablet, Oral, BID  sodium chloride, 10 mL, Intravenous, Q12H          Assessment & Plan       NSTEMI (non-ST elevated myocardial infarction)    Hypertension    Dyslipidemia    MV CAD    T2DM    Combined systolic and diastolic CHF      73-year-old gentleman, with severe multivessel coronary disease, 70% proximal to mid LAD, 70% D1, 70 to 80% ramus, 70% OM1, subtotal occlusion of the RCA on heart catheterization April 5.  Today he underwent CABG x 4, LIMA to LAD, saphenous vein to OM1, saphenous vein to PL, saphenous vein to D1.  He received 2 platelets but no packed cells.  Mediastinal tubes have scant output.  He is in sinus rhythm.  He is sedated and on mechanical ventilation in the ICU.  He is on some low-dose norepinephrine for hypotension.  Preoperative EF is 47%.  Repeat limited echo revealed an EF of 56%.    He does have a history of diabetes mellitus type 2 for which he takes metformin.  Preoperative fasting blood glucose was 101.    He does take losartan hydrochlorothiazide for hypertension.  Currently he is hypotensive on some low-dose norepinephrine.  Lipid panel is not available but he does take a statin.  He does not have a history of tobacco use.  Reoperative spirometry reveals an FEV1 of 103%.  There is a verbal history of daily alcohol consumption.  Liver function tests are normal.    PLAN:    Albumin  Wean norepinephrine as able   amiodarone per CT surgery  Wean FiO2 per respiratory protocol  Monitor rhythm, urine output, mediastinal tube drainage, H&H  Replace electrolytes as needed  As needed Valium  Aspirin, statin, beta-blocker  Insulin drip per protocol  Chart Pepcid    VTE Prophylaxis: foot pumps    Stress Ulcer Prophylaxis: none    Minerva Dougherty MD  Pulmonary and Critical Care    Time: 40min

## 2024-04-11 ENCOUNTER — TRANSCRIBE ORDERS (OUTPATIENT)
Dept: CARDIAC REHAB | Facility: HOSPITAL | Age: 74
End: 2024-04-11
Payer: COMMERCIAL

## 2024-04-11 ENCOUNTER — APPOINTMENT (OUTPATIENT)
Dept: GENERAL RADIOLOGY | Facility: HOSPITAL | Age: 74
End: 2024-04-11
Payer: MEDICARE

## 2024-04-11 DIAGNOSIS — Z95.1 S/P CABG X 4: Primary | ICD-10-CM

## 2024-04-11 LAB
ALBUMIN SERPL-MCNC: 4.5 G/DL (ref 3.5–5.2)
ALBUMIN/GLOB SERPL: 2.5 G/DL
ALP SERPL-CCNC: 36 U/L (ref 39–117)
ALT SERPL W P-5'-P-CCNC: 15 U/L (ref 1–41)
ANION GAP SERPL CALCULATED.3IONS-SCNC: 13 MMOL/L (ref 5–15)
AST SERPL-CCNC: 46 U/L (ref 1–40)
BASOPHILS # BLD AUTO: 0.04 10*3/MM3 (ref 0–0.2)
BASOPHILS NFR BLD AUTO: 0.3 % (ref 0–1.5)
BH BB BLOOD EXPIRATION DATE: NORMAL
BH BB BLOOD EXPIRATION DATE: NORMAL
BH BB BLOOD TYPE BARCODE: 6200
BH BB BLOOD TYPE BARCODE: 6200
BH BB DISPENSE STATUS: NORMAL
BH BB DISPENSE STATUS: NORMAL
BH BB PRODUCT CODE: NORMAL
BH BB PRODUCT CODE: NORMAL
BH BB UNIT NUMBER: NORMAL
BH BB UNIT NUMBER: NORMAL
BILIRUB SERPL-MCNC: 0.3 MG/DL (ref 0–1.2)
BUN SERPL-MCNC: 19 MG/DL (ref 8–23)
BUN/CREAT SERPL: 15.6 (ref 7–25)
CALCIUM SPEC-SCNC: 9 MG/DL (ref 8.6–10.5)
CHLORIDE SERPL-SCNC: 104 MMOL/L (ref 98–107)
CO2 SERPL-SCNC: 23 MMOL/L (ref 22–29)
COTININE UR QL SCN: NEGATIVE NG/ML
CREAT SERPL-MCNC: 1.22 MG/DL (ref 0.76–1.27)
DEPRECATED RDW RBC AUTO: 48.9 FL (ref 37–54)
EGFRCR SERPLBLD CKD-EPI 2021: 63 ML/MIN/1.73
EOSINOPHIL # BLD AUTO: 0.03 10*3/MM3 (ref 0–0.4)
EOSINOPHIL NFR BLD AUTO: 0.2 % (ref 0.3–6.2)
ERYTHROCYTE [DISTWIDTH] IN BLOOD BY AUTOMATED COUNT: 13.6 % (ref 12.3–15.4)
GLOBULIN UR ELPH-MCNC: 1.8 GM/DL
GLUCOSE BLDC GLUCOMTR-MCNC: 127 MG/DL (ref 70–130)
GLUCOSE BLDC GLUCOMTR-MCNC: 135 MG/DL (ref 70–130)
GLUCOSE BLDC GLUCOMTR-MCNC: 136 MG/DL (ref 70–130)
GLUCOSE BLDC GLUCOMTR-MCNC: 138 MG/DL (ref 70–130)
GLUCOSE BLDC GLUCOMTR-MCNC: 147 MG/DL (ref 70–130)
GLUCOSE BLDC GLUCOMTR-MCNC: 148 MG/DL (ref 70–130)
GLUCOSE BLDC GLUCOMTR-MCNC: 150 MG/DL (ref 70–130)
GLUCOSE BLDC GLUCOMTR-MCNC: 156 MG/DL (ref 70–130)
GLUCOSE BLDC GLUCOMTR-MCNC: 160 MG/DL (ref 70–130)
GLUCOSE BLDC GLUCOMTR-MCNC: 160 MG/DL (ref 70–130)
GLUCOSE BLDC GLUCOMTR-MCNC: 162 MG/DL (ref 70–130)
GLUCOSE BLDC GLUCOMTR-MCNC: 169 MG/DL (ref 70–130)
GLUCOSE BLDC GLUCOMTR-MCNC: 171 MG/DL (ref 70–130)
GLUCOSE BLDC GLUCOMTR-MCNC: 172 MG/DL (ref 70–130)
GLUCOSE BLDC GLUCOMTR-MCNC: 176 MG/DL (ref 70–130)
GLUCOSE BLDC GLUCOMTR-MCNC: 180 MG/DL (ref 70–130)
GLUCOSE SERPL-MCNC: 168 MG/DL (ref 65–99)
HCT VFR BLD AUTO: 31 % (ref 37.5–51)
HGB BLD-MCNC: 10.1 G/DL (ref 13–17.7)
IMM GRANULOCYTES # BLD AUTO: 0.11 10*3/MM3 (ref 0–0.05)
IMM GRANULOCYTES NFR BLD AUTO: 0.8 % (ref 0–0.5)
INR PPP: 1.27 (ref 0.89–1.12)
LYMPHOCYTES # BLD AUTO: 0.9 10*3/MM3 (ref 0.7–3.1)
LYMPHOCYTES NFR BLD AUTO: 6.2 % (ref 19.6–45.3)
Lab: NORMAL
MCH RBC QN AUTO: 31.7 PG (ref 26.6–33)
MCHC RBC AUTO-ENTMCNC: 32.6 G/DL (ref 31.5–35.7)
MCV RBC AUTO: 97.2 FL (ref 79–97)
MONOCYTES # BLD AUTO: 1.52 10*3/MM3 (ref 0.1–0.9)
MONOCYTES NFR BLD AUTO: 10.4 % (ref 5–12)
NEUTROPHILS NFR BLD AUTO: 11.98 10*3/MM3 (ref 1.7–7)
NEUTROPHILS NFR BLD AUTO: 82.1 % (ref 42.7–76)
NRBC BLD AUTO-RTO: 0 /100 WBC (ref 0–0.2)
PLATELET # BLD AUTO: 211 10*3/MM3 (ref 140–450)
PMV BLD AUTO: 10.2 FL (ref 6–12)
POTASSIUM SERPL-SCNC: 4 MMOL/L (ref 3.5–5.2)
PROT SERPL-MCNC: 6.3 G/DL (ref 6–8.5)
PROTHROMBIN TIME: 16 SECONDS (ref 12.2–14.5)
QT INTERVAL: 400 MS
QTC INTERVAL: 456 MS
RBC # BLD AUTO: 3.19 10*6/MM3 (ref 4.14–5.8)
SODIUM SERPL-SCNC: 140 MMOL/L (ref 136–145)
UNIT  ABO: NORMAL
UNIT  ABO: NORMAL
UNIT  RH: NORMAL
UNIT  RH: NORMAL
WBC NRBC COR # BLD AUTO: 14.58 10*3/MM3 (ref 3.4–10.8)

## 2024-04-11 PROCEDURE — 99232 SBSQ HOSP IP/OBS MODERATE 35: CPT

## 2024-04-11 PROCEDURE — P9041 ALBUMIN (HUMAN),5%, 50ML: HCPCS | Performed by: INTERNAL MEDICINE

## 2024-04-11 PROCEDURE — 97162 PT EVAL MOD COMPLEX 30 MIN: CPT

## 2024-04-11 PROCEDURE — 25010000002 PROCHLORPERAZINE 10 MG/2ML SOLUTION

## 2024-04-11 PROCEDURE — 80053 COMPREHEN METABOLIC PANEL: CPT | Performed by: PHYSICIAN ASSISTANT

## 2024-04-11 PROCEDURE — 85610 PROTHROMBIN TIME: CPT | Performed by: PHYSICIAN ASSISTANT

## 2024-04-11 PROCEDURE — 25010000002 ONDANSETRON PER 1 MG: Performed by: PHYSICIAN ASSISTANT

## 2024-04-11 PROCEDURE — 99232 SBSQ HOSP IP/OBS MODERATE 35: CPT | Performed by: INTERNAL MEDICINE

## 2024-04-11 PROCEDURE — 85025 COMPLETE CBC W/AUTO DIFF WBC: CPT | Performed by: PHYSICIAN ASSISTANT

## 2024-04-11 PROCEDURE — 25010000002 CEFAZOLIN PER 500 MG: Performed by: PHYSICIAN ASSISTANT

## 2024-04-11 PROCEDURE — 99024 POSTOP FOLLOW-UP VISIT: CPT | Performed by: THORACIC SURGERY (CARDIOTHORACIC VASCULAR SURGERY)

## 2024-04-11 PROCEDURE — 25010000002 MORPHINE PER 10 MG: Performed by: THORACIC SURGERY (CARDIOTHORACIC VASCULAR SURGERY)

## 2024-04-11 PROCEDURE — 25010000002 ALBUMIN HUMAN 5% PER 50 ML: Performed by: INTERNAL MEDICINE

## 2024-04-11 PROCEDURE — 93005 ELECTROCARDIOGRAM TRACING: CPT | Performed by: PHYSICIAN ASSISTANT

## 2024-04-11 PROCEDURE — 93010 ELECTROCARDIOGRAM REPORT: CPT | Performed by: INTERNAL MEDICINE

## 2024-04-11 PROCEDURE — 63710000001 INSULIN DETEMIR PER 5 UNITS: Performed by: INTERNAL MEDICINE

## 2024-04-11 PROCEDURE — 71045 X-RAY EXAM CHEST 1 VIEW: CPT

## 2024-04-11 PROCEDURE — 82948 REAGENT STRIP/BLOOD GLUCOSE: CPT

## 2024-04-11 RX ORDER — ALBUMIN, HUMAN INJ 5% 5 %
250 SOLUTION INTRAVENOUS ONCE
Status: COMPLETED | OUTPATIENT
Start: 2024-04-11 | End: 2024-04-11

## 2024-04-11 RX ORDER — FAMOTIDINE 20 MG/1
20 TABLET, FILM COATED ORAL
Status: DISCONTINUED | OUTPATIENT
Start: 2024-04-11 | End: 2024-04-17 | Stop reason: HOSPADM

## 2024-04-11 RX ORDER — METHOCARBAMOL 750 MG/1
750 TABLET, FILM COATED ORAL EVERY 8 HOURS PRN
Status: DISCONTINUED | OUTPATIENT
Start: 2024-04-11 | End: 2024-04-17 | Stop reason: HOSPADM

## 2024-04-11 RX ORDER — PROCHLORPERAZINE EDISYLATE 5 MG/ML
5 INJECTION INTRAMUSCULAR; INTRAVENOUS EVERY 6 HOURS PRN
Status: DISCONTINUED | OUTPATIENT
Start: 2024-04-11 | End: 2024-04-17 | Stop reason: HOSPADM

## 2024-04-11 RX ORDER — INSULIN LISPRO 100 [IU]/ML
2-9 INJECTION, SOLUTION INTRAVENOUS; SUBCUTANEOUS
Status: DISCONTINUED | OUTPATIENT
Start: 2024-04-11 | End: 2024-04-17 | Stop reason: HOSPADM

## 2024-04-11 RX ADMIN — 0.12% CHLORHEXIDINE GLUCONATE 15 ML: 1.2 RINSE ORAL at 08:21

## 2024-04-11 RX ADMIN — ONDANSETRON 4 MG: 2 INJECTION INTRAMUSCULAR; INTRAVENOUS at 08:25

## 2024-04-11 RX ADMIN — Medication 12.5 MG: at 20:06

## 2024-04-11 RX ADMIN — ROSUVASTATIN 40 MG: 20 TABLET, FILM COATED ORAL at 20:08

## 2024-04-11 RX ADMIN — MORPHINE SULFATE 2 MG: 2 INJECTION, SOLUTION INTRAMUSCULAR; INTRAVENOUS at 04:47

## 2024-04-11 RX ADMIN — PROCHLORPERAZINE EDISYLATE 5 MG: 5 INJECTION INTRAMUSCULAR; INTRAVENOUS at 13:31

## 2024-04-11 RX ADMIN — HYDROCODONE BITARTRATE AND ACETAMINOPHEN 1 TABLET: 7.5; 325 TABLET ORAL at 14:08

## 2024-04-11 RX ADMIN — ALBUMIN (HUMAN) 250 ML: 12.5 INJECTION, SOLUTION INTRAVENOUS at 10:05

## 2024-04-11 RX ADMIN — MORPHINE SULFATE 2 MG: 2 INJECTION, SOLUTION INTRAMUSCULAR; INTRAVENOUS at 01:08

## 2024-04-11 RX ADMIN — HYDROCODONE BITARTRATE AND ACETAMINOPHEN 1 TABLET: 7.5; 325 TABLET ORAL at 17:59

## 2024-04-11 RX ADMIN — INSULIN HUMAN 2.2 UNITS/HR: 1 INJECTION, SOLUTION INTRAVENOUS at 01:15

## 2024-04-11 RX ADMIN — SODIUM CHLORIDE 2 G: 900 INJECTION INTRAVENOUS at 05:12

## 2024-04-11 RX ADMIN — ACETAMINOPHEN 650 MG: 325 TABLET ORAL at 02:10

## 2024-04-11 RX ADMIN — HYDROCODONE BITARTRATE AND ACETAMINOPHEN 1 TABLET: 7.5; 325 TABLET ORAL at 03:38

## 2024-04-11 RX ADMIN — ASPIRIN 325 MG: 325 TABLET, COATED ORAL at 08:21

## 2024-04-11 RX ADMIN — GABAPENTIN 100 MG: 100 CAPSULE ORAL at 20:08

## 2024-04-11 RX ADMIN — NOREPINEPHRINE BITARTRATE 0.1 MCG/KG/MIN: 0.03 INJECTION, SOLUTION INTRAVENOUS at 04:47

## 2024-04-11 RX ADMIN — SENNOSIDES AND DOCUSATE SODIUM 2 TABLET: 8.6; 5 TABLET ORAL at 08:21

## 2024-04-11 RX ADMIN — MORPHINE SULFATE 2 MG: 2 INJECTION, SOLUTION INTRAMUSCULAR; INTRAVENOUS at 17:26

## 2024-04-11 RX ADMIN — ACETAMINOPHEN 650 MG: 325 TABLET ORAL at 20:16

## 2024-04-11 RX ADMIN — SENNOSIDES AND DOCUSATE SODIUM 2 TABLET: 8.6; 5 TABLET ORAL at 20:07

## 2024-04-11 RX ADMIN — HYDROCODONE BITARTRATE AND ACETAMINOPHEN 1 TABLET: 7.5; 325 TABLET ORAL at 10:05

## 2024-04-11 RX ADMIN — MORPHINE SULFATE 2 MG: 2 INJECTION, SOLUTION INTRAMUSCULAR; INTRAVENOUS at 08:21

## 2024-04-11 RX ADMIN — SODIUM CHLORIDE 2 G: 900 INJECTION INTRAVENOUS at 22:40

## 2024-04-11 RX ADMIN — OXYCODONE HYDROCHLORIDE 10 MG: 10 TABLET ORAL at 15:59

## 2024-04-11 RX ADMIN — NOREPINEPHRINE BITARTRATE 0.06 MCG/KG/MIN: 0.03 INJECTION, SOLUTION INTRAVENOUS at 14:57

## 2024-04-11 RX ADMIN — OXYCODONE HYDROCHLORIDE 10 MG: 10 TABLET ORAL at 02:09

## 2024-04-11 RX ADMIN — OXYCODONE HYDROCHLORIDE 10 MG: 10 TABLET ORAL at 06:16

## 2024-04-11 RX ADMIN — SODIUM CHLORIDE 2 G: 900 INJECTION INTRAVENOUS at 14:08

## 2024-04-11 RX ADMIN — INSULIN DETEMIR 5 UNITS: 100 INJECTION, SOLUTION SUBCUTANEOUS at 15:58

## 2024-04-11 RX ADMIN — MORPHINE SULFATE 2 MG: 2 INJECTION, SOLUTION INTRAMUSCULAR; INTRAVENOUS at 20:11

## 2024-04-11 RX ADMIN — OXYCODONE HYDROCHLORIDE 10 MG: 10 TABLET ORAL at 12:01

## 2024-04-11 RX ADMIN — GABAPENTIN 100 MG: 100 CAPSULE ORAL at 04:02

## 2024-04-11 RX ADMIN — MORPHINE SULFATE 2 MG: 2 INJECTION, SOLUTION INTRAMUSCULAR; INTRAVENOUS at 03:38

## 2024-04-11 RX ADMIN — GABAPENTIN 100 MG: 100 CAPSULE ORAL at 12:01

## 2024-04-11 RX ADMIN — FAMOTIDINE 20 MG: 20 TABLET, FILM COATED ORAL at 10:05

## 2024-04-11 RX ADMIN — FAMOTIDINE 20 MG: 20 TABLET, FILM COATED ORAL at 17:26

## 2024-04-11 RX ADMIN — MORPHINE SULFATE 2 MG: 2 INJECTION, SOLUTION INTRAMUSCULAR; INTRAVENOUS at 22:39

## 2024-04-11 RX ADMIN — MORPHINE SULFATE 2 MG: 2 INJECTION, SOLUTION INTRAMUSCULAR; INTRAVENOUS at 15:30

## 2024-04-11 NOTE — PROGRESS NOTES
[facility]  PROGRESS NOTE    Khalif Joshua   [id number]   1950    LOS: 5 days .  Date of Admission: 4/5/2024  Date of Service: 04/11/24    Primary Care Physician: Renee Liriano MD    Chief Complaint: chest pain    Subjective      Patient sitting up in the chair. He walked this morning and did well. He is having incisional chest pain. On levophed and insulin gtt. Wife at bedside.     ROS  All systems have been reviewed and are negative with the exception of those mentioned in the HPI and problem list above.     Objective   Vital Sign Min/Max for last 24 hours  Temp  Min: 95.2 °F (35.1 °C)  Max: 98.8 °F (37.1 °C)   BP  Min: 69/44  Max: 131/86   Pulse  Min: 72  Max: 80   Resp  Min: 12  Max: 20   SpO2  Min: 91 %  Max: 100 %   No data recorded   Weight  Min: 95.7 kg (211 lb)  Max: 95.7 kg (211 lb)     Physical Exam:  GENERAL: Alert, cooperative, in no acute distress.   HEENT: Normocephalic, no jugular venous distention  HEART: Regular rhythm, normal rate, and no murmurs, gallops. + rub  LUNGS: Clear to auscultation bilaterally. No wheezing, rales or rhonchi. On 2 L NC  NEUROLOGIC: No focal abnormalities involving strength or sensation are noted.   EXTREMITIES: No clubbing, cyanosis, or edema noted.     Results:  Results from last 7 days   Lab Units 04/11/24  0303 04/10/24  2010 04/10/24  1735   WBC 10*3/mm3 14.58* 23.08* 18.67*   HEMOGLOBIN g/dL 10.1* 11.0* 10.8*   HEMATOCRIT % 31.0* 32.7* 32.3*   PLATELETS 10*3/mm3 211 236 200     Results from last 7 days   Lab Units 04/11/24  0303 04/10/24  2010 04/10/24  1735   SODIUM mmol/L 140 141 135*   POTASSIUM mmol/L 4.0 4.2 4.3   CHLORIDE mmol/L 104 101 96*   CO2 mmol/L 23.0 21.0* 24.0   BUN mg/dL 19 21 21   CREATININE mg/dL 1.22 1.15 1.19   GLUCOSE mg/dL 168* 140* 107*      Lab Results   Component Value Date    AST 46 (H) 04/11/2024    ALT 15 04/11/2024                     Results from last 7 days   Lab Units 04/11/24  9556  04/10/24  1735 04/05/24  1810   PROTIME Seconds 16.0* 17.9* 12.7   INR  1.27* 1.46* 0.95   APTT seconds  --  31.9 26.5*     Results from last 7 days   Lab Units 04/05/24  1247 04/05/24  1027   HSTROP T ng/L 53* 54*     Results from last 7 days   Lab Units 04/05/24  1027   PROBNP pg/mL 102.6       Intake/Output Summary (Last 24 hours) at 4/11/2024 0817  Last data filed at 4/11/2024 0600  Gross per 24 hour   Intake 1034 ml   Output 2395 ml   Net -1361 ml       EKG/TELE: NSR    Radiology Data:   XR Chest 1 View    Result Date: 4/11/2024  Impression: Interval extubation with remainder of exam unchanged. Electronically Signed: Mikey Ko MD  4/11/2024 6:05 AM EDT  Workstation ID: [id number]    XR Chest 1 View    Result Date: 4/10/2024  Impression: Poststernotomy chest radiograph, with ET tube, right IJ catheter, and NG tube, apparently in satisfactory position. Minimal bibasilar atelectasis. No evidence of pneumothorax. Electronically Signed: Epifanio Light MD  4/10/2024 6:23 PM EDT  Workstation ID: [id number]    Results for orders placed during the hospital encounter of 04/05/24    Adult Transthoracic Echo Limited W/ Cont if Necessary Per Protocol    Interpretation Summary    Left ventricular systolic function is normal. Calculated left ventricular EF = 56.7%     Current Medications:  acetaminophen, 650 mg, Oral, Q8H  aspirin, 325 mg, Oral, Daily  [Held by provider] bisoprolol, 2.5 mg, Oral, Q24H  ceFAZolin, 2 g, Intravenous, Q8H  chlorhexidine, 15 mL, Mouth/Throat, Q12H  gabapentin, 100 mg, Oral, Q8H  losartan, 100 mg, Oral, Q24H   And  hydroCHLOROthiazide, 12.5 mg, Oral, Q24H  metoprolol tartrate, 12.5 mg, Oral, Q12H  pharmacy consult - MTM, , Does not apply, Daily  rosuvastatin, 40 mg, Oral, Nightly  senna-docusate sodium, 2 tablet, Oral, BID      amiodarone, 1 mg/min, Last Rate: 1 mg/min (04/10/24 2158)  dextrose 5 % with KCl 20 mEq, 30 mL/hr, Last Rate: 30 mL/hr (04/1950)  EPINEPHrine, 0.02-0.3  mcg/kg/min  insulin, 0-100 Units/hr, Last Rate: 1.9 Units/hr (04/11/24 0705)  niCARdipine, 5-15 mg/hr  nitroglycerin, 5-200 mcg/min  norepinephrine, 0.02-0.3 mcg/kg/min, Last Rate: 0.16 mcg/kg/min (04/11/24 0532)      Assessment and Plan:   Multivessel coronary artery disease  S/p CABG x 4 (LIMA to LAD, SVG to diagonal, SVG to posterolateral branch, SVG to first obtuse) with Dr. Barlow, 4/10/24  Combined systolic and diastolic heart failure  Echo: EF 56%, LV diastolic function consistent with grade I (impaired relaxation)  Hypertension  Resume medications when BP allows, currently still on levophed  Hyperlipidemia  Continue ASA and statin    - Encourage ambulation and IS use  - We will continue to follow    Electronically signed by MISTI Camacho, 04/11/24, 8:17 AM EDT.     Please note that portions of this note were dictated utilizing Dragon dictation.

## 2024-04-11 NOTE — PLAN OF CARE
Goal Outcome Evaluation:  Plan of Care Reviewed With: patient        Progress: improving  Outcome Evaluation: PT eval is completed. patient presents S/P CABG x4. patient demonstrates impaired bed mobility transfers and gait compared to baseline status. patient was able to ambulate 220 ft with min assist. stable vitals with activity anticipate patient to be able to go home with family assist at D/C      Anticipated Discharge Disposition (PT): home with assist

## 2024-04-11 NOTE — PROGRESS NOTES
Critical Care Note     LOS: 5 days   Patient Care Team:  Renee Liriano MD as PCP - General (Internal Medicine)    Chief Complaint/Reason for visit:    Chief Complaint   Patient presents with    Chest Pain   CABGx4 4/10/24  Diabetes mellitus type 2  History of alcohol use  Hypertension  Dyslipidemia    Subjective     Interval History:    He did extubated night of surgery.  He is currently on 2 L nasal cannula with a saturation of 94%.  He denies a history of diabetes.  He states he was put on metformin just as a preventative measure.  He is currently on insulin at 1.9 units/h.  He remains on norepinephrine 1.6 mics per kilogram per minute to maintain adequate blood pressure.  He is in sinus rhythm.  He is maintaining sinus rhythm.  Chest tube output yesterday 585 mL.  Overnight only 60 mL.  Urine output adequate 1.8 L in the last 24 hours.  He is having significant incisional pain.  He does not have much of an appetite but denies nausea.    Review of Systems:    All systems were reviewed and negative except as noted in subjective.    Medical history, surgical history, social history, family history reviewed    Objective     Intake/Output:    Intake/Output Summary (Last 24 hours) at 4/11/2024 0851  Last data filed at 4/11/2024 0800  Gross per 24 hour   Intake 1034 ml   Output 2505 ml   Net -1471 ml       Nutrition:  NPO Diet NPO Type: Strict NPO    Infusions:  amiodarone, 1 mg/min, Last Rate: 1 mg/min (04/10/24 2134)  dextrose 5 % with KCl 20 mEq, 30 mL/hr, Last Rate: 30 mL/hr (04/10/24 1728)  EPINEPHrine, 0.02-0.3 mcg/kg/min  insulin, 0-100 Units/hr, Last Rate: 1.9 Units/hr (04/11/24 0705)  niCARdipine, 5-15 mg/hr  nitroglycerin, 5-200 mcg/min  norepinephrine, 0.02-0.3 mcg/kg/min, Last Rate: 0.14 mcg/kg/min (04/11/24 0827)        Mechanical Ventilator Settings:            Resp Rate (Set): 14  Pressure Support (cm H2O): 10 cm H20  FiO2 (%): 40 %  PEEP/CPAP (cm H2O): 5 cm H20    Minute Ventilation (L/min) (Obs):  "11.1 L/min  Resp Rate (Observed) Vent: 16  I:E Ratio (Set): 1:3.09  I:E Ratio (Obs): 1:2.3    PIP Observed (cm H2O): 16 cm H2O       Telemetry: Sinus rhythm             Vital Signs  Blood pressure 109/64, pulse 80, temperature 98.8 °F (37.1 °C), temperature source Bladder, resp. rate 16, height 180.3 cm (70.98\"), weight 95.7 kg (211 lb), SpO2 94%.    Physical Exam:  General Appearance:  Older gentleman upright in bed   Head:  Atraumatic   Eyes:          No jaundice conjunctiva pink   Ears:     Throat: Oral mucosa moist   Neck: Right IJ line   Back:      Lungs:   Sternotomy incision intact.  Mediastinal and pleural drains in place with scant bloody output.  Breath sounds are bilateral without wheeze or rhonchi    Heart:  Regular rhythm, S1, S2 auscultated, loud pericardial rub   Abdomen:   Large healed abdominal scar, vertical, active bowel sounds, nondistended   Rectal:   Deferred   Extremities: Ace wrap left lower extremity, right radial arterial line   Pulses:    Skin: Cool and dry   Lymph nodes: No cervical adenopathy   Neurologic: Alert, speech fluent, face symmetric      Results Review:     I reviewed the patient's new clinical results.   Results from last 7 days   Lab Units 04/11/24  0303 04/10/24  2010 04/10/24  1735 04/07/24  0109 04/06/24  0347 04/05/24  1027   SODIUM mmol/L 140 141 135*   < > 133* 135*   POTASSIUM mmol/L 4.0 4.2 4.3   < > 3.6 4.3   CHLORIDE mmol/L 104 101 96*   < > 99 100   CO2 mmol/L 23.0 21.0* 24.0   < > 25.0 26.0   BUN mg/dL 19 21 21   < > 20 22   CREATININE mg/dL 1.22 1.15 1.19   < > 0.82 0.83   CALCIUM mg/dL 9.0 9.6 9.1   < > 9.2 10.2   BILIRUBIN mg/dL 0.3  --   --   --  0.3 0.3   ALK PHOS U/L 36*  --   --   --  51 61   ALT (SGPT) U/L 15  --   --   --  14 16   AST (SGOT) U/L 46*  --   --   --  16 22   GLUCOSE mg/dL 168* 140* 107*   < > 99 90    < > = values in this interval not displayed.     Results from last 7 days   Lab Units 04/11/24  0303 04/10/24  2010 04/10/24  1735   WBC " "10*3/mm3 14.58* 23.08* 18.67*   HEMOGLOBIN g/dL 10.1* 11.0* 10.8*   HEMATOCRIT % 31.0* 32.7* 32.3*   PLATELETS 10*3/mm3 211 236 200     Results from last 7 days   Lab Units 04/10/24  2155   PH, ARTERIAL pH units 7.365   PO2 ART mm Hg 121.0*   PCO2, ARTERIAL mm Hg 39.4   HCO3 ART mmol/L 22.5     Lab Results   Component Value Date    BLOODCX No growth at 5 days 10/30/2021     No results found for: \"URINECX\"    I reviewed the patient's new imaging including images and reports.    XR CHEST 1 VW    Date of Exam: 4/11/2024 2:39 AM EDT    Indication: Post-Op Heart Surgery    Comparison: 1 day prior.    Findings:  Interval extubation and removal of nasogastric tube. Remaining support hardware projects unchanged. There is no distinct pneumothorax. Trace effusion on the left appears similar. No new focal airspace consolidation. Unchanged heart and mediastinal  contours.   Impression:     Impression:  Interval extubation with remainder of exam unchanged.      Electronically Signed: Mikey Ko MD   4/11/2024 6:05 AM EDT     All medications reviewed.   acetaminophen, 650 mg, Oral, Q8H  aspirin, 325 mg, Oral, Daily  [Held by provider] bisoprolol, 2.5 mg, Oral, Q24H  ceFAZolin, 2 g, Intravenous, Q8H  chlorhexidine, 15 mL, Mouth/Throat, Q12H  gabapentin, 100 mg, Oral, Q8H  losartan, 100 mg, Oral, Q24H   And  hydroCHLOROthiazide, 12.5 mg, Oral, Q24H  metoprolol tartrate, 12.5 mg, Oral, Q12H  pharmacy consult - MTM, , Does not apply, Daily  rosuvastatin, 40 mg, Oral, Nightly  senna-docusate sodium, 2 tablet, Oral, BID          Assessment & Plan       NSTEMI (non-ST elevated myocardial infarction)    Hypertension    Dyslipidemia    MV CAD    T2DM    Combined systolic and diastolic CHF      73-year-old gentleman, with severe multivessel coronary disease, 70% proximal to mid LAD, 70% D1, 70 to 80% ramus, 70% OM1, subtotal occlusion of the RCA on heart catheterization April 5.  4/10 he underwent CABG x 4, LIMA to LAD, saphenous vein " to OM1, saphenous vein to PL, saphenous vein to D1.  He received 2 platelets but no packed cells.  Postoperative hemoglobin is 10.1.  Mediastinal tubes have total 585 output, in the last 24 hours.  He is in sinus rhythm.  He extubated night of surgery and is currently on low-flow nasal cannula.  He is on  norepinephrine 1.6 mics per kilogram per minute for hypotension.  Preoperative EF is 47%.  Repeat limited echo revealed an EF of 56%.    He denies a history of diabetes mellitus type 2 despite taking metformin.  He was told that the metformin was preventative.  Preoperative fasting blood glucose was 101.  He is currently on insulin 1.9 units/h.    He does take losartan hydrochlorothiazide for hypertension.  Currently he is hypotensive on some  norepinephrine.  Lipid panel is not available but he does take a statin.  He does not have a history of tobacco use.  Reoperative spirometry reveals an FEV1 of 103%.  There is a verbal history of daily alcohol consumption.  Liver function tests are normal.    PLAN:      Wean norepinephrine as able  Monitor rhythm, urine output, mediastinal tube drainage, H&H  Replace electrolytes as needed  Aspirin, statin, beta-blocker  Insulin drip per protocol, transition to sliding scale this afternoon  Mobilize with therapy  Pain management    VTE Prophylaxis: foot pumps    Stress Ulcer Prophylaxis: start Pepcid    Minerva Dougherty MD  Pulmonary and Critical Care    Time: 25min

## 2024-04-11 NOTE — PROGRESS NOTES
Pt. Referred for Phase II Cardiac Rehab. Staff discussed benefits of exercise, program protocol, and educational material provided. Teach back verified.  Patient scheduled for orientation at [facility] on Friday, May 10th at 1300.

## 2024-04-11 NOTE — PROGRESS NOTES
CTS Progress Note       LOS: 5 days   Patient Care Team:  Renee Liriano MD as PCP - General (Internal Medicine)    Chief Complaint: NSTEMI (non-ST elevated myocardial infarction)    Vital Signs:  Temp:  [95.2 °F (35.1 °C)-98.8 °F (37.1 °C)] 98.8 °F (37.1 °C)  Heart Rate:  [72-80] 75  Resp:  [12-20] 12  BP: ()/(40-86) 88/53  FiO2 (%):  [40 %-100 %] 40 %    Physical Exam: Extubated up in chair breathing unlabored neurologically intact without deficits seems extremely anxious       Results:     Results from last 7 days   Lab Units 04/11/24  0303   WBC 10*3/mm3 14.58*   HEMOGLOBIN g/dL 10.1*   HEMATOCRIT % 31.0*   PLATELETS 10*3/mm3 211     Results from last 7 days   Lab Units 04/11/24  0303   SODIUM mmol/L 140   POTASSIUM mmol/L 4.0   CHLORIDE mmol/L 104   CO2 mmol/L 23.0   BUN mg/dL 19   CREATININE mg/dL 1.22   GLUCOSE mg/dL 168*   CALCIUM mg/dL 9.0           Imaging Results (Last 24 Hours)       Procedure Component Value Units Date/Time    XR Chest 1 View [[id number]] Collected: 04/11/24 0603     Updated: 04/11/24 0608    Narrative:      XR CHEST 1 VW    Date of Exam: 4/11/2024 2:39 AM EDT    Indication: Post-Op Heart Surgery    Comparison: 1 day prior.    Findings:  Interval extubation and removal of nasogastric tube. Remaining support hardware projects unchanged. There is no distinct pneumothorax. Trace effusion on the left appears similar. No new focal airspace consolidation. Unchanged heart and mediastinal   contours.      Impression:      Impression:  Interval extubation with remainder of exam unchanged.      Electronically Signed: Mikey Ko MD    4/11/2024 6:05 AM EDT    Workstation ID: [id number]    XR Chest 1 View [[id number]] Collected: 04/10/24 1821     Updated: 04/10/24 1826    Narrative:      XR CHEST 1 VW    Date of Exam: 4/10/2024 6:03 PM EDT    Indication: Post-Op Check Line & Tube Placement    Comparison: 4/5/2024    Findings:  Sternotomy wires are now seen. ET tube is in good position at  the level of the inferior margin of the clavicles. Right IJ catheter tip lies in the mid SVC. NG tube is seen passing at least a couple centimeters below the GE junction. Heart shadow is in   the upper range of normal size. Pulmonary vasculature appears normal. Lungs appear clear except for minimal bibasilar discoid atelectasis. No pneumothorax or effusion is seen.      Impression:      Impression:  Poststernotomy chest radiograph, with ET tube, right IJ catheter, and NG tube, apparently in satisfactory position. Minimal bibasilar atelectasis. No evidence of pneumothorax.      Electronically Signed: Epifanio Light MD    4/10/2024 6:23 PM EDT    Workstation ID: [id number]            Assessment      NSTEMI (non-ST elevated myocardial infarction)    Hypertension    Dyslipidemia    MV CAD    T2DM    Combined systolic and diastolic CHF    Satisfactory progress less than 24 hours post coronary bypass grafting.  Patient is extremely nervous and anxious.  Very concerned about pain medicine regiment however overall is doing quite well    Plan   Continuing supportive care    Please note that portions of this note were completed with a voice recognition program. Efforts were made to edit the dictations, but occasionally words are mistranscribed.    Jermaine Barlow MD  04/11/24  06:18 EDT

## 2024-04-12 ENCOUNTER — APPOINTMENT (OUTPATIENT)
Dept: GENERAL RADIOLOGY | Facility: HOSPITAL | Age: 74
End: 2024-04-12
Payer: MEDICARE

## 2024-04-12 LAB
ANION GAP SERPL CALCULATED.3IONS-SCNC: 9 MMOL/L (ref 5–15)
BASE EXCESS BLDA CALC-SCNC: -1 MMOL/L (ref -5–5)
BASE EXCESS BLDA CALC-SCNC: -13 MMOL/L (ref -5–5)
BASE EXCESS BLDA CALC-SCNC: -2 MMOL/L (ref -5–5)
BASE EXCESS BLDA CALC-SCNC: -2 MMOL/L (ref -5–5)
BASE EXCESS BLDA CALC-SCNC: -3 MMOL/L (ref -5–5)
BASE EXCESS BLDA CALC-SCNC: -3 MMOL/L (ref -5–5)
BASE EXCESS BLDA CALC-SCNC: -4 MMOL/L (ref -5–5)
BASE EXCESS BLDA CALC-SCNC: -8 MMOL/L (ref -5–5)
BH BB BLOOD EXPIRATION DATE: NORMAL
BH BB BLOOD EXPIRATION DATE: NORMAL
BH BB BLOOD TYPE BARCODE: 9500
BH BB BLOOD TYPE BARCODE: 9500
BH BB DISPENSE STATUS: NORMAL
BH BB DISPENSE STATUS: NORMAL
BH BB PRODUCT CODE: NORMAL
BH BB PRODUCT CODE: NORMAL
BH BB UNIT NUMBER: NORMAL
BH BB UNIT NUMBER: NORMAL
BUN SERPL-MCNC: 25 MG/DL (ref 8–23)
BUN/CREAT SERPL: 24.8 (ref 7–25)
CA-I BLDA-SCNC: 0.93 MMOL/L (ref 1.2–1.32)
CA-I BLDA-SCNC: 0.97 MMOL/L (ref 1.2–1.32)
CA-I BLDA-SCNC: 1.01 MMOL/L (ref 1.2–1.32)
CA-I BLDA-SCNC: 1.04 MMOL/L (ref 1.2–1.32)
CA-I BLDA-SCNC: 1.05 MMOL/L (ref 1.2–1.32)
CA-I BLDA-SCNC: 1.25 MMOL/L (ref 1.2–1.32)
CA-I BLDA-SCNC: 1.27 MMOL/L (ref 1.2–1.32)
CA-I BLDA-SCNC: 1.28 MMOL/L (ref 1.2–1.32)
CALCIUM SPEC-SCNC: 8.7 MG/DL (ref 8.6–10.5)
CHLORIDE SERPL-SCNC: 100 MMOL/L (ref 98–107)
CO2 BLDA-SCNC: 14 MMOL/L (ref 24–29)
CO2 BLDA-SCNC: 20 MMOL/L (ref 24–29)
CO2 BLDA-SCNC: 21 MMOL/L (ref 24–29)
CO2 BLDA-SCNC: 24 MMOL/L (ref 24–29)
CO2 BLDA-SCNC: 24 MMOL/L (ref 24–29)
CO2 BLDA-SCNC: 25 MMOL/L (ref 24–29)
CO2 BLDA-SCNC: 25 MMOL/L (ref 24–29)
CO2 BLDA-SCNC: 26 MMOL/L (ref 24–29)
CO2 SERPL-SCNC: 26 MMOL/L (ref 22–29)
CREAT SERPL-MCNC: 1.01 MG/DL (ref 0.76–1.27)
CROSSMATCH INTERPRETATION: NORMAL
CROSSMATCH INTERPRETATION: NORMAL
DEPRECATED RDW RBC AUTO: 49 FL (ref 37–54)
EGFRCR SERPLBLD CKD-EPI 2021: 79 ML/MIN/1.73
ERYTHROCYTE [DISTWIDTH] IN BLOOD BY AUTOMATED COUNT: 13.8 % (ref 12.3–15.4)
GLUCOSE BLDC GLUCOMTR-MCNC: 100 MG/DL (ref 70–130)
GLUCOSE BLDC GLUCOMTR-MCNC: 111 MG/DL (ref 70–130)
GLUCOSE BLDC GLUCOMTR-MCNC: 120 MG/DL (ref 70–130)
GLUCOSE BLDC GLUCOMTR-MCNC: 120 MG/DL (ref 70–130)
GLUCOSE BLDC GLUCOMTR-MCNC: 125 MG/DL (ref 70–130)
GLUCOSE BLDC GLUCOMTR-MCNC: 134 MG/DL (ref 70–130)
GLUCOSE BLDC GLUCOMTR-MCNC: 143 MG/DL (ref 70–130)
GLUCOSE BLDC GLUCOMTR-MCNC: 146 MG/DL (ref 70–130)
GLUCOSE BLDC GLUCOMTR-MCNC: 158 MG/DL (ref 70–130)
GLUCOSE BLDC GLUCOMTR-MCNC: 167 MG/DL (ref 70–130)
GLUCOSE BLDC GLUCOMTR-MCNC: 77 MG/DL (ref 70–130)
GLUCOSE BLDC GLUCOMTR-MCNC: 94 MG/DL (ref 70–130)
GLUCOSE SERPL-MCNC: 131 MG/DL (ref 65–99)
HCO3 BLDA-SCNC: 13.6 MMOL/L (ref 22–26)
HCO3 BLDA-SCNC: 18.8 MMOL/L (ref 22–26)
HCO3 BLDA-SCNC: 20.4 MMOL/L (ref 22–26)
HCO3 BLDA-SCNC: 22.9 MMOL/L (ref 22–26)
HCO3 BLDA-SCNC: 23 MMOL/L (ref 22–26)
HCO3 BLDA-SCNC: 23.8 MMOL/L (ref 22–26)
HCO3 BLDA-SCNC: 24 MMOL/L (ref 22–26)
HCO3 BLDA-SCNC: 24.8 MMOL/L (ref 22–26)
HCT VFR BLD AUTO: 29.9 % (ref 37.5–51)
HCT VFR BLDA CALC: 26 % (ref 38–51)
HCT VFR BLDA CALC: 27 % (ref 38–51)
HCT VFR BLDA CALC: 28 % (ref 38–51)
HCT VFR BLDA CALC: 31 % (ref 38–51)
HCT VFR BLDA CALC: 34 % (ref 38–51)
HCT VFR BLDA CALC: 35 % (ref 38–51)
HCT VFR BLDA CALC: 40 % (ref 38–51)
HCT VFR BLDA CALC: 40 % (ref 38–51)
HGB BLD-MCNC: 9.7 G/DL (ref 13–17.7)
HGB BLDA-MCNC: 10.5 G/DL (ref 12–17)
HGB BLDA-MCNC: 11.6 G/DL (ref 12–17)
HGB BLDA-MCNC: 11.9 G/DL (ref 12–17)
HGB BLDA-MCNC: 13.6 G/DL (ref 12–17)
HGB BLDA-MCNC: 13.6 G/DL (ref 12–17)
HGB BLDA-MCNC: 8.8 G/DL (ref 12–17)
HGB BLDA-MCNC: 9.2 G/DL (ref 12–17)
HGB BLDA-MCNC: 9.5 G/DL (ref 12–17)
MCH RBC QN AUTO: 31.5 PG (ref 26.6–33)
MCHC RBC AUTO-ENTMCNC: 32.4 G/DL (ref 31.5–35.7)
MCV RBC AUTO: 97.1 FL (ref 79–97)
PCO2 BLDA: 28.8 MM HG (ref 35–45)
PCO2 BLDA: 31.9 MM HG (ref 35–45)
PCO2 BLDA: 37.7 MM HG (ref 35–45)
PCO2 BLDA: 42.9 MM HG (ref 35–45)
PCO2 BLDA: 43.6 MM HG (ref 35–45)
PCO2 BLDA: 45.5 MM HG (ref 35–45)
PCO2 BLDA: 46.3 MM HG (ref 35–45)
PCO2 BLDA: 48.7 MM HG (ref 35–45)
PH BLDA: 7.28 PH UNITS (ref 7.35–7.6)
PH BLDA: 7.3 PH UNITS (ref 7.35–7.6)
PH BLDA: 7.34 PH UNITS (ref 7.35–7.6)
PH BLDA: 7.41 PH UNITS (ref 7.35–7.6)
PLATELET # BLD AUTO: 165 10*3/MM3 (ref 140–450)
PMV BLD AUTO: 10.1 FL (ref 6–12)
PO2 BLDA: 210 MMHG (ref 80–105)
PO2 BLDA: 239 MMHG (ref 80–105)
PO2 BLDA: 296 MMHG (ref 80–105)
PO2 BLDA: 364 MMHG (ref 80–105)
PO2 BLDA: 375 MMHG (ref 80–105)
PO2 BLDA: 381 MMHG (ref 80–105)
PO2 BLDA: 51 MMHG (ref 80–105)
PO2 BLDA: 96 MMHG (ref 80–105)
POTASSIUM BLDA-SCNC: 2.7 MMOL/L (ref 3.5–4.9)
POTASSIUM BLDA-SCNC: 4.2 MMOL/L (ref 3.5–4.9)
POTASSIUM BLDA-SCNC: 4.3 MMOL/L (ref 3.5–4.9)
POTASSIUM BLDA-SCNC: 4.6 MMOL/L (ref 3.5–4.9)
POTASSIUM BLDA-SCNC: 5.8 MMOL/L (ref 3.5–4.9)
POTASSIUM BLDA-SCNC: 6.2 MMOL/L (ref 3.5–4.9)
POTASSIUM BLDA-SCNC: 6.3 MMOL/L (ref 3.5–4.9)
POTASSIUM BLDA-SCNC: 6.9 MMOL/L (ref 3.5–4.9)
POTASSIUM SERPL-SCNC: 4 MMOL/L (ref 3.5–5.2)
QT INTERVAL: 426 MS
QTC INTERVAL: 472 MS
RBC # BLD AUTO: 3.08 10*6/MM3 (ref 4.14–5.8)
SAO2 % BLDA: 100 % (ref 95–98)
SAO2 % BLDA: 81 % (ref 95–98)
SAO2 % BLDA: 97 % (ref 95–98)
SODIUM BLD-SCNC: 131 MMOL/L (ref 138–146)
SODIUM BLD-SCNC: 131 MMOL/L (ref 138–146)
SODIUM BLD-SCNC: 133 MMOL/L (ref 138–146)
SODIUM BLD-SCNC: 135 MMOL/L (ref 138–146)
SODIUM BLD-SCNC: 135 MMOL/L (ref 138–146)
SODIUM BLD-SCNC: 137 MMOL/L (ref 138–146)
SODIUM BLD-SCNC: 138 MMOL/L (ref 138–146)
SODIUM BLD-SCNC: 145 MMOL/L (ref 138–146)
SODIUM SERPL-SCNC: 135 MMOL/L (ref 136–145)
UNIT  ABO: NORMAL
UNIT  ABO: NORMAL
UNIT  RH: NORMAL
UNIT  RH: NORMAL
WBC NRBC COR # BLD AUTO: 7.52 10*3/MM3 (ref 3.4–10.8)

## 2024-04-12 PROCEDURE — 80048 BASIC METABOLIC PNL TOTAL CA: CPT | Performed by: PHYSICIAN ASSISTANT

## 2024-04-12 PROCEDURE — 71045 X-RAY EXAM CHEST 1 VIEW: CPT

## 2024-04-12 PROCEDURE — 97530 THERAPEUTIC ACTIVITIES: CPT

## 2024-04-12 PROCEDURE — 93010 ELECTROCARDIOGRAM REPORT: CPT | Performed by: INTERNAL MEDICINE

## 2024-04-12 PROCEDURE — 93005 ELECTROCARDIOGRAM TRACING: CPT | Performed by: INTERNAL MEDICINE

## 2024-04-12 PROCEDURE — 99232 SBSQ HOSP IP/OBS MODERATE 35: CPT | Performed by: INTERNAL MEDICINE

## 2024-04-12 PROCEDURE — 85027 COMPLETE CBC AUTOMATED: CPT | Performed by: PHYSICIAN ASSISTANT

## 2024-04-12 PROCEDURE — 25010000002 PROCHLORPERAZINE 10 MG/2ML SOLUTION: Performed by: STUDENT IN AN ORGANIZED HEALTH CARE EDUCATION/TRAINING PROGRAM

## 2024-04-12 PROCEDURE — 99232 SBSQ HOSP IP/OBS MODERATE 35: CPT

## 2024-04-12 PROCEDURE — 93005 ELECTROCARDIOGRAM TRACING: CPT | Performed by: PHYSICIAN ASSISTANT

## 2024-04-12 PROCEDURE — 82948 REAGENT STRIP/BLOOD GLUCOSE: CPT

## 2024-04-12 PROCEDURE — 25010000002 CEFAZOLIN PER 500 MG: Performed by: PHYSICIAN ASSISTANT

## 2024-04-12 PROCEDURE — 25010000002 MORPHINE PER 10 MG: Performed by: THORACIC SURGERY (CARDIOTHORACIC VASCULAR SURGERY)

## 2024-04-12 PROCEDURE — 99024 POSTOP FOLLOW-UP VISIT: CPT | Performed by: THORACIC SURGERY (CARDIOTHORACIC VASCULAR SURGERY)

## 2024-04-12 RX ORDER — NITROGLYCERIN 0.4 MG/1
0.4 TABLET SUBLINGUAL
Status: DISCONTINUED | OUTPATIENT
Start: 2024-04-12 | End: 2024-04-17 | Stop reason: HOSPADM

## 2024-04-12 RX ORDER — SODIUM CHLORIDE 0.9 % (FLUSH) 0.9 %
10 SYRINGE (ML) INJECTION AS NEEDED
Status: DISCONTINUED | OUTPATIENT
Start: 2024-04-12 | End: 2024-04-17 | Stop reason: HOSPADM

## 2024-04-12 RX ORDER — SODIUM CHLORIDE 0.9 % (FLUSH) 0.9 %
10 SYRINGE (ML) INJECTION EVERY 12 HOURS SCHEDULED
Status: DISCONTINUED | OUTPATIENT
Start: 2024-04-12 | End: 2024-04-17 | Stop reason: HOSPADM

## 2024-04-12 RX ORDER — SODIUM CHLORIDE 9 MG/ML
40 INJECTION, SOLUTION INTRAVENOUS AS NEEDED
Status: DISCONTINUED | OUTPATIENT
Start: 2024-04-12 | End: 2024-04-17 | Stop reason: HOSPADM

## 2024-04-12 RX ORDER — CLOPIDOGREL BISULFATE 75 MG/1
75 TABLET ORAL DAILY
Status: DISCONTINUED | OUTPATIENT
Start: 2024-04-12 | End: 2024-04-14

## 2024-04-12 RX ADMIN — Medication 12.5 MG: at 20:44

## 2024-04-12 RX ADMIN — MORPHINE SULFATE 2 MG: 2 INJECTION, SOLUTION INTRAMUSCULAR; INTRAVENOUS at 07:34

## 2024-04-12 RX ADMIN — CLOPIDOGREL BISULFATE 75 MG: 75 TABLET ORAL at 08:03

## 2024-04-12 RX ADMIN — ACETAMINOPHEN 650 MG: 325 TABLET ORAL at 02:27

## 2024-04-12 RX ADMIN — Medication 12.5 MG: at 08:03

## 2024-04-12 RX ADMIN — OXYCODONE HYDROCHLORIDE 10 MG: 10 TABLET ORAL at 04:13

## 2024-04-12 RX ADMIN — FAMOTIDINE 20 MG: 20 TABLET, FILM COATED ORAL at 08:03

## 2024-04-12 RX ADMIN — HYDROCODONE BITARTRATE AND ACETAMINOPHEN 1 TABLET: 7.5; 325 TABLET ORAL at 19:35

## 2024-04-12 RX ADMIN — PROCHLORPERAZINE EDISYLATE 5 MG: 5 INJECTION INTRAMUSCULAR; INTRAVENOUS at 12:41

## 2024-04-12 RX ADMIN — PROCHLORPERAZINE EDISYLATE 5 MG: 5 INJECTION INTRAMUSCULAR; INTRAVENOUS at 20:44

## 2024-04-12 RX ADMIN — GABAPENTIN 100 MG: 100 CAPSULE ORAL at 20:44

## 2024-04-12 RX ADMIN — HYDROCODONE BITARTRATE AND ACETAMINOPHEN 1 TABLET: 7.5; 325 TABLET ORAL at 15:43

## 2024-04-12 RX ADMIN — SODIUM CHLORIDE 2 G: 900 INJECTION INTRAVENOUS at 05:45

## 2024-04-12 RX ADMIN — ASPIRIN 325 MG: 325 TABLET, COATED ORAL at 08:03

## 2024-04-12 RX ADMIN — SENNOSIDES AND DOCUSATE SODIUM 2 TABLET: 8.6; 5 TABLET ORAL at 08:03

## 2024-04-12 RX ADMIN — ACETAMINOPHEN 650 MG: 325 TABLET ORAL at 18:48

## 2024-04-12 RX ADMIN — Medication 10 ML: at 12:42

## 2024-04-12 RX ADMIN — ROSUVASTATIN 40 MG: 20 TABLET, FILM COATED ORAL at 20:44

## 2024-04-12 RX ADMIN — ACETAMINOPHEN 650 MG: 325 TABLET ORAL at 10:03

## 2024-04-12 RX ADMIN — Medication 10 ML: at 21:00

## 2024-04-12 RX ADMIN — 0.12% CHLORHEXIDINE GLUCONATE 15 ML: 1.2 RINSE ORAL at 20:44

## 2024-04-12 RX ADMIN — FAMOTIDINE 20 MG: 20 TABLET, FILM COATED ORAL at 18:48

## 2024-04-12 RX ADMIN — GABAPENTIN 100 MG: 100 CAPSULE ORAL at 04:09

## 2024-04-12 RX ADMIN — HYDROCODONE BITARTRATE AND ACETAMINOPHEN 1 TABLET: 7.5; 325 TABLET ORAL at 00:34

## 2024-04-12 RX ADMIN — Medication 5 MG: at 20:44

## 2024-04-12 RX ADMIN — METHOCARBAMOL TABLETS 750 MG: 750 TABLET, COATED ORAL at 00:34

## 2024-04-12 RX ADMIN — MORPHINE SULFATE 2 MG: 2 INJECTION, SOLUTION INTRAMUSCULAR; INTRAVENOUS at 08:49

## 2024-04-12 NOTE — PLAN OF CARE
Goal Outcome Evaluation:   Pt remains A&O x 4. 2LNC. IS encouraged. Remains in a significant amount of pain, managed with scheduled tylenol & PRNs. NSR. Remains off Levo. CT 1/2- 130 mL, CT 3- 40 mL, sanguineous drainage. UOP of 565 mL, adams d/c this AM. No BM. Ambulated 220 ft. Up to chair this AM. Tmax- 100.5. Spouse updated @ bedside.

## 2024-04-12 NOTE — PROGRESS NOTES
CTS Progress Note      POD 3 s/p CABG      Subjective  Just got back in bed.  States he is feeling fine he and seeing things according to his wife.  Thinks its Neurontin.  Says he had this reaction before.      Objective    Physical Exam:   Vital Signs   Temp:  [97.7 °F (36.5 °C)-99.3 °F (37.4 °C)] 98.5 °F (36.9 °C)  Heart Rate:  [] 97  Resp:  [18] 18  BP: (106-150)/(64-86) 125/77   GEN: NAD   CV: Regular rate and rhythm    RESP: Clear to auscultation    EXT: Warm without significant edema but slightly more swelling the left lower extremity appears to be secondary to EVH   Incision: Healing well sternum stable     Results     Results from last 7 days   Lab Units 04/13/24  0433   WBC 10*3/mm3 4.92   HEMOGLOBIN g/dL 9.8*   HEMATOCRIT % 30.1*   PLATELETS 10*3/mm3 176     Results from last 7 days   Lab Units 04/13/24  0433   SODIUM mmol/L 131*   POTASSIUM mmol/L 3.8   CHLORIDE mmol/L 95*   CO2 mmol/L 27.0   BUN mg/dL 30*   CREATININE mg/dL 0.83   GLUCOSE mg/dL 129*   CALCIUM mg/dL 8.8     Results from last 7 days   Lab Units 04/11/24  0303 04/10/24  1735   INR  1.27* 1.46*   APTT seconds  --  31.9           Assessment & Plan   Postoperative day 3 status post CABG    NSTEMI (non-ST elevated myocardial infarction)    Hypertension    Dyslipidemia    MV CAD    T2DM    Combined systolic and diastolic CHF        Plan   Hopefully discharge home in the a.m.  Continue postop rehab with aggressive pulmonary toilet and increasing activity levels  DC Neurontin secondary to side effects    BRANDON Davila  04/13/24  08:42 EDT

## 2024-04-12 NOTE — THERAPY TREATMENT NOTE
Patient Name: Khalif Joshua  : 1950    MRN: [id number]                              Today's Date: 2024       Admit Date: 2024    Visit Dx:     ICD-10-CM ICD-9-CM   1. Chest pain, unspecified type  R07.9 786.50   2. NSTEMI (non-ST elevated myocardial infarction)  I21.4 410.70   3. Coronary artery disease involving native heart, unspecified vessel or lesion type, unspecified whether angina present  I25.10 414.01     Patient Active Problem List   Diagnosis    Acute diverticulitis    Hypertension    Dyslipidemia    Abnormal EKG    Lactic acidosis    Sepsis without acute organ dysfunction    Peritonitis    Diverticul disease small and large intestine, no perforati or abscess    NSTEMI (non-ST elevated myocardial infarction)    MV CAD    T2DM    Combined systolic and diastolic CHF     Past Medical History:   Diagnosis Date    CAD (coronary artery disease)     Diverticulitis     Hyperlipemia     Hypertension     Osteoarthritis     Perforated sigmoid colon      Past Surgical History:   Procedure Laterality Date    CARDIAC CATHETERIZATION N/A 2024    Procedure: Left Heart Cath;  Surgeon: Jermaine Pineda III, MD;  Location: [facility] CATH INVASIVE LOCATION;  Service: Cardiology;  Laterality: N/A;    COLOSTOMY CLOSURE N/A 2022    Procedure: COLOSTOMY TAKEDOWN OPEN;  Surgeon: Jose Ferguson MD;  Location:  [facility];  Service: General;  Laterality: N/A;    CORONARY ARTERY BYPASS GRAFT N/A 4/10/2024    Procedure: MEDIAN STERNOTOMY, CORONARY ARTERY BYPASS GRAFTING X4, LIMA ,EVH OF THE LEFT GREATER SAPHENOUS VEIN WITH EVH EXPLORATION OF THE RIGHT LEG;  Surgeon: Jermaine Barlow MD;  Location:  [facility];  Service: Cardiothoracic;  Laterality: N/A;    EXPLORATORY LAPAROTOMY N/A 10/30/2021    Procedure: LAPAROTOMY EXPLORATORY, SIGMOID COLECTOMY,  END COLOSTOMY;  Surgeon: Jose Ferguson MD;  Location:  [facility];  Service: General;  Laterality: N/A;    ILEOSTOMY      Placement and reversal     REPLACEMENT TOTAL KNEE Bilateral       General Information       Row Name 04/12/24 1156          Physical Therapy Time and Intention    Document Type therapy note (daily note)  -APRIL     Mode of Treatment physical therapy  -APRIL       Row Name 04/12/24 1156          General Information    Patient Profile Reviewed yes  -APRIL     Prior Level of Function independent:;bed mobility;ADL's;gait;transfer  -APRIL     Existing Precautions/Restrictions cardiac;oxygen therapy device and L/min  -APRIL     Barriers to Rehab none identified  -APRIL       Row Name 04/12/24 1156          Living Environment    People in Home spouse  -APRIL       Row Name 04/12/24 1156          Cognition    Orientation Status (Cognition) oriented x 4  -APRIL       Row Name 04/12/24 1156          Safety Issues, Functional Mobility    Safety Issues Affecting Function (Mobility) safety precautions follow-through/compliance  -APRIL     Impairments Affecting Function (Mobility) balance;endurance/activity tolerance;shortness of breath;strength  -APRIL               User Key  (r) = Recorded By, (t) = Taken By, (c) = Cosigned By      Initials Name Provider Type    APRIL Ofelia Means, PT Physical Therapist                   Mobility       Row Name 04/12/24 1156          Bed Mobility    Bed Mobility rolling left;rolling right;scooting/bridging;supine-sit;sit-supine  -APRIL     Rolling Left [place] (Bed Mobility) contact guard  -APRIL     Rolling Right [place] (Bed Mobility) contact guard  -APRIL     Scooting/Bridging [place] (Bed Mobility) contact guard  -APRIL     Supine-Sit [place] (Bed Mobility) minimum assist (75% patient effort)  -APRIL     Sit-Supine [place] (Bed Mobility) minimum assist (75% patient effort)  -APRIL     Comment, (Bed Mobility) patient was given instruction on log roll transfers and is able to maintain sternal precautions and get up much easier today  -APRIL       Row Name 04/12/24 1156          Bed-Chair Transfer    Bed-Chair [place] (Transfers) contact  guard  -APRIL       Row Name 04/12/24 1156          Sit-Stand Transfer    Sit-Stand [place] (Transfers) contact guard  -APRIL       Row Name 04/12/24 1156          Gait/Stairs (Locomotion)    [place] Level (Gait) contact guard  -APRIL     Distance in Feet (Gait) 440  -APRIL     Comment, (Gait/Stairs) patient able to ambulate without assist of any devises. good standing balance  -APRIL               User Key  (r) = Recorded By, (t) = Taken By, (c) = Cosigned By      Initials Name Provider Type    Ofelia Daley, PT Physical Therapist                   Obj/Interventions       Row Name 04/12/24 1158          Balance    Balance Assessment sitting static balance;sitting dynamic balance;standing static balance;standing dynamic balance  -APRIL     Static Sitting Balance independent  -APRIL     Dynamic Sitting Balance independent  -APRIL     Position, Sitting Balance unsupported;sitting edge of bed  -APRIL     Static Standing Balance independent  -APRIL     Dynamic Standing Balance contact guard  -APRIL     Position/Device Used, Standing Balance unsupported  -APRIL               User Key  (r) = Recorded By, (t) = Taken By, (c) = Cosigned By      Initials Name Provider Type    Ofelia Daley, PT Physical Therapist                   Goals/Plan       Row Name 04/12/24 1204          Therapy Assessment/Plan (PT)    Planned Therapy Interventions (PT) balance training;bed mobility training;gait training;home exercise program;transfer training;strengthening  -APRIL               User Key  (r) = Recorded By, (t) = Taken By, (c) = Cosigned By      Initials Name Provider Type    Ofelia Daley PT Physical Therapist                   Clinical Impression       Row Name 04/12/24 1158          Pain    Pretreatment Pain Rating 3/10  -APRIL     Posttreatment Pain Rating 5/10  -APRIL     Pain Location incisional  -APRIL     Pain Location - chest  -APRIL     Pain Intervention(s) Repositioned;Ambulation/increased activity;Splinting  -APRIL       Row Name 04/12/24  1158          Plan of Care Review    Plan of Care Reviewed With patient  -APRIL     Progress improving  -APRIL     Outcome Evaluation patient was able to increas ambulation to 440 ft without difficulty. patient has stable vitals with activity limited by knee pain today patient is progressing toward all mobility goals as expected anticipate patient to be able to go home with family assist at D/C  -APRIL       Row Name 04/12/24 1158          Therapy Assessment/Plan (PT)    Patient/Family Therapy Goals Statement (PT) return to PLOF  -APRIL     Rehab Potential (PT) good, to achieve stated therapy goals  -APRIL     Criteria for Skilled Interventions Met (PT) yes;skilled treatment is necessary  -APRIL     Therapy Frequency (PT) daily  -APRIL       Row Name 04/12/24 1158          Vital Signs    Pre Systolic BP Rehab 106  -APRIL     Pre Treatment Diastolic BP 65  -APRIL     Post Systolic BP Rehab 140  -APRIL     Post Treatment Diastolic BP 69  -APRIL     Pretreatment Heart Rate (beats/min) 85  -APRIL     Posttreatment Heart Rate (beats/min) 100  -APRIL     Pre SpO2 (%) 95  -APRIL     O2 Delivery Pre Treatment nasal cannula  -APRIL     Post SpO2 (%) 96  -APRIL     O2 Delivery Post Treatment nasal cannula  -APRIL     Pre Patient Position Supine  -APRIL     Intra Patient Position Standing  -APRIL     Post Patient Position Supine  -APRIL       Row Name 04/12/24 1158          Positioning and Restraints    Pre-Treatment Position in bed  -APRIL     Post Treatment Position bed  -APRIL     In Bed notified nsg;supine;encouraged to call for assist;call light within reach;with family/caregiver  -               User Key  (r) = Recorded By, (t) = Taken By, (c) = Cosigned By      Initials Name Provider Type    APRIL Ofelia Means, PT Physical Therapist                   Outcome Measures       Row Name 04/12/24 1205          How much help from another person do you currently need...    Turning from your back to your side while in flat bed without using bedrails? 3  -APRIL     Moving from lying on back  to sitting on the side of a flat bed without bedrails? 3  -APRIL     Moving to and from a bed to a chair (including a wheelchair)? 3  -APRIL     Standing up from a chair using your arms (e.g., wheelchair, bedside chair)? 3  -APRIL     Climbing 3-5 steps with a railing? 3  -APRIL     To walk in hospital room? 3  -APRIL     AM-PAC 6 Clicks Score (PT) 18  -APRIL     Highest Level of Mobility Goal 6 --> Walk 10 steps or more  -APRIL               User Key  (r) = Recorded By, (t) = Taken By, (c) = Cosigned By      Initials Name Provider Type    Ofelia Daley PT Physical Therapist                                 Physical Therapy Education       Title: PT OT SLP Therapies (In Progress)       Topic: Physical Therapy (In Progress)       Point: Mobility training (In Progress)       Learning Progress Summary             Patient Acceptance, E, NR by  at 4/12/2024 1030    Acceptance, E, NR by APRIL at 4/11/2024 0850                         Point: Home exercise program (In Progress)       Learning Progress Summary             Patient Acceptance, E, NR by APRIL at 4/12/2024 1030    Acceptance, E, NR by APRIL at 4/11/2024 0850                         Point: Body mechanics (In Progress)       Learning Progress Summary             Patient Acceptance, E, NR by APRIL at 4/12/2024 1030    Acceptance, E, NR by APRIL at 4/11/2024 0850                         Point: Precautions (In Progress)       Learning Progress Summary             Patient Acceptance, E, NR by APRIL at 4/12/2024 1030    Acceptance, E, NR by APRIL at 4/11/2024 0850                                         User Key       Initials Effective Dates Name Provider Type Discipline    APRIL 02/03/23 -  Ofelia Means PT Physical Therapist PT                  PT Recommendation and Plan  Planned Therapy Interventions (PT): balance training, bed mobility training, gait training, home exercise program, transfer training, strengthening  Plan of Care Reviewed With: patient  Progress: improving  Outcome Evaluation:  patient was able to increas ambulation to 440 ft without difficulty. patient has stable vitals with activity limited by knee pain today patient is progressing toward all mobility goals as expected anticipate patient to be able to go home with family assist at D/C     Time Calculation:   PT Evaluation Complexity  History, PT Evaluation Complexity: 3 or more personal factors and/or comorbidities  Examination of Body Systems (PT Eval Complexity): total of 4 or more elements  Clinical Presentation (PT Evaluation Complexity): unstable  Clinical Decision Making (PT Evaluation Complexity): moderate complexity  Overall Complexity (PT Evaluation Complexity): moderate complexity     PT Charges       Row Name 04/12/24 1205             Time Calculation    Start Time 1030  -APRIL      PT Received On 04/12/24  -APRIL      PT Goal Re-Cert Due Date 04/21/24  -APRIL         Time Calculation- PT    Total Timed Code Minutes- PT 23 minute(s)  -APRIL         Timed Charges    72206 - PT Therapeutic Activity Minutes 23  -APRIL         Total Minutes    Timed Charges Total Minutes 23  -APRIL       Total Minutes 23  -APRIL                User Key  (r) = Recorded By, (t) = Taken By, (c) = Cosigned By      Initials Name Provider Type    Ofelia Daley, PT Physical Therapist                  Therapy Charges for Today       Code Description Service Date Service Provider Modifiers Qty    96671783102 HC PT EVAL MOD COMPLEXITY 4 4/11/2024 Ofelia Means, PT GP 1    41612835642 HC PT THERAPEUTIC ACT EA 15 MIN 4/12/2024 Ofelia Means PT GP 2            PT G-Codes  AM-PAC 6 Clicks Score (PT): 18  PT Discharge Summary  Anticipated Discharge Disposition (PT): home with assist    Ofelia Means PT  4/12/2024

## 2024-04-12 NOTE — PROGRESS NOTES
Intensive Care Follow-up     Hospital:  LOS: 6 days   Mr. Khalif Joshua, 73 y.o. male is followed for:   NSTEMI (non-ST elevated myocardial infarction)   Postoperative hemodynamic, electrolyte and respiratory management         History of present illness:   73-year-old male with multivessel coronary disease, hypertension, dyslipidemia.  Patient was taken to operating room on 4/10 and underwent coronary bypass graft x 4.  Estimated blood loss was 450 mL.  Patient was extubated the night of surgery.      Subjective   Interval History:  Overnight no acute events.  Patient seems to be doing fair.  No overnight arrhythmias noted.  Chest tubes and pacing wires have been discontinued.  Patient states that pain is under control.    Low-grade fever overnight.               The patient's past medical, surgical and social history were reviewed and updated in Epic as appropriate.       Objective     Infusions:  amiodarone, 1 mg/min, Last Rate: 1 mg/min (04/10/24 2134)  dextrose 5 % with KCl 20 mEq, 30 mL/hr, Last Rate: 30 mL/hr (04/10/24 1728)      Medications:  acetaminophen, 650 mg, Oral, Q8H  aspirin, 325 mg, Oral, Daily  [Held by provider] bisoprolol, 2.5 mg, Oral, Q24H  chlorhexidine, 15 mL, Mouth/Throat, Q12H  clopidogrel, 75 mg, Oral, Daily  famotidine, 20 mg, Oral, BID AC  gabapentin, 100 mg, Oral, Q8H  [Held by provider] losartan, 100 mg, Oral, Q24H   And  [Held by provider] hydroCHLOROthiazide, 12.5 mg, Oral, Q24H  insulin lispro, 2-9 Units, Subcutaneous, 4x Daily AC & at Bedtime  metoprolol tartrate, 12.5 mg, Oral, Q12H  pharmacy consult - MTM, , Does not apply, Daily  rosuvastatin, 40 mg, Oral, Nightly  senna-docusate sodium, 2 tablet, Oral, BID        Vital Sign Min/Max for last 24 hours  Temp  Min: 97.1 °F (36.2 °C)  Max: 100.5 °F (38.1 °C)   BP  Min: 97/57  Max: 118/66   Pulse  Min: 79  Max: 98   Resp  Min: 16  Max: 22   SpO2  Min: 89 %  Max: 98 %   Flow (L/min)  Min: 2  Max: 2       Input/Output for last 24  "hour shift  04/11 0701 - 04/12 0700  In: 1914.8 [I.V.:1914.8]  Out: 1510 [Urine:1050]      Objective:  Vital signs: (most recent): Blood pressure 106/65, pulse 89, temperature 97.1 °F (36.2 °C), temperature source Axillary, resp. rate 20, height 180.3 cm (70.98\"), weight 95.7 kg (211 lb), SpO2 95%.            General Appearance: Awake, alert, in no acute distress   Lungs:   B/L Breath sounds present with decreased breath sounds on bases, no wheezing heard, no crackles.   Heart: S1 and S2 present, no murmur  Abdomen: Soft, nontender, no guarding or rigidity, bowel sounds positive.  Extremities:  no edema, warm to touch.  Pulses: Positive and symmetric.  Neurologic:  Moving all four extremities. Good strength bilaterally.   Psychological: Normal affect, Cooperative      Results from last 7 days   Lab Units 04/12/24  0436 04/11/24  0303 04/10/24  2010   WBC 10*3/mm3 7.52 14.58* 23.08*   HEMOGLOBIN g/dL 9.7* 10.1* 11.0*   PLATELETS 10*3/mm3 165 211 236     Results from last 7 days   Lab Units 04/12/24  0436 04/11/24  0303 04/10/24  2010 04/10/24  1735 04/07/24  0109 04/06/24  1816   SODIUM mmol/L 135* 140 141 135*   < >  --    POTASSIUM mmol/L 4.0 4.0 4.2 4.3   < >  --    CO2 mmol/L 26.0 23.0 21.0* 24.0   < >  --    BUN mg/dL 25* 19 21 21   < >  --    CREATININE mg/dL 1.01 1.22 1.15 1.19   < >  --    MAGNESIUM mg/dL  --   --  2.1 2.1  --  2.1   PHOSPHORUS mg/dL  --   --  3.9 4.2  --   --    GLUCOSE mg/dL 131* 168* 140* 107*   < >  --     < > = values in this interval not displayed.     Estimated Creatinine Clearance: 78.1 mL/min (by C-G formula based on SCr of 1.01 mg/dL).    Results from last 7 days   Lab Units 04/10/24  2155   PH, ARTERIAL pH units 7.365   PCO2, ARTERIAL mm Hg 39.4   PO2 ART mm Hg 121.0*       Images:   CXR reviewed and showed minimal left apical pneumothorax. Right IJ in place. Chest tubes in place. S/p median sternotomy.    I reviewed the patient's results and images.     Assessment & Plan "   Impression        NSTEMI (non-ST elevated myocardial infarction)    Hypertension    Dyslipidemia    MV CAD    T2DM    Combined systolic and diastolic CHF       Plan        1.  Patient s/p coronary bypass graft surgery.   followed closely by CT surgery and cardiology.  Continue aspirin and Plavix.  Beta-blockers as tolerated.  2.  Urine output is acceptable.  Creatinine improved from yesterday.  3.  WBC count improved from yesterday.  Likely postop stress response.  Hemoglobin slightly dropped but otherwise stable.  4.  Sliding scale insulin coverage to continue.  Blood sugars acceptable.  5.  GI prophylaxis.  SCDs for DVT prophylaxis.  Pharmacologic prophylaxis when okay by CT surgery.  6.  For dyslipidemia continue statins.  7.  Judicious pain control.    Patient is being transferred out of ICU to telemetry floor.  Will sign out to hospitalist team    Plan of care and goals reviewed with multidisciplinary/antibiotic stewardship team during rounds.   I discussed the patient's findings and my recommendations with patient and nursing staff         Uvaldo Fournier MD, [facility]  Pulmonary, Critical care and Sleep Medicine

## 2024-04-12 NOTE — NURSING NOTE
Prior to central line removal, order for the removal of catheter was verified, patient was assessed, necessary materials were gathered and patient was educated regarding procedure .    Patient was positioned supine to ensure that the insertion site was at or below the level of the heart.    Hands were washed, clean gloves were applied and central line dressing was gently removed. Catheter exit site was not cultured.     A new pair of clean gloves were then applied. Insertion site was cleansed with 2% Chlorhexidine swab using a circular motion beginning at the insertion site and moving outward for 30 seconds and allowed to dry.     Clamp on line was present and clamped.     Patient was instructed to perform Valsalva maneuver as catheter was withdrawn.     The central line was grasped at the insertion site and slowly pulled outward parallel to the skin. Resistance was not met.    After central line was completely removed, a sterile 4x4 gauze pad was used to apply light pressure until bleeding stopped. At that time, petroleum-based gauze and a sterile occlusive dressing was applied to exit site.     Patient was instructed to keep dressing in place for at least 24 hours and to remain in a flat or reclining position for 30 minutes post-removal.     Catheter was inspected after removal and was intact. Tip of central line was not sent for culture. Patient tolerated procedure.

## 2024-04-12 NOTE — CASE MANAGEMENT/SOCIAL WORK
Continued Stay Note  [facility]     Patient Name: Khalif Joshua  MRN: [id number]  Today's Date: 4/12/2024    Admit Date: 4/5/2024    Plan: Home   Discharge Plan       Row Name 04/12/24 1149       Plan    Plan Home    Patient/Family in Agreement with Plan yes    Plan Comments Spoke with patient at bedside. Denies any discharge needs. Has order to telemetry. CM will cont to follow.    Final Discharge Disposition Code 01 - home or self-care                   Discharge Codes    No documentation.                 Expected Discharge Date and Time       Expected Discharge Date Expected Discharge Time    Apr 11, 2024               Beba You RN

## 2024-04-12 NOTE — PLAN OF CARE
Goal Outcome Evaluation:  Plan of Care Reviewed With: patient        Progress: improving  Outcome Evaluation: patient was able to increas ambulation to 440 ft without difficulty. patient has stable vitals with activity limited by knee pain today patient is progressing toward all mobility goals as expected anticipate patient to be able to go home with family assist at D/C      Anticipated Discharge Disposition (PT): home with assist

## 2024-04-12 NOTE — PROGRESS NOTES
[facility]  PROGRESS NOTE    Khalif Joshua   [id number]   1950    LOS: 6 days .  Date of Admission: 4/5/2024  Date of Service: 04/12/24    Primary Care Physician: Renee Liriano MD    Chief Complaint: chest pain    Subjective      Patient sitting up in the chair.  Chest tubes and pacing wires removed this morning.  Patient still having incisional chest pain.  No other complaints.    ROS  All systems have been reviewed and are negative with the exception of those mentioned in the HPI and problem list above.     Objective   Vital Sign Min/Max for last 24 hours  Temp  Min: 98.2 °F (36.8 °C)  Max: 100.5 °F (38.1 °C)   BP  Min: 92/61  Max: 116/65   Pulse  Min: 75  Max: 98   Resp  Min: 16  Max: 22   SpO2  Min: 89 %  Max: 98 %   No data recorded   No data recorded     Physical Exam:  GENERAL: Alert, cooperative, in no acute distress.   HEENT: Normocephalic, no jugular venous distention  HEART: Regular rhythm, normal rate, and no murmurs, gallops, or rub  LUNGS: Clear to auscultation bilaterally. No wheezing, rales or rhonchi. On 2 L NC  NEUROLOGIC: No focal abnormalities involving strength or sensation are noted.   EXTREMITIES: No clubbing, cyanosis, or edema noted.     Results:  Results from last 7 days   Lab Units 04/12/24  0436 04/11/24  0303 04/10/24  2010   WBC 10*3/mm3 7.52 14.58* 23.08*   HEMOGLOBIN g/dL 9.7* 10.1* 11.0*   HEMATOCRIT % 29.9* 31.0* 32.7*   PLATELETS 10*3/mm3 165 211 236     Results from last 7 days   Lab Units 04/12/24  0436 04/11/24  0303 04/10/24  2010   SODIUM mmol/L 135* 140 141   POTASSIUM mmol/L 4.0 4.0 4.2   CHLORIDE mmol/L 100 104 101   CO2 mmol/L 26.0 23.0 21.0*   BUN mg/dL 25* 19 21   CREATININE mg/dL 1.01 1.22 1.15   GLUCOSE mg/dL 131* 168* 140*      Lab Results   Component Value Date    AST 46 (H) 04/11/2024    ALT 15 04/11/2024                     Results from last 7 days   Lab Units 04/11/24  0303 04/10/24  1735 04/05/24  1810   PROTIME  Seconds 16.0* 17.9* 12.7   INR  1.27* 1.46* 0.95   APTT seconds  --  31.9 26.5*     Results from last 7 days   Lab Units 04/05/24  1247 04/05/24  1027   HSTROP T ng/L 53* 54*     Results from last 7 days   Lab Units 04/05/24  1027   PROBNP pg/mL 102.6       Intake/Output Summary (Last 24 hours) at 4/12/2024 0757  Last data filed at 4/12/2024 0600  Gross per 24 hour   Intake 1914.8 ml   Output 1510 ml   Net 404.8 ml       EKG/TELE: NSR    Radiology Data:   XR Chest 1 View    Result Date: 4/12/2024  Impression: Probable minimal left apical pneumothorax. Mild atelectasis lung bases, greatest on the left with minimal left effusion. Electronically Signed: Tammy Grande MD  4/12/2024 7:38 AM EDT  Workstation ID: [id number]    XR Chest 1 View    Result Date: 4/11/2024  Impression: Interval extubation with remainder of exam unchanged. Electronically Signed: Mikey Ko MD  4/11/2024 6:05 AM EDT  Workstation ID: [id number]    XR Chest 1 View    Result Date: 4/10/2024  Impression: Poststernotomy chest radiograph, with ET tube, right IJ catheter, and NG tube, apparently in satisfactory position. Minimal bibasilar atelectasis. No evidence of pneumothorax. Electronically Signed: Epifanio Light MD  4/10/2024 6:23 PM EDT  Workstation ID: [id number]    Results for orders placed during the hospital encounter of 04/05/24    Adult Transthoracic Echo Limited W/ Cont if Necessary Per Protocol    Interpretation Summary    Left ventricular systolic function is normal. Calculated left ventricular EF = 56.7%     Current Medications:  acetaminophen, 650 mg, Oral, Q8H  aspirin, 325 mg, Oral, Daily  [Held by provider] bisoprolol, 2.5 mg, Oral, Q24H  chlorhexidine, 15 mL, Mouth/Throat, Q12H  clopidogrel, 75 mg, Oral, Daily  famotidine, 20 mg, Oral, BID AC  gabapentin, 100 mg, Oral, Q8H  [Held by provider] losartan, 100 mg, Oral, Q24H   And  [Held by provider] hydroCHLOROthiazide, 12.5 mg, Oral, Q24H  insulin lispro, 2-9 Units, Subcutaneous, 4x Daily  AC & at Bedtime  metoprolol tartrate, 12.5 mg, Oral, Q12H  pharmacy consult - [place], , Does not apply, Daily  rosuvastatin, 40 mg, Oral, Nightly  senna-docusate sodium, 2 tablet, Oral, BID      amiodarone, 1 mg/min, Last Rate: 1 mg/min (04/10/24 2134)  dextrose 5 % with KCl 20 mEq, 30 mL/hr, Last Rate: 30 mL/hr (04/10/24 1728)  EPINEPHrine, 0.02-0.3 mcg/kg/min  niCARdipine, 5-15 mg/hr  nitroglycerin, 5-200 mcg/min  norepinephrine, 0.02-0.3 mcg/kg/min, Last Rate: 0.02 mcg/kg/min (04/11/24 1648)      Assessment and Plan:   Multivessel coronary artery disease  S/p CABG x 4 (LIMA to LAD, SVG to diagonal, SVG to posterolateral branch, SVG to first obtuse) with Dr. Barlow, 4/10/24  On ASA and plavix   Combined systolic and diastolic heart failure  Echo: EF 56%, LV diastolic function consistent with grade I (impaired relaxation)  Hypertension  Resume medications when BP allows  Hyperlipidemia  Continue statin    - Encourage ambulation and IS use  - Plan to move to telemetry today  - We will continue to follow    Electronically signed by MISTI Camacho, 04/12/24, 8:05 AM EDT.      Please note that portions of this note were dictated utilizing Dragon dictation.

## 2024-04-12 NOTE — CASE MANAGEMENT/SOCIAL WORK
Continued Stay Note  [facility]     Patient Name: Khalif Joshua  MRN: [id number]  Today's Date: 4/12/2024    Admit Date: 4/5/2024    Plan: Home   Discharge Plan       Row Name 04/12/24 1440       Plan    Plan Home    Patient/Family in Agreement with Plan yes    Plan Comments Spoke with patient at bedside. Voiced no needs or concerns at this time. Plan is home at discharge. CM will continue to follow.    Final Discharge Disposition Code 01 - home or self-care      Row Name 04/12/24 1149                   Discharge Codes    No documentation.                 Expected Discharge Date and Time       Expected Discharge Date Expected Discharge Time    Apr 11, 2024               Roc Arcos RN

## 2024-04-12 NOTE — PROGRESS NOTES
CTS Progress Note       LOS: 6 days   Patient Care Team:  Renee Liriano MD as PCP - General (Internal Medicine)    Chief Complaint: NSTEMI (non-ST elevated myocardial infarction)    Vital Signs:  Temp:  [98.2 °F (36.8 °C)-100.5 °F (38.1 °C)] 98.3 °F (36.8 °C)  Heart Rate:  [75-98] 92  Resp:  [16-22] 16  BP: ()/(50-81) 109/72    Physical Exam: Up in chair alert conversant breathing unlabored       Results:     Results from last 7 days   Lab Units 04/12/24  0436   WBC 10*3/mm3 7.52   HEMOGLOBIN g/dL 9.7*   HEMATOCRIT % 29.9*   PLATELETS 10*3/mm3 165     Results from last 7 days   Lab Units 04/12/24  0436   SODIUM mmol/L 135*   POTASSIUM mmol/L 4.0   CHLORIDE mmol/L 100   CO2 mmol/L 26.0   BUN mg/dL 25*   CREATININE mg/dL 1.01   GLUCOSE mg/dL 131*   CALCIUM mg/dL 8.7           Imaging Results (Last 24 Hours)       Procedure Component Value Units Date/Time    XR Chest 1 View [[id number]] Resulted: 04/12/24 0307     Updated: 04/12/24 0310            Assessment      NSTEMI (non-ST elevated myocardial infarction)    Hypertension    Dyslipidemia    MV CAD    T2DM    Combined systolic and diastolic CHF        Plan   Discontinue chest tubes and pacer wires  Plavix 75 mg p.o. today and daily thereafter  May transfer to telemetry later today if satisfactory with other physicians    Please note that portions of this note were completed with a voice recognition program. Efforts were made to edit the dictations, but occasionally words are mistranscribed.    Jermaine Barlow MD  04/12/24  06:15 EDT

## 2024-04-13 ENCOUNTER — APPOINTMENT (OUTPATIENT)
Dept: GENERAL RADIOLOGY | Facility: HOSPITAL | Age: 74
End: 2024-04-13
Payer: MEDICARE

## 2024-04-13 ENCOUNTER — APPOINTMENT (OUTPATIENT)
Dept: CT IMAGING | Facility: HOSPITAL | Age: 74
End: 2024-04-13
Payer: MEDICARE

## 2024-04-13 LAB
ANION GAP SERPL CALCULATED.3IONS-SCNC: 9 MMOL/L (ref 5–15)
BUN SERPL-MCNC: 30 MG/DL (ref 8–23)
BUN/CREAT SERPL: 36.1 (ref 7–25)
CALCIUM SPEC-SCNC: 8.8 MG/DL (ref 8.6–10.5)
CHLORIDE SERPL-SCNC: 95 MMOL/L (ref 98–107)
CO2 SERPL-SCNC: 27 MMOL/L (ref 22–29)
CREAT SERPL-MCNC: 0.83 MG/DL (ref 0.76–1.27)
DEPRECATED RDW RBC AUTO: 46.5 FL (ref 37–54)
EGFRCR SERPLBLD CKD-EPI 2021: 93 ML/MIN/1.73
ERYTHROCYTE [DISTWIDTH] IN BLOOD BY AUTOMATED COUNT: 13.3 % (ref 12.3–15.4)
GLUCOSE BLDC GLUCOMTR-MCNC: 119 MG/DL (ref 70–130)
GLUCOSE BLDC GLUCOMTR-MCNC: 123 MG/DL (ref 70–130)
GLUCOSE BLDC GLUCOMTR-MCNC: 124 MG/DL (ref 70–130)
GLUCOSE BLDC GLUCOMTR-MCNC: 130 MG/DL (ref 70–130)
GLUCOSE SERPL-MCNC: 129 MG/DL (ref 65–99)
HCT VFR BLD AUTO: 30.1 % (ref 37.5–51)
HGB BLD-MCNC: 9.8 G/DL (ref 13–17.7)
MCH RBC QN AUTO: 30.9 PG (ref 26.6–33)
MCHC RBC AUTO-ENTMCNC: 32.6 G/DL (ref 31.5–35.7)
MCV RBC AUTO: 95 FL (ref 79–97)
PLATELET # BLD AUTO: 176 10*3/MM3 (ref 140–450)
PMV BLD AUTO: 10.2 FL (ref 6–12)
POTASSIUM SERPL-SCNC: 3.8 MMOL/L (ref 3.5–5.2)
POTASSIUM SERPL-SCNC: 4 MMOL/L (ref 3.5–5.2)
QT INTERVAL: 328 MS
QTC INTERVAL: 427 MS
RBC # BLD AUTO: 3.17 10*6/MM3 (ref 4.14–5.8)
SODIUM SERPL-SCNC: 131 MMOL/L (ref 136–145)
WBC NRBC COR # BLD AUTO: 4.92 10*3/MM3 (ref 3.4–10.8)

## 2024-04-13 PROCEDURE — 25010000002 FUROSEMIDE PER 20 MG

## 2024-04-13 PROCEDURE — 25010000002 KETOROLAC TROMETHAMINE PER 15 MG: Performed by: NURSE PRACTITIONER

## 2024-04-13 PROCEDURE — 80048 BASIC METABOLIC PNL TOTAL CA: CPT | Performed by: STUDENT IN AN ORGANIZED HEALTH CARE EDUCATION/TRAINING PROGRAM

## 2024-04-13 PROCEDURE — 71045 X-RAY EXAM CHEST 1 VIEW: CPT

## 2024-04-13 PROCEDURE — 97530 THERAPEUTIC ACTIVITIES: CPT

## 2024-04-13 PROCEDURE — 99232 SBSQ HOSP IP/OBS MODERATE 35: CPT

## 2024-04-13 PROCEDURE — 74018 RADEX ABDOMEN 1 VIEW: CPT

## 2024-04-13 PROCEDURE — 32551 INSERTION OF CHEST TUBE: CPT | Performed by: INTERNAL MEDICINE

## 2024-04-13 PROCEDURE — 84132 ASSAY OF SERUM POTASSIUM: CPT | Performed by: STUDENT IN AN ORGANIZED HEALTH CARE EDUCATION/TRAINING PROGRAM

## 2024-04-13 PROCEDURE — 99232 SBSQ HOSP IP/OBS MODERATE 35: CPT | Performed by: STUDENT IN AN ORGANIZED HEALTH CARE EDUCATION/TRAINING PROGRAM

## 2024-04-13 PROCEDURE — 76998 US GUIDE INTRAOP: CPT | Performed by: INTERNAL MEDICINE

## 2024-04-13 PROCEDURE — 82948 REAGENT STRIP/BLOOD GLUCOSE: CPT

## 2024-04-13 PROCEDURE — 74176 CT ABD & PELVIS W/O CONTRAST: CPT

## 2024-04-13 PROCEDURE — 25010000002 PROCHLORPERAZINE 10 MG/2ML SOLUTION: Performed by: STUDENT IN AN ORGANIZED HEALTH CARE EDUCATION/TRAINING PROGRAM

## 2024-04-13 PROCEDURE — 25010000002 METOCLOPRAMIDE PER 10 MG: Performed by: SURGERY

## 2024-04-13 PROCEDURE — 99024 POSTOP FOLLOW-UP VISIT: CPT | Performed by: PHYSICIAN ASSISTANT

## 2024-04-13 PROCEDURE — 25010000002 MORPHINE PER 10 MG: Performed by: STUDENT IN AN ORGANIZED HEALTH CARE EDUCATION/TRAINING PROGRAM

## 2024-04-13 PROCEDURE — 97110 THERAPEUTIC EXERCISES: CPT

## 2024-04-13 PROCEDURE — 85027 COMPLETE CBC AUTOMATED: CPT | Performed by: STUDENT IN AN ORGANIZED HEALTH CARE EDUCATION/TRAINING PROGRAM

## 2024-04-13 RX ORDER — BISACODYL 5 MG/1
10 TABLET, DELAYED RELEASE ORAL DAILY
Status: DISCONTINUED | OUTPATIENT
Start: 2024-04-13 | End: 2024-04-17 | Stop reason: HOSPADM

## 2024-04-13 RX ORDER — METOCLOPRAMIDE HYDROCHLORIDE 5 MG/ML
10 INJECTION INTRAMUSCULAR; INTRAVENOUS EVERY 6 HOURS
Status: DISCONTINUED | OUTPATIENT
Start: 2024-04-13 | End: 2024-04-17 | Stop reason: HOSPADM

## 2024-04-13 RX ORDER — FUROSEMIDE 10 MG/ML
20 INJECTION INTRAMUSCULAR; INTRAVENOUS ONCE
Status: COMPLETED | OUTPATIENT
Start: 2024-04-13 | End: 2024-04-13

## 2024-04-13 RX ORDER — DOCUSATE SODIUM 50 MG/5 ML
100 LIQUID (ML) ORAL 2 TIMES DAILY
Status: DISCONTINUED | OUTPATIENT
Start: 2024-04-14 | End: 2024-04-17 | Stop reason: HOSPADM

## 2024-04-13 RX ORDER — LIDOCAINE HYDROCHLORIDE 10 MG/ML
20 INJECTION, SOLUTION EPIDURAL; INFILTRATION; INTRACAUDAL; PERINEURAL ONCE
Status: COMPLETED | OUTPATIENT
Start: 2024-04-13 | End: 2024-04-13

## 2024-04-13 RX ORDER — KETOROLAC TROMETHAMINE 15 MG/ML
15 INJECTION, SOLUTION INTRAMUSCULAR; INTRAVENOUS ONCE
Status: COMPLETED | OUTPATIENT
Start: 2024-04-13 | End: 2024-04-13

## 2024-04-13 RX ORDER — BISACODYL 10 MG
10 SUPPOSITORY, RECTAL RECTAL EVERY 8 HOURS
Status: DISCONTINUED | OUTPATIENT
Start: 2024-04-13 | End: 2024-04-17 | Stop reason: HOSPADM

## 2024-04-13 RX ORDER — DOCUSATE SODIUM 100 MG/1
100 CAPSULE, LIQUID FILLED ORAL 2 TIMES DAILY
Status: DISCONTINUED | OUTPATIENT
Start: 2024-04-13 | End: 2024-04-13

## 2024-04-13 RX ORDER — LIDOCAINE HYDROCHLORIDE 10 MG/ML
INJECTION, SOLUTION EPIDURAL; INFILTRATION; INTRACAUDAL; PERINEURAL
Status: COMPLETED
Start: 2024-04-13 | End: 2024-04-13

## 2024-04-13 RX ORDER — POTASSIUM CHLORIDE 20 MEQ/1
20 TABLET, EXTENDED RELEASE ORAL ONCE
Status: COMPLETED | OUTPATIENT
Start: 2024-04-13 | End: 2024-04-13

## 2024-04-13 RX ADMIN — ACETAMINOPHEN 650 MG: 325 TABLET ORAL at 09:41

## 2024-04-13 RX ADMIN — Medication 12.5 MG: at 09:42

## 2024-04-13 RX ADMIN — OXYCODONE HYDROCHLORIDE 10 MG: 10 TABLET ORAL at 17:57

## 2024-04-13 RX ADMIN — LIDOCAINE HYDROCHLORIDE 20 ML: 10 INJECTION, SOLUTION EPIDURAL; INFILTRATION; INTRACAUDAL; PERINEURAL at 22:59

## 2024-04-13 RX ADMIN — BISACODYL 10 MG: 10 SUPPOSITORY RECTAL at 23:04

## 2024-04-13 RX ADMIN — GABAPENTIN 100 MG: 100 CAPSULE ORAL at 03:48

## 2024-04-13 RX ADMIN — MORPHINE SULFATE 2 MG: 2 INJECTION, SOLUTION INTRAMUSCULAR; INTRAVENOUS at 16:09

## 2024-04-13 RX ADMIN — KETOROLAC TROMETHAMINE 15 MG: 15 INJECTION, SOLUTION INTRAMUSCULAR; INTRAVENOUS at 23:04

## 2024-04-13 RX ADMIN — BISACODYL 10 MG: 5 TABLET, COATED ORAL at 23:05

## 2024-04-13 RX ADMIN — HYDROCODONE BITARTRATE AND ACETAMINOPHEN 1 TABLET: 7.5; 325 TABLET ORAL at 13:22

## 2024-04-13 RX ADMIN — POTASSIUM CHLORIDE 20 MEQ: 1500 TABLET, EXTENDED RELEASE ORAL at 09:42

## 2024-04-13 RX ADMIN — SENNOSIDES AND DOCUSATE SODIUM 2 TABLET: 8.6; 5 TABLET ORAL at 23:04

## 2024-04-13 RX ADMIN — PROCHLORPERAZINE EDISYLATE 5 MG: 5 INJECTION INTRAMUSCULAR; INTRAVENOUS at 13:29

## 2024-04-13 RX ADMIN — DOCUSATE SODIUM 100 MG: 50 LIQUID ORAL at 23:53

## 2024-04-13 RX ADMIN — ASPIRIN 325 MG: 325 TABLET, COATED ORAL at 09:41

## 2024-04-13 RX ADMIN — METOCLOPRAMIDE 10 MG: 5 INJECTION, SOLUTION INTRAMUSCULAR; INTRAVENOUS at 23:04

## 2024-04-13 RX ADMIN — Medication 12.5 MG: at 13:38

## 2024-04-13 RX ADMIN — MORPHINE SULFATE 2 MG: 2 INJECTION, SOLUTION INTRAMUSCULAR; INTRAVENOUS at 20:50

## 2024-04-13 RX ADMIN — ACETAMINOPHEN 650 MG: 325 TABLET ORAL at 03:48

## 2024-04-13 RX ADMIN — MORPHINE SULFATE 2 MG: 2 INJECTION, SOLUTION INTRAMUSCULAR; INTRAVENOUS at 22:03

## 2024-04-13 RX ADMIN — FAMOTIDINE 20 MG: 20 TABLET, FILM COATED ORAL at 09:42

## 2024-04-13 RX ADMIN — HYDROCODONE BITARTRATE AND ACETAMINOPHEN 1 TABLET: 7.5; 325 TABLET ORAL at 09:43

## 2024-04-13 RX ADMIN — DOCUSATE SODIUM 100 MG: 100 CAPSULE, LIQUID FILLED ORAL at 23:04

## 2024-04-13 RX ADMIN — FUROSEMIDE 20 MG: 10 INJECTION, SOLUTION INTRAMUSCULAR; INTRAVENOUS at 13:38

## 2024-04-13 RX ADMIN — Medication 10 ML: at 13:41

## 2024-04-13 RX ADMIN — CLOPIDOGREL BISULFATE 75 MG: 75 TABLET ORAL at 09:41

## 2024-04-13 NOTE — PLAN OF CARE
Goal Outcome Evaluation:  Plan of Care Reviewed With: patient, daughter        Progress: no change  Outcome Evaluation: Pt. had been premed w/ morphine, & performed cardiac ther exer  (w/u's & cooldowns)UIC, then respirex, PLB exer, & instructed pt & dtr in Cardiac HEP, sternal precaut, signs/sympt & prog.of amb. distance, but decl. STS, MIP,gt., or stair trial d/t incis pain persistent (7/10), & will amb later after dinner. Noted desat 92% on 2L (was RA earlier today), elev SBP, & HR 88.

## 2024-04-13 NOTE — CONSULTS
Patient Name:  Khalif Joshua  YOB: 1950  [id number]       Patient Care Team:  Renee Liriano MD as PCP - General (Internal Medicine)      General Surgery Consult Note     Date of Consultation: 04/13/24    Consulting Physician : Gil Gerber, *    Reason for Consult : Abdominal pain and distention    Subjective     I have been asked to see  Khalif Joshua , a 73 y.o. male in consultation for abdominal pain and distention.  He has a known past medical history of diverticular disease status post Kate's procedure with subsequent takedown, NSTEMI status post coronary bypass grafting x4 on 4/10/2024, hypertension, hyperlipidemia, arthritis, and a remote history of alcohol abuse.  He was admitted to our hospital with an NSTEMI on 4/5/2024.  He underwent workup by cardiology and ultimately underwent his coronary artery bypass grafting on 4/10/2024 as detailed above.  Postoperatively he did well and was transferred to the floor yesterday to the care of the hospitalist.  Starting this morning he began having increasing abdominal distention and difficulty breathing deeply.  He reports some epigastric fullness and pain as well.  This is associate with nausea but no vomiting.  He has had multiple very loose bowel movements this morning.  He also describes some abdominal pressure and pain.  He was concerned because of his history of prior bowel perforations and so underwent CT scanning of the abdomen and pelvis.  This showed evidence of an ileus versus partial small bowel obstruction.  An NG tube was placed and we have been asked to see him for possible surgical management.  Currently sitting at the bedside.  His daughter is at the bedside as well.  The NG tube is draining gastric material without blood.    He has a significant abdominal past medical history of perforated diverticulitis with Kate's procedure and subsequent colostomy takedown.  Shortly after his takedown he underwent a  separate bilateral knee replacement, and postoperatively had another perforation at his previous colorectal anastomosis.  This required a diverting loop ileostomy and washout.  He subsequently had the ileostomy taken down as well.  This all occurred in 2022.  Since that time he has had no additional abdominal surgery.      Allergy: No Known Allergies    Medications:  acetaminophen, 650 mg, Oral, Q8H  aspirin, 325 mg, Oral, Daily  bisacodyl, 10 mg, Oral, Daily  bisacodyl, 10 mg, Rectal, Q8H  clopidogrel, 75 mg, Oral, Daily  docusate sodium, 100 mg, Oral, BID  famotidine, 20 mg, Oral, BID AC  [Held by provider] losartan, 100 mg, Oral, Q24H   And  [Held by provider] hydroCHLOROthiazide, 12.5 mg, Oral, Q24H  insulin lispro, 2-9 Units, Subcutaneous, 4x Daily AC & at Bedtime  metoclopramide, 10 mg, Intravenous, Q6H  metoprolol tartrate, 25 mg, Oral, Q12H  pharmacy consult - MTM, , Does not apply, Daily  rosuvastatin, 40 mg, Oral, Nightly  senna-docusate sodium, 2 tablet, Oral, BID  sodium chloride, 10 mL, Intravenous, Q12H      amiodarone, 1 mg/min, Last Rate: 1 mg/min (04/10/24 2134)  dextrose 5 % with KCl 20 mEq, 30 mL/hr, Last Rate: 30 mL/hr (04/10/24 1728)      No current facility-administered medications on file prior to encounter.     Current Outpatient Medications on File Prior to Encounter   Medication Sig    aspirin 325 MG EC tablet Take 1 tablet by mouth Daily.    diclofenac (VOLTAREN) 75 MG EC tablet Take 1 tablet by mouth 2 (Two) Times a Day.    losartan-hydrochlorothiazide (HYZAAR) 100-25 MG per tablet Take 1 tablet by mouth Daily.    Magnesium 250 MG tablet Take 1 tablet by mouth Daily.    metFORMIN ER (GLUCOPHAGE-XR) 500 MG 24 hr tablet Take 1 tablet by mouth Daily With Breakfast.    multivitamin with minerals (Multivitamin Adult) tablet tablet Take 1 tablet by mouth Daily.    rosuvastatin (CRESTOR) 40 MG tablet Take 1 tablet by mouth Daily.    tadalafil (CIALIS) 5 MG tablet Take 1 tablet by mouth Daily.        PMHx:   Past Medical History:   Diagnosis Date    CAD (coronary artery disease)     Diverticulitis     Hyperlipemia     Hypertension     Osteoarthritis     Perforated sigmoid colon    -History of alcohol abuse    Past Surgical History:  Past Surgical History:   Procedure Laterality Date    CARDIAC CATHETERIZATION N/A 04/05/2024    Procedure: Left Heart Cath;  Surgeon: Jermaine Pineda III, MD;  Location: [facility] CATH INVASIVE LOCATION;  Service: Cardiology;  Laterality: N/A;    COLOSTOMY CLOSURE N/A 04/21/2022    Procedure: COLOSTOMY TAKEDOWN OPEN;  Surgeon: Jose Ferguson MD;  Location: [facility] OR;  Service: General;  Laterality: N/A;    CORONARY ARTERY BYPASS GRAFT N/A 4/10/2024    Procedure: MEDIAN STERNOTOMY, CORONARY ARTERY BYPASS GRAFTING X4, LIMA ,EVH OF THE LEFT GREATER SAPHENOUS VEIN WITH EVH EXPLORATION OF THE RIGHT LEG;  Surgeon: Jermaine Barlow MD;  Location: [facility] OR;  Service: Cardiothoracic;  Laterality: N/A;    EXPLORATORY LAPAROTOMY N/A 10/30/2021    Procedure: LAPAROTOMY EXPLORATORY, SIGMOID COLECTOMY,  END COLOSTOMY;  Surgeon: Jose Ferguson MD;  Location: [facility] OR;  Service: General;  Laterality: N/A;    ILEOSTOMY      Placement and reversal    REPLACEMENT TOTAL KNEE Bilateral          Family History: Significant for coronary artery disease    Social History: 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    Tobacco use: Denies     EtOH use : Occasional use currently, but remote history of alcohol abuse, quit approximately 8 to 9 years ago   Illicit drug use: Denies      Review of Systems        Constitutional: No fevers, chills or malaise   Eyes: Denies visual changes    Cardiovascular: Denies chest pain, palpitations   Pulmonary: Denies cough, some shortness of breath   Abdominal/ GI: See HPI    Genitourinary: Denies dysuria or hematuria   Musculoskeletal: Denies any but chronic joint aches, pains or deformities   Psychiatric: No recent mood changes   Neurologic: No paresthesias or loss of  "function          Objective     Physical Exam:      Vital Signs  /61 (BP Location: Right arm, Patient Position: Lying)   Pulse 87   Temp 98.1 °F (36.7 °C) (Oral)   Resp 18   Ht 180.3 cm (70.98\")   Wt 100 kg (220 lb 10.1 oz)   SpO2 92%   BMI 30.79 kg/m²     Intake/Output Summary (Last 24 hours) at 4/13/2024 1952  Last data filed at 4/13/2024 0900  Gross per 24 hour   Intake 200 ml   Output 450 ml   Net -250 ml         Physical Exam:    Head: Normocephalic, atraumatic.   Eyes: Pupils equal, round, react to light and accommodation.   Mouth: Oral mucosa without lesions,   Neck: No masses, lymphadenopathy or carotid bruits bilaterally   CV: Rhythm and rate regular , no  murmurs, rubs or gallops  Lungs: Clear  to auscultation bilaterally.   Abdomen: Bowel sounds hypoactive, soft, mildly distended.  There is a reducible fat-containing hernia in the left abdomen under his previous colostomy site.  There is no peritonitis.  There is a vertical midline incision consistent with his previous operations.  Groin : No obvious hernias bilaterally   Extremities:  No cyanosis, clubbing or edema bilaterally   Lymphatics: No abnormal lymphadenopathy appreciated   Neurologic: No gross deficits       Results Review: I have personally reviewed all of the recent lab and imaging results available at this time.  Laboratory exam reveals a white count of 4.9 with a hemoglobin of 9.8 and platelet count of 176.  Basic metabolic panel essentially is normal, most recent glucose 130.    CT scan of the abdomen pelvis was personally viewed by me as well as the final dictated and edited reports.This shows significant bilateral pneumothoraces right greater than left which the right side is fairly new.  There is generalized distention of the both the small and the large bowel down to the level of the rectum.  There is some formed stool in the rectum which may be causing a bit of a constipation/ileus.  There is no free air in the abdomen or " free fluid.  There is no evidence of bowel obstruction whatsoever.  No evidence of internal hernia.  The appendix is visualized and normal.  The gallbladder is normal with some small stones without evidence of acute cholecystitis.  There is stranding around the bilateral kidneys possibly related to chronic kidney disease.  There are multiple small bowel anastomoses all of which are widely patent without evidence of location.  There is a small fat-containing hernia in the prior colostomy site without involved bowel.  There is also a midline hernia near the umbilicus of approximately 1.5 cm containing fat as well without bowel involvement.         Assessment and Plan:    Ileus - He has evidence of a postoperative ileus, likely related to his recent cardiac surgery, as well as diuresis to prevent pulmonary edema.  I have written for a bowel regimen and agree with NG tube placement.  Hopefully this will resolve his symptoms with time. With regards to his bilateral pneumothoraces, the CT surgery service has been contacted to address this issue as well, and this is likely related to his recent surgery as well as his chest tubes were recently discontinued.  I will continue to follow this patient with you.    Problem List Items Addressed This Visit          Cardiac and Vasculature    * (Principal) NSTEMI (non-ST elevated myocardial infarction)    Relevant Medications    tadalafil (CIALIS) 5 MG tablet    clopidogrel (PLAVIX) tablet 75 mg    nitroglycerin (NITROSTAT) SL tablet 0.4 mg    metoprolol tartrate (LOPRESSOR) tablet 25 mg    metoprolol tartrate (LOPRESSOR) half tablet 12.5 mg (Completed)    Other Relevant Orders    Case Request Cath Lab: Angioplasty-coronary (Completed)    MV CAD    Relevant Medications    tadalafil (CIALIS) 5 MG tablet    clopidogrel (PLAVIX) tablet 75 mg    nitroglycerin (NITROSTAT) SL tablet 0.4 mg    metoprolol tartrate (LOPRESSOR) tablet 25 mg    metoprolol tartrate (LOPRESSOR) half tablet 12.5  mg (Completed)    Other Relevant Orders    Case request (Completed)     Other Visit Diagnoses       Chest pain, unspecified type    -  Primary             Active Hospital Problems    Diagnosis  POA    **NSTEMI (non-ST elevated myocardial infarction) [I21.4]  Yes    T2DM [E11.9]  Yes    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            I discussed the patient's findings and my recommendations with the patient and/or family, as well as the primary team     Pako Lee MD  04/13/24  19:52 EDT        Dictated using Dragon Dictation

## 2024-04-13 NOTE — THERAPY TREATMENT NOTE
Patient Name: Khalif Joshua  : 1950    MRN: [id number]                              Today's Date: 2024       Admit Date: 2024    Visit Dx:     ICD-10-CM ICD-9-CM   1. Chest pain, unspecified type  R07.9 786.50   2. NSTEMI (non-ST elevated myocardial infarction)  I21.4 410.70   3. Coronary artery disease involving native heart, unspecified vessel or lesion type, unspecified whether angina present  I25.10 414.01     Patient Active Problem List   Diagnosis    Acute diverticulitis    Hypertension    Dyslipidemia    Abnormal EKG    Lactic acidosis    Sepsis without acute organ dysfunction    Peritonitis    Diverticul disease small and large intestine, no perforati or abscess    NSTEMI (non-ST elevated myocardial infarction)    MV CAD    T2DM    Combined systolic and diastolic CHF     Past Medical History:   Diagnosis Date    CAD (coronary artery disease)     Diverticulitis     Hyperlipemia     Hypertension     Osteoarthritis     Perforated sigmoid colon      Past Surgical History:   Procedure Laterality Date    CARDIAC CATHETERIZATION N/A 2024    Procedure: Left Heart Cath;  Surgeon: Jermaine Pineda III, MD;  Location: [facility] CATH INVASIVE LOCATION;  Service: Cardiology;  Laterality: N/A;    COLOSTOMY CLOSURE N/A 2022    Procedure: COLOSTOMY TAKEDOWN OPEN;  Surgeon: Jose Ferguson MD;  Location:  [facility];  Service: General;  Laterality: N/A;    CORONARY ARTERY BYPASS GRAFT N/A 4/10/2024    Procedure: MEDIAN STERNOTOMY, CORONARY ARTERY BYPASS GRAFTING X4, LIMA ,EVH OF THE LEFT GREATER SAPHENOUS VEIN WITH EVH EXPLORATION OF THE RIGHT LEG;  Surgeon: Jermaine Barlow MD;  Location:  [facility];  Service: Cardiothoracic;  Laterality: N/A;    EXPLORATORY LAPAROTOMY N/A 10/30/2021    Procedure: LAPAROTOMY EXPLORATORY, SIGMOID COLECTOMY,  END COLOSTOMY;  Surgeon: Jose Ferguson MD;  Location:  [facility];  Service: General;  Laterality: N/A;    ILEOSTOMY      Placement and reversal     REPLACEMENT TOTAL KNEE Bilateral       General Information       Row Name 04/13/24 1657          Physical Therapy Time and Intention    Document Type therapy note (daily note)  -DM     Mode of Treatment physical therapy  -DM       Row Name 04/13/24 1657          General Information    Existing Precautions/Restrictions cardiac;oxygen therapy device and L/min;sternal;other (see comments)  4-10: CABG x 4  -DM               User Key  (r) = Recorded By, (t) = Taken By, (c) = Cosigned By      Initials Name Provider Type    DM Christine Mortensen, PT Physical Therapist                   Mobility       Row Name 04/13/24 1657          Bed Mobility    Comment, (Bed Mobility) [facility]; c/o sternal incis. discomf 7/10 despite premed w/ morphine (was 9/10 prior);nsg in to assess; will see if addnl pain med ordered;respirex 1250 cc; instructed in PLB exer; issued & reviewed Cardiac HEP, sternal precaut., progression of cardiac exercise/amb.,& signs/sympt. (dtr present)  -DM       Row Name 04/13/24 1657          Transfers    Comment, (Transfers) decl. d/t persistent incis.pain  -DM       Row Name 04/13/24 1657          Sit-Stand Transfer    Sit-Stand [place] (Transfers) unable to assess  -DM       Row Name 04/13/24 1657          Gait/Stairs (Locomotion)    [place] Level (Gait) unable to assess  -DM               User Key  (r) = Recorded By, (t) = Taken By, (c) = Cosigned By      Initials Name Provider Type    Christine Guadarrama, PT Physical Therapist                   Obj/Interventions       Row Name 04/13/24 1657          Motor Skills    Therapeutic Exercise shoulder;hip;knee;ankle  Performed cardiac ther exer (reviewed protocol for w/u & cooldown exer)  -DM       Row Name 04/13/24 1657          Shoulder (Therapeutic Exercise)    Shoulder (Therapeutic Exercise) AROM (active range of motion)  -DM     Shoulder AROM (Therapeutic Exercise) bilateral;flexion;extension;aBduction;aDduction;sitting;10 repetitions;other (see comments)   Biceps curls; deep inspirations w/ sh.exer to 90 deg.  -DM       Row Name 04/13/24 1657          Hip (Therapeutic Exercise)    Hip (Therapeutic Exercise) AROM (active range of motion)  -DM     Hip AROM (Therapeutic Exercise) bilateral;flexion;extension;sitting;10 repetitions  -DM       Row Name 04/13/24 1657          Knee (Therapeutic Exercise)    Knee (Therapeutic Exercise) AROM (active range of motion)  -DM     Knee AROM (Therapeutic Exercise) bilateral;flexion;extension;LAQ (long arc quad);sitting  -DM       Row Name 04/13/24 1657          Ankle (Therapeutic Exercise)    Ankle (Therapeutic Exercise) AROM (active range of motion)  -DM     Ankle AROM (Therapeutic Exercise) bilateral;dorsiflexion;plantarflexion;sitting;10 repetitions;other (see comments);2 sets  AC;AP;rocking heel to toe  -DM       Row Name 04/13/24 1657          Balance    Balance Assessment sitting static balance;sitting dynamic balance  -DM     Static Sitting Balance independent  -DM     Dynamic Sitting Balance independent  RECIP scooting  -DM     Position, Sitting Balance unsupported;sitting in chair  -DM     Balance Interventions sitting;static;dynamic;weight shifting activity  -DM               User Key  (r) = Recorded By, (t) = Taken By, (c) = Cosigned By      Initials Name Provider Type    DM Christine Mortensen, PT Physical Therapist                   Goals/Plan    No documentation.                  Clinical Impression       Row Name 04/13/24 1657          Pain    Pretreatment Pain Rating 7/10  -DM     Posttreatment Pain Rating 7/10  -DM     Pain Location incisional  -DM     Pain Location - chest  -DM     Pain Intervention(s) Medication (See MAR);Repositioned;Elevated;Rest  -DM     Additional Documentation Pain Scale: Numbers Pre/Post-Treatment (Group)  -DM       Row Name 04/13/24 1657          Plan of Care Review    Plan of Care Reviewed With patient;daughter  -DM     Progress no change  -DM     Outcome Evaluation Pt. had been premed w/  morphine, & performed cardiac ther exer  (w/u's & cooldowns)UIC, then respirex, PLB exer, & instructed pt & dtr in Cardiac HEP, sternal precaut, signs/sympt & prog.of amb. distance, but decl. STS, MIP,gt., or stair trial d/t incis pain persistent (7/10), & will amb later after dinner. Noted desat 92% on 2L (was RA earlier today), elev SBP, & HR 88.  -DM       Row Name 04/13/24 1657          Vital Signs    Pre Systolic BP Rehab 104  -DM     Pre Treatment Diastolic BP 60  -DM     Post Systolic BP Rehab 125  -DM     Post Treatment Diastolic BP 61  -DM     Pretreatment Heart Rate (beats/min) 86  -DM     Posttreatment Heart Rate (beats/min) 88  -DM     Pre SpO2 (%) 98  -DM     O2 Delivery Pre Treatment nasal cannula  -DM     Intra SpO2 (%) 92  -DM     O2 Delivery Intra Treatment nasal cannula  -DM     Post SpO2 (%) 97  -DM     O2 Delivery Post Treatment nasal cannula  -DM     Pre Patient Position Sitting  -DM     Intra Patient Position Sitting  -DM     Post Patient Position Sitting  -DM       Row Name 04/13/24 1657          Positioning and Restraints    Pre-Treatment Position sitting in chair/recliner  -DM     Post Treatment Position chair  -DM     In Chair notified nsg;reclined;call light within reach;encouraged to call for assist;with family/caregiver;waffle cushion;legs elevated  -DM               User Key  (r) = Recorded By, (t) = Taken By, (c) = Cosigned By      Initials Name Provider Type    DM Christine Mortensen, PT Physical Therapist                   Outcome Measures       Row Name 04/13/24 1657          How much help from another person do you currently need...    Turning from your back to your side while in flat bed without using bedrails? 3  -DM     Moving from lying on back to sitting on the side of a flat bed without bedrails? 3  -DM     Moving to and from a bed to a chair (including a wheelchair)? 3  -DM     Standing up from a chair using your arms (e.g., wheelchair, bedside chair)? 3  -DM     Climbing 3-5  steps with a railing? 3  -DM     To walk in hospital room? 3  -DM     AM-PAC 6 Clicks Score (PT) 18  -DM     Highest Level of Mobility Goal 6 --> Walk 10 steps or more  -DM       Row Name 04/13/24 1657          Functional Assessment    Outcome Measure Options AM-PAC 6 Clicks Basic Mobility (PT)  -DM               User Key  (r) = Recorded By, (t) = Taken By, (c) = Cosigned By      Initials Name Provider Type    DM Christine Mortensen, PT Physical Therapist                                 Physical Therapy Education       Title: PT OT SLP Therapies (In Progress)       Topic: Physical Therapy (In Progress)       Point: Mobility training (In Progress)       Learning Progress Summary             Patient Acceptance, E,D,H, NR by DM at 4/13/2024 1744    Acceptance, E, NR by APRIL at 4/12/2024 1030    Acceptance, E, NR by APRIL at 4/11/2024 0850   Family Acceptance, E,D,H, NR by DM at 4/13/2024 1744                         Point: Home exercise program (In Progress)       Learning Progress Summary             Patient Acceptance, E,D,H, NR by DM at 4/13/2024 1744    Acceptance, E, NR by APRIL at 4/12/2024 1030    Acceptance, E, NR by APRIL at 4/11/2024 0850   Family Acceptance, E,D,H, NR by DM at 4/13/2024 1744                         Point: Body mechanics (In Progress)       Learning Progress Summary             Patient Acceptance, E,D,H, NR by DM at 4/13/2024 1744    Acceptance, E, NR by APRIL at 4/12/2024 1030    Acceptance, E, NR by APRIL at 4/11/2024 0850   Family Acceptance, E,D,H, NR by DM at 4/13/2024 1744                         Point: Precautions (In Progress)       Learning Progress Summary             Patient Acceptance, E,D,H, NR by DM at 4/13/2024 1744    Acceptance, E, NR by APRIL at 4/12/2024 1030    Acceptance, E, NR by APRIL at 4/11/2024 0850   Family Acceptance, E,D,H, NR by DM at 4/13/2024 1744                                         User Key       Initials Effective Dates Name Provider Type Discipline    APRIL 02/03/23 -  Clary  Ofelia CORTES, PT Physical Therapist PT    DM 02/03/23 -  Christine Mortensen, PT Physical Therapist PT                  PT Recommendation and Plan     Plan of Care Reviewed With: patient, daughter  Progress: no change  Outcome Evaluation: Pt. had been premed w/ morphine, & performed cardiac ther exer  (w/u's & cooldowns)UIC, then respirex, PLB exer, & instructed pt & dtr in Cardiac HEP, sternal precaut, signs/sympt & prog.of amb. distance, but decl. STS, MIP,gt., or stair trial d/t incis pain persistent (7/10), & will amb later after dinner. Noted desat 92% on 2L (was RA earlier today), elev SBP, & HR 88.     Time Calculation:         PT Charges       Row Name 04/13/24 1744             Time Calculation    Start Time 1657  -DM      PT Received On 04/13/24  -DM      PT Goal Re-Cert Due Date 04/21/24  -DM         Time Calculation- PT    Total Timed Code Minutes- PT 25 minute(s)  -DM         Timed Charges    68059 - PT Therapeutic Exercise Minutes 12  -DM      90312 - PT Therapeutic Activity Minutes 13  -DM         Total Minutes    Timed Charges Total Minutes 25  -DM       Total Minutes 25  -DM                User Key  (r) = Recorded By, (t) = Taken By, (c) = Cosigned By      Initials Name Provider Type    DM Christine Mortensen, PT Physical Therapist                  Therapy Charges for Today       Code Description Service Date Service Provider Modifiers Qty    94422373990 HC PT THER PROC EA 15 MIN 4/13/2024 Christine Mortensen, PT GP 1    80817360529 HC PT THERAPEUTIC ACT EA 15 MIN 4/13/2024 Christine Mortensen, PT GP 1            PT G-Codes  Outcome Measure Options: AM-PAC 6 Clicks Basic Mobility (PT)  AM-PAC 6 Clicks Score (PT): 18       Christine Mortensen, PT  4/13/2024

## 2024-04-13 NOTE — PROGRESS NOTES
"                  Clinical Nutrition   Nutrition Support Assessment  Reason for Visit: [facility]    Patient Name: Khalif Joshua  YOB: 1950  MRN: [id number]  Date of Encounter: 04/12/24 20:52 EDT  Admission date: 4/5/2024    Comments: Pt had 5 days NPO or clr liq diet Now w diet advanced. Inconsistent wt hx. Will attempt nutrition focused exam as feasible.     Nutrition Assessment   Admission Diagnosis:  NSTEMI (non-ST elevated myocardial infarction) [I21.4]      Problem List:    NSTEMI (non-ST elevated myocardial infarction)    Hypertension    Dyslipidemia    MV CAD    T2DM    Combined systolic and diastolic CHF        PMH:   He  has a past medical history of CAD (coronary artery disease), Diverticulitis, Hyperlipemia, Hypertension, Osteoarthritis, and Perforated sigmoid colon.    PSH:  He  has a past surgical history that includes Exploratory Laparotomy (N/A, 10/30/2021); Colostomy closure (N/A, 04/21/2022); Cardiac catheterization (N/A, 04/05/2024); Replacement total knee (Bilateral); Ileostomy; and Coronary artery bypass graft (N/A, 4/10/2024).    Applicable Nutrition Concerns:   Skin:  Oral:  GI:    Applicable Interval History:   4/10 CABG    Reported/Observed/Food/Nutrition Related History:     4/12  Pt allows uncertain re wt, current wt is a guess -does not weigh often. Now starting diet.    Anthropometrics     Height: Height: 180.3 cm (70.98\")  Last Filed Weight: Weight: 95.7 kg (211 lb) (04/10/24 1204)  Method: Weight Method: Stated  BMI: BMI (Calculated): 29.4    UBW: Per EMR wt of 200 lbs on 12/21/22, 205 lbs on 9/8/23, 217 lbs on 2/21/24, 200 lbs on 4/8/24 and 220 lbs on 4/12/24     Weight change:  uncertain at this time.     Nutrition Focused Physical Exam     Date:   4/12      Unable to perform due to Pt unable to participate at time of visit    Current Nutrition Prescription   PO: Diet: Diabetic, Regular/House, Cardiac; Healthy Heart (2-3 Na+); Consistent Carbohydrate; Fluid Consistency: " Thin (IDDSI 0)  Oral Nutrition Supplement:   Intake: Insufficient data 0% x 1 meal recorded      Nutrition Diagnosis   Date:  4/12            Updated:    Problem No nutrition diagnosis at this time pending further data   Etiology    Signs/Symptoms    Status:       Goal:   Nutrition to support treatment and Establish PO      Nutrition Intervention      Follow treatment progress, Care plan reviewed, Advise alternate selection, Menu provided      Monitoring/Evaluation:   Per protocol, I&O, PO intake, Pertinent labs, Weight, Symptoms      Pina Banegas RD  Time Spent: 25 min

## 2024-04-13 NOTE — PROGRESS NOTES
"  [facility]  PROGRESS NOTE    Date of Admission: 4/5/2024  Date of Service: 04/13/24    Primary Care Physician: Renee Liriano MD    Chief Complaint: Follow-up CAD, heart failure, cardiac risk factors      Subjective      No acute events overnight.  Heart rate in the upper 90s and systolic blood pressure in the 120s.  He is having some mild shortness of breath but nothing worse than normal.  No chest pain.      Objective   Vitals: /60   Pulse 97   Temp 98.5 °F (36.9 °C) (Oral)   Resp 18   Ht 180.3 cm (70.98\")   Wt 100 kg (220 lb 10.1 oz)   SpO2 96%   BMI 30.79 kg/m²     Physical Exam:  GENERAL: in no acute distress.   HEENT: no jugular venous distention  HEART: Regular rhythm, normal rate, and no murmurs, gallops, or rubs.   LUNGS: Clear to auscultation bilaterally. No wheezing, rales or rhonchi.  EXTREMITIES: 1+ bilateral lower extremity edema    Results:  Results from last 7 days   Lab Units 04/13/24  0433 04/12/24  0436 04/11/24  0303   WBC 10*3/mm3 4.92 7.52 14.58*   HEMOGLOBIN g/dL 9.8* 9.7* 10.1*   HEMATOCRIT % 30.1* 29.9* 31.0*   PLATELETS 10*3/mm3 176 165 211     Results from last 7 days   Lab Units 04/13/24  0433 04/12/24  0436 04/11/24  0303   SODIUM mmol/L 131* 135* 140   POTASSIUM mmol/L 3.8 4.0 4.0   CHLORIDE mmol/L 95* 100 104   CO2 mmol/L 27.0 26.0 23.0   BUN mg/dL 30* 25* 19   CREATININE mg/dL 0.83 1.01 1.22   GLUCOSE mg/dL 129* 131* 168*      Lab Results   Component Value Date    AST 46 (H) 04/11/2024    ALT 15 04/11/2024                     Results from last 7 days   Lab Units 04/11/24  0303 04/10/24  1735   PROTIME Seconds 16.0* 17.9*   INR  1.27* 1.46*   APTT seconds  --  31.9                 Intake/Output Summary (Last 24 hours) at 4/13/2024 1021  Last data filed at 4/13/2024 0900  Gross per 24 hour   Intake 200 ml   Output 825 ml   Net -625 ml       I personally reviewed the patient's EKG/Telemetry data    Radiology Data:   Echo " 4/6/2024  Clinical Indication    Chest Pain; Evaluation of Ventricular Function after ACS; Acute Chest Pain     Interpretation Summary         Left ventricular systolic function is mildly decreased. Calculated left ventricular EF = 46.9% Left ventricular ejection fraction appears to be 46 - 50%.  wall motion not well seen and endocardial border not well seen.  would recomend limited contrast echo    Left ventricular diastolic function is consistent with (grade I) impaired relaxation.    The right ventricular cavity is mildly dilated.    Estimated right ventricular systolic pressure from tricuspid regurgitation is normal (<35 mmHg).    Echo 4/7/2024  Interpretation Summary         Left ventricular systolic function is normal. Calculated left ventricular EF = 56.7%  Current Medications:  acetaminophen, 650 mg, Oral, Q8H  aspirin, 325 mg, Oral, Daily  [Held by provider] bisoprolol, 2.5 mg, Oral, Q24H  clopidogrel, 75 mg, Oral, Daily  famotidine, 20 mg, Oral, BID AC  [Held by provider] losartan, 100 mg, Oral, Q24H   And  [Held by provider] hydroCHLOROthiazide, 12.5 mg, Oral, Q24H  insulin lispro, 2-9 Units, Subcutaneous, 4x Daily AC & at Bedtime  metoprolol tartrate, 12.5 mg, Oral, Q12H  pharmacy consult - MTM, , Does not apply, Daily  rosuvastatin, 40 mg, Oral, Nightly  senna-docusate sodium, 2 tablet, Oral, BID  sodium chloride, 10 mL, Intravenous, Q12H      amiodarone, 1 mg/min, Last Rate: 1 mg/min (04/10/24 2134)  dextrose 5 % with KCl 20 mEq, 30 mL/hr, Last Rate: 30 mL/hr (04/10/24 1728)        Assessment:   Multivessel coronary artery disease  S/p CABG x 4 (LIMA to LAD, SVG to diagonal, SVG to posterolateral branch, SVG to first obtuse) with Dr. Barlow, 4/10/24  On ASA and plavix   Combined systolic and diastolic heart failure  Echo: EF 56%, LV diastolic function consistent with grade I (impaired relaxation)  Hypertension  Resume medications when BP allows  Hyperlipidemia  Continue statin    Plan:   Continue  aspirin, Plavix, and Crestor for CAD.  Increase metoprolol to 25 mg twice daily for better heart rate control.  Given extra 12.5 now as he only had 12.5 this morning.  Diuresis with 20 mg of IV Lasix.  Increase activity as tolerated.  Wean oxygen as tolerated.      Merna Beach PA-C

## 2024-04-13 NOTE — PROGRESS NOTES
[facility] Medicine Services  PROGRESS NOTE    Patient Name: Khalif Joshua  : 1950  MRN: [id number]    Date of Admission: 2024  Primary Care Physician: Renee Liriano MD    Subjective   Subjective     CC:  F/u CABG    HPI:  Patient seen resting in chair with wife at bedside.  He is reporting some chest soreness and leg soreness around incision sites.  Denies any increased dyspnea or any other chest discomfort.  Had some confusion overnight which he attributes to gabapentin.      Objective   Objective     Vital Signs:   Temp:  [97.7 °F (36.5 °C)-99.3 °F (37.4 °C)] 98.5 °F (36.9 °C)  Heart Rate:  [] 97  Resp:  [18] 18  BP: (104-150)/(60-86) 104/60  Flow (L/min):  [2] 2     Physical Exam:  Constitutional: No acute distress, awake, alert  HENT: NCAT, mucous membranes moist  Respiratory: Clear to auscultation bilaterally, respiratory effort normal on 2L NC  Cardiovascular: RRR, no murmurs, rubs, or gallops  Gastrointestinal: Positive bowel sounds, soft, nontender, nondistended  Musculoskeletal: trace bilateral ankle edema  Psychiatric: Appropriate affect, cooperative  Neurologic: Oriented x 3, strength symmetric in all extremities, Cranial Nerves grossly intact to confrontation, speech clear  Skin: leg and sternal incisions tender but c/d/i      Results Reviewed:  LAB RESULTS:      Lab 24  0433 24  0436 24  0303 04/10/24  2010 04/10/24  1735 04/10/24  0002 24  1820 24  1041 24  0727 24  0630 24  0044   WBC 4.92 7.52 14.58* 23.08* 18.67*   < >  --   --  7.77  --   --    HEMOGLOBIN 9.8* 9.7* 10.1* 11.0* 10.8*   < >  --   --  14.5  --   --    HEMOGLOBIN, POC  --   --   --   --   --    < >  --   --   --   --   --    HEMATOCRIT 30.1* 29.9* 31.0* 32.7* 32.3*   < >  --   --  44.0  --   --    HEMATOCRIT POC  --   --   --   --   --    < >  --   --   --   --   --    PLATELETS 176 165 211 236 200   < >  --   --  244  --   --    NEUTROS  ABS  --   --  11.98*  --   --   --   --   --  3.99  --   --    IMMATURE GRANS (ABS)  --   --  0.11*  --   --   --   --   --  0.05  --   --    LYMPHS ABS  --   --  0.90  --   --   --   --   --  2.75  --   --    MONOS ABS  --   --  1.52*  --   --   --   --   --  0.77  --   --    EOS ABS  --   --  0.03  --   --   --   --   --  0.15  --   --    MCV 95.0 97.1* 97.2* 95.9 93.9   < >  --   --  94.4  --   --    PROTIME  --   --  16.0*  --  17.9*  --   --   --   --   --   --    APTT  --   --   --   --  31.9  --   --   --   --   --   --    HEPARIN ANTI-XA  --   --   --   --   --   --  0.27* 0.28* 0.27* 0.36 0.35    < > = values in this interval not displayed.         Lab 04/13/24  0433 04/12/24  0436 04/11/24  0303 04/10/24  2010 04/10/24  1735 04/07/24  0109 04/06/24  1816   SODIUM 131* 135* 140 141 135*   < >  --    POTASSIUM 3.8 4.0 4.0 4.2 4.3   < >  --    CHLORIDE 95* 100 104 101 96*   < >  --    CO2 27.0 26.0 23.0 21.0* 24.0   < >  --    ANION GAP 9.0 9.0 13.0 19.0* 15.0   < >  --    BUN 30* 25* 19 21 21   < >  --    CREATININE 0.83 1.01 1.22 1.15 1.19   < >  --    EGFR 93.0 79.0 63.0 67.6 64.9   < >  --    GLUCOSE 129* 131* 168* 140* 107*   < >  --    CALCIUM 8.8 8.7 9.0 9.6 9.1   < >  --    IONIZED CALCIUM  --   --   --   --  1.20  --   --    MAGNESIUM  --   --   --  2.1 2.1  --  2.1   PHOSPHORUS  --   --   --  3.9 4.2  --   --     < > = values in this interval not displayed.         Lab 04/11/24  0303 04/10/24  2010 04/10/24  1735   TOTAL PROTEIN 6.3  --   --    ALBUMIN 4.5 4.4 4.1   GLOBULIN 1.8  --   --    ALT (SGPT) 15  --   --    AST (SGOT) 46*  --   --    BILIRUBIN 0.3  --   --    ALK PHOS 36*  --   --          Lab 04/11/24  0303 04/10/24  1735   PROTIME 16.0* 17.9*   INR 1.27* 1.46*             Lab 04/09/24  1358 04/09/24  1041   ABO TYPING O O   RH TYPING Negative Negative   ANTIBODY SCREEN  --  Negative         Lab 04/10/24  2155 04/10/24  1753 04/10/24  1647   PH, ARTERIAL 7.365 7.397 7.30*   PCO2, ARTERIAL  39.4 37.3  --    PO2 .0* 251.0*  --    FIO2 40 100  --    HCO3 ART 22.5 22.9  --    BASE EXCESS ART -2.7* -1.6*  --    CARBOXYHEMOGLOBIN 1.1 0.8  --      Brief Urine Lab Results       None            Microbiology Results Abnormal       None            XR Chest 1 View    Result Date: 4/12/2024  XR CHEST 1 VW Date of Exam: 4/12/2024 3:07 AM EDT Indication: Post-Op Heart Surgery Comparison: 4/11/2024. Findings: Support lines are unchanged in position. Suggestion of a minimal left apical pneumothorax. There is mild atelectasis of the lung bases, greatest on the left. There may be a minimal left effusion.     Impression: Impression: Probable minimal left apical pneumothorax. Mild atelectasis lung bases, greatest on the left with minimal left effusion. Electronically Signed: Tammy Grande MD  4/12/2024 7:38 AM EDT  Workstation ID: [id number]     Results for orders placed during the hospital encounter of 04/05/24    Adult Transthoracic Echo Limited W/ Cont if Necessary Per Protocol    Interpretation Summary    Left ventricular systolic function is normal. Calculated left ventricular EF = 56.7%      Current medications:  Scheduled Meds:acetaminophen, 650 mg, Oral, Q8H  aspirin, 325 mg, Oral, Daily  [Held by provider] bisoprolol, 2.5 mg, Oral, Q24H  clopidogrel, 75 mg, Oral, Daily  famotidine, 20 mg, Oral, BID AC  [Held by provider] losartan, 100 mg, Oral, Q24H   And  [Held by provider] hydroCHLOROthiazide, 12.5 mg, Oral, Q24H  insulin lispro, 2-9 Units, Subcutaneous, 4x Daily AC & at Bedtime  metoprolol tartrate, 12.5 mg, Oral, Q12H  pharmacy consult - MTM, , Does not apply, Daily  rosuvastatin, 40 mg, Oral, Nightly  senna-docusate sodium, 2 tablet, Oral, BID  sodium chloride, 10 mL, Intravenous, Q12H      Continuous Infusions:amiodarone, 1 mg/min, Last Rate: 1 mg/min (04/10/24 2134)  dextrose 5 % with KCl 20 mEq, 30 mL/hr, Last Rate: 30 mL/hr (04/10/24 1728)      PRN Meds:.  [DISCONTINUED] senna-docusate sodium  **AND** [DISCONTINUED] polyethylene glycol **AND** bisacodyl **AND** [DISCONTINUED] bisacodyl    bisacodyl    Calcium Replacement - Follow Nurse / BPA Driven Protocol    diazePAM    HYDROcodone-acetaminophen    ipratropium-albuterol    Magnesium Cardiology Dose Replacement - Follow Nurse / BPA Driven Protocol    melatonin    methocarbamol    Morphine    nitroglycerin    oxyCODONE    Phosphorus Replacement - Follow Nurse / BPA Driven Protocol    polyethylene glycol    Potassium Replacement - Follow Nurse / BPA Driven Protocol    prochlorperazine    sodium chloride    sodium chloride    Assessment & Plan   Assessment & Plan     Active Hospital Problems    Diagnosis  POA    **NSTEMI (non-ST elevated myocardial infarction) [I21.4]  Yes    T2DM [E11.9]  Yes    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        Brief Hospital Course to date:  Khalif Joshua is a 73 y.o. male With a history of CAD, hypertension, hyperlipidemia who presented with NSTEMI and was taken to CABG on 4/10.  He was transferred out of ICU on 4/12.  He is being followed by CT surgery and cardiology    NSTEMI  Multivessel CAD status post CABG on 4/10.  Combined systolic and diastolic heart failure  Hyperlipidemia  Hypertension  - Managed by CT surgery and cardiology  - Continue aspirin, Plavix, beta-blockers (as tolerated), statin  - Pain control as needed  - Continue aggressive pulmonary toilet activity    Leukocytosis  -Resolved, likely postop stress response    Expected Discharge Location and Transportation: home  Expected Discharge   Expected Discharge Date: 4/14/2024; Expected Discharge Time:      DVT prophylaxis:  Mechanical DVT prophylaxis orders are present.         AM-PAC 6 Clicks Score (PT): 18 (04/12/24 1205)    CODE STATUS:   Code Status and Medical Interventions:   Ordered at: 04/10/24  1733     Code Status (Patient has no pulse and is not breathing):    CPR (Attempt to Resuscitate)     Medical Interventions (Patient has pulse or is breathing):    Full Support       Rhoda Duran MD  04/13/24

## 2024-04-14 ENCOUNTER — APPOINTMENT (OUTPATIENT)
Dept: GENERAL RADIOLOGY | Facility: HOSPITAL | Age: 74
End: 2024-04-14
Payer: MEDICARE

## 2024-04-14 PROBLEM — J95.811 POSTPROCEDURAL PNEUMOTHORAX: Status: ACTIVE | Noted: 2024-04-14

## 2024-04-14 PROBLEM — I50.40 COMBINED SYSTOLIC AND DIASTOLIC CONGESTIVE HEART FAILURE: Status: RESOLVED | Noted: 2024-04-10 | Resolved: 2024-04-14

## 2024-04-14 PROBLEM — I48.0 PAROXYSMAL ATRIAL FIBRILLATION: Status: ACTIVE | Noted: 2024-04-14

## 2024-04-14 LAB
ANION GAP SERPL CALCULATED.3IONS-SCNC: 11 MMOL/L (ref 5–15)
BUN SERPL-MCNC: 31 MG/DL (ref 8–23)
BUN/CREAT SERPL: 38.3 (ref 7–25)
CALCIUM SPEC-SCNC: 9.3 MG/DL (ref 8.6–10.5)
CHLORIDE SERPL-SCNC: 94 MMOL/L (ref 98–107)
CO2 SERPL-SCNC: 30 MMOL/L (ref 22–29)
CREAT SERPL-MCNC: 0.81 MG/DL (ref 0.76–1.27)
EGFRCR SERPLBLD CKD-EPI 2021: 93.7 ML/MIN/1.73
GLUCOSE BLDC GLUCOMTR-MCNC: 113 MG/DL (ref 70–130)
GLUCOSE BLDC GLUCOMTR-MCNC: 118 MG/DL (ref 70–130)
GLUCOSE BLDC GLUCOMTR-MCNC: 133 MG/DL (ref 70–130)
GLUCOSE BLDC GLUCOMTR-MCNC: 139 MG/DL (ref 70–130)
GLUCOSE SERPL-MCNC: 120 MG/DL (ref 65–99)
HCT VFR BLD AUTO: 27.7 % (ref 37.5–51)
HGB BLD-MCNC: 9 G/DL (ref 13–17.7)
POTASSIUM SERPL-SCNC: 3.6 MMOL/L (ref 3.5–5.2)
SODIUM SERPL-SCNC: 135 MMOL/L (ref 136–145)

## 2024-04-14 PROCEDURE — 82948 REAGENT STRIP/BLOOD GLUCOSE: CPT

## 2024-04-14 PROCEDURE — 93005 ELECTROCARDIOGRAM TRACING: CPT | Performed by: PHYSICIAN ASSISTANT

## 2024-04-14 PROCEDURE — 93010 ELECTROCARDIOGRAM REPORT: CPT | Performed by: INTERNAL MEDICINE

## 2024-04-14 PROCEDURE — 25010000002 AMIODARONE IN DEXTROSE 5% 150-4.21 MG/100ML-% SOLUTION

## 2024-04-14 PROCEDURE — 25010000002 FUROSEMIDE PER 20 MG

## 2024-04-14 PROCEDURE — 85018 HEMOGLOBIN: CPT | Performed by: STUDENT IN AN ORGANIZED HEALTH CARE EDUCATION/TRAINING PROGRAM

## 2024-04-14 PROCEDURE — 93005 ELECTROCARDIOGRAM TRACING: CPT

## 2024-04-14 PROCEDURE — 99232 SBSQ HOSP IP/OBS MODERATE 35: CPT | Performed by: INTERNAL MEDICINE

## 2024-04-14 PROCEDURE — 25010000002 METOCLOPRAMIDE PER 10 MG: Performed by: SURGERY

## 2024-04-14 PROCEDURE — 99024 POSTOP FOLLOW-UP VISIT: CPT | Performed by: PHYSICIAN ASSISTANT

## 2024-04-14 PROCEDURE — 0W9B30Z DRAINAGE OF LEFT PLEURAL CAVITY WITH DRAINAGE DEVICE, PERCUTANEOUS APPROACH: ICD-10-PCS | Performed by: INTERNAL MEDICINE

## 2024-04-14 PROCEDURE — 85014 HEMATOCRIT: CPT | Performed by: STUDENT IN AN ORGANIZED HEALTH CARE EDUCATION/TRAINING PROGRAM

## 2024-04-14 PROCEDURE — 80048 BASIC METABOLIC PNL TOTAL CA: CPT | Performed by: STUDENT IN AN ORGANIZED HEALTH CARE EDUCATION/TRAINING PROGRAM

## 2024-04-14 PROCEDURE — 25010000002 MORPHINE PER 10 MG: Performed by: STUDENT IN AN ORGANIZED HEALTH CARE EDUCATION/TRAINING PROGRAM

## 2024-04-14 PROCEDURE — 25010000002 AMIODARONE IN DEXTROSE 5% 360-4.14 MG/200ML-% SOLUTION

## 2024-04-14 PROCEDURE — 99232 SBSQ HOSP IP/OBS MODERATE 35: CPT | Performed by: NURSE PRACTITIONER

## 2024-04-14 PROCEDURE — 71045 X-RAY EXAM CHEST 1 VIEW: CPT

## 2024-04-14 RX ORDER — FUROSEMIDE 10 MG/ML
20 INJECTION INTRAMUSCULAR; INTRAVENOUS ONCE
Status: COMPLETED | OUTPATIENT
Start: 2024-04-14 | End: 2024-04-14

## 2024-04-14 RX ORDER — ASPIRIN 81 MG/1
81 TABLET ORAL DAILY
Status: DISCONTINUED | OUTPATIENT
Start: 2024-04-15 | End: 2024-04-17 | Stop reason: HOSPADM

## 2024-04-14 RX ORDER — METOPROLOL TARTRATE 1 MG/ML
2.5 INJECTION, SOLUTION INTRAVENOUS ONCE
Status: COMPLETED | OUTPATIENT
Start: 2024-04-14 | End: 2024-04-14

## 2024-04-14 RX ORDER — POTASSIUM CHLORIDE 7.45 MG/ML
10 INJECTION INTRAVENOUS
Status: DISCONTINUED | OUTPATIENT
Start: 2024-04-14 | End: 2024-04-14

## 2024-04-14 RX ORDER — METOPROLOL TARTRATE 50 MG/1
50 TABLET, FILM COATED ORAL EVERY 12 HOURS SCHEDULED
Status: DISCONTINUED | OUTPATIENT
Start: 2024-04-14 | End: 2024-04-17 | Stop reason: HOSPADM

## 2024-04-14 RX ORDER — POTASSIUM CHLORIDE 750 MG/1
40 CAPSULE, EXTENDED RELEASE ORAL EVERY 4 HOURS
Status: COMPLETED | OUTPATIENT
Start: 2024-04-14 | End: 2024-04-14

## 2024-04-14 RX ADMIN — METOCLOPRAMIDE 10 MG: 5 INJECTION, SOLUTION INTRAMUSCULAR; INTRAVENOUS at 09:21

## 2024-04-14 RX ADMIN — Medication 10 ML: at 21:28

## 2024-04-14 RX ADMIN — METOCLOPRAMIDE 10 MG: 5 INJECTION, SOLUTION INTRAMUSCULAR; INTRAVENOUS at 03:40

## 2024-04-14 RX ADMIN — AMIODARONE HYDROCHLORIDE 1 MG/MIN: 1.8 INJECTION, SOLUTION INTRAVENOUS at 12:56

## 2024-04-14 RX ADMIN — SENNOSIDES AND DOCUSATE SODIUM 2 TABLET: 8.6; 5 TABLET ORAL at 09:23

## 2024-04-14 RX ADMIN — Medication 10 ML: at 09:30

## 2024-04-14 RX ADMIN — FAMOTIDINE 20 MG: 20 TABLET, FILM COATED ORAL at 18:31

## 2024-04-14 RX ADMIN — ASPIRIN 325 MG: 325 TABLET, COATED ORAL at 09:23

## 2024-04-14 RX ADMIN — FAMOTIDINE 20 MG: 20 TABLET, FILM COATED ORAL at 09:23

## 2024-04-14 RX ADMIN — AMIODARONE HYDROCHLORIDE 150 MG: 1.5 INJECTION, SOLUTION INTRAVENOUS at 12:36

## 2024-04-14 RX ADMIN — FUROSEMIDE 20 MG: 10 INJECTION, SOLUTION INTRAMUSCULAR; INTRAVENOUS at 12:38

## 2024-04-14 RX ADMIN — SENNOSIDES AND DOCUSATE SODIUM 2 TABLET: 8.6; 5 TABLET ORAL at 21:27

## 2024-04-14 RX ADMIN — DOCUSATE SODIUM 100 MG: 50 LIQUID ORAL at 11:19

## 2024-04-14 RX ADMIN — AMIODARONE HYDROCHLORIDE 0.5 MG/MIN: 1.8 INJECTION, SOLUTION INTRAVENOUS at 18:35

## 2024-04-14 RX ADMIN — METOCLOPRAMIDE 10 MG: 5 INJECTION, SOLUTION INTRAMUSCULAR; INTRAVENOUS at 18:34

## 2024-04-14 RX ADMIN — BISACODYL 10 MG: 5 TABLET, COATED ORAL at 09:23

## 2024-04-14 RX ADMIN — MORPHINE SULFATE 2 MG: 2 INJECTION, SOLUTION INTRAMUSCULAR; INTRAVENOUS at 03:40

## 2024-04-14 RX ADMIN — ACETAMINOPHEN 650 MG: 325 TABLET ORAL at 18:32

## 2024-04-14 RX ADMIN — METOPROLOL TARTRATE 2.5 MG: 5 INJECTION INTRAVENOUS at 11:32

## 2024-04-14 RX ADMIN — METOCLOPRAMIDE 10 MG: 5 INJECTION, SOLUTION INTRAMUSCULAR; INTRAVENOUS at 21:34

## 2024-04-14 RX ADMIN — POTASSIUM CHLORIDE 40 MEQ: 750 CAPSULE, EXTENDED RELEASE ORAL at 12:37

## 2024-04-14 RX ADMIN — POTASSIUM CHLORIDE 40 MEQ: 750 CAPSULE, EXTENDED RELEASE ORAL at 18:32

## 2024-04-14 RX ADMIN — METOPROLOL TARTRATE 50 MG: 50 TABLET, FILM COATED ORAL at 18:31

## 2024-04-14 RX ADMIN — CLOPIDOGREL BISULFATE 75 MG: 75 TABLET ORAL at 09:23

## 2024-04-14 RX ADMIN — ROSUVASTATIN 40 MG: 20 TABLET, FILM COATED ORAL at 21:27

## 2024-04-14 RX ADMIN — ACETAMINOPHEN 650 MG: 325 TABLET ORAL at 09:23

## 2024-04-14 NOTE — PROGRESS NOTES
"  [facility]  PROGRESS NOTE    Date of Admission: 4/5/2024  Date of Service: 04/14/24    Primary Care Physician: Renee Liriano MD    Chief Complaint: Follow-up CAD, heart failure, cardiac risk factors    Active Hospital Problems    Diagnosis  POA    **NSTEMI status post CABG 4/10/2024 [I21.4]  Yes     Priority: High     Cardiac catheterization for NSTEMI (4/5/2024): Severe multivessel CAD.    Echo (4/7/2024): LVEF 50%.  CABG by Thomas Barlow (4/10/2024): LIMA to LAD, SVG to diagonal, SVG to OM, SVG to RPL      Postoperative atrial fibrillation [I48.0]  No     A-fib with RVR following CABG  [id number] 4 (age, CAD, HTN, DM)      Postprocedural pneumothorax [J95.811]  Unknown    T2DM [E11.9]  Yes    Hypertension [I10]  Yes    Hyperlipidemia LDL goal <70 [E78.5]  Yes           Subjective      Chest tubes placed for bilateral pneumothorax  Chest x-ray today shows near resolution of pneumothorax  A-fib this morning, starting IV amiodarone      Objective   Vitals: BP (S) 96/87 Comment: provider notified  Pulse (!) 144   Temp 97.7 °F (36.5 °C) (Oral)   Resp 18   Ht 180.3 cm (70.98\")   Wt 99.1 kg (218 lb 6.4 oz)   SpO2 (!) 88%   BMI 30.47 kg/m²     Physical Exam  Constitutional:       Appearance: Normal appearance.   Cardiovascular:      Rate and Rhythm: Tachycardia present. Rhythm irregular.   Pulmonary:      Effort: Pulmonary effort is normal.   Neurological:      Mental Status: He is alert.         Results:  Results from last 7 days   Lab Units 04/14/24  0509 04/13/24  0433 04/12/24  0436 04/11/24  0303   WBC 10*3/mm3  --  4.92 7.52 14.58*   HEMOGLOBIN g/dL 9.0* 9.8* 9.7* 10.1*   HEMATOCRIT % 27.7* 30.1* 29.9* 31.0*   PLATELETS 10*3/mm3  --  176 165 211     Results from last 7 days   Lab Units 04/14/24  0509 04/13/24  1546 04/13/24  0433 04/12/24  0436   SODIUM mmol/L 135*  --  131* 135*   POTASSIUM mmol/L 3.6 4.0 3.8 4.0   CHLORIDE mmol/L 94*  --  95* 100   CO2 mmol/L " 30.0*  --  27.0 26.0   BUN mg/dL 31*  --  30* 25*   CREATININE mg/dL 0.81  --  0.83 1.01   GLUCOSE mg/dL 120*  --  129* 131*      Lab Results   Component Value Date    AST 46 (H) 04/11/2024    ALT 15 04/11/2024                     Results from last 7 days   Lab Units 04/11/24  0303 04/10/24  1735   PROTIME Seconds 16.0* 17.9*   INR  1.27* 1.46*   APTT seconds  --  31.9                 Intake/Output Summary (Last 24 hours) at 4/14/2024 1239  Last data filed at 4/14/2024 0340  Gross per 24 hour   Intake --   Output 645 ml   Net -645 ml       I personally reviewed the patient's EKG/Telemetry data    Current Medications:  acetaminophen, 650 mg, Oral, Q8H  amiodarone, 150 mg, Intravenous, Once  [START ON 4/15/2024] aspirin, 81 mg, Oral, Daily  bisacodyl, 10 mg, Oral, Daily  bisacodyl, 10 mg, Rectal, Q8H  docusate sodium, 100 mg, Nasogastric, BID  famotidine, 20 mg, Oral, BID AC  [Held by provider] losartan, 100 mg, Oral, Q24H   And  [Held by provider] hydroCHLOROthiazide, 12.5 mg, Oral, Q24H  insulin lispro, 2-9 Units, Subcutaneous, 4x Daily AC & at Bedtime  metoclopramide, 10 mg, Intravenous, Q6H  metoprolol tartrate, 50 mg, Oral, Q12H  pharmacy consult - MTM, , Does not apply, Daily  potassium chloride, 40 mEq, Oral, Q4H  rosuvastatin, 40 mg, Oral, Nightly  senna-docusate sodium, 2 tablet, Oral, BID  sodium chloride, 10 mL, Intravenous, Q12H      amiodarone, 1 mg/min   Followed by  amiodarone, 0.5 mg/min  dextrose 5 % with KCl 20 mEq, 30 mL/hr, Last Rate: 30 mL/hr (04/10/24 1728)        Assessment:   Multivessel coronary artery disease  S/p CABG x 4 (LIMA to LAD, SVG to diagonal, SVG to posterolateral branch, SVG to first obtuse) with Dr. Barlow, 4/10/24  On ASA and plavix   Postoperative atrial fibrillation  Start amiodarone IV per protocol  Start NOAC once pneumothorax resolved  Hyperlipidemia goal LDL <70  Postoperative bilateral pneumothorax status post chest tube placement    Plan:   Start IV amiodarone per  protocol  Discontinue clopidogrel  Start NOAC once pneumothorax resolved and chest tubes removed  Dr. Gerber to reassess tomorrow      Ming Beaver IV, MD

## 2024-04-14 NOTE — PROGRESS NOTES
"Patient Name:  Khalif Joshua  YOB: 1950  [id number]    Surgery Progress Note    Date of visit: 4/14/2024    Subjective   Subjective: Feeling much better. Had several BM's. NG removed, tolerating PO. Chest tubes placed bilaterally.         Objective     Objective:     /73 (BP Location: Right arm, Patient Position: Sitting)   Pulse (!) 122   Temp 98.3 °F (36.8 °C) (Oral)   Resp 18   Ht 180.3 cm (70.98\")   Wt 99.1 kg (218 lb 6.4 oz)   SpO2 (!) 89%   BMI 30.47 kg/m²     Intake/Output Summary (Last 24 hours) at 4/14/2024 1748  Last data filed at 4/14/2024 0340  Gross per 24 hour   Intake --   Output 645 ml   Net -645 ml       CV:  Rhythm  regular and rate regular   L:  Clearer to auscultation bilaterally. Tubes with serous output  Abd:  Bowel sounds positive , soft, less distended and less tender  Ext:  No cyanosis, clubbing, edema    Recent labs that are back at this time have been reviewed.            Assessment/ Plan:    Ileus- Improving. Advance diet as tolerated tomorrow if stable.     Problem List Items Addressed This Visit          Cardiac and Vasculature    * (Principal) NSTEMI status post CABG 4/10/2024    Relevant Medications    tadalafil (CIALIS) 5 MG tablet    nitroglycerin (NITROSTAT) SL tablet 0.4 mg    metoprolol tartrate (LOPRESSOR) injection 2.5 mg (Completed)    metoprolol tartrate (LOPRESSOR) tablet 50 mg (Start on 4/14/2024  6:00 PM)    Other Relevant Orders    Case Request Cath Lab: Angioplasty-coronary (Completed)    MV CAD    Relevant Medications    tadalafil (CIALIS) 5 MG tablet    nitroglycerin (NITROSTAT) SL tablet 0.4 mg    metoprolol tartrate (LOPRESSOR) injection 2.5 mg (Completed)    metoprolol tartrate (LOPRESSOR) tablet 50 mg (Start on 4/14/2024  6:00 PM)    Other Relevant Orders    Case request (Completed)       Pulmonary and Pneumonias    Postprocedural pneumothorax    Relevant Orders    Chest Tube Insertion (Completed)    Chest Tube Insertion (Completed) "     Other Visit Diagnoses       Chest pain, unspecified type    -  Primary             Active Hospital Problems    Diagnosis  POA    **NSTEMI status post CABG 4/10/2024 [I21.4]  Yes    Postoperative atrial fibrillation [I48.0]  No    Postprocedural pneumothorax [J95.811]  Unknown    T2DM [E11.9]  Yes    Hypertension [I10]  Yes    Hyperlipidemia LDL goal <70 [E78.5]  Yes      Resolved Hospital Problems    Diagnosis Date Resolved POA    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

## 2024-04-14 NOTE — PLAN OF CARE
Goal Outcome Evaluation:   Please see additional notes, Pt had two large to moderate BM (diarrhea) over night, BM regimen and soap suds enema completed. Chest tubes intact R 220 out put, L 75 out put, NG tube at 60 with 350 output. Pt not in distress and no complaints of SOA, remains 2LNC to RA Spo2 >90% VSS.         Problem: Adult Inpatient Plan of Care  Goal: Plan of Care Review  Outcome: Ongoing, Not Progressing  Goal: Patient-Specific Goal (Individualized)  Outcome: Ongoing, Not Progressing  Goal: Absence of Hospital-Acquired Illness or Injury  Outcome: Ongoing, Not Progressing  Intervention: Identify and Manage Fall Risk  Recent Flowsheet Documentation  Taken 4/14/2024 0630 by Bethany Lott RN  Safety Promotion/Fall Prevention:   safety round/check completed   room organization consistent   nonskid shoes/slippers when out of bed   lighting adjusted   fall prevention program maintained   activity supervised  Taken 4/14/2024 0340 by Bethany Lott RN  Safety Promotion/Fall Prevention:   safety round/check completed   room organization consistent   nonskid shoes/slippers when out of bed   lighting adjusted   fall prevention program maintained   activity supervised  Taken 4/14/2024 0000 by Bethany Lott RN  Safety Promotion/Fall Prevention:   safety round/check completed   room organization consistent   nonskid shoes/slippers when out of bed   mobility aid in reach   fall prevention program maintained   activity supervised  Taken 4/13/2024 2007 by Bethany Lott RN  Safety Promotion/Fall Prevention:   safety round/check completed   room organization consistent   nonskid shoes/slippers when out of bed   lighting adjusted   fall prevention program maintained   activity supervised  Intervention: Prevent Skin Injury  Recent Flowsheet Documentation  Taken 4/14/2024 0630 by Bethany Lott RN  Body Position: position changed independently  Taken 4/14/2024 0340 by Bethany Lott RN  Body Position:  position changed independently  Taken 4/14/2024 0000 by Bethany Lott RN  Body Position: position changed independently  Taken 4/13/2024 2007 by Bethany Lott RN  Body Position: position changed independently  Skin Protection:   adhesive use limited   tubing/devices free from skin contact  Intervention: Prevent and Manage VTE (Venous Thromboembolism) Risk  Recent Flowsheet Documentation  Taken 4/14/2024 0630 by Bethany Lott RN  Activity Management: activity minimized  Taken 4/14/2024 0340 by Bethany Lott RN  Activity Management: activity minimized  Taken 4/14/2024 0000 by Bethany Lott RN  Activity Management: activity minimized  Taken 4/13/2024 2007 by Bethany Lott RN  Activity Management: activity encouraged  VTE Prevention/Management: (see mar) other (see comments)  Range of Motion: active ROM (range of motion) encouraged  Intervention: Prevent Infection  Recent Flowsheet Documentation  Taken 4/14/2024 0630 by Bethany Lott RN  Infection Prevention: hand hygiene promoted  Taken 4/13/2024 2007 by Bethany Lott RN  Infection Prevention: hand hygiene promoted  Goal: Optimal Comfort and Wellbeing  Outcome: Ongoing, Not Progressing  Intervention: Monitor Pain and Promote Comfort  Recent Flowsheet Documentation  Taken 4/13/2024 2007 by Bethany Lott RN  Pain Management Interventions: see MAR  Intervention: Provide Person-Centered Care  Recent Flowsheet Documentation  Taken 4/13/2024 2007 by Bethany Lott RN  Trust Relationship/Rapport:   care explained   choices provided  Goal: Readiness for Transition of Care  Outcome: Ongoing, Not Progressing     Problem: Activity Intolerance (Cardiovascular Surgery)  Goal: Improved Activity Tolerance  Outcome: Ongoing, Not Progressing  Intervention: Optimize Tolerance for Activity  Recent Flowsheet Documentation  Taken 4/13/2024 2007 by Bethany Lott RN  Environmental Support: calm environment promoted  Self-Care Promotion: independence  encouraged     Problem: Adjustment to Surgery (Cardiovascular Surgery)  Goal: Optimal Coping with Heart Surgery  Outcome: Ongoing, Not Progressing  Intervention: Support Psychosocial Response to Surgery  Recent Flowsheet Documentation  Taken 4/13/2024 2007 by Bethany Lott RN  Supportive Measures: active listening utilized  Family/Support System Care: caregiver stress acknowledged     Problem: Bleeding (Cardiovascular Surgery)  Goal: Bleeding (Cardiovascular Surgery)  Outcome: Ongoing, Not Progressing  Intervention: Monitor and Manage Bleeding  Recent Flowsheet Documentation  Taken 4/13/2024 2007 by Bethany Lott RN  Bleeding Management: dressing monitored     Problem: Bowel Motility Impaired (Cardiovascular Surgery)  Goal: Effective Bowel Elimination (Cardiovascular Surgery)  Outcome: Ongoing, Not Progressing     Problem: Cardiac Function Impaired (Cardiovascular Surgery)  Goal: Effective Cardiac Function  Outcome: Ongoing, Not Progressing     Problem: Cerebral Tissue Perfusion (Cardiovascular Surgery)  Goal: Optimal Cerebral Tissue Perfusion (Cardiovascular Surgery)  Outcome: Ongoing, Not Progressing  Intervention: Protect and Optimize Cerebral Perfusion  Recent Flowsheet Documentation  Taken 4/14/2024 0630 by Bethany Lott RN  Head of Bed (HOB) Positioning: HOB elevated  Taken 4/14/2024 0340 by Bethany Lott RN  Head of Bed (HOB) Positioning: HOB elevated  Taken 4/14/2024 0000 by Bethany Lott RN  Head of Bed (HOB) Positioning: HOB elevated  Taken 4/13/2024 2007 by Bethany Lott RN  Glycemic Management: blood glucose monitored  Sensory Stimulation Regulation: care clustered  Head of Bed (HOB) Positioning: HOB elevated  Cerebral Perfusion Promotion: blood pressure monitored     Problem: Fluid and Electrolyte Imbalance (Cardiovascular Surgery)  Goal: Fluid and Electrolyte Balance (Cardiovascular Surgery)  Outcome: Ongoing, Not Progressing     Problem: Glycemic Control Impaired  (Cardiovascular Surgery)  Goal: Blood Glucose Level Within Targeted Range (Cardiovascular Surgery)  Outcome: Ongoing, Not Progressing  Intervention: Optimize Glycemic Control  Recent Flowsheet Documentation  Taken 4/13/2024 2007 by Bethany Lott RN  Glycemic Management: blood glucose monitored     Problem: Infection (Cardiovascular Surgery)  Goal: Absence of Infection Signs and Symptoms  Outcome: Ongoing, Not Progressing  Intervention: Prevent or Manage Infection  Recent Flowsheet Documentation  Taken 4/14/2024 0630 by Bethany Lott RN  Infection Prevention: hand hygiene promoted  Taken 4/13/2024 2007 by Bethany Lott RN  Infection Prevention: hand hygiene promoted     Problem: Ongoing Anesthesia Effects (Cardiovascular Surgery)  Goal: Anesthesia/Sedation Recovery  Outcome: Ongoing, Not Progressing  Intervention: Optimize Anesthesia Recovery  Recent Flowsheet Documentation  Taken 4/14/2024 0630 by eBthany Lott RN  Safety Promotion/Fall Prevention:   safety round/check completed   room organization consistent   nonskid shoes/slippers when out of bed   lighting adjusted   fall prevention program maintained   activity supervised  Taken 4/14/2024 0340 by Bethany Lott RN  Safety Promotion/Fall Prevention:   safety round/check completed   room organization consistent   nonskid shoes/slippers when out of bed   lighting adjusted   fall prevention program maintained   activity supervised  Taken 4/14/2024 0000 by Bethany Lott RN  Safety Promotion/Fall Prevention:   safety round/check completed   room organization consistent   nonskid shoes/slippers when out of bed   mobility aid in reach   fall prevention program maintained   activity supervised  Taken 4/13/2024 2007 by Bethany Lott RN  Safety Promotion/Fall Prevention:   safety round/check completed   room organization consistent   nonskid shoes/slippers when out of bed   lighting adjusted   fall prevention program maintained   activity  supervised  Reorientation Measures:   calendar in view   clock in view     Problem: Pain (Cardiovascular Surgery)  Goal: Acceptable Pain Control  Outcome: Ongoing, Not Progressing  Intervention: Prevent or Manage Pain  Recent Flowsheet Documentation  Taken 4/13/2024 2007 by Bethany Lott RN  Pain Management Interventions: see MAR  Diversional Activities: television     Problem: Postoperative Nausea and Vomiting (Cardiovascular Surgery)  Goal: Nausea and Vomiting Relief (Cardiovascular Surgery)  Outcome: Ongoing, Not Progressing     Problem: Postoperative Urinary Retention (Cardiovascular Surgery)  Goal: Effective Urinary Elimination (Cardiovascular Surgery)  Outcome: Ongoing, Not Progressing     Problem: Respiratory Compromise (Cardiovascular Surgery)  Goal: Effective Oxygenation and Ventilation (Cardiovascular Surgery)  Outcome: Ongoing, Not Progressing  Intervention: Promote Airway Secretion Clearance  Recent Flowsheet Documentation  Taken 4/14/2024 0630 by Bethany Lott RN  Cough And Deep Breathing: done independently per patient  Taken 4/13/2024 2007 by Bethany Lott RN  Airway/Ventilation Management: airway patency maintained  Cough And Deep Breathing: done independently per patient     Problem: Asthma Comorbidity  Goal: Maintenance of Asthma Control  Outcome: Ongoing, Not Progressing  Intervention: Maintain Asthma Symptom Control  Recent Flowsheet Documentation  Taken 4/14/2024 0000 by Bethany Lott RN  Medication Review/Management: medications reviewed  Taken 4/13/2024 2007 by Bethany Lott RN  Medication Review/Management: medications reviewed     Problem: Behavioral Health Comorbidity  Goal: Maintenance of Behavioral Health Symptom Control  Outcome: Ongoing, Not Progressing  Intervention: Maintain Behavioral Health Symptom Control  Recent Flowsheet Documentation  Taken 4/14/2024 0000 by Bethany Lott RN  Medication Review/Management: medications reviewed  Taken 4/13/2024 2007 by  Bethany Lott RN  Medication Review/Management: medications reviewed     Problem: COPD (Chronic Obstructive Pulmonary Disease) Comorbidity  Goal: Maintenance of COPD Symptom Control  Outcome: Ongoing, Not Progressing  Intervention: Maintain COPD-Symptom Control  Recent Flowsheet Documentation  Taken 4/14/2024 0000 by Bethany Lott RN  Medication Review/Management: medications reviewed  Taken 4/13/2024 2007 by Bethany Lott RN  Supportive Measures: active listening utilized  Medication Review/Management: medications reviewed     Problem: Diabetes Comorbidity  Goal: Blood Glucose Level Within Targeted Range  Outcome: Ongoing, Not Progressing  Intervention: Monitor and Manage Glycemia  Recent Flowsheet Documentation  Taken 4/13/2024 2007 by Bethany Lott RN  Glycemic Management: blood glucose monitored     Problem: Heart Failure Comorbidity  Goal: Maintenance of Heart Failure Symptom Control  Outcome: Ongoing, Not Progressing  Intervention: Maintain Heart Failure-Management  Recent Flowsheet Documentation  Taken 4/14/2024 0000 by Bethany Lott RN  Medication Review/Management: medications reviewed  Taken 4/13/2024 2007 by Bethany Lott RN  Medication Review/Management: medications reviewed     Problem: Hypertension Comorbidity  Goal: Blood Pressure in Desired Range  Outcome: Ongoing, Not Progressing  Intervention: Maintain Blood Pressure Management  Recent Flowsheet Documentation  Taken 4/14/2024 0000 by Bethany Lott RN  Medication Review/Management: medications reviewed  Taken 4/13/2024 2007 by Bethany Lott RN  Medication Review/Management: medications reviewed     Problem: Obstructive Sleep Apnea Risk or Actual Comorbidity Management  Goal: Unobstructed Breathing During Sleep  Outcome: Ongoing, Not Progressing     Problem: Osteoarthritis Comorbidity  Goal: Maintenance of Osteoarthritis Symptom Control  Outcome: Ongoing, Not Progressing  Intervention: Maintain Osteoarthritis Symptom  Control  Recent Flowsheet Documentation  Taken 4/14/2024 0630 by Bethany Lott RN  Activity Management: activity minimized  Taken 4/14/2024 0340 by Bethany Lott RN  Activity Management: activity minimized  Taken 4/14/2024 0000 by Bethany Lott RN  Activity Management: activity minimized  Medication Review/Management: medications reviewed  Taken 4/13/2024 2007 by Bethany Lott RN  Activity Management: activity encouraged  Assistive Device Utilized:   walker   gait belt  Medication Review/Management: medications reviewed     Problem: Pain Chronic (Persistent) (Comorbidity Management)  Goal: Acceptable Pain Control and Functional Ability  Outcome: Ongoing, Not Progressing  Intervention: Manage Persistent Pain  Recent Flowsheet Documentation  Taken 4/14/2024 0000 by Bethany Lott RN  Medication Review/Management: medications reviewed  Taken 4/13/2024 2007 by Bethany Lott RN  Sleep/Rest Enhancement: awakenings minimized  Medication Review/Management: medications reviewed  Intervention: Develop Pain Management Plan  Recent Flowsheet Documentation  Taken 4/13/2024 2007 by Bethany Lott RN  Pain Management Interventions: see MAR  Intervention: Optimize Psychosocial Wellbeing  Recent Flowsheet Documentation  Taken 4/13/2024 2007 by Bethany Lott RN  Supportive Measures: active listening utilized  Diversional Activities: television  Family/Support System Care: caregiver stress acknowledged     Problem: Seizure Disorder Comorbidity  Goal: Maintenance of Seizure Control  Outcome: Ongoing, Not Progressing     Problem: Communication Impairment (Mechanical Ventilation, Invasive)  Goal: Effective Communication  Outcome: Ongoing, Not Progressing  Intervention: Ensure Effective Communication  Recent Flowsheet Documentation  Taken 4/13/2024 2007 by Bethany Lott RN  Communication Enhancement Strategies: call light answered in person     Problem: Device-Related Complication Risk (Mechanical  Ventilation, Invasive)  Goal: Optimal Device Function  Outcome: Ongoing, Not Progressing  Intervention: Optimize Device Care and Function  Recent Flowsheet Documentation  Taken 4/14/2024 0630 by Bethany Lott RN  Airway Safety Measures: oxygen flowmeter at bedside  Taken 4/14/2024 0000 by Bethany Lott RN  Airway Safety Measures: oxygen flowmeter at bedside  Taken 4/13/2024 2007 by Bethany Lott RN  Airway Safety Measures: oxygen flowmeter at bedside     Problem: Inability to Wean (Mechanical Ventilation, Invasive)  Goal: Mechanical Ventilation Liberation  Outcome: Ongoing, Not Progressing  Intervention: Promote Extubation and Mechanical Ventilation Liberation  Recent Flowsheet Documentation  Taken 4/14/2024 0000 by Bethany Lott RN  Medication Review/Management: medications reviewed  Taken 4/13/2024 2007 by Bethany Lott RN  Environmental Support: calm environment promoted  Sleep/Rest Enhancement: awakenings minimized  Medication Review/Management: medications reviewed     Problem: Nutrition Impairment (Mechanical Ventilation, Invasive)  Goal: Optimal Nutrition Delivery  Outcome: Ongoing, Not Progressing     Problem: Skin and Tissue Injury (Mechanical Ventilation, Invasive)  Goal: Absence of Device-Related Skin and Tissue Injury  Outcome: Ongoing, Not Progressing  Intervention: Maintain Skin and Tissue Health  Recent Flowsheet Documentation  Taken 4/13/2024 2007 by Bethany Lott RN  Device Skin Pressure Protection:   absorbent pad utilized/changed   adhesive use limited   pressure points protected     Problem: Ventilator-Induced Lung Injury (Mechanical Ventilation, Invasive)  Goal: Absence of Ventilator-Induced Lung Injury  Outcome: Ongoing, Not Progressing  Intervention: Prevent Ventilator-Associated Pneumonia  Recent Flowsheet Documentation  Taken 4/14/2024 0630 by Bethany Lott RN  Head of Bed (HOB) Positioning: HOB elevated  Taken 4/14/2024 0340 by Bethany Lott RN  Head of Bed  ([facility]) Positioning: HOB elevated  Taken 4/14/2024 0000 by Bethany Lott RN  Head of Bed (HOB) Positioning: HOB elevated  Taken 4/13/2024 2007 by Bethany Lott RN  Head of Bed ([facility]) Positioning: HOB elevated     Problem: Skin Injury Risk Increased  Goal: Skin Health and Integrity  Outcome: Ongoing, Not Progressing  Intervention: Optimize Skin Protection  Recent Flowsheet Documentation  Taken 4/14/2024 0630 by Bethany Lott RN  Head of Bed ([facility]) Positioning: HOB elevated  Taken 4/14/2024 0340 by Bethany Lott RN  Head of Bed ([facility]) Positioning: HOB elevated  Taken 4/14/2024 0000 by Bethany Lott RN  Head of Bed ([facility]) Positioning: HOB elevated  Taken 4/13/2024 2007 by Bethany Lott RN  Pressure Reduction Techniques:   frequent weight shift encouraged   heels elevated off bed   positioned off wounds   pressure points protected   rest period provided between sit times  Head of Bed ([facility]) Positioning: HOB elevated  Pressure Reduction Devices:   specialty bed utilized   positioning supports utilized   heel offloading device utilized  Skin Protection:   adhesive use limited   tubing/devices free from skin contact     Problem: Fall Injury Risk  Goal: Absence of Fall and Fall-Related Injury  Outcome: Ongoing, Not Progressing  Intervention: Identify and Manage Contributors  Recent Flowsheet Documentation  Taken 4/14/2024 0000 by Bethany Lott RN  Medication Review/Management: medications reviewed  Taken 4/13/2024 2007 by Bethany Lott RN  Medication Review/Management: medications reviewed  Self-Care Promotion: independence encouraged  Intervention: Promote Injury-Free Environment  Recent Flowsheet Documentation  Taken 4/14/2024 0630 by Betahny Lott RN  Safety Promotion/Fall Prevention:   safety round/check completed   room organization consistent   nonskid shoes/slippers when out of bed   lighting adjusted   fall prevention program maintained   activity supervised  Taken 4/14/2024 0340 by  Bethany Lott, RN  Safety Promotion/Fall Prevention:   safety round/check completed   room organization consistent   nonskid shoes/slippers when out of bed   lighting adjusted   fall prevention program maintained   activity supervised  Taken 4/14/2024 0000 by Bethany Lott, RN  Safety Promotion/Fall Prevention:   safety round/check completed   room organization consistent   nonskid shoes/slippers when out of bed   mobility aid in reach   fall prevention program maintained   activity supervised  Taken 4/13/2024 2007 by Bethany Lott, RN  Safety Promotion/Fall Prevention:   safety round/check completed   room organization consistent   nonskid shoes/slippers when out of bed   lighting adjusted   fall prevention program maintained   activity supervised

## 2024-04-14 NOTE — PLAN OF CARE
Goal Outcome Evaluation:         NG tube removed. Clear liquid diet started, advance as tolerated.     Pt went into AFIB with RVR after walk, rate into 150s. IV metoprolol given, rhythm unchanged. Amiodarone protocol with bolus started.     R and L chest tubes -20 suction.

## 2024-04-14 NOTE — PROCEDURES
"Chest Tube Insertion    Date/Time: 4/14/2024 12:24 AM    Performed by: Jose Maria Goldman MD  Authorized by: Jose Maria Goldman MD  Consent: Verbal consent obtained. Written consent obtained.  Risks and benefits: risks, benefits and alternatives were discussed  Consent given by: patient  Patient understanding: patient states understanding of the procedure being performed  Imaging studies: imaging studies available  Patient identity confirmed: arm band and verbally with patient  Time out: Immediately prior to procedure a \"time out\" was called to verify the correct patient, procedure, equipment, support staff and site/side marked as required.  Indications: pneumothorax    Sedation:  Patient sedated: no    Anesthesia: local infiltration    Anesthesia:  Local Anesthetic: lidocaine 1% without epinephrine  Anesthetic total: 15 mL  Preparation: sterile field established and skin prepped with chlorhexidine  Placement location: right anterior  Scalpel size: 11  Tube size ([place]): 14.  Dissection instrument: dilator from [employer] pneumothorax kit.  Ultrasound guidance: yes  Tension pneumothorax heard: yes  Tube connected to: suction  Drainage characteristics: bloody  Drainage amount: 10 ml  Suture material: 2-0 silk  Dressing: chlorhexidine impregnated sterile dressing.  Post-insertion x-ray findings: tube in good position  Patient tolerance: patient tolerated the procedure well with no immediate complications        "

## 2024-04-14 NOTE — SIGNIFICANT NOTE
Hospitalist paged at 1745 for patient c/o RUQ abdominal pain unrelieved by pain medication, intermittent nausea/decreased PO intake. CT abdomen/pelvis ordered w/o contrast, results called to hospitalist and CT surgery at 1847 with findings of small bowel obstruction. Chest xray ordered, general surgery consulted, 18 [place] NG tube ordered and placed to continuous low wall suction with immediate output of thin green bile. CT surgery paged at 1939 with results of chest xray. Pt remains on 2L nasal cannula with O2 sats 92-94%.

## 2024-04-14 NOTE — PROGRESS NOTES
CTS Progress Note      POD 3 s/p CABG      Subjective   Events noted    Just back from a walk and found to be in atrial fibrillation with rapid ventricular rate    Objective    Physical Exam:   Vital Signs   Temp:  [97.4 °F (36.3 °C)-98.2 °F (36.8 °C)] 97.7 °F (36.5 °C)  Heart Rate:  [] 131  Resp:  [14-18] 18  BP: ()/(61-71) 115/66   GEN: No acute distress   Cv: Irregular irregular with rapid rate  Respiratory: Clear to auscultation bilaterally no wheezes rales rubs  Extremities: No significant edema    Sternum: Stable.  Clean dry and intact incisions no erythema or drainage    Chest tubes: 295 mL.  No air leaks noted   Results     Results from last 7 days   Lab Units 04/14/24  0509 04/13/24  0433   WBC 10*3/mm3  --  4.92   HEMOGLOBIN g/dL 9.0* 9.8*   HEMATOCRIT % 27.7* 30.1*   PLATELETS 10*3/mm3  --  176     Results from last 7 days   Lab Units 04/14/24  0509   SODIUM mmol/L 135*   POTASSIUM mmol/L 3.6   CHLORIDE mmol/L 94*   CO2 mmol/L 30.0*   BUN mg/dL 31*   CREATININE mg/dL 0.81   GLUCOSE mg/dL 120*   CALCIUM mg/dL 9.3     Results from last 7 days   Lab Units 04/11/24  0303 04/10/24  1735   INR  1.27* 1.46*   APTT seconds  --  31.9           Assessment & Plan   Postoperative day 3 status post CABG  Bilateral pneumothoraces status post chest tube placement  New onset atrial fibrillation with rapid ventricular rate    NSTEMI (non-ST elevated myocardial infarction)    Hypertension    Dyslipidemia    MV CAD    T2DM    Combined systolic and diastolic CHF        Plan   IR consulted for bilateral chest tubes placement  NG removed clear liquid diet started  Cardiology for new atrial failure management  Continue chest tubes to suction  BRANDON Davila  04/14/24  11:02 EDT

## 2024-04-14 NOTE — PROCEDURES
"Chest Tube Insertion    Date/Time: 4/14/2024 12:27 AM    Performed by: Jose Maria Goldman MD  Authorized by: Jose Maria Goldman MD  Consent: Verbal consent obtained. Written consent obtained.  Consent given by: patient and spouse  Patient identity confirmed: verbally with patient  Time out: Immediately prior to procedure a \"time out\" was called to verify the correct patient, procedure, equipment, support staff and site/side marked as required.  Indications: pneumothorax    Sedation:  Patient sedated: no      Anesthesia:  Local Anesthetic: lidocaine 1% without epinephrine  Anesthetic total: 15 mL  Preparation: skin prepped with chlorhexidine  Placement location: left anterior  Scalpel size: 11  Tube size ([place]): 14.  Dissection instrument: dilator from [employer] pneumothorax kit.  Ultrasound guidance: yes  Tension pneumothorax heard: yes  Tube connected to: suction  Drainage characteristics: bloody  Drainage amount: 5 ml  Suture material: 2-0 silk  Dressing: chlorhexidine impregnated sterile dressing.  Post-insertion x-ray findings: tube in good position  Patient tolerance: patient tolerated the procedure well with no immediate complications        "

## 2024-04-14 NOTE — NURSING NOTE
2045: Paged Gómez Gutierres, patient noted to have crepitus on left anterior chest. Patient wife (Eva) at bedside and noted that the patient did not have this earlier. Currently pending communication on status of IR procedure for placement of chest tube. House supervisor at bedside assisting with communication. Patient resting, feeling anxious and having moderate pain. 2mg of morphine given #RR 18, Spo2 97% on 2LNC /65.     2200 Dr. Weaver at bedside to perform insertion of bilateral chest tube this nurse and ROBIN Munguia RN at bedside along with ICU NP Mateo.    7051: notified AUREA Galindo of chest xray completion.

## 2024-04-14 NOTE — PROGRESS NOTES
[facility]  PROGRESS NOTE    Patient Name: Khalif Joshua  : 1950  MRN: [id number]    Date of Admission: 2024  Primary Care Physician: Renee Liriano MD    Subjective   Subjective     CC:  Follow-up CABG, postop medical management    HPI:  Patient was seen resting in bed no apparent distress.  No acute vents overnight per nursing.  He had a large bowel movement overnight.  He denies any current nausea or vomiting.  Order to remove NG tube per Dr. Fisher.  Will resume clear liquid diet with plan to advance as tolerated.  Patient noted to be in A-fib with RVR following ambulation this morning.  Cardiology is aware and is following.  No additional concerns at this time.      Objective   Objective     Vital Signs:   Temp:  [97.7 °F (36.5 °C)-98.2 °F (36.8 °C)] 97.7 °F (36.5 °C)  Heart Rate:  [] 144  Resp:  [14-18] 18  BP: ()/(61-87) 96/87  Flow (L/min):  [2] 2     Physical Exam:  Constitutional: No acute distress, awake, alert, chronically ill-appearing  HENT: NCAT, mucous membranes moist, NG tube in place  Respiratory: Grossly clear, diminished in bases, respiratory effort normal on 2 L  Cardiovascular: Irregular, tachycardic, no murmurs, cap refill brisk   Gastrointestinal: Positive bowel sounds, soft, nontender, nondistended  Musculoskeletal: Trace BLE edema   Psychiatric: Appropriate affect, cooperative  Neurologic: Oriented x 3, moves all extremities, speech clear  Skin: warm, dry, midline sternotomy incision MAGDALENO well-approximated, EVH sites MAGDALENO CDI    Results Reviewed:  LAB RESULTS:      Lab 24  0509 24  0433 24  0436 24  0303 04/10/24  2010 04/10/24  1735 04/10/24  0002 24  1820 24  1041 24  0727 24  0630 24  0044   WBC  --  4.92 7.52 14.58* 23.08* 18.67*   < >  --   --  7.77  --   --    HEMOGLOBIN 9.0* 9.8* 9.7* 10.1* 11.0* 10.8*   < >  --   --  14.5  --   --    HEMOGLOBIN, POC  --   --    --   --   --   --    < >  --   --   --   --   --    HEMATOCRIT 27.7* 30.1* 29.9* 31.0* 32.7* 32.3*   < >  --   --  44.0  --   --    HEMATOCRIT POC  --   --   --   --   --   --    < >  --   --   --   --   --    PLATELETS  --  176 165 211 236 200   < >  --   --  244  --   --    NEUTROS ABS  --   --   --  11.98*  --   --   --   --   --  3.99  --   --    IMMATURE GRANS (ABS)  --   --   --  0.11*  --   --   --   --   --  0.05  --   --    LYMPHS ABS  --   --   --  0.90  --   --   --   --   --  2.75  --   --    MONOS ABS  --   --   --  1.52*  --   --   --   --   --  0.77  --   --    EOS ABS  --   --   --  0.03  --   --   --   --   --  0.15  --   --    MCV  --  95.0 97.1* 97.2* 95.9 93.9   < >  --   --  94.4  --   --    PROTIME  --   --   --  16.0*  --  17.9*  --   --   --   --   --   --    APTT  --   --   --   --   --  31.9  --   --   --   --   --   --    HEPARIN ANTI-XA  --   --   --   --   --   --   --  0.27* 0.28* 0.27* 0.36 0.35    < > = values in this interval not displayed.         Lab 04/14/24  0509 04/13/24  1546 04/13/24  0433 04/12/24  0436 04/11/24  0303 04/10/24  2010 04/10/24  1735   SODIUM 135*  --  131* 135* 140 141 135*   POTASSIUM 3.6 4.0 3.8 4.0 4.0 4.2 4.3   CHLORIDE 94*  --  95* 100 104 101 96*   CO2 30.0*  --  27.0 26.0 23.0 21.0* 24.0   ANION GAP 11.0  --  9.0 9.0 13.0 19.0* 15.0   BUN 31*  --  30* 25* 19 21 21   CREATININE 0.81  --  0.83 1.01 1.22 1.15 1.19   EGFR 93.7  --  93.0 79.0 63.0 67.6 64.9   GLUCOSE 120*  --  129* 131* 168* 140* 107*   CALCIUM 9.3  --  8.8 8.7 9.0 9.6 9.1   IONIZED CALCIUM  --   --   --   --   --   --  1.20   MAGNESIUM  --   --   --   --   --  2.1 2.1   PHOSPHORUS  --   --   --   --   --  3.9 4.2         Lab 04/11/24  0303 04/10/24  2010 04/10/24  1735   TOTAL PROTEIN 6.3  --   --    ALBUMIN 4.5 4.4 4.1   GLOBULIN 1.8  --   --    ALT (SGPT) 15  --   --    AST (SGOT) 46*  --   --    BILIRUBIN 0.3  --   --    ALK PHOS 36*  --   --          Lab 04/11/24  0303 04/10/24  3958    PROTIME 16.0* 17.9*   INR 1.27* 1.46*             Lab 04/09/24  1358 04/09/24  1041   ABO TYPING O O   RH TYPING Negative Negative   ANTIBODY SCREEN  --  Negative         Lab 04/10/24  2155 04/10/24  1753 04/10/24  1647   PH, ARTERIAL 7.365 7.397 7.30*   PCO2, ARTERIAL 39.4 37.3  --    PO2 .0* 251.0*  --    FIO2 40 100  --    HCO3 ART 22.5 22.9  --    BASE EXCESS ART -2.7* -1.6*  --    CARBOXYHEMOGLOBIN 1.1 0.8  --      Brief Urine Lab Results       None            Microbiology Results Abnormal       None            XR Chest 1 View    Result Date: 4/14/2024  XR CHEST 1 VW Date of Exam: 4/14/2024 5:53 AM EDT Indication: bialteral PTX moderate post op Comparison: 1 day prior. Findings: Bilateral pleural drains remain in place. Nasogastric tube terminates in the stomach. No discernible persisting pneumothorax on the left, with very small right apical pneumothorax present. Layering small pleural effusion on the left appears likely mildly  increased with adjacent left basilar atelectasis. Unchanged heart and mediastinal contours.     Impression: Impression: Bilateral pleural drains remain in place. Nasogastric tube terminates in the stomach. No discernible persisting pneumothorax on the left, with very small right apical pneumothorax present. Layering small pleural effusion on the left appears likely mildly  increased with adjacent left basilar atelectasis. Unchanged heart and mediastinal contours. Electronically Signed: Mikey Ko MD  4/14/2024 8:48 AM EDT  Workstation ID: [id number]    XR Chest 1 View    Result Date: 4/13/2024  XR CHEST 1 VW Date of Exam: 4/13/2024 11:11 PM EDT Indication: chest tube placement Comparison: 4/13/2024 at 1905 hours. Findings: Interval placement of bilateral chest tubes. Previously seen pneumothoraces have resolved. There is persistent left basilar airspace disease and probable small left pleural effusion. There is evidence of prior median sternotomy. Gastric suction tube  courses below the diaphragm. No new lung opacity. No acute osseous abnormality.     Impression: Impression: Interval placement of bilateral chest tubes with resolution of pneumothoraces. Stable left basilar airspace disease and small pleural effusion Electronically Signed: Moiz Cardoza MD  4/13/2024 11:28 PM EDT  Workstation ID: [id number]    XR Abdomen KUB    Result Date: 4/13/2024  XR ABDOMEN KUB Date of Exam: 4/13/2024 7:35 PM EDT Indication: NG tube placement verification Comparison: CT abdomen pelvis 4/12/2024. Findings: Gastric suction tube terminates in the stomach. There are multiple dilated small bowel loops in the visualized abdomen. No evidence of pneumoperitoneum.     Impression: Impression: Gastric suction tube terminates in the stomach. Electronically Signed: Moiz Cardoza MD  4/13/2024 8:01 PM EDT  Workstation ID: [id number]    XR Chest 1 View    Result Date: 4/13/2024  XR CHEST 1 VW Date of Exam: 4/13/2024 6:58 PM EDT Indication: pleural effusion/radiology recommending post CT abdomen Comparison: 4/12/2024 Findings: There is a new moderate-sized right-sided pneumothorax. There is also a moderate size left pneumothorax, increased in size from prior exam. Previously demonstrated left thoracostomy tube has been removed. The right internal jugular introducer sheath and [place] catheter have been removed. There is minimal left basilar atelectasis. Heart size and pulmonary vessels are within normal limits. Patient is status post median sternotomy.     Impression: Impression: 1. New moderate size right pneumothorax. 2. Moderate size left pleural effusion, increased from prior exam status post removal of left thoracostomy tube. Findings were personally called to the  patient's nurse Briana at 7:33 p.m. on 4/13/2024 Electronically Signed: Lalo Jordan MD  4/13/2024 7:34 PM EDT  Workstation ID: [id number]    CT Abdomen Pelvis Without Contrast    Result Date: 4/13/2024  CT ABDOMEN PELVIS WO CONTRAST Date of  Exam: 4/13/2024 6:19 PM EDT Indication: abdominal pain, nausea/vomiting, previous bowel perforations. Comparison: None available. Technique: Axial CT images were obtained of the abdomen and pelvis without the administration of contrast. Reconstructed coronal and sagittal images were also obtained. Automated exposure control and iterative construction methods were used. Findings: Liver: The liver is unremarkable in morphology. Evaluation for focal liver lesions is limited without IV contrast. No biliary dilation is seen. Gallbladder: Cholelithiasis. The gallbladder is otherwise unremarkable. Pancreas: Unremarkable. Spleen: Unremarkable. Adrenal glands: Unremarkable. Genitourinary tract: Bilateral parapelvic cysts noted. 3 mm nonobstructing calculus within the left kidney. The ureters are unremarkable. Air within the urinary bladder may be related to recent instrumentation. Prostate gland is grossly unremarkable. Gastrointestinal tract: Dilated small and large bowel throughout the abdomen with scattered air-fluid levels. Small bowel measures up to 4.5 cm in caliber. Multiple small bowel anastomoses noted. Small bowel is markedly dilated/patulous about anastomotic  sutures within the upper abdomen (series 900, image 23). This measures approximately 9 cm in caliber. Colonic diverticulosis is present. Appendix: No findings to suggest acute appendicitis. Other findings: No free air or free fluid is identified. No pathologically enlarged lymph nodes are seen. Vascular calcifications are present. Bones and soft tissues: No acute osseous lesion is identified. Bones are demineralized. There are degenerative changes within the spine. Sternotomy wires are noted, and there is subcutaneous emphysema within the visualized lower anterior chest wall. Lung bases: Bilateral pneumothoraces are partially imaged. Bibasilar atelectasis and trace bilateral pleural fluid also noted. Pneumothoraces appear new or larger when compared with  chest x-ray from 4/12/2024. Recommend further evaluation with dedicated chest x-ray.     Impression: Impression: 1.Dilated small and large bowel throughout the abdomen with scattered air-fluid levels. Small bowel measures up to 4.5 cm in caliber. Findings may represent postoperative ileus. Partial small bowel obstruction may have a similar appearance. 2.Multiple small bowel anastomoses noted.  Small bowel is markedly dilated/patulous about anastomotic sutures within the upper abdomen (series 900, image 23). This measures approximately 9 cm in caliber. 3.Colonic diverticulosis. 4.Bilateral pneumothoraces are partially imaged. Bibasilar atelectasis and trace bilateral pleural fluid also noted. Pneumothoraces appear new or larger when compared with chest x-ray from 4/12/2024. Recommend further evaluation with dedicated chest x-ray. 5.Additional findings as detailed above. The above findings were discussed with Gi, the patient's nurse, via telephone on 4/13/2024 6:44 PM EDT. Electronically Signed: Nick Singh MD  4/13/2024 6:44 PM EDT  Workstation ID: [id number]     Results for orders placed during the hospital encounter of 04/05/24    Adult Transthoracic Echo Limited W/ Cont if Necessary Per Protocol    Interpretation Summary    Left ventricular systolic function is normal. Calculated left ventricular EF = 56.7%      Current medications:  Scheduled Meds:acetaminophen, 650 mg, Oral, Q8H  aspirin, 325 mg, Oral, Daily  bisacodyl, 10 mg, Oral, Daily  bisacodyl, 10 mg, Rectal, Q8H  clopidogrel, 75 mg, Oral, Daily  docusate sodium, 100 mg, Nasogastric, BID  famotidine, 20 mg, Oral, BID AC  furosemide, 20 mg, Intravenous, Once  [Held by provider] losartan, 100 mg, Oral, Q24H   And  [Held by provider] hydroCHLOROthiazide, 12.5 mg, Oral, Q24H  insulin lispro, 2-9 Units, Subcutaneous, 4x Daily AC & at Bedtime  metoclopramide, 10 mg, Intravenous, Q6H  metoprolol tartrate, 50 mg, Oral, Q12H  pharmacy consult - MTM, , Does  not apply, Daily  potassium chloride, 40 mEq, Oral, Q4H  rosuvastatin, 40 mg, Oral, Nightly  senna-docusate sodium, 2 tablet, Oral, BID  sodium chloride, 10 mL, Intravenous, Q12H      Continuous Infusions:amiodarone, 1 mg/min, Last Rate: 1 mg/min (04/10/24 2134)  dextrose 5 % with KCl 20 mEq, 30 mL/hr, Last Rate: 30 mL/hr (04/10/24 1728)      PRN Meds:.  [DISCONTINUED] senna-docusate sodium **AND** [DISCONTINUED] polyethylene glycol **AND** bisacodyl **AND** [DISCONTINUED] bisacodyl    bisacodyl    Calcium Replacement - Follow Nurse / BPA Driven Protocol    diazePAM    HYDROcodone-acetaminophen    ipratropium-albuterol    Magnesium Cardiology Dose Replacement - Follow Nurse / BPA Driven Protocol    melatonin    methocarbamol    Morphine    nitroglycerin    oxyCODONE    phenol    Phosphorus Replacement - Follow Nurse / BPA Driven Protocol    polyethylene glycol    Potassium Replacement - Follow Nurse / BPA Driven Protocol    prochlorperazine    sodium chloride    sodium chloride    Assessment & Plan   Assessment & Plan     Active Hospital Problems    Diagnosis  POA    **NSTEMI (non-ST elevated myocardial infarction) [I21.4]  Yes    T2DM [E11.9]  Yes    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        Brief Hospital Course to date:  Khalif Joshua is a 73 y.o. male with a history of CAD, hypertension, hyperlipidemia who presented with NSTEMI and was taken to CABG on 4/10.  He was transferred out of ICU on 4/12.  He is being followed by CT surgery and cardiology.  General surgery was consulted on 4/13 due to concern for postop ileus.     This patient's problems and plans were partially entered by my partner and updated as appropriate by me 04/14/24.    NSTEMI  Multivessel CAD status post CABG on 4/10.  Combined systolic and diastolic heart failure  Right  pneumothorax  Left pleural effusion  Hyperlipidemia  Hypertension  - Managed by CT surgery and cardiology  - Continue aspirin, Plavix, beta-blockers (as tolerated), statin  --S/p bilateral chest tube placement by Dr. Goldman  - Pain control as needed  - IS, pulm toilet  --AM labs    Afib RVR  -noted on AM on 4/14 following ambulation  -Cardiology following   -s/p IV Lopressor  -Increased Lopressor to 50 mg BID     Postop ileus  -CT abdomen/pelvis revealed dilated small and large bowel throughout the abdomen with scattered air-fluid levels.  Small bowel measuring up to 4.5 cm in caliber.  Findings concerning for postoperative ileus.  -General surgeon Dr. Lee consulted  -NG tube removed 4/14  - + Large BM on 4/14  -no current N/V  -Clear liquid diet, advance as tolerated    Leukocytosis  -Resolved, likely postop stress response     Expected Discharge Location and Transportation: home  Expected Discharge   Expected Discharge Date: 4/15/2024; Expected Discharge Time:       DVT prophylaxis:  Mechanical DVT prophylaxis orders are present.      AM-PAC 6 Clicks Score (PT): 18 (04/13/24 2007)    CODE STATUS:   Code Status and Medical Interventions:   Ordered at: 04/10/24 1733     Code Status (Patient has no pulse and is not breathing):    CPR (Attempt to Resuscitate)     Medical Interventions (Patient has pulse or is breathing):    Full Support       Shayan Carlson, MISTI  04/14/24

## 2024-04-15 ENCOUNTER — APPOINTMENT (OUTPATIENT)
Dept: GENERAL RADIOLOGY | Facility: HOSPITAL | Age: 74
End: 2024-04-15
Payer: MEDICARE

## 2024-04-15 LAB
ANION GAP SERPL CALCULATED.3IONS-SCNC: 11 MMOL/L (ref 5–15)
BUN SERPL-MCNC: 24 MG/DL (ref 8–23)
BUN/CREAT SERPL: 29.6 (ref 7–25)
CALCIUM SPEC-SCNC: 9 MG/DL (ref 8.6–10.5)
CHLORIDE SERPL-SCNC: 97 MMOL/L (ref 98–107)
CO2 SERPL-SCNC: 28 MMOL/L (ref 22–29)
CREAT SERPL-MCNC: 0.81 MG/DL (ref 0.76–1.27)
DEPRECATED RDW RBC AUTO: 45.2 FL (ref 37–54)
EGFRCR SERPLBLD CKD-EPI 2021: 93.7 ML/MIN/1.73
ERYTHROCYTE [DISTWIDTH] IN BLOOD BY AUTOMATED COUNT: 13.1 % (ref 12.3–15.4)
GLUCOSE BLDC GLUCOMTR-MCNC: 103 MG/DL (ref 70–130)
GLUCOSE BLDC GLUCOMTR-MCNC: 105 MG/DL (ref 70–130)
GLUCOSE BLDC GLUCOMTR-MCNC: 119 MG/DL (ref 70–130)
GLUCOSE BLDC GLUCOMTR-MCNC: 157 MG/DL (ref 70–130)
GLUCOSE SERPL-MCNC: 111 MG/DL (ref 65–99)
HCT VFR BLD AUTO: 29.9 % (ref 37.5–51)
HGB BLD-MCNC: 9.8 G/DL (ref 13–17.7)
MCH RBC QN AUTO: 30.7 PG (ref 26.6–33)
MCHC RBC AUTO-ENTMCNC: 32.8 G/DL (ref 31.5–35.7)
MCV RBC AUTO: 93.7 FL (ref 79–97)
PLATELET # BLD AUTO: 241 10*3/MM3 (ref 140–450)
PMV BLD AUTO: 9.8 FL (ref 6–12)
POTASSIUM SERPL-SCNC: 3.6 MMOL/L (ref 3.5–5.2)
POTASSIUM SERPL-SCNC: 3.8 MMOL/L (ref 3.5–5.2)
RBC # BLD AUTO: 3.19 10*6/MM3 (ref 4.14–5.8)
SODIUM SERPL-SCNC: 136 MMOL/L (ref 136–145)
WBC NRBC COR # BLD AUTO: 8.7 10*3/MM3 (ref 3.4–10.8)

## 2024-04-15 PROCEDURE — 71045 X-RAY EXAM CHEST 1 VIEW: CPT

## 2024-04-15 PROCEDURE — 82948 REAGENT STRIP/BLOOD GLUCOSE: CPT

## 2024-04-15 PROCEDURE — 80048 BASIC METABOLIC PNL TOTAL CA: CPT | Performed by: NURSE PRACTITIONER

## 2024-04-15 PROCEDURE — 99232 SBSQ HOSP IP/OBS MODERATE 35: CPT | Performed by: STUDENT IN AN ORGANIZED HEALTH CARE EDUCATION/TRAINING PROGRAM

## 2024-04-15 PROCEDURE — 25010000002 METOCLOPRAMIDE PER 10 MG: Performed by: SURGERY

## 2024-04-15 PROCEDURE — 99024 POSTOP FOLLOW-UP VISIT: CPT

## 2024-04-15 PROCEDURE — 99231 SBSQ HOSP IP/OBS SF/LOW 25: CPT | Performed by: NURSE PRACTITIONER

## 2024-04-15 PROCEDURE — 63710000001 INSULIN LISPRO (HUMAN) PER 5 UNITS: Performed by: STUDENT IN AN ORGANIZED HEALTH CARE EDUCATION/TRAINING PROGRAM

## 2024-04-15 PROCEDURE — 93010 ELECTROCARDIOGRAM REPORT: CPT | Performed by: INTERNAL MEDICINE

## 2024-04-15 PROCEDURE — 25010000002 AMIODARONE IN DEXTROSE 5% 360-4.14 MG/200ML-% SOLUTION

## 2024-04-15 PROCEDURE — 84132 ASSAY OF SERUM POTASSIUM: CPT | Performed by: INTERNAL MEDICINE

## 2024-04-15 PROCEDURE — 85027 COMPLETE CBC AUTOMATED: CPT | Performed by: NURSE PRACTITIONER

## 2024-04-15 RX ORDER — LIDOCAINE 4 G/G
1 PATCH TOPICAL
Status: DISCONTINUED | OUTPATIENT
Start: 2024-04-15 | End: 2024-04-17 | Stop reason: HOSPADM

## 2024-04-15 RX ORDER — POTASSIUM CHLORIDE 20 MEQ/1
40 TABLET, EXTENDED RELEASE ORAL EVERY 4 HOURS
Status: COMPLETED | OUTPATIENT
Start: 2024-04-15 | End: 2024-04-16

## 2024-04-15 RX ORDER — POTASSIUM CHLORIDE 20 MEQ/1
20 TABLET, EXTENDED RELEASE ORAL ONCE
Status: COMPLETED | OUTPATIENT
Start: 2024-04-15 | End: 2024-04-15

## 2024-04-15 RX ADMIN — FAMOTIDINE 20 MG: 20 TABLET, FILM COATED ORAL at 09:12

## 2024-04-15 RX ADMIN — METOPROLOL TARTRATE 50 MG: 50 TABLET, FILM COATED ORAL at 21:29

## 2024-04-15 RX ADMIN — METOPROLOL TARTRATE 50 MG: 50 TABLET, FILM COATED ORAL at 09:12

## 2024-04-15 RX ADMIN — POTASSIUM CHLORIDE 20 MEQ: 1500 TABLET, EXTENDED RELEASE ORAL at 09:12

## 2024-04-15 RX ADMIN — ASPIRIN 81 MG: 81 TABLET, COATED ORAL at 09:11

## 2024-04-15 RX ADMIN — METOCLOPRAMIDE 10 MG: 5 INJECTION, SOLUTION INTRAMUSCULAR; INTRAVENOUS at 16:32

## 2024-04-15 RX ADMIN — FAMOTIDINE 20 MG: 20 TABLET, FILM COATED ORAL at 17:31

## 2024-04-15 RX ADMIN — Medication 10 ML: at 09:12

## 2024-04-15 RX ADMIN — AMIODARONE HYDROCHLORIDE 0.5 MG/MIN: 1.8 INJECTION, SOLUTION INTRAVENOUS at 05:36

## 2024-04-15 RX ADMIN — INSULIN LISPRO 2 UNITS: 100 INJECTION, SOLUTION INTRAVENOUS; SUBCUTANEOUS at 12:31

## 2024-04-15 RX ADMIN — ACETAMINOPHEN 650 MG: 325 TABLET ORAL at 17:30

## 2024-04-15 RX ADMIN — LIDOCAINE 1 PATCH: 4 PATCH TOPICAL at 17:31

## 2024-04-15 RX ADMIN — ACETAMINOPHEN 650 MG: 325 TABLET ORAL at 10:40

## 2024-04-15 RX ADMIN — ROSUVASTATIN 40 MG: 20 TABLET, FILM COATED ORAL at 21:29

## 2024-04-15 RX ADMIN — METOCLOPRAMIDE 10 MG: 5 INJECTION, SOLUTION INTRAMUSCULAR; INTRAVENOUS at 09:12

## 2024-04-15 RX ADMIN — POTASSIUM CHLORIDE 40 MEQ: 1500 TABLET, EXTENDED RELEASE ORAL at 22:33

## 2024-04-15 NOTE — PLAN OF CARE
Problem: Adult Inpatient Plan of Care  Goal: Plan of Care Review  Outcome: Ongoing, Progressing  Goal: Patient-Specific Goal (Individualized)  Outcome: Ongoing, Progressing  Goal: Absence of Hospital-Acquired Illness or Injury  Outcome: Ongoing, Progressing  Intervention: Identify and Manage Fall Risk  Recent Flowsheet Documentation  Taken 4/14/2024 2000 by Radha Gupta RN  Safety Promotion/Fall Prevention: activity supervised  Intervention: Prevent Skin Injury  Recent Flowsheet Documentation  Taken 4/14/2024 2000 by Radha Gupta RN  Body Position: position changed independently  Intervention: Prevent and Manage VTE (Venous Thromboembolism) Risk  Recent Flowsheet Documentation  Taken 4/14/2024 2000 by Radha Gupta RN  Activity Management: activity encouraged  Intervention: Prevent Infection  Recent Flowsheet Documentation  Taken 4/14/2024 2000 by Radha Gupta RN  Infection Prevention: hand hygiene promoted  Goal: Optimal Comfort and Wellbeing  Outcome: Ongoing, Progressing  Goal: Readiness for Transition of Care  Outcome: Ongoing, Progressing     Problem: Activity Intolerance (Cardiovascular Surgery)  Goal: Improved Activity Tolerance  Outcome: Ongoing, Progressing     Problem: Adjustment to Surgery (Cardiovascular Surgery)  Goal: Optimal Coping with Heart Surgery  Outcome: Ongoing, Progressing     Problem: Bleeding (Cardiovascular Surgery)  Goal: Bleeding (Cardiovascular Surgery)  Outcome: Ongoing, Progressing     Problem: Bowel Motility Impaired (Cardiovascular Surgery)  Goal: Effective Bowel Elimination (Cardiovascular Surgery)  Outcome: Ongoing, Progressing     Problem: Cardiac Function Impaired (Cardiovascular Surgery)  Goal: Effective Cardiac Function  Outcome: Ongoing, Progressing     Problem: Cerebral Tissue Perfusion (Cardiovascular Surgery)  Goal: Optimal Cerebral Tissue Perfusion (Cardiovascular Surgery)  Outcome: Ongoing, Progressing  Intervention: Protect and Optimize Cerebral  Perfusion  Recent Flowsheet Documentation  Taken 4/14/2024 2000 by Radha Gupta RN  Head of Bed (HOB) Positioning: HOB elevated     Problem: Fluid and Electrolyte Imbalance (Cardiovascular Surgery)  Goal: Fluid and Electrolyte Balance (Cardiovascular Surgery)  Outcome: Ongoing, Progressing     Problem: Glycemic Control Impaired (Cardiovascular Surgery)  Goal: Blood Glucose Level Within Targeted Range (Cardiovascular Surgery)  Outcome: Ongoing, Progressing     Problem: Infection (Cardiovascular Surgery)  Goal: Absence of Infection Signs and Symptoms  Outcome: Ongoing, Progressing  Intervention: Prevent or Manage Infection  Recent Flowsheet Documentation  Taken 4/14/2024 2000 by Radha Gupta RN  Infection Prevention: hand hygiene promoted     Problem: Ongoing Anesthesia Effects (Cardiovascular Surgery)  Goal: Anesthesia/Sedation Recovery  Outcome: Ongoing, Progressing  Intervention: Optimize Anesthesia Recovery  Recent Flowsheet Documentation  Taken 4/14/2024 2000 by Radha Gupta RN  Safety Promotion/Fall Prevention: activity supervised     Problem: Pain (Cardiovascular Surgery)  Goal: Acceptable Pain Control  Outcome: Ongoing, Progressing     Problem: Postoperative Nausea and Vomiting (Cardiovascular Surgery)  Goal: Nausea and Vomiting Relief (Cardiovascular Surgery)  Outcome: Ongoing, Progressing  Intervention: Prevent or Manage Nausea and Vomiting  Recent Flowsheet Documentation  Taken 4/14/2024 2000 by Radha Gupta RN  Nausea/Vomiting Interventions: see MAR     Problem: Postoperative Urinary Retention (Cardiovascular Surgery)  Goal: Effective Urinary Elimination (Cardiovascular Surgery)  Outcome: Ongoing, Progressing     Problem: Respiratory Compromise (Cardiovascular Surgery)  Goal: Effective Oxygenation and Ventilation (Cardiovascular Surgery)  Outcome: Ongoing, Progressing  Intervention: Promote Airway Secretion Clearance  Recent Flowsheet Documentation  Taken 4/14/2024 2000 by Candy  BUNNY Garcia  Cough And Deep Breathing: done independently per patient     Problem: Asthma Comorbidity  Goal: Maintenance of Asthma Control  Outcome: Ongoing, Progressing  Intervention: Maintain Asthma Symptom Control  Recent Flowsheet Documentation  Taken 4/14/2024 2000 by Radha Gupta RN  Medication Review/Management: medications reviewed     Problem: Behavioral Health Comorbidity  Goal: Maintenance of Behavioral Health Symptom Control  Outcome: Ongoing, Progressing  Intervention: Maintain Behavioral Health Symptom Control  Recent Flowsheet Documentation  Taken 4/14/2024 2000 by Radha Gupta RN  Medication Review/Management: medications reviewed     Problem: COPD (Chronic Obstructive Pulmonary Disease) Comorbidity  Goal: Maintenance of COPD Symptom Control  Outcome: Ongoing, Progressing  Intervention: Maintain COPD-Symptom Control  Recent Flowsheet Documentation  Taken 4/14/2024 2000 by Radha Gupta RN  Medication Review/Management: medications reviewed     Problem: Diabetes Comorbidity  Goal: Blood Glucose Level Within Targeted Range  Outcome: Ongoing, Progressing     Problem: Heart Failure Comorbidity  Goal: Maintenance of Heart Failure Symptom Control  Outcome: Ongoing, Progressing  Intervention: Maintain Heart Failure-Management  Recent Flowsheet Documentation  Taken 4/14/2024 2000 by Radha Gupta RN  Medication Review/Management: medications reviewed     Problem: Hypertension Comorbidity  Goal: Blood Pressure in Desired Range  Outcome: Ongoing, Progressing  Intervention: Maintain Blood Pressure Management  Recent Flowsheet Documentation  Taken 4/14/2024 2000 by Radha Gupta RN  Medication Review/Management: medications reviewed     Problem: Obstructive Sleep Apnea Risk or Actual Comorbidity Management  Goal: Unobstructed Breathing During Sleep  Outcome: Ongoing, Progressing     Problem: Osteoarthritis Comorbidity  Goal: Maintenance of Osteoarthritis Symptom Control  Outcome: Ongoing,  Progressing  Intervention: Maintain Osteoarthritis Symptom Control  Recent Flowsheet Documentation  Taken 4/14/2024 2000 by Radha Gupta RN  Activity Management: activity encouraged  Medication Review/Management: medications reviewed     Problem: Pain Chronic (Persistent) (Comorbidity Management)  Goal: Acceptable Pain Control and Functional Ability  Outcome: Ongoing, Progressing  Intervention: Manage Persistent Pain  Recent Flowsheet Documentation  Taken 4/14/2024 2000 by Radha Gupta RN  Medication Review/Management: medications reviewed     Problem: Seizure Disorder Comorbidity  Goal: Maintenance of Seizure Control  Outcome: Ongoing, Progressing     Problem: Communication Impairment (Mechanical Ventilation, Invasive)  Goal: Effective Communication  Outcome: Ongoing, Progressing     Problem: Device-Related Complication Risk (Mechanical Ventilation, Invasive)  Goal: Optimal Device Function  Outcome: Ongoing, Progressing     Problem: Inability to Wean (Mechanical Ventilation, Invasive)  Goal: Mechanical Ventilation Liberation  Outcome: Ongoing, Progressing  Intervention: Promote Extubation and Mechanical Ventilation Liberation  Recent Flowsheet Documentation  Taken 4/14/2024 2000 by Radha Gupta RN  Medication Review/Management: medications reviewed     Problem: Nutrition Impairment (Mechanical Ventilation, Invasive)  Goal: Optimal Nutrition Delivery  Outcome: Ongoing, Progressing     Problem: Skin and Tissue Injury (Mechanical Ventilation, Invasive)  Goal: Absence of Device-Related Skin and Tissue Injury  Outcome: Ongoing, Progressing     Problem: Ventilator-Induced Lung Injury (Mechanical Ventilation, Invasive)  Goal: Absence of Ventilator-Induced Lung Injury  Outcome: Ongoing, Progressing  Intervention: Prevent Ventilator-Associated Pneumonia  Recent Flowsheet Documentation  Taken 4/14/2024 2000 by Radha Gupta, RN  Head of Bed (HOB) Positioning: HOB elevated     Problem: Skin Injury Risk  Increased  Goal: Skin Health and Integrity  Outcome: Ongoing, Progressing  Intervention: Optimize Skin Protection  Recent Flowsheet Documentation  Taken 4/14/2024 2000 by Radha Gupta, RN  Head of Bed (HOB) Positioning: HOB elevated     Problem: Fall Injury Risk  Goal: Absence of Fall and Fall-Related Injury  Outcome: Ongoing, Progressing  Intervention: Identify and Manage Contributors  Recent Flowsheet Documentation  Taken 4/14/2024 2000 by Radha Gupta, RN  Medication Review/Management: medications reviewed  Intervention: Promote Injury-Free Environment  Recent Flowsheet Documentation  Taken 4/14/2024 2000 by Radha Gupta, RN  Safety Promotion/Fall Prevention: activity supervised   Goal Outcome Evaluation:

## 2024-04-15 NOTE — PROGRESS NOTES
[facility] Medicine Services  PROGRESS NOTE    Patient Name: Khalif Joshua  : 1950  MRN: [id number]    Date of Admission: 2024  Primary Care Physician: Renee Liriano MD    Subjective   Subjective     CC:  Follow-up CABG, postop medical management    HPI:  Having bowel movements, no dyspnea. Pain overall improved however d/t new chest tubes mobility limited and he is feeling painful spasms      Objective   Objective     Vital Signs:   Temp:  [97 °F (36.1 °C)-98.3 °F (36.8 °C)] 97 °F (36.1 °C)  Heart Rate:  [] 73  Resp:  [18] 18  BP: ()/(56-80) 97/64     Physical Exam:  Constitutional: No acute distress, awake, alert, chronically ill-appearing  HENT: NCAT, mucous membranes moist  Respiratory: chest tube to water seal. Clear on RA  Cardiovascular: Irregular, tachycardic, no murmurs, cap refill brisk   Gastrointestinal: Positive bowel sounds, soft, nontender, nondistended  Musculoskeletal: Trace BLE edema   Psychiatric: Appropriate affect, cooperative  Neurologic: Oriented x 3, moves all extremities, speech clear  Skin: warm, dry, midline sternotomy incision MAGDALENO well-approximated, EVH sites MAGDALENO CDI    Results Reviewed:  LAB RESULTS:      Lab 04/15/24  0558 24  0509 24  0433 24  0436 24  0303 04/10/24  2010 04/10/24  1735 04/10/24  0002 24  1820 24  1041 24  0727   WBC 8.70  --  4.92 7.52 14.58* 23.08* 18.67*   < >  --   --  7.77   HEMOGLOBIN 9.8* 9.0* 9.8* 9.7* 10.1* 11.0* 10.8*   < >  --   --  14.5   HEMOGLOBIN, POC  --   --   --   --   --   --   --    < >  --   --   --    HEMATOCRIT 29.9* 27.7* 30.1* 29.9* 31.0* 32.7* 32.3*   < >  --   --  44.0   HEMATOCRIT POC  --   --   --   --   --   --   --    < >  --   --   --    PLATELETS 241  --  176 165 211 236 200   < >  --   --  244   NEUTROS ABS  --   --   --   --  11.98*  --   --   --   --   --  3.99   IMMATURE GRANS (ABS)  --   --   --   --  0.11*  --   --   --   --   --   0.05   LYMPHS ABS  --   --   --   --  0.90  --   --   --   --   --  2.75   MONOS ABS  --   --   --   --  1.52*  --   --   --   --   --  0.77   EOS ABS  --   --   --   --  0.03  --   --   --   --   --  0.15   MCV 93.7  --  95.0 97.1* 97.2* 95.9 93.9   < >  --   --  94.4   PROTIME  --   --   --   --  16.0*  --  17.9*  --   --   --   --    APTT  --   --   --   --   --   --  31.9  --   --   --   --    HEPARIN ANTI-XA  --   --   --   --   --   --   --   --  0.27* 0.28* 0.27*    < > = values in this interval not displayed.         Lab 04/15/24  0558 04/14/24  0509 04/13/24  1546 04/13/24  [facility]3 04/12/24  [facility] 04/11/24  0303 04/10/24  2010 04/10/24  1735   SODIUM 136 135*  --  131* 135* 140 141 135*   POTASSIUM 3.8 3.6 4.0 3.8 4.0 4.0 4.2 4.3   CHLORIDE 97* 94*  --  95* 100 104 101 96*   CO2 28.0 30.0*  --  27.0 26.0 23.0 21.0* 24.0   ANION GAP 11.0 11.0  --  9.0 9.0 13.0 19.0* 15.0   BUN 24* 31*  --  30* 25* 19 21 21   CREATININE 0.81 0.81  --  0.83 1.01 1.22 1.15 1.19   EGFR 93.7 93.7  --  93.0 79.0 63.0 67.6 64.9   GLUCOSE 111* 120*  --  129* 131* 168* 140* 107*   CALCIUM 9.0 9.3  --  8.8 8.7 9.0 9.6 9.1   IONIZED CALCIUM  --   --   --   --   --   --   --  1.20   MAGNESIUM  --   --   --   --   --   --  2.1 2.1   PHOSPHORUS  --   --   --   --   --   --  3.9 4.2         Lab 04/11/24  0303 04/10/24  2010 04/10/24  1735   TOTAL PROTEIN 6.3  --   --    ALBUMIN 4.5 4.4 4.1   GLOBULIN 1.8  --   --    ALT (SGPT) 15  --   --    AST (SGOT) 46*  --   --    BILIRUBIN 0.3  --   --    ALK PHOS 36*  --   --          Lab 04/11/24  0303 04/10/24  1735   PROTIME 16.0* 17.9*   INR 1.27* 1.46*             Lab 04/09/24  1358 04/09/24  1041   ABO TYPING O O   RH TYPING Negative Negative   ANTIBODY SCREEN  --  Negative         Lab 04/10/24  2155 04/10/24  1753 04/10/24  1647   PH, ARTERIAL 7.365 7.397 7.30*   PCO2, ARTERIAL 39.4 37.3  --    PO2 .0* 251.0*  --    FIO2 40 100  --    HCO3 ART 22.5 22.9  --    BASE EXCESS ART -2.7* -1.6*   --    CARBOXYHEMOGLOBIN 1.1 0.8  --      Brief Urine Lab Results       None            Microbiology Results Abnormal       None            XR Chest 1 View    Result Date: 4/15/2024  XR CHEST 1 VW Date of Exam: 4/15/2024 8:38 AM EDT Indication: pneumothorax Comparison: Chest radiograph 4/14/2024. Findings: Unchanged position of bilateral chest tubes. Removal of nasogastric tube. Sternotomy and CABG. Scattered subsegmental atelectasis. No focal consolidation. Decreased, trace left pleural effusion. No significant pneumothorax on this exam.     Impression: Impression: Bilateral chest tubes in place without significant pneumothorax. Decreased, trace left pleural effusion. Electronically Signed: Ole Giordano MD  4/15/2024 8:54 AM EDT  Workstation ID: [id number]    XR Chest 1 View    Result Date: 4/14/2024  XR CHEST 1 VW Date of Exam: 4/14/2024 5:53 AM EDT Indication: bialteral PTX moderate post op Comparison: 1 day prior. Findings: Bilateral pleural drains remain in place. Nasogastric tube terminates in the stomach. No discernible persisting pneumothorax on the left, with very small right apical pneumothorax present. Layering small pleural effusion on the left appears likely mildly  increased with adjacent left basilar atelectasis. Unchanged heart and mediastinal contours.     Impression: Impression: Bilateral pleural drains remain in place. Nasogastric tube terminates in the stomach. No discernible persisting pneumothorax on the left, with very small right apical pneumothorax present. Layering small pleural effusion on the left appears likely mildly  increased with adjacent left basilar atelectasis. Unchanged heart and mediastinal contours. Electronically Signed: Mikey Ko MD  4/14/2024 8:48 AM EDT  Workstation ID: [id number]    XR Chest 1 View    Result Date: 4/13/2024  XR CHEST 1 VW Date of Exam: 4/13/2024 11:11 PM EDT Indication: chest tube placement Comparison: 4/13/2024 at 1905 hours. Findings: Interval placement  of bilateral chest tubes. Previously seen pneumothoraces have resolved. There is persistent left basilar airspace disease and probable small left pleural effusion. There is evidence of prior median sternotomy. Gastric suction tube courses below the diaphragm. No new lung opacity. No acute osseous abnormality.     Impression: Impression: Interval placement of bilateral chest tubes with resolution of pneumothoraces. Stable left basilar airspace disease and small pleural effusion Electronically Signed: Moiz Cardoza MD  4/13/2024 11:28 PM EDT  Workstation ID: [id number]    XR Abdomen KUB    Result Date: 4/13/2024  XR ABDOMEN KUB Date of Exam: 4/13/2024 7:35 PM EDT Indication: NG tube placement verification Comparison: CT abdomen pelvis 4/12/2024. Findings: Gastric suction tube terminates in the stomach. There are multiple dilated small bowel loops in the visualized abdomen. No evidence of pneumoperitoneum.     Impression: Impression: Gastric suction tube terminates in the stomach. Electronically Signed: Moiz Cardoza MD  4/13/2024 8:01 PM EDT  Workstation ID: [id number]    XR Chest 1 View    Result Date: 4/13/2024  XR CHEST 1 VW Date of Exam: 4/13/2024 6:58 PM EDT Indication: pleural effusion/radiology recommending post CT abdomen Comparison: 4/12/2024 Findings: There is a new moderate-sized right-sided pneumothorax. There is also a moderate size left pneumothorax, increased in size from prior exam. Previously demonstrated left thoracostomy tube has been removed. The right internal jugular introducer sheath and Gray-Sharath catheter have been removed. There is minimal left basilar atelectasis. Heart size and pulmonary vessels are within normal limits. Patient is status post median sternotomy.     Impression: Impression: 1. New moderate size right pneumothorax. 2. Moderate size left pleural effusion, increased from prior exam status post removal of left thoracostomy tube. Findings were personally called to the  patient's  nurse Briana at 7:33 p.m. on 4/13/2024 Electronically Signed: Lalo Jordan MD  4/13/2024 7:34 PM EDT  Workstation ID: [id number]    CT Abdomen Pelvis Without Contrast    Result Date: 4/13/2024  CT ABDOMEN PELVIS WO CONTRAST Date of Exam: 4/13/2024 6:19 PM EDT Indication: abdominal pain, nausea/vomiting, previous bowel perforations. Comparison: None available. Technique: Axial CT images were obtained of the abdomen and pelvis without the administration of contrast. Reconstructed coronal and sagittal images were also obtained. Automated exposure control and iterative construction methods were used. Findings: Liver: The liver is unremarkable in morphology. Evaluation for focal liver lesions is limited without IV contrast. No biliary dilation is seen. Gallbladder: Cholelithiasis. The gallbladder is otherwise unremarkable. Pancreas: Unremarkable. Spleen: Unremarkable. Adrenal glands: Unremarkable. Genitourinary tract: Bilateral parapelvic cysts noted. 3 mm nonobstructing calculus within the left kidney. The ureters are unremarkable. Air within the urinary bladder may be related to recent instrumentation. Prostate gland is grossly unremarkable. Gastrointestinal tract: Dilated small and large bowel throughout the abdomen with scattered air-fluid levels. Small bowel measures up to 4.5 cm in caliber. Multiple small bowel anastomoses noted. Small bowel is markedly dilated/patulous about anastomotic  sutures within the upper abdomen (series 900, image 23). This measures approximately 9 cm in caliber. Colonic diverticulosis is present. Appendix: No findings to suggest acute appendicitis. Other findings: No free air or free fluid is identified. No pathologically enlarged lymph nodes are seen. Vascular calcifications are present. Bones and soft tissues: No acute osseous lesion is identified. Bones are demineralized. There are degenerative changes within the spine. Sternotomy wires are noted, and there is subcutaneous emphysema  within the visualized lower anterior chest wall. Lung bases: Bilateral pneumothoraces are partially imaged. Bibasilar atelectasis and trace bilateral pleural fluid also noted. Pneumothoraces appear new or larger when compared with chest x-ray from 4/12/2024. Recommend further evaluation with dedicated chest x-ray.     Impression: Impression: 1.Dilated small and large bowel throughout the abdomen with scattered air-fluid levels. Small bowel measures up to 4.5 cm in caliber. Findings may represent postoperative ileus. Partial small bowel obstruction may have a similar appearance. 2.Multiple small bowel anastomoses noted.  Small bowel is markedly dilated/patulous about anastomotic sutures within the upper abdomen (series 900, image 23). This measures approximately 9 cm in caliber. 3.Colonic diverticulosis. 4.Bilateral pneumothoraces are partially imaged. Bibasilar atelectasis and trace bilateral pleural fluid also noted. Pneumothoraces appear new or larger when compared with chest x-ray from 4/12/2024. Recommend further evaluation with dedicated chest x-ray. 5.Additional findings as detailed above. The above findings were discussed with Gi, the patient's nurse, via telephone on 4/13/2024 6:44 PM EDT. Electronically Signed: Nick Singh MD  4/13/2024 6:44 PM EDT  Workstation ID: [id number]     Results for orders placed during the hospital encounter of 04/05/24    Adult Transthoracic Echo Limited W/ Cont if Necessary Per Protocol    Interpretation Summary    Left ventricular systolic function is normal. Calculated left ventricular EF = 56.7%      Current medications:  Scheduled Meds:acetaminophen, 650 mg, Oral, Q8H  aspirin, 81 mg, Oral, Daily  bisacodyl, 10 mg, Oral, Daily  bisacodyl, 10 mg, Rectal, Q8H  docusate sodium, 100 mg, Nasogastric, BID  famotidine, 20 mg, Oral, BID AC  [Held by provider] losartan, 100 mg, Oral, Q24H   And  [Held by provider] hydroCHLOROthiazide, 12.5 mg, Oral, Q24H  insulin lispro, 2-9  Units, Subcutaneous, 4x Daily AC & at Bedtime  metoclopramide, 10 mg, Intravenous, Q6H  metoprolol tartrate, 50 mg, Oral, Q12H  pharmacy consult - MTM, , Does not apply, Daily  rosuvastatin, 40 mg, Oral, Nightly  senna-docusate sodium, 2 tablet, Oral, BID  sodium chloride, 10 mL, Intravenous, Q12H      Continuous Infusions:dextrose 5 % with KCl 20 mEq, 30 mL/hr, Last Rate: 30 mL/hr (04/10/24 1728)      PRN Meds:.  [DISCONTINUED] senna-docusate sodium **AND** [DISCONTINUED] polyethylene glycol **AND** bisacodyl **AND** [DISCONTINUED] bisacodyl    bisacodyl    Calcium Replacement - Follow Nurse / BPA Driven Protocol    diazePAM    HYDROcodone-acetaminophen    ipratropium-albuterol    Magnesium Cardiology Dose Replacement - Follow Nurse / BPA Driven Protocol    melatonin    methocarbamol    Morphine    nitroglycerin    oxyCODONE    phenol    Phosphorus Replacement - Follow Nurse / BPA Driven Protocol    polyethylene glycol    Potassium Replacement - Follow Nurse / BPA Driven Protocol    prochlorperazine    sodium chloride    sodium chloride    Assessment & Plan   Assessment & Plan     Active Hospital Problems    Diagnosis  POA    **NSTEMI status post CABG 4/10/2024 [I21.4]  Yes    Postoperative atrial fibrillation [I48.0]  No    Postprocedural pneumothorax [J95.811]  Unknown    T2DM [E11.9]  Yes    Hypertension [I10]  Yes    Hyperlipidemia LDL goal <70 [E78.5]  Yes      Resolved Hospital Problems    Diagnosis Date Resolved POA    Combined systolic and diastolic CHF [I50.40] 04/14/2024 Yes        Brief Hospital Course to date:  Khalif Joshua is a 73 y.o. male with a history of CAD, hypertension, hyperlipidemia who presented with NSTEMI and was taken to CABG on 4/10.  He was transferred out of ICU on 4/12.  He is being followed by CT surgery and cardiology.  General surgery was consulted on 4/13 due to concern for postop ileus.     This patient's problems and plans were partially entered by my partner and updated as  appropriate by me 04/15/24.    NSTEMI  Multivessel CAD status post CABG on 4/10.  Combined systolic and diastolic heart failure  Right pneumothorax  Left pleural effusion  Hyperlipidemia  Hypertension  - Managed by CT surgery and cardiology  - Continue aspirin, Plavix, beta-blockers (as tolerated), statin  --S/p bilateral chest tube placement by Dr. Goldman for 4/13 pneumothorax  - Pain control as needed  - chest tubes to waterseal today, planning on removing 1 tube tmrw    Afib RVR--resolved  -noted on AM on 4/14 following ambulation  -Cardiology following   -s/p IV Lopressor  -Increased Lopressor to 50 mg BID  -s/p amiodarone, NOAC once chest tubes removed     Postop ileus--resolved  -CT abdomen/pelvis revealed dilated small and large bowel throughout the abdomen with scattered air-fluid levels.  Small bowel measuring up to 4.5 cm in caliber.  Findings concerning for postoperative ileus.  -General surgeon Dr. Lee consulted  -NG tube removed 4/14  - + Large BM on 4/14  -no current N/V  -Clear liquid diet, advance as tolerated. Tolerating diet, ileus resolved. Surg now signed off    Leukocytosis  -Resolved, likely postop stress response     Expected Discharge Location and Transportation: home  Expected Discharge   Expected Discharge Date: 4/17/2024; Expected Discharge Time:       DVT prophylaxis:  Mechanical DVT prophylaxis orders are present.      AM-PAC 6 Clicks Score (PT): 18 (04/14/24 2000)    CODE STATUS:   Code Status and Medical Interventions:   Ordered at: 04/10/24 1731     Code Status (Patient has no pulse and is not breathing):    CPR (Attempt to Resuscitate)     Medical Interventions (Patient has pulse or is breathing):    Full Support       Rhoda Duran MD  04/15/24

## 2024-04-15 NOTE — CASE MANAGEMENT/SOCIAL WORK
Continued Stay Note  [facility]     Patient Name: Khalif Joshua  MRN: [id number]  Today's Date: 4/15/2024    Admit Date: 4/5/2024    Plan: Home   Discharge Plan       Row Name 04/15/24 0917       Plan    Plan Home    Patient/Family in Agreement with Plan yes    Plan Comments Spoke to patient at bedside. Plan is home with spouse. Patient denies any discharge needs at this time.. CM will continue to follow.    Final Discharge Disposition Code 01 - home or self-care                   Discharge Codes    No documentation.                 Expected Discharge Date and Time       Expected Discharge Date Expected Discharge Time    Apr 14, 2024               Fabien Lazcano RN

## 2024-04-15 NOTE — PROGRESS NOTES
[facility] Cardiothoracic Surgery In-Patient Progress Note     POD # 5 s/p CABG x 4     LOS: 9 days       Subjective  Denies chest pain or dyspnea. Sitting up in chair. Remains on amiodarone gtt.       Objective  Vital Signs  Temp:  [97.5 °F (36.4 °C)-98.3 °F (36.8 °C)] 97.5 °F (36.4 °C)  Heart Rate:  [] 83  Resp:  [18-20] 18  BP: ()/(56-87) 112/56        Physical Exam:   General Appearance: alert, appears stated age and cooperative   Lungs: clear to auscultation, respirations regular, respirations even, and respirations unlabored   Heart: Irregularly irregular    Skin: Incision c/d/I   Sternum: Stable   CT: No air leak with cough  Output by Drain (mL) 04/14/24 0701 - 04/14/24 1900 04/14/24 1901 - 04/15/24 0700 04/15/24 0701 - 04/15/24 0749 Range Total   Chest Tube 1 Right 30 5  35   Chest Tube 1 Left 14 5  19        Results     Results from last 7 days   Lab Units 04/15/24  0558   WBC 10*3/mm3 8.70   HEMOGLOBIN g/dL 9.8*   HEMATOCRIT % 29.9*   PLATELETS 10*3/mm3 241     Results from last 7 days   Lab Units 04/15/24  0558   SODIUM mmol/L 136   POTASSIUM mmol/L 3.8   CHLORIDE mmol/L 97*   CO2 mmol/L 28.0   BUN mg/dL 24*   CREATININE mg/dL 0.81   GLUCOSE mg/dL 111*   CALCIUM mg/dL 9.0     Imaging Results (Last 24 Hours)       Procedure Component Value Units Date/Time    XR Chest 1 View [[id number]] Collected: 04/14/24 0847     Updated: 04/14/24 0851    Narrative:      XR CHEST 1 VW    Date of Exam: 4/14/2024 5:53 AM EDT    Indication: bialteral PTX moderate  post op    Comparison: 1 day prior.    Findings:  Bilateral pleural drains remain in place. Nasogastric tube terminates in the stomach. No discernible persisting pneumothorax on the left, with very small right apical pneumothorax present. Layering small pleural effusion on the left appears likely mildly   increased with adjacent left basilar atelectasis. Unchanged heart and mediastinal contours.      Impression:      Impression:  Bilateral pleural  drains remain in place. Nasogastric tube terminates in the stomach. No discernible persisting pneumothorax on the left, with very small right apical pneumothorax present. Layering small pleural effusion on the left appears likely mildly   increased with adjacent left basilar atelectasis. Unchanged heart and mediastinal contours.        Electronically Signed: Mikey Ko MD    4/14/2024 8:48 AM EDT    Workstation ID: [id number]            Assessment    NSTEMI status post CABG 4/10/2024    Hypertension    Hyperlipidemia LDL goal <70    T2DM    Postoperative atrial fibrillation    Postprocedural pneumothorax      Plan   Place chest tubes to waterseal, plan on removing 1 chest tube tomorrow  Afib management per cardiology  Ambulate  Pulmonary Toilet: IS q1 hr while awake  ASA, Statin, BB      Sayra Wallace, APRN  04/15/24  07:48 EDT

## 2024-04-15 NOTE — PROGRESS NOTES
"Patient Name:  Khalif Joshua  YOB: 1950  [id number]    Surgery Progress Note    Date of visit: 4/15/2024    Subjective   Subjective: Feeling better.          Objective     Objective:     /64   Pulse 89   Temp 97 °F (36.1 °C) (Oral)   Resp 18   Ht 180.3 cm (70.98\")   Wt 97.6 kg (215 lb 3.2 oz)   SpO2 98%   BMI 30.03 kg/m²     Intake/Output Summary (Last 24 hours) at 4/15/2024 1148  Last data filed at 4/15/2024 0900  Gross per 24 hour   Intake 240 ml   Output 804 ml   Net -564 ml       CV:  Rhythm  regular and rate regular   L:  Clear  to auscultation bilaterally , No air leak bilaterally  Abd:  Bowel sounds positive , soft, nontender  Ext:  No cyanosis, clubbing, edema    Recent labs that are back at this time have been reviewed.            Assessment/ Plan:    Ileus- Resolved. Ok to advance diet as tolerated. Will sign off, please call with questions. No surgical follow up required.    Problem List Items Addressed This Visit          Cardiac and Vasculature    * (Principal) NSTEMI status post CABG 4/10/2024    Relevant Medications    tadalafil (CIALIS) 5 MG tablet    nitroglycerin (NITROSTAT) SL tablet 0.4 mg    metoprolol tartrate (LOPRESSOR) tablet 50 mg    Other Relevant Orders    Case Request Cath Lab: Angioplasty-coronary (Completed)    MV CAD    Relevant Medications    tadalafil (CIALIS) 5 MG tablet    nitroglycerin (NITROSTAT) SL tablet 0.4 mg    metoprolol tartrate (LOPRESSOR) tablet 50 mg    Other Relevant Orders    Case request (Completed)       Pulmonary and Pneumonias    Postprocedural pneumothorax    Relevant Orders    Chest Tube Insertion (Completed)    Chest Tube Insertion (Completed)     Other Visit Diagnoses       Chest pain, unspecified type    -  Primary             Active Hospital Problems    Diagnosis  POA    **NSTEMI status post CABG 4/10/2024 [I21.4]  Yes    Postoperative atrial fibrillation [I48.0]  No    Postprocedural pneumothorax [J95.811]  Unknown    T2DM " [E11.9]  Yes    Hypertension [I10]  Yes    Hyperlipidemia LDL goal <70 [E78.5]  Yes      Resolved Hospital Problems    Diagnosis Date Resolved POA    Combined systolic and diastolic CHF [I50.40] 04/14/2024 Yes              Pako Lee MD  4/15/2024  11:48 EDT

## 2024-04-15 NOTE — PLAN OF CARE
Goal Outcome Evaluation:  Plan of Care Reviewed With: patient, spouse        Progress: improving  Outcome Evaluation: Vital signs wnl. Oxygen level greater than 90% on room air. Sinus rhythm. Patient alert and oriented x 4. Chest tube remain in place to waterseal. Small amount of serosangious drainage noted from each chest tube. Pain medication given as needed. Patient walked in hallway twice. Will continue to monitor.

## 2024-04-16 ENCOUNTER — APPOINTMENT (OUTPATIENT)
Dept: GENERAL RADIOLOGY | Facility: HOSPITAL | Age: 74
End: 2024-04-16
Payer: MEDICARE

## 2024-04-16 LAB
ACT BLD: 125 SECONDS (ref 82–152)
ACT BLD: 406 SECONDS (ref 82–152)
ACT BLD: 450 SECONDS (ref 82–152)
ACT BLD: 499 SECONDS (ref 82–152)
ANION GAP SERPL CALCULATED.3IONS-SCNC: 11 MMOL/L (ref 5–15)
BUN SERPL-MCNC: 22 MG/DL (ref 8–23)
BUN/CREAT SERPL: 28.2 (ref 7–25)
CALCIUM SPEC-SCNC: 8.2 MG/DL (ref 8.6–10.5)
CHLORIDE SERPL-SCNC: 98 MMOL/L (ref 98–107)
CO2 SERPL-SCNC: 26 MMOL/L (ref 22–29)
CREAT SERPL-MCNC: 0.78 MG/DL (ref 0.76–1.27)
DEPRECATED RDW RBC AUTO: 47 FL (ref 37–54)
EGFRCR SERPLBLD CKD-EPI 2021: 94.8 ML/MIN/1.73
ERYTHROCYTE [DISTWIDTH] IN BLOOD BY AUTOMATED COUNT: 13.6 % (ref 12.3–15.4)
GLUCOSE BLDC GLUCOMTR-MCNC: 101 MG/DL (ref 70–130)
GLUCOSE BLDC GLUCOMTR-MCNC: 101 MG/DL (ref 70–130)
GLUCOSE BLDC GLUCOMTR-MCNC: 116 MG/DL (ref 70–130)
GLUCOSE BLDC GLUCOMTR-MCNC: 129 MG/DL (ref 70–130)
GLUCOSE SERPL-MCNC: 92 MG/DL (ref 65–99)
HCT VFR BLD AUTO: 27.9 % (ref 37.5–51)
HGB BLD-MCNC: 9.1 G/DL (ref 13–17.7)
MCH RBC QN AUTO: 31.1 PG (ref 26.6–33)
MCHC RBC AUTO-ENTMCNC: 32.6 G/DL (ref 31.5–35.7)
MCV RBC AUTO: 95.2 FL (ref 79–97)
PLATELET # BLD AUTO: 278 10*3/MM3 (ref 140–450)
PMV BLD AUTO: 9.9 FL (ref 6–12)
POTASSIUM SERPL-SCNC: 3.5 MMOL/L (ref 3.5–5.2)
QT INTERVAL: 330 MS
QT INTERVAL: 358 MS
QTC INTERVAL: 440 MS
QTC INTERVAL: 464 MS
RBC # BLD AUTO: 2.93 10*6/MM3 (ref 4.14–5.8)
SODIUM SERPL-SCNC: 135 MMOL/L (ref 136–145)
WBC NRBC COR # BLD AUTO: 10.8 10*3/MM3 (ref 3.4–10.8)

## 2024-04-16 PROCEDURE — 99232 SBSQ HOSP IP/OBS MODERATE 35: CPT | Performed by: INTERNAL MEDICINE

## 2024-04-16 PROCEDURE — 25010000002 METOCLOPRAMIDE PER 10 MG: Performed by: SURGERY

## 2024-04-16 PROCEDURE — 97530 THERAPEUTIC ACTIVITIES: CPT

## 2024-04-16 PROCEDURE — 85027 COMPLETE CBC AUTOMATED: CPT

## 2024-04-16 PROCEDURE — 80048 BASIC METABOLIC PNL TOTAL CA: CPT

## 2024-04-16 PROCEDURE — 99024 POSTOP FOLLOW-UP VISIT: CPT

## 2024-04-16 PROCEDURE — 71045 X-RAY EXAM CHEST 1 VIEW: CPT

## 2024-04-16 PROCEDURE — 99232 SBSQ HOSP IP/OBS MODERATE 35: CPT | Performed by: STUDENT IN AN ORGANIZED HEALTH CARE EDUCATION/TRAINING PROGRAM

## 2024-04-16 PROCEDURE — 82948 REAGENT STRIP/BLOOD GLUCOSE: CPT

## 2024-04-16 RX ORDER — POTASSIUM CHLORIDE 20 MEQ/1
40 TABLET, EXTENDED RELEASE ORAL EVERY 4 HOURS
Status: COMPLETED | OUTPATIENT
Start: 2024-04-16 | End: 2024-04-16

## 2024-04-16 RX ADMIN — METOPROLOL TARTRATE 50 MG: 50 TABLET, FILM COATED ORAL at 08:51

## 2024-04-16 RX ADMIN — FAMOTIDINE 20 MG: 20 TABLET, FILM COATED ORAL at 18:08

## 2024-04-16 RX ADMIN — METOPROLOL TARTRATE 50 MG: 50 TABLET, FILM COATED ORAL at 20:50

## 2024-04-16 RX ADMIN — METOCLOPRAMIDE 10 MG: 5 INJECTION, SOLUTION INTRAMUSCULAR; INTRAVENOUS at 15:28

## 2024-04-16 RX ADMIN — Medication 10 ML: at 08:52

## 2024-04-16 RX ADMIN — LIDOCAINE 1 PATCH: 4 PATCH TOPICAL at 08:51

## 2024-04-16 RX ADMIN — ASPIRIN 81 MG: 81 TABLET, COATED ORAL at 08:50

## 2024-04-16 RX ADMIN — POTASSIUM CHLORIDE 40 MEQ: 1500 TABLET, EXTENDED RELEASE ORAL at 15:28

## 2024-04-16 RX ADMIN — POTASSIUM CHLORIDE 40 MEQ: 1500 TABLET, EXTENDED RELEASE ORAL at 20:50

## 2024-04-16 RX ADMIN — FAMOTIDINE 20 MG: 20 TABLET, FILM COATED ORAL at 08:50

## 2024-04-16 RX ADMIN — ROSUVASTATIN 40 MG: 20 TABLET, FILM COATED ORAL at 20:50

## 2024-04-16 RX ADMIN — POTASSIUM CHLORIDE 40 MEQ: 1500 TABLET, EXTENDED RELEASE ORAL at 06:29

## 2024-04-16 RX ADMIN — ACETAMINOPHEN 650 MG: 325 TABLET ORAL at 18:07

## 2024-04-16 RX ADMIN — METOCLOPRAMIDE 10 MG: 5 INJECTION, SOLUTION INTRAMUSCULAR; INTRAVENOUS at 08:50

## 2024-04-16 NOTE — PROGRESS NOTES
[facility] Cardiothoracic Surgery In-Patient Progress Note     POD # 6 s/p CABG x 4     LOS: 10 days       Subjective  Denies chest pain or dyspnea. Sitting up in chair. Eager to go home.       Objective  Vital Signs  Temp:  [97 °F (36.1 °C)-99.1 °F (37.3 °C)] 98.7 °F (37.1 °C)  Heart Rate:  [73-90] 87  Resp:  [16-18] 18  BP: ()/(64-72) 135/71        Physical Exam:   General Appearance: alert, appears stated age and cooperative   Lungs: clear to auscultation, respirations regular, respirations even, and respirations unlabored   Heart: Irregularly irregular    Skin: Incision c/d/I   Sternum: Stable   CT: No air leak with cough  Output by Drain (mL) 04/15/24 0701 - 04/15/24 1900 04/15/24 1901 - 04/16/24 0700 04/16/24 0701 - 04/16/24 0736 Range Total   Chest Tube 1 Right 30 15  45   Chest Tube 1 Left 5 0  5        Results     Results from last 7 days   Lab Units 04/15/24  0558   WBC 10*3/mm3 8.70   HEMOGLOBIN g/dL 9.8*   HEMATOCRIT % 29.9*   PLATELETS 10*3/mm3 241     Results from last 7 days   Lab Units 04/15/24  1556 04/15/24  0558   SODIUM mmol/L  --  136   POTASSIUM mmol/L 3.6 3.8   CHLORIDE mmol/L  --  97*   CO2 mmol/L  --  28.0   BUN mg/dL  --  24*   CREATININE mg/dL  --  0.81   GLUCOSE mg/dL  --  111*   CALCIUM mg/dL  --  9.0     Imaging Results (Last 24 Hours)       Procedure Component Value Units Date/Time    XR Chest 1 View [[id number]] Resulted: 04/16/24 0243     Updated: 04/16/24 0416    XR Chest 1 View [[id number]] Collected: 04/15/24 0850     Updated: 04/15/24 0857    Narrative:      XR CHEST 1 VW    Date of Exam: 4/15/2024 8:38 AM EDT    Indication: pneumothorax    Comparison: Chest radiograph 4/14/2024.    Findings:  Unchanged position of bilateral chest tubes. Removal of nasogastric tube. Sternotomy and CABG. Scattered subsegmental atelectasis. No focal consolidation. Decreased, trace left pleural effusion. No significant pneumothorax on this exam.      Impression:      Impression:  Bilateral  chest tubes in place without significant pneumothorax. Decreased, trace left pleural effusion.      Electronically Signed: Ole Giordano MD    4/15/2024 8:54 AM EDT    Workstation ID: [id number]            Assessment    NSTEMI status post CABG 4/10/2024    Hypertension    Hyperlipidemia LDL goal <70    T2DM    Postoperative atrial fibrillation    Postprocedural pneumothorax      Plan   D/C 1 of the 2 chest tubes today, will plan on removing remaining chest tube tomorrow and discharge home  Afib management per cardiology  Ambulate  Pulmonary Toilet: IS q1 hr while awake  ASA, Statin, BB  Likely home in 24 hours    Sayra Wallace, APRN  04/16/24  07:36 EDT

## 2024-04-16 NOTE — PROGRESS NOTES
"                  Clinical Nutrition   Nutrition Support Assessment  Reason for Visit: Follow-up protocol    Patient Name: Khalif Joshua  YOB: 1950  MRN: [id number]  Date of Encounter: 04/15/24 21:33 EDT  Admission date: 4/5/2024    Comments: Nutrition focused exam complete. Pt does not meet criteria for malnutrition dx at this time. Improved to 75% intake of meals today.     Nutrition Assessment   Admission Diagnosis:  NSTEMI (non-ST elevated myocardial infarction) [I21.4]      Problem List:    NSTEMI status post CABG 4/10/2024    Hypertension    Hyperlipidemia LDL goal <70    T2DM    Postoperative atrial fibrillation    Postprocedural pneumothorax        PMH:   He  has a past medical history of CAD (coronary artery disease), Diverticulitis, Hyperlipemia, Hypertension, Osteoarthritis, and Perforated sigmoid colon.    PSH:  He  has a past surgical history that includes Exploratory Laparotomy (N/A, 10/30/2021); Colostomy closure (N/A, 04/21/2022); Cardiac catheterization (N/A, 04/05/2024); Replacement total knee (Bilateral); Ileostomy; and Coronary artery bypass graft (N/A, 4/10/2024).    Applicable Nutrition Concerns:   Skin:  Oral:  GI:    Applicable Interval History:   4/10 CABG    Reported/Observed/Food/Nutrition Related History:     4/15  Doing better w food.    4/12  Pt allows uncertain re wt, current wt is a guess -does not weigh often. Now starting diet.    Anthropometrics     Height: Height: 180.3 cm (70.98\")  Last Filed Weight: Weight: 97.6 kg (215 lb 3.2 oz) (04/15/24 0329)  Method: Weight Method: Bed scale  BMI: BMI (Calculated): 30    UBW: Per EMR wt of 200 lbs on 12/21/22, 205 lbs on 9/8/23, 217 lbs on 2/21/24, 200 lbs on 4/8/24 and 220 lbs on 4/12/24     Weight change:  uncertain at this time.     Nutrition Focused Physical Exam     Date:   4/15      Pt does not meet criteria for malnutrition diagnosis, at this time.   Only very mild wasting at clavicle, scapular and calf " areas.    Current Nutrition Prescription   PO: Diet: Cardiac; Healthy Heart (2-3 Na+); Fluid Consistency: Thin (IDDSI 0)  Oral Nutrition Supplement:   Intake:  11 DAYS   59% x 12 meals recorded Note 75% x 3 meals on 4/15      Nutrition Diagnosis   Date:  4/12            Updated: 4/15   Problem No nutrition diagnosis at this time    Etiology    Signs/Symptoms    Status:       Goal:   Nutrition to support treatment and Maintain intake per documentation 4/15      Nutrition Intervention      Follow treatment progress, Care plan reviewed, Encourage intake      Monitoring/Evaluation:   Per protocol, I&O, PO intake, Pertinent labs, Weight, Symptoms      Pina Banegas RD  Time Spent: 25 min

## 2024-04-16 NOTE — THERAPY TREATMENT NOTE
Patient Name: Khalif Joshua  : 1950    MRN: [id number]                              Today's Date: 2024       Admit Date: 2024    Visit Dx:     ICD-10-CM ICD-9-CM   1. Chest pain, unspecified type  R07.9 786.50   2. NSTEMI (non-ST elevated myocardial infarction)  I21.4 410.70   3. Coronary artery disease involving native heart, unspecified vessel or lesion type, unspecified whether angina present  I25.10 414.01   4. Postprocedural pneumothorax  J95.811 512.1     Patient Active Problem List   Diagnosis    Acute diverticulitis    Hypertension    Hyperlipidemia LDL goal <70    Abnormal EKG    Lactic acidosis    Sepsis without acute organ dysfunction    Peritonitis    Diverticul disease small and large intestine, no perforati or abscess    NSTEMI status post CABG 4/10/2024    MV CAD    T2DM    Postoperative atrial fibrillation    Postprocedural pneumothorax     Past Medical History:   Diagnosis Date    CAD (coronary artery disease)     Diverticulitis     Hyperlipemia     Hypertension     Osteoarthritis     Perforated sigmoid colon      Past Surgical History:   Procedure Laterality Date    CARDIAC CATHETERIZATION N/A 2024    Procedure: Left Heart Cath;  Surgeon: Jermaine Pineda III, MD;  Location: [facility] CATH INVASIVE LOCATION;  Service: Cardiology;  Laterality: N/A;    COLOSTOMY CLOSURE N/A 2022    Procedure: COLOSTOMY TAKEDOWN OPEN;  Surgeon: Jose Ferguson MD;  Location:  [facility];  Service: General;  Laterality: N/A;    CORONARY ARTERY BYPASS GRAFT N/A 4/10/2024    Procedure: MEDIAN STERNOTOMY, CORONARY ARTERY BYPASS GRAFTING X4, LIMA ,EVH OF THE LEFT GREATER SAPHENOUS VEIN WITH EVH EXPLORATION OF THE RIGHT LEG;  Surgeon: Jermaine Barlow MD;  Location:  [facility];  Service: Cardiothoracic;  Laterality: N/A;    EXPLORATORY LAPAROTOMY N/A 10/30/2021    Procedure: LAPAROTOMY EXPLORATORY, SIGMOID COLECTOMY,  END COLOSTOMY;  Surgeon: Jose Ferguson MD;  Location: [facility] OR;   Service: General;  Laterality: N/A;    ILEOSTOMY      Placement and reversal    REPLACEMENT TOTAL KNEE Bilateral       General Information       Row Name 04/16/24 1548          Physical Therapy Time and Intention    Document Type therapy note (daily note)  -     Mode of Treatment physical therapy  -       Row Name 04/16/24 1548          General Information    Patient Profile Reviewed yes  -     Existing Precautions/Restrictions cardiac;oxygen therapy device and L/min;sternal;other (see comments)  chest tube  -     Barriers to Rehab none identified  -       Row Name 04/16/24 1548          Cognition    Orientation Status (Cognition) oriented x 4  -       Row Name 04/16/24 1548          Safety Issues, Functional Mobility    Safety Issues Affecting Function (Mobility) safety precautions follow-through/compliance  -     Impairments Affecting Function (Mobility) balance;endurance/activity tolerance;shortness of breath;strength  -               User Key  (r) = Recorded By, (t) = Taken By, (c) = Cosigned By      Initials Name Provider Type     Amarilis Barlow, PT Physical Therapist                   Mobility       Row Name 04/16/24 1549          Bed Mobility    Comment, (Bed Mobility) UIC  -       Row Name 04/16/24 1549          Sit-Stand Transfer    Sit-Stand Thayer (Transfers) standby assist  -     Assistive Device (Sit-Stand Transfers) walker, front-wheeled  -HM     Comment, (Sit-Stand Transfer) cues for sequencing to maintain sternal precautions  -       Row Name 04/16/24 1549          Gait/Stairs (Locomotion)    Thayer Level (Gait) standby assist  -     Assistive Device (Gait) walker, front-wheeled  -HM     Distance in Feet (Gait) 430  +470  -HM     Deviations/Abnormal Patterns (Gait) abbe decreased;stride length decreased;bilateral deviations  -     Comment, (Gait/Stairs) Pt ambulated 430 feet with FWx with slight decreased abbe and decreased stride length. Pt initially  ambulated with FWx however progressed to no AD with good stability noted. SpO2 100% after ambulation.  -               User Key  (r) = Recorded By, (t) = Taken By, (c) = Cosigned By      Initials Name Provider Type     Amarilis Barlow PT Physical Therapist                   Obj/Interventions       Row Name 04/16/24 1551          Balance    Balance Assessment sitting static balance;sitting dynamic balance;sit to stand dynamic balance;standing static balance;standing dynamic balance  -     Static Sitting Balance independent  -     Dynamic Sitting Balance independent  -     Position, Sitting Balance unsupported;sitting in chair  -     Sit to Stand Dynamic Balance standby assist  -     Static Standing Balance standby assist  -     Dynamic Standing Balance standby assist  -     Position/Device Used, Standing Balance unsupported  -     Balance Interventions standing;dynamic;occupation based/functional task  -     Comment, Balance no LOB, progressed from utilizing FWx to no AD with good stability noted  -               User Key  (r) = Recorded By, (t) = Taken By, (c) = Cosigned By      Initials Name Provider Type     Amarilis Barlow PT Physical Therapist                   Goals/Plan    No documentation.                  Clinical Impression       Row Name 04/16/24 9202          Pain    Additional Documentation Pain Scale: FACES Pre/Post-Treatment (Group)  -       Row Name 04/16/24 6959          Pain Scale: FACES Pre/Post-Treatment    Pain: FACES Scale, Pretreatment 2-->hurts little bit  -     Posttreatment Pain Rating 2-->hurts little bit  -     Pre/Posttreatment Pain Comment generalized pain stated underarms  -       Row Name 04/16/24 0793          Plan of Care Review    Plan of Care Reviewed With patient  -     Progress improving  -     Outcome Evaluation Pt able to ambulate 430 feet SBA with FWx, progressed to no AD for 470 feet SBA with good stability noted. Educated on sternal  precautions with STS. Pt continued to present below baseline function warranting IPPT POC. d/c rec home with assist when medically appropriate.  -       Row Name 04/16/24 1552          Vital Signs    Pre Systolic BP Rehab 116  -HM     Pre Treatment Diastolic BP 65  -HM     Pretreatment Heart Rate (beats/min) 80  -HM     Posttreatment Heart Rate (beats/min) 87  -HM     Pre SpO2 (%) 100  -HM     O2 Delivery Pre Treatment room air  -HM     O2 Delivery Intra Treatment room air  -HM     Post SpO2 (%) 100  -HM     O2 Delivery Post Treatment room air  -HM     Pre Patient Position Sitting  -     Intra Patient Position Standing  -HM     Post Patient Position Sitting  -       Row Name 04/16/24 1552          Positioning and Restraints    Pre-Treatment Position sitting in chair/recliner  -HM     Post Treatment Position chair  -HM     In Chair notified nsg;reclined;sitting;call light within reach;encouraged to call for assist;waffle cushion;legs elevated  -               User Key  (r) = Recorded By, (t) = Taken By, (c) = Cosigned By      Initials Name Provider Type     Amarilis Barlow, PT Physical Therapist                   Outcome Measures       Row Name 04/16/24 1556 04/16/24 0820       How much help from another person do you currently need...    Turning from your back to your side while in flat bed without using bedrails? 3  - 3  -JG    Moving from lying on back to sitting on the side of a flat bed without bedrails? 3  - 3  -JG    Moving to and from a bed to a chair (including a wheelchair)? 3  - 3  -JG    Standing up from a chair using your arms (e.g., wheelchair, bedside chair)? 3  - 3  -JG    Climbing 3-5 steps with a railing? 3  - 3  -JG    To walk in hospital room? 3  - 3  -JG    AM-PAC 6 Clicks Score (PT) 18  - 18  -JG    Highest Level of Mobility Goal 6 --> Walk 10 steps or more  - 6 --> Walk 10 steps or more  -J      Row Name 04/16/24 1556          Functional Assessment    Outcome Measure  Options AM-PAC 6 Clicks Basic Mobility (PT)  -               User Key  (r) = Recorded By, (t) = Taken By, (c) = Cosigned By      Initials Name Provider Type    Jaylene Paniagua LPN Licensed Nurse    Amarilis Campbell, PT Physical Therapist                                 Physical Therapy Education       Title: PT OT SLP Therapies (In Progress)       Topic: Physical Therapy (In Progress)       Point: Mobility training (In Progress)       Learning Progress Summary             Patient Acceptance, E,TB, VU,NR by  at 4/16/2024 1557    Acceptance, E,D,H, NR by DM at 4/13/2024 1744    Acceptance, E, NR by APRIL at 4/12/2024 1030    Acceptance, E, NR by APRIL at 4/11/2024 0850   Family Acceptance, E,D,H, NR by DM at 4/13/2024 1744                         Point: Home exercise program (In Progress)       Learning Progress Summary             Patient Acceptance, E,D,H, NR by DM at 4/13/2024 1744    Acceptance, E, NR by APRIL at 4/12/2024 1030    Acceptance, E, NR by APRIL at 4/11/2024 0850   Family Acceptance, E,D,H, NR by DM at 4/13/2024 1744                         Point: Body mechanics (In Progress)       Learning Progress Summary             Patient Acceptance, E,TB, VU,NR by  at 4/16/2024 1557    Acceptance, E,D,H, NR by DM at 4/13/2024 1744    Acceptance, E, NR by APRIL at 4/12/2024 1030    Acceptance, E, NR by APRIL at 4/11/2024 0850   Family Acceptance, E,D,H, NR by DM at 4/13/2024 1744                         Point: Precautions (In Progress)       Learning Progress Summary             Patient Acceptance, E,TB, VU,NR by  at 4/16/2024 1557    Acceptance, E,D,H, NR by DM at 4/13/2024 1744    Acceptance, E, NR by APRIL at 4/12/2024 1030    Acceptance, E, NR by APRIL at 4/11/2024 0850   Family Acceptance, E,D,H, NR by DM at 4/13/2024 1744                                         User Key       Initials Effective Dates Name Provider Type [facility] 02/03/23 -  Ofelia Means, PT Physical Therapist PT    DM 02/03/23 -   Christine Mortensen, PT Physical Therapist PT     09/22/22 -  Amarilis Barlow PT Physical Therapist PT                  PT Recommendation and Plan     Plan of Care Reviewed With: patient  Progress: improving  Outcome Evaluation: Pt able to ambulate 430 feet SBA with FWx, progressed to no AD for 470 feet SBA with good stability noted. Educated on sternal precautions with STS. Pt continued to present below baseline function warranting IPPT POC. d/c rec home with assist when medically appropriate.     Time Calculation:         PT Charges       Row Name 04/16/24 1557             Time Calculation    Start Time 1500  -HM      PT Received On 04/16/24  -HM         Timed Charges    59623 - PT Therapeutic Activity Minutes 24  -HM         Total Minutes    Timed Charges Total Minutes 24  -HM       Total Minutes 24  -HM                User Key  (r) = Recorded By, (t) = Taken By, (c) = Cosigned By      Initials Name Provider Type     Amarilis Barlow, RIKI Physical Therapist                  Therapy Charges for Today       Code Description Service Date Service Provider Modifiers Qty    [id number]  PT THERAPEUTIC ACT EA 15 MIN 4/16/2024 Amarilis Barlow, PT GP 2            PT G-Codes  Outcome Measure Options: AM-PAC 6 Clicks Basic Mobility (PT)  AM-PAC 6 Clicks Score (PT): 18  PT Discharge Summary  Anticipated Discharge Disposition (PT): home with assist    Amarilis Barlow PT  4/16/2024

## 2024-04-16 NOTE — PROGRESS NOTES
[facility] Medicine Services  PROGRESS NOTE    Patient Name: Khalif Joshua  : 1950  MRN: [id number]    Date of Admission: 2024  Primary Care Physician: Renee Liriano MD    Subjective   Subjective     CC:  Follow-up CABG, postop medical management    HPI:  No new issues overnight. CTS to remove one of the chest tubes today. He wants to go home soon      Objective   Objective     Vital Signs:   Temp:  [97 °F (36.1 °C)-99.1 °F (37.3 °C)] 98.8 °F (37.1 °C)  Heart Rate:  [75-91] 77  Resp:  [16-18] 18  BP: (101-135)/(60-72) 101/60     Physical Exam:  Constitutional: No acute distress, awake, alert, chronically ill-appearing  HENT: NCAT, mucous membranes moist  Respiratory: chest tube to water seal. Clear on RA  Cardiovascular: Irregular, tachycardic, no murmurs, cap refill brisk   Gastrointestinal: Positive bowel sounds, soft, nontender, nondistended  Musculoskeletal: Trace BLE edema   Psychiatric: Appropriate affect, cooperative  Neurologic: Oriented x 3, moves all extremities, speech clear  Skin: warm, dry, midline sternotomy incision MAGDALENO well-approximated, EVH sites MAGDALENO CDI    Results Reviewed:  LAB RESULTS:      Lab 24  0750 04/15/24  0558 24  0509 24  0433 24  0436 24  0303 04/10/24  2010 04/10/24  1735 04/10/24  0002 24  1820   WBC 10.80 8.70  --  4.92 7.52 14.58*   < > 18.67*   < >  --    HEMOGLOBIN 9.1* 9.8* 9.0* 9.8* 9.7* 10.1*   < > 10.8*   < >  --    HEMOGLOBIN, POC  --   --   --   --   --   --   --   --    < >  --    HEMATOCRIT 27.9* 29.9* 27.7* 30.1* 29.9* 31.0*   < > 32.3*   < >  --    HEMATOCRIT POC  --   --   --   --   --   --   --   --    < >  --    PLATELETS 278 241  --  176 165 211   < > 200   < >  --    NEUTROS ABS  --   --   --   --   --  11.98*  --   --   --   --    IMMATURE GRANS (ABS)  --   --   --   --   --  0.11*  --   --   --   --    LYMPHS ABS  --   --   --   --   --  0.90  --   --   --   --    MONOS ABS  --   --    --   --   --  1.52*  --   --   --   --    EOS ABS  --   --   --   --   --  0.03  --   --   --   --    MCV 95.2 93.7  --  95.0 97.1* 97.2*   < > 93.9   < >  --    PROTIME  --   --   --   --   --  16.0*  --  17.9*  --   --    APTT  --   --   --   --   --   --   --  31.9  --   --    HEPARIN ANTI-XA  --   --   --   --   --   --   --   --   --  0.27*    < > = values in this interval not displayed.         Lab 04/16/24  0750 04/15/24  1556 04/15/24  0558 04/14/24  0509 04/13/24  1546 04/13/24  0433 04/12/24  0436 04/11/24  0303 04/10/24  2010 04/10/24  1735   SODIUM 135*  --  136 135*  --  131* 135*   < > 141 135*   POTASSIUM 3.5 3.6 3.8 3.6 4.0 3.8 4.0   < > 4.2 4.3   CHLORIDE 98  --  97* 94*  --  95* 100   < > 101 96*   CO2 26.0  --  28.0 30.0*  --  27.0 26.0   < > 21.0* 24.0   ANION GAP 11.0  --  11.0 11.0  --  9.0 9.0   < > 19.0* 15.0   BUN 22  --  24* 31*  --  30* 25*   < > 21 21   CREATININE 0.78  --  0.81 0.81  --  0.83 1.01   < > 1.15 1.19   EGFR 94.8  --  93.7 93.7  --  93.0 79.0   < > 67.6 64.9   GLUCOSE 92  --  111* 120*  --  129* 131*   < > 140* 107*   CALCIUM 8.2*  --  9.0 9.3  --  8.8 8.7   < > 9.6 9.1   IONIZED CALCIUM  --   --   --   --   --   --   --   --   --  1.20   MAGNESIUM  --   --   --   --   --   --   --   --  2.1 2.1   PHOSPHORUS  --   --   --   --   --   --   --   --  3.9 4.2    < > = values in this interval not displayed.         Lab 04/11/24  0303 04/10/24  2010 04/10/24  1735   TOTAL PROTEIN 6.3  --   --    ALBUMIN 4.5 4.4 4.1   GLOBULIN 1.8  --   --    ALT (SGPT) 15  --   --    AST (SGOT) 46*  --   --    BILIRUBIN 0.3  --   --    ALK PHOS 36*  --   --          Lab 04/11/24  0303 04/10/24  1735   PROTIME 16.0* 17.9*   INR 1.27* 1.46*             Lab 04/09/24  1358   ABO TYPING O   RH TYPING Negative         Lab 04/10/24  2155 04/10/24  1753 04/10/24  1647   PH, ARTERIAL 7.365 7.397 7.30*   PCO2, ARTERIAL 39.4 37.3  --    PO2 .0* 251.0*  --    FIO2 40 100  --    HCO3 ART 22.5 22.9  --     BASE EXCESS ART -2.7* -1.6*  --    CARBOXYHEMOGLOBIN 1.1 0.8  --      Brief Urine Lab Results       None            Microbiology Results Abnormal       None            XR Chest 1 View    Result Date: 4/16/2024  XR CHEST 1 VW Date of Exam: 4/16/2024 2:43 AM EDT Indication: Bilateral pneumothorax Comparison:  4/15/2024 Findings: Bilateral pleural pigtail catheters. Median sternotomy wires. Cardiac size is similar to prior exam. Similar left basal opacity. Slightly increased small left pleural effusion. No pneumothorax. No evidence of acute osseous abnormalities. Visualized upper abdomen is unremarkable. Subcutaneous emphysema seen within the left chest wall and left neck.     Impression: Impression: No substantial pneumothorax. Slightly increased small left pleural effusion with associated left basilar opacity. Subcutaneous emphysema seen within the left chest wall and left neck. Electronically Signed: Hu Shook MD  4/16/2024 8:31 AM EDT  Workstation ID: [id number]    XR Chest 1 View    Result Date: 4/15/2024  XR CHEST 1 VW Date of Exam: 4/15/2024 8:38 AM EDT Indication: pneumothorax Comparison: Chest radiograph 4/14/2024. Findings: Unchanged position of bilateral chest tubes. Removal of nasogastric tube. Sternotomy and CABG. Scattered subsegmental atelectasis. No focal consolidation. Decreased, trace left pleural effusion. No significant pneumothorax on this exam.     Impression: Impression: Bilateral chest tubes in place without significant pneumothorax. Decreased, trace left pleural effusion. Electronically Signed: Ole Giordano MD  4/15/2024 8:54 AM EDT  Workstation ID: [id number]     Results for orders placed during the hospital encounter of 04/05/24    Adult Transthoracic Echo Limited W/ Cont if Necessary Per Protocol    Interpretation Summary    Left ventricular systolic function is normal. Calculated left ventricular EF = 56.7%      Current medications:  Scheduled Meds:acetaminophen, 650 mg, Oral,  Q8H  aspirin, 81 mg, Oral, Daily  bisacodyl, 10 mg, Oral, Daily  bisacodyl, 10 mg, Rectal, Q8H  docusate sodium, 100 mg, Nasogastric, BID  famotidine, 20 mg, Oral, BID AC  [Held by provider] losartan, 100 mg, Oral, Q24H   And  [Held by provider] hydroCHLOROthiazide, 12.5 mg, Oral, Q24H  insulin lispro, 2-9 Units, Subcutaneous, 4x Daily AC & at Bedtime  Lidocaine, 1 patch, Transdermal, Q24H  metoclopramide, 10 mg, Intravenous, Q6H  metoprolol tartrate, 50 mg, Oral, Q12H  pharmacy consult - MTM, , Does not apply, Daily  rosuvastatin, 40 mg, Oral, Nightly  senna-docusate sodium, 2 tablet, Oral, BID  sodium chloride, 10 mL, Intravenous, Q12H      Continuous Infusions:dextrose 5 % with KCl 20 mEq, 30 mL/hr, Last Rate: 30 mL/hr (04/10/24 1728)      PRN Meds:.  [DISCONTINUED] senna-docusate sodium **AND** [DISCONTINUED] polyethylene glycol **AND** bisacodyl **AND** [DISCONTINUED] bisacodyl    bisacodyl    Calcium Replacement - Follow Nurse / BPA Driven Protocol    HYDROcodone-acetaminophen    ipratropium-albuterol    Magnesium Cardiology Dose Replacement - Follow Nurse / BPA Driven Protocol    melatonin    methocarbamol    Morphine    nitroglycerin    oxyCODONE    phenol    Phosphorus Replacement - Follow Nurse / BPA Driven Protocol    polyethylene glycol    Potassium Replacement - Follow Nurse / BPA Driven Protocol    prochlorperazine    sodium chloride    sodium chloride    Assessment & Plan   Assessment & Plan     Active Hospital Problems    Diagnosis  POA    **NSTEMI status post CABG 4/10/2024 [I21.4]  Yes    Postoperative atrial fibrillation [I48.0]  No    Postprocedural pneumothorax [J95.811]  Unknown    T2DM [E11.9]  Yes    Hypertension [I10]  Yes    Hyperlipidemia LDL goal <70 [E78.5]  Yes      Resolved Hospital Problems    Diagnosis Date Resolved POA    Combined systolic and diastolic CHF [I50.40] 04/14/2024 Yes        Brief Hospital Course to date:  Khalif Joshua is a 73 y.o. male with a history of CAD,  hypertension, hyperlipidemia who presented with NSTEMI and was taken to CABG on 4/10.  He was transferred out of ICU on 4/12.  He is being followed by CT surgery and cardiology.  General surgery was consulted on 4/13 due to concern for postop ileus.     This patient's problems and plans were partially entered by my partner and updated as appropriate by me 04/16/24.    NSTEMI  Multivessel CAD status post CABG on 4/10.  Combined systolic and diastolic heart failure  Right pneumothorax  Left pleural effusion  Hyperlipidemia  Hypertension  - Managed by CT surgery and cardiology  - Continue aspirin, Plavix, beta-blockers (as tolerated), statin  --S/p bilateral chest tube placement by Dr. Goldman for 4/13 pneumothorax. One chest tube to be removed today, CTS planning to remain the other tmrw then discharge  - Pain control as needed      Afib RVR--resolved  -noted on AM on 4/14 following ambulation  -Cardiology following   -s/p IV Lopressor  -Increased Lopressor to 50 mg BID  -s/p amiodarone, NOAC once chest tubes removed     Postop ileus--resolved  -CT abdomen/pelvis revealed dilated small and large bowel throughout the abdomen with scattered air-fluid levels.  Small bowel measuring up to 4.5 cm in caliber.  Findings concerning for postoperative ileus.  -General surgeon Dr. Lee consulted  -NG tube removed 4/14  - + Large BM on 4/14  -no current N/V  -Clear liquid diet, advance as tolerated. Tolerating diet, ileus resolved. Surg now signed off    Leukocytosis  -Resolved, likely postop stress response     Expected Discharge Location and Transportation: home  Expected Discharge   Expected Discharge Date: 4/17/2024; Expected Discharge Time:       DVT prophylaxis:  Mechanical DVT prophylaxis orders are present.      AM-PAC 6 Clicks Score (PT): 18 (04/15/24 3525)    CODE STATUS:   Code Status and Medical Interventions:   Ordered at: 04/10/24 3773     Code Status (Patient has no pulse and is not breathing):    CPR (Attempt to  Resuscitate)     Medical Interventions (Patient has pulse or is breathing):    Full Support       Rhoda Duran MD  04/16/24

## 2024-04-16 NOTE — PLAN OF CARE
Problem: Adult Inpatient Plan of Care  Goal: Plan of Care Review  4/16/2024 0545 by Radha Gupta RN  Outcome: Ongoing, Progressing  4/16/2024 0543 by Radha Gupta RN  Outcome: Ongoing, Progressing  Goal: Patient-Specific Goal (Individualized)  4/16/2024 0545 by Radha Gupta RN  Outcome: Ongoing, Progressing  4/16/2024 0543 by Radha Gupta RN  Outcome: Ongoing, Progressing  Goal: Absence of Hospital-Acquired Illness or Injury  4/16/2024 0545 by Radha Gupta RN  Outcome: Ongoing, Progressing  4/16/2024 0543 by Radha Gupta RN  Outcome: Ongoing, Progressing  Intervention: Identify and Manage Fall Risk  Recent Flowsheet Documentation  Taken 4/16/2024 0400 by Radha Gupta RN  Safety Promotion/Fall Prevention: activity supervised  Taken 4/16/2024 0200 by Radha Gupta RN  Safety Promotion/Fall Prevention: activity supervised  Taken 4/16/2024 0000 by Radha Gupta RN  Safety Promotion/Fall Prevention: activity supervised  Taken 4/15/2024 2200 by Radha Gupta RN  Safety Promotion/Fall Prevention: activity supervised  Taken 4/15/2024 2000 by Radha Gupta RN  Safety Promotion/Fall Prevention: activity supervised  Intervention: Prevent Skin Injury  Recent Flowsheet Documentation  Taken 4/16/2024 0400 by Radha Gupta RN  Body Position: position changed independently  Taken 4/16/2024 0200 by Radha Gupta RN  Body Position: position changed independently  Taken 4/16/2024 0000 by Radha Gupta RN  Body Position: position changed independently  Taken 4/15/2024 2200 by Radha Gupta RN  Body Position: position changed independently  Taken 4/15/2024 2000 by Radha Gupta RN  Body Position: position changed independently  Intervention: Prevent and Manage VTE (Venous Thromboembolism) Risk  Recent Flowsheet Documentation  Taken 4/16/2024 0400 by Radha Gupta RN  Activity Management: activity encouraged  Taken 4/16/2024 0200 by Radha Gupta  RN  Activity Management: activity encouraged  Taken 4/16/2024 0000 by Radha Gupta RN  Activity Management: activity encouraged  Taken 4/15/2024 2200 by Radha Gupta RN  Activity Management: activity encouraged  Taken 4/15/2024 2000 by Radha Gupta RN  Activity Management: activity encouraged  Intervention: Prevent Infection  Recent Flowsheet Documentation  Taken 4/16/2024 0400 by Radha Gupta RN  Infection Prevention: hand hygiene promoted  Taken 4/16/2024 0200 by Radha Gupta RN  Infection Prevention: hand hygiene promoted  Taken 4/16/2024 0000 by Radha Gupta RN  Infection Prevention: hand hygiene promoted  Taken 4/15/2024 2200 by Radha Gupta RN  Infection Prevention: hand hygiene promoted  Taken 4/15/2024 2000 by Radha Gupta RN  Infection Prevention: hand hygiene promoted  Goal: Optimal Comfort and Wellbeing  4/16/2024 0545 by Radha Gupta RN  Outcome: Ongoing, Progressing  4/16/2024 0543 by Radha Gupta RN  Outcome: Ongoing, Progressing  Goal: Readiness for Transition of Care  4/16/2024 0545 by Radha Gupta RN  Outcome: Ongoing, Progressing  4/16/2024 0543 by Radha Gupta RN  Outcome: Ongoing, Progressing     Problem: Activity Intolerance (Cardiovascular Surgery)  Goal: Improved Activity Tolerance  4/16/2024 0545 by Radha Gupta RN  Outcome: Ongoing, Progressing  4/16/2024 0543 by Radha Gupta RN  Outcome: Ongoing, Progressing     Problem: Adjustment to Surgery (Cardiovascular Surgery)  Goal: Optimal Coping with Heart Surgery  4/16/2024 0545 by Radha Gupta RN  Outcome: Ongoing, Progressing  4/16/2024 0543 by Radha Gupta RN  Outcome: Ongoing, Progressing     Problem: Bleeding (Cardiovascular Surgery)  Goal: Bleeding (Cardiovascular Surgery)  4/16/2024 0545 by Radha Gupta RN  Outcome: Ongoing, Progressing  4/16/2024 0543 by Radha Gupta RN  Outcome: Ongoing, Progressing     Problem: Bowel Motility Impaired  (Cardiovascular Surgery)  Goal: Effective Bowel Elimination (Cardiovascular Surgery)  4/16/2024 0545 by Radha Gupta RN  Outcome: Ongoing, Progressing  4/16/2024 0543 by Radha Gupta RN  Outcome: Ongoing, Progressing     Problem: Cardiac Function Impaired (Cardiovascular Surgery)  Goal: Effective Cardiac Function  4/16/2024 0545 by Radha Gupta RN  Outcome: Ongoing, Progressing  4/16/2024 0543 by Radha Gupta RN  Outcome: Ongoing, Progressing     Problem: Cerebral Tissue Perfusion (Cardiovascular Surgery)  Goal: Optimal Cerebral Tissue Perfusion (Cardiovascular Surgery)  4/16/2024 0545 by Radha Gupta RN  Outcome: Ongoing, Progressing  4/16/2024 0543 by Radha Gupta RN  Outcome: Ongoing, Progressing  Intervention: Protect and Optimize Cerebral Perfusion  Recent Flowsheet Documentation  Taken 4/16/2024 0400 by Radha Gupta RN  Head of Bed (HOB) Positioning: HOB elevated  Taken 4/16/2024 0200 by Radha Gupta RN  Head of Bed (HOB) Positioning: HOB elevated  Taken 4/16/2024 0000 by Radha uGpta RN  Head of Bed (HOB) Positioning: HOB elevated  Taken 4/15/2024 2200 by Radha Gupta RN  Head of Bed (HOB) Positioning: HOB elevated  Taken 4/15/2024 2000 by Radha Gupta RN  Head of Bed (HOB) Positioning: HOB elevated     Problem: Fluid and Electrolyte Imbalance (Cardiovascular Surgery)  Goal: Fluid and Electrolyte Balance (Cardiovascular Surgery)  4/16/2024 0545 by Radha Gupta RN  Outcome: Ongoing, Progressing  4/16/2024 0543 by Radha Gupta RN  Outcome: Ongoing, Progressing     Problem: Glycemic Control Impaired (Cardiovascular Surgery)  Goal: Blood Glucose Level Within Targeted Range (Cardiovascular Surgery)  4/16/2024 0545 by Radha Gupta RN  Outcome: Ongoing, Progressing  4/16/2024 0543 by Radha Gupta RN  Outcome: Ongoing, Progressing     Problem: Infection (Cardiovascular Surgery)  Goal: Absence of Infection Signs and Symptoms  4/16/2024 0545 by  Radha Gupta RN  Outcome: Ongoing, Progressing  4/16/2024 0543 by Radha Gupta RN  Outcome: Ongoing, Progressing  Intervention: Prevent or Manage Infection  Recent Flowsheet Documentation  Taken 4/16/2024 0400 by Radha Gupta RN  Infection Prevention: hand hygiene promoted  Taken 4/16/2024 0200 by Radha Gupta RN  Infection Prevention: hand hygiene promoted  Taken 4/16/2024 0000 by Radha Gupta RN  Infection Prevention: hand hygiene promoted  Taken 4/15/2024 2200 by Radha Gupta RN  Infection Prevention: hand hygiene promoted  Taken 4/15/2024 2000 by Radha Gupta RN  Infection Prevention: hand hygiene promoted     Problem: Ongoing Anesthesia Effects (Cardiovascular Surgery)  Goal: Anesthesia/Sedation Recovery  4/16/2024 0545 by Radha Gupta RN  Outcome: Ongoing, Progressing  4/16/2024 0543 by Radha Gupta RN  Outcome: Ongoing, Progressing  Intervention: Optimize Anesthesia Recovery  Recent Flowsheet Documentation  Taken 4/16/2024 0400 by Radha Gupta RN  Safety Promotion/Fall Prevention: activity supervised  Taken 4/16/2024 0200 by Radha Gupta RN  Safety Promotion/Fall Prevention: activity supervised  Taken 4/16/2024 0000 by Radha Gupta RN  Safety Promotion/Fall Prevention: activity supervised  Taken 4/15/2024 2200 by Radha Gupta RN  Safety Promotion/Fall Prevention: activity supervised  Taken 4/15/2024 2000 by Radha Gupta RN  Safety Promotion/Fall Prevention: activity supervised     Problem: Pain (Cardiovascular Surgery)  Goal: Acceptable Pain Control  4/16/2024 0545 by Radha Gupta RN  Outcome: Ongoing, Progressing  4/16/2024 0543 by Radha Gupta RN  Outcome: Ongoing, Progressing     Problem: Postoperative Nausea and Vomiting (Cardiovascular Surgery)  Goal: Nausea and Vomiting Relief (Cardiovascular Surgery)  4/16/2024 0545 by Radha Gupta RN  Outcome: Ongoing, Progressing  4/16/2024 0543 by Radha Gupta RN  Outcome:  Ongoing, Progressing     Problem: Postoperative Urinary Retention (Cardiovascular Surgery)  Goal: Effective Urinary Elimination (Cardiovascular Surgery)  4/16/2024 0545 by Radha Gupta RN  Outcome: Ongoing, Progressing  4/16/2024 0543 by Radha Gupta RN  Outcome: Ongoing, Progressing     Problem: Respiratory Compromise (Cardiovascular Surgery)  Goal: Effective Oxygenation and Ventilation (Cardiovascular Surgery)  4/16/2024 0545 by Radha Gupta RN  Outcome: Ongoing, Progressing  4/16/2024 0543 by Radha Gupta RN  Outcome: Ongoing, Progressing     Problem: Asthma Comorbidity  Goal: Maintenance of Asthma Control  4/16/2024 0545 by Radha Gupta RN  Outcome: Ongoing, Progressing  4/16/2024 0543 by Radha Gupta RN  Outcome: Ongoing, Progressing  Intervention: Maintain Asthma Symptom Control  Recent Flowsheet Documentation  Taken 4/16/2024 0400 by Radha Gupta RN  Medication Review/Management: medications reviewed  Taken 4/16/2024 0200 by Radha Gupta RN  Medication Review/Management: medications reviewed  Taken 4/16/2024 0000 by Radha Gupta RN  Medication Review/Management: medications reviewed  Taken 4/15/2024 2200 by Radha Gupta RN  Medication Review/Management: medications reviewed  Taken 4/15/2024 2000 by Radha Gupta RN  Medication Review/Management: medications reviewed     Problem: Behavioral Health Comorbidity  Goal: Maintenance of Behavioral Health Symptom Control  4/16/2024 0545 by Radha Gupta RN  Outcome: Ongoing, Progressing  4/16/2024 0543 by Radha Gupta RN  Outcome: Ongoing, Progressing  Intervention: Maintain Behavioral Health Symptom Control  Recent Flowsheet Documentation  Taken 4/16/2024 0400 by Radha Gupta RN  Medication Review/Management: medications reviewed  Taken 4/16/2024 0200 by Radha Gupta RN  Medication Review/Management: medications reviewed  Taken 4/16/2024 0000 by Radha Gupta RN  Medication  Review/Management: medications reviewed  Taken 4/15/2024 2200 by Radha Gupta RN  Medication Review/Management: medications reviewed  Taken 4/15/2024 2000 by Radha Gupta RN  Medication Review/Management: medications reviewed     Problem: COPD (Chronic Obstructive Pulmonary Disease) Comorbidity  Goal: Maintenance of COPD Symptom Control  4/16/2024 0545 by Radha Gupta RN  Outcome: Ongoing, Progressing  4/16/2024 0543 by Radha Gupta RN  Outcome: Ongoing, Progressing  Intervention: Maintain COPD-Symptom Control  Recent Flowsheet Documentation  Taken 4/16/2024 0400 by Radha Gupta RN  Medication Review/Management: medications reviewed  Taken 4/16/2024 0200 by Radha Gupta RN  Medication Review/Management: medications reviewed  Taken 4/16/2024 0000 by Radha Gupta RN  Medication Review/Management: medications reviewed  Taken 4/15/2024 2200 by Radha Gupta RN  Medication Review/Management: medications reviewed  Taken 4/15/2024 2000 by Radha Gupta RN  Medication Review/Management: medications reviewed     Problem: Diabetes Comorbidity  Goal: Blood Glucose Level Within Targeted Range  4/16/2024 0545 by Radha Gupta RN  Outcome: Ongoing, Progressing  4/16/2024 0543 by Radha Gupta RN  Outcome: Ongoing, Progressing     Problem: Heart Failure Comorbidity  Goal: Maintenance of Heart Failure Symptom Control  4/16/2024 0545 by Radha Gupta RN  Outcome: Ongoing, Progressing  4/16/2024 0543 by Radha Gupta RN  Outcome: Ongoing, Progressing  Intervention: Maintain Heart Failure-Management  Recent Flowsheet Documentation  Taken 4/16/2024 0400 by Radha Gupta RN  Medication Review/Management: medications reviewed  Taken 4/16/2024 0200 by Radha Gupta RN  Medication Review/Management: medications reviewed  Taken 4/16/2024 0000 by Radha Gupta RN  Medication Review/Management: medications reviewed  Taken 4/15/2024 2200 by Radha Gupta RN  Medication  Review/Management: medications reviewed  Taken 4/15/2024 2000 by Radha Gupta RN  Medication Review/Management: medications reviewed     Problem: Hypertension Comorbidity  Goal: Blood Pressure in Desired Range  4/16/2024 0545 by Radha Gupta RN  Outcome: Ongoing, Progressing  4/16/2024 0543 by Radha Gupta RN  Outcome: Ongoing, Progressing  Intervention: Maintain Blood Pressure Management  Recent Flowsheet Documentation  Taken 4/16/2024 0400 by Radha Gupta RN  Medication Review/Management: medications reviewed  Taken 4/16/2024 0200 by Radha Gupta RN  Medication Review/Management: medications reviewed  Taken 4/16/2024 0000 by Radha Gupta RN  Medication Review/Management: medications reviewed  Taken 4/15/2024 2200 by Radha Gupta RN  Medication Review/Management: medications reviewed  Taken 4/15/2024 2000 by Radha Gupta RN  Medication Review/Management: medications reviewed     Problem: Obstructive Sleep Apnea Risk or Actual Comorbidity Management  Goal: Unobstructed Breathing During Sleep  4/16/2024 0545 by Radha Gupta RN  Outcome: Ongoing, Progressing  4/16/2024 0543 by Radha Gupta RN  Outcome: Ongoing, Progressing     Problem: Osteoarthritis Comorbidity  Goal: Maintenance of Osteoarthritis Symptom Control  4/16/2024 0545 by Radha Gupta RN  Outcome: Ongoing, Progressing  4/16/2024 0543 by Radha Gupta RN  Outcome: Ongoing, Progressing  Intervention: Maintain Osteoarthritis Symptom Control  Recent Flowsheet Documentation  Taken 4/16/2024 0400 by Radha Gupta RN  Activity Management: activity encouraged  Medication Review/Management: medications reviewed  Taken 4/16/2024 0200 by Radha Gupta RN  Activity Management: activity encouraged  Medication Review/Management: medications reviewed  Taken 4/16/2024 0000 by Radha Gupta RN  Activity Management: activity encouraged  Medication Review/Management: medications reviewed  Taken 4/15/2024  2200 by Radha Gupta RN  Activity Management: activity encouraged  Medication Review/Management: medications reviewed  Taken 4/15/2024 2000 by Radha Gupta RN  Activity Management: activity encouraged  Medication Review/Management: medications reviewed     Problem: Pain Chronic (Persistent) (Comorbidity Management)  Goal: Acceptable Pain Control and Functional Ability  4/16/2024 0545 by Radha Gupta RN  Outcome: Ongoing, Progressing  4/16/2024 0543 by Radha Gupta RN  Outcome: Ongoing, Progressing  Intervention: Manage Persistent Pain  Recent Flowsheet Documentation  Taken 4/16/2024 0400 by Radha Gupta RN  Medication Review/Management: medications reviewed  Taken 4/16/2024 0200 by Radha Gupta RN  Medication Review/Management: medications reviewed  Taken 4/16/2024 0000 by Radha Gupta RN  Medication Review/Management: medications reviewed  Taken 4/15/2024 2200 by Radha Gupta RN  Medication Review/Management: medications reviewed  Taken 4/15/2024 2000 by Radha Gupta RN  Medication Review/Management: medications reviewed     Problem: Seizure Disorder Comorbidity  Goal: Maintenance of Seizure Control  4/16/2024 0545 by Radha Gupta RN  Outcome: Ongoing, Progressing  4/16/2024 0543 by Radha Gupta RN  Outcome: Ongoing, Progressing     Problem: Communication Impairment (Mechanical Ventilation, Invasive)  Goal: Effective Communication  4/16/2024 0545 by Radha Gupta RN  Outcome: Ongoing, Progressing  4/16/2024 0543 by Radha Gupta RN  Outcome: Ongoing, Progressing     Problem: Device-Related Complication Risk (Mechanical Ventilation, Invasive)  Goal: Optimal Device Function  4/16/2024 0545 by Radha Gupta RN  Outcome: Ongoing, Progressing  4/16/2024 0543 by Radha Gupta RN  Outcome: Ongoing, Progressing     Problem: Inability to Wean (Mechanical Ventilation, Invasive)  Goal: Mechanical Ventilation Liberation  4/16/2024 0545 by Radha Gupta  RN  Outcome: Ongoing, Progressing  4/16/2024 0543 by Radha Gupta RN  Outcome: Ongoing, Progressing  Intervention: Promote Extubation and Mechanical Ventilation Liberation  Recent Flowsheet Documentation  Taken 4/16/2024 0400 by Radha Gupta RN  Medication Review/Management: medications reviewed  Taken 4/16/2024 0200 by Radha Gupta RN  Medication Review/Management: medications reviewed  Taken 4/16/2024 0000 by Radha Gupta RN  Medication Review/Management: medications reviewed  Taken 4/15/2024 2200 by Radha Gupta RN  Medication Review/Management: medications reviewed  Taken 4/15/2024 2000 by Radha Gupta RN  Medication Review/Management: medications reviewed     Problem: Nutrition Impairment (Mechanical Ventilation, Invasive)  Goal: Optimal Nutrition Delivery  4/16/2024 0545 by Radha Gupta RN  Outcome: Ongoing, Progressing  4/16/2024 0543 by Radha Gupta RN  Outcome: Ongoing, Progressing     Problem: Skin and Tissue Injury (Mechanical Ventilation, Invasive)  Goal: Absence of Device-Related Skin and Tissue Injury  4/16/2024 0545 by Radha Gupta RN  Outcome: Ongoing, Progressing  4/16/2024 0543 by Radha Gupta RN  Outcome: Ongoing, Progressing     Problem: Ventilator-Induced Lung Injury (Mechanical Ventilation, Invasive)  Goal: Absence of Ventilator-Induced Lung Injury  4/16/2024 0545 by Radha Gupta RN  Outcome: Ongoing, Progressing  4/16/2024 0543 by Radha Gupta RN  Outcome: Ongoing, Progressing  Intervention: Prevent Ventilator-Associated Pneumonia  Recent Flowsheet Documentation  Taken 4/16/2024 0400 by Radha Gupta RN  Head of Bed (HOB) Positioning: HOB elevated  Taken 4/16/2024 0200 by Radha Gupta RN  Head of Bed (HOB) Positioning: HOB elevated  Taken 4/16/2024 0000 by Radha Gupta RN  Head of Bed (HOB) Positioning: HOB elevated  Taken 4/15/2024 2200 by Radha Gupta RN  Head of Bed (HOB) Positioning: HOB elevated  Taken  4/15/2024 2000 by Radha Gupta RN  Head of Bed (HOB) Positioning: HOB elevated     Problem: Skin Injury Risk Increased  Goal: Skin Health and Integrity  4/16/2024 0545 by Radha Gupta RN  Outcome: Ongoing, Progressing  4/16/2024 0543 by Radha Gupta RN  Outcome: Ongoing, Progressing  Intervention: Optimize Skin Protection  Recent Flowsheet Documentation  Taken 4/16/2024 0400 by Radha Gupta RN  Head of Bed (HOB) Positioning: HOB elevated  Taken 4/16/2024 0200 by Radha Gupta RN  Head of Bed (HOB) Positioning: HOB elevated  Taken 4/16/2024 0000 by Radha Gupta RN  Head of Bed (HOB) Positioning: HOB elevated  Taken 4/15/2024 2200 by Radha Gupta RN  Head of Bed (HOB) Positioning: HOB elevated  Taken 4/15/2024 2000 by Radha Gupta RN  Head of Bed (HOB) Positioning: HOB elevated     Problem: Fall Injury Risk  Goal: Absence of Fall and Fall-Related Injury  4/16/2024 0545 by Radha Gupta RN  Outcome: Ongoing, Progressing  4/16/2024 0543 by Radha Gupta RN  Outcome: Ongoing, Progressing  Intervention: Identify and Manage Contributors  Recent Flowsheet Documentation  Taken 4/16/2024 0400 by Radha Gupta RN  Medication Review/Management: medications reviewed  Taken 4/16/2024 0200 by Radha Gupta RN  Medication Review/Management: medications reviewed  Taken 4/16/2024 0000 by Radha Gupta RN  Medication Review/Management: medications reviewed  Taken 4/15/2024 2200 by Radha Gupta RN  Medication Review/Management: medications reviewed  Taken 4/15/2024 2000 by Radha Gupta RN  Medication Review/Management: medications reviewed  Intervention: Promote Injury-Free Environment  Recent Flowsheet Documentation  Taken 4/16/2024 0400 by Radha Gupta RN  Safety Promotion/Fall Prevention: activity supervised  Taken 4/16/2024 0200 by Radha Gupta RN  Safety Promotion/Fall Prevention: activity supervised  Taken 4/16/2024 0000 by Radha Gupta, BUNNY  Safety  Promotion/Fall Prevention: activity supervised  Taken 4/15/2024 2200 by Radha Gupta, RN  Safety Promotion/Fall Prevention: activity supervised  Taken 4/15/2024 2000 by Radha Gupta, RN  Safety Promotion/Fall Prevention: activity supervised   Goal Outcome Evaluation:

## 2024-04-16 NOTE — PLAN OF CARE
Goal Outcome Evaluation:     Patient remains in the bed, chest rise and fall noted. Patient calm and cooperative. Patient appears in no distress, call bell within reach. Bed locked and the lowered to the floor.      Problem: Adult Inpatient Plan of Care  Goal: Plan of Care Review  Outcome: Ongoing, Progressing  Goal: Patient-Specific Goal (Individualized)  Outcome: Ongoing, Progressing  Goal: Absence of Hospital-Acquired Illness or Injury  Outcome: Ongoing, Progressing  Intervention: Identify and Manage Fall Risk  Recent Flowsheet Documentation  Taken 4/16/2024 0400 by Radha Gupta RN  Safety Promotion/Fall Prevention: activity supervised  Taken 4/16/2024 0200 by Radha Gupta RN  Safety Promotion/Fall Prevention: activity supervised  Taken 4/16/2024 0000 by Radha Gupta RN  Safety Promotion/Fall Prevention: activity supervised  Taken 4/15/2024 2200 by Radha Gupta RN  Safety Promotion/Fall Prevention: activity supervised  Taken 4/15/2024 2000 by Radha Gupta RN  Safety Promotion/Fall Prevention: activity supervised  Intervention: Prevent Skin Injury  Recent Flowsheet Documentation  Taken 4/16/2024 0400 by Radha Gupta RN  Body Position: position changed independently  Taken 4/16/2024 0200 by Radha Gupta RN  Body Position: position changed independently  Taken 4/16/2024 0000 by Radha Gupta RN  Body Position: position changed independently  Taken 4/15/2024 2200 by Radha Gupta RN  Body Position: position changed independently  Taken 4/15/2024 2000 by Radha Gupta RN  Body Position: position changed independently  Intervention: Prevent and Manage VTE (Venous Thromboembolism) Risk  Recent Flowsheet Documentation  Taken 4/16/2024 0400 by Radha Gupta RN  Activity Management: activity encouraged  Taken 4/16/2024 0200 by Radha Gupta RN  Activity Management: activity encouraged  Taken 4/16/2024 0000 by Radha Gupta RN  Activity Management: activity  encouraged  Taken 4/15/2024 2200 by Radha Gupta, RN  Activity Management: activity encouraged  Taken 4/15/2024 2000 by Radha Gupta RN  Activity Management: activity encouraged  Intervention: Prevent Infection  Recent Flowsheet Documentation  Taken 4/16/2024 0400 by Radha Gupta RN  Infection Prevention: hand hygiene promoted  Taken 4/16/2024 0200 by Radha Gupta RN  Infection Prevention: hand hygiene promoted  Taken 4/16/2024 0000 by Radha Gupta RN  Infection Prevention: hand hygiene promoted  Taken 4/15/2024 2200 by Radha Gupta RN  Infection Prevention: hand hygiene promoted  Taken 4/15/2024 2000 by Radha Gupta RN  Infection Prevention: hand hygiene promoted  Goal: Optimal Comfort and Wellbeing  Outcome: Ongoing, Progressing  Goal: Readiness for Transition of Care  Outcome: Ongoing, Progressing     Problem: Activity Intolerance (Cardiovascular Surgery)  Goal: Improved Activity Tolerance  Outcome: Ongoing, Progressing     Problem: Adjustment to Surgery (Cardiovascular Surgery)  Goal: Optimal Coping with Heart Surgery  Outcome: Ongoing, Progressing     Problem: Bleeding (Cardiovascular Surgery)  Goal: Bleeding (Cardiovascular Surgery)  Outcome: Ongoing, Progressing     Problem: Bowel Motility Impaired (Cardiovascular Surgery)  Goal: Effective Bowel Elimination (Cardiovascular Surgery)  Outcome: Ongoing, Progressing     Problem: Cardiac Function Impaired (Cardiovascular Surgery)  Goal: Effective Cardiac Function  Outcome: Ongoing, Progressing     Problem: Cerebral Tissue Perfusion (Cardiovascular Surgery)  Goal: Optimal Cerebral Tissue Perfusion (Cardiovascular Surgery)  Outcome: Ongoing, Progressing  Intervention: Protect and Optimize Cerebral Perfusion  Recent Flowsheet Documentation  Taken 4/16/2024 0400 by Radha Gupta RN  Head of Bed (HOB) Positioning: HOB elevated  Taken 4/16/2024 0200 by Radha Gupta RN  Head of Bed (HOB) Positioning: HOB elevated  Taken  4/16/2024 0000 by Radha Gupta RN  Head of Bed (HOB) Positioning: HOB elevated  Taken 4/15/2024 2200 by Radha Gupta RN  Head of Bed (HOB) Positioning: HOB elevated  Taken 4/15/2024 2000 by Radha Gupta RN  Head of Bed (HOB) Positioning: HOB elevated     Problem: Fluid and Electrolyte Imbalance (Cardiovascular Surgery)  Goal: Fluid and Electrolyte Balance (Cardiovascular Surgery)  Outcome: Ongoing, Progressing     Problem: Glycemic Control Impaired (Cardiovascular Surgery)  Goal: Blood Glucose Level Within Targeted Range (Cardiovascular Surgery)  Outcome: Ongoing, Progressing     Problem: Infection (Cardiovascular Surgery)  Goal: Absence of Infection Signs and Symptoms  Outcome: Ongoing, Progressing  Intervention: Prevent or Manage Infection  Recent Flowsheet Documentation  Taken 4/16/2024 0400 by Radha Gupta RN  Infection Prevention: hand hygiene promoted  Taken 4/16/2024 0200 by Radha Gupta RN  Infection Prevention: hand hygiene promoted  Taken 4/16/2024 0000 by Radha Gupta RN  Infection Prevention: hand hygiene promoted  Taken 4/15/2024 2200 by Radha Gupta RN  Infection Prevention: hand hygiene promoted  Taken 4/15/2024 2000 by Radha Gupta RN  Infection Prevention: hand hygiene promoted     Problem: Ongoing Anesthesia Effects (Cardiovascular Surgery)  Goal: Anesthesia/Sedation Recovery  Outcome: Ongoing, Progressing  Intervention: Optimize Anesthesia Recovery  Recent Flowsheet Documentation  Taken 4/16/2024 0400 by Radha Gupta RN  Safety Promotion/Fall Prevention: activity supervised  Taken 4/16/2024 0200 by Radha Gupta RN  Safety Promotion/Fall Prevention: activity supervised  Taken 4/16/2024 0000 by Radha Gupta RN  Safety Promotion/Fall Prevention: activity supervised  Taken 4/15/2024 2200 by Radha Gupta RN  Safety Promotion/Fall Prevention: activity supervised  Taken 4/15/2024 2000 by Radha Gupta RN  Safety Promotion/Fall Prevention:  activity supervised     Problem: Pain (Cardiovascular Surgery)  Goal: Acceptable Pain Control  Outcome: Ongoing, Progressing     Problem: Postoperative Nausea and Vomiting (Cardiovascular Surgery)  Goal: Nausea and Vomiting Relief (Cardiovascular Surgery)  Outcome: Ongoing, Progressing     Problem: Postoperative Urinary Retention (Cardiovascular Surgery)  Goal: Effective Urinary Elimination (Cardiovascular Surgery)  Outcome: Ongoing, Progressing     Problem: Respiratory Compromise (Cardiovascular Surgery)  Goal: Effective Oxygenation and Ventilation (Cardiovascular Surgery)  Outcome: Ongoing, Progressing     Problem: Asthma Comorbidity  Goal: Maintenance of Asthma Control  Outcome: Ongoing, Progressing  Intervention: Maintain Asthma Symptom Control  Recent Flowsheet Documentation  Taken 4/16/2024 0400 by Radha Gupta RN  Medication Review/Management: medications reviewed  Taken 4/16/2024 0200 by Radha Gupta RN  Medication Review/Management: medications reviewed  Taken 4/16/2024 0000 by Radha Gupta RN  Medication Review/Management: medications reviewed  Taken 4/15/2024 2200 by Radha Gupta RN  Medication Review/Management: medications reviewed  Taken 4/15/2024 2000 by Radha Gupta RN  Medication Review/Management: medications reviewed     Problem: Behavioral Health Comorbidity  Goal: Maintenance of Behavioral Health Symptom Control  Outcome: Ongoing, Progressing  Intervention: Maintain Behavioral Health Symptom Control  Recent Flowsheet Documentation  Taken 4/16/2024 0400 by Radha Gupta RN  Medication Review/Management: medications reviewed  Taken 4/16/2024 0200 by Radha Gupta RN  Medication Review/Management: medications reviewed  Taken 4/16/2024 0000 by Radha Gupta RN  Medication Review/Management: medications reviewed  Taken 4/15/2024 2200 by Radha Gupta RN  Medication Review/Management: medications reviewed  Taken 4/15/2024 2000 by Radha Gupta  RN  Medication Review/Management: medications reviewed     Problem: COPD (Chronic Obstructive Pulmonary Disease) Comorbidity  Goal: Maintenance of COPD Symptom Control  Outcome: Ongoing, Progressing  Intervention: Maintain COPD-Symptom Control  Recent Flowsheet Documentation  Taken 4/16/2024 0400 by Radha Gupta RN  Medication Review/Management: medications reviewed  Taken 4/16/2024 0200 by Radha Gupta RN  Medication Review/Management: medications reviewed  Taken 4/16/2024 0000 by Radha Gupta RN  Medication Review/Management: medications reviewed  Taken 4/15/2024 2200 by Radha Gupta RN  Medication Review/Management: medications reviewed  Taken 4/15/2024 2000 by Radha Gupta RN  Medication Review/Management: medications reviewed     Problem: Diabetes Comorbidity  Goal: Blood Glucose Level Within Targeted Range  Outcome: Ongoing, Progressing     Problem: Heart Failure Comorbidity  Goal: Maintenance of Heart Failure Symptom Control  Outcome: Ongoing, Progressing  Intervention: Maintain Heart Failure-Management  Recent Flowsheet Documentation  Taken 4/16/2024 0400 by Radha Gupta RN  Medication Review/Management: medications reviewed  Taken 4/16/2024 0200 by Radha Gupta RN  Medication Review/Management: medications reviewed  Taken 4/16/2024 0000 by Radha Gupta RN  Medication Review/Management: medications reviewed  Taken 4/15/2024 2200 by Radha Gupta RN  Medication Review/Management: medications reviewed  Taken 4/15/2024 2000 by Radha Gupta RN  Medication Review/Management: medications reviewed     Problem: Hypertension Comorbidity  Goal: Blood Pressure in Desired Range  Outcome: Ongoing, Progressing  Intervention: Maintain Blood Pressure Management  Recent Flowsheet Documentation  Taken 4/16/2024 0400 by Radha Gupta RN  Medication Review/Management: medications reviewed  Taken 4/16/2024 0200 by Radha Gupta RN  Medication Review/Management: medications  reviewed  Taken 4/16/2024 0000 by Radha Gupta RN  Medication Review/Management: medications reviewed  Taken 4/15/2024 2200 by Radha Gupta RN  Medication Review/Management: medications reviewed  Taken 4/15/2024 2000 by Radha Gupta RN  Medication Review/Management: medications reviewed     Problem: Obstructive Sleep Apnea Risk or Actual Comorbidity Management  Goal: Unobstructed Breathing During Sleep  Outcome: Ongoing, Progressing     Problem: Osteoarthritis Comorbidity  Goal: Maintenance of Osteoarthritis Symptom Control  Outcome: Ongoing, Progressing  Intervention: Maintain Osteoarthritis Symptom Control  Recent Flowsheet Documentation  Taken 4/16/2024 0400 by Radha Gupta RN  Activity Management: activity encouraged  Medication Review/Management: medications reviewed  Taken 4/16/2024 0200 by Radha Gupta RN  Activity Management: activity encouraged  Medication Review/Management: medications reviewed  Taken 4/16/2024 0000 by Radha Gupta RN  Activity Management: activity encouraged  Medication Review/Management: medications reviewed  Taken 4/15/2024 2200 by Radha Gupta RN  Activity Management: activity encouraged  Medication Review/Management: medications reviewed  Taken 4/15/2024 2000 by Radha Gupta RN  Activity Management: activity encouraged  Medication Review/Management: medications reviewed     Problem: Pain Chronic (Persistent) (Comorbidity Management)  Goal: Acceptable Pain Control and Functional Ability  Outcome: Ongoing, Progressing  Intervention: Manage Persistent Pain  Recent Flowsheet Documentation  Taken 4/16/2024 0400 by Radha Gupta RN  Medication Review/Management: medications reviewed  Taken 4/16/2024 0200 by Radha Gupta RN  Medication Review/Management: medications reviewed  Taken 4/16/2024 0000 by Radha Gupta RN  Medication Review/Management: medications reviewed  Taken 4/15/2024 2200 by Radha Gupta, RN  Medication  Review/Management: medications reviewed  Taken 4/15/2024 2000 by Radha Gupta RN  Medication Review/Management: medications reviewed     Problem: Seizure Disorder Comorbidity  Goal: Maintenance of Seizure Control  Outcome: Ongoing, Progressing     Problem: Communication Impairment (Mechanical Ventilation, Invasive)  Goal: Effective Communication  Outcome: Ongoing, Progressing     Problem: Device-Related Complication Risk (Mechanical Ventilation, Invasive)  Goal: Optimal Device Function  Outcome: Ongoing, Progressing     Problem: Inability to Wean (Mechanical Ventilation, Invasive)  Goal: Mechanical Ventilation Liberation  Outcome: Ongoing, Progressing  Intervention: Promote Extubation and Mechanical Ventilation Liberation  Recent Flowsheet Documentation  Taken 4/16/2024 0400 by Radha Gupta RN  Medication Review/Management: medications reviewed  Taken 4/16/2024 0200 by Radha Gupta RN  Medication Review/Management: medications reviewed  Taken 4/16/2024 0000 by Radha Gupta RN  Medication Review/Management: medications reviewed  Taken 4/15/2024 2200 by Radha Gupta RN  Medication Review/Management: medications reviewed  Taken 4/15/2024 2000 by Radha Gupta RN  Medication Review/Management: medications reviewed     Problem: Nutrition Impairment (Mechanical Ventilation, Invasive)  Goal: Optimal Nutrition Delivery  Outcome: Ongoing, Progressing     Problem: Skin and Tissue Injury (Mechanical Ventilation, Invasive)  Goal: Absence of Device-Related Skin and Tissue Injury  Outcome: Ongoing, Progressing     Problem: Ventilator-Induced Lung Injury (Mechanical Ventilation, Invasive)  Goal: Absence of Ventilator-Induced Lung Injury  Outcome: Ongoing, Progressing  Intervention: Prevent Ventilator-Associated Pneumonia  Recent Flowsheet Documentation  Taken 4/16/2024 0400 by Radha Gupta, RN  Head of Bed (HOB) Positioning: HOB elevated  Taken 4/16/2024 0200 by Radha Gupta, RN  Head of Bed  (HOB) Positioning: HOB elevated  Taken 4/16/2024 0000 by Radha Gupta RN  Head of Bed (HOB) Positioning: HOB elevated  Taken 4/15/2024 2200 by Radha Gupta RN  Head of Bed (HOB) Positioning: HOB elevated  Taken 4/15/2024 2000 by Radha Gupta RN  Head of Bed (HOB) Positioning: HOB elevated     Problem: Skin Injury Risk Increased  Goal: Skin Health and Integrity  Outcome: Ongoing, Progressing  Intervention: Optimize Skin Protection  Recent Flowsheet Documentation  Taken 4/16/2024 0400 by Radha Gupta RN  Head of Bed (HOB) Positioning: HOB elevated  Taken 4/16/2024 0200 by Radha Gupta RN  Head of Bed (HOB) Positioning: HOB elevated  Taken 4/16/2024 0000 by Radha Gupta RN  Head of Bed (HOB) Positioning: HOB elevated  Taken 4/15/2024 2200 by Radha Gupta RN  Head of Bed (HOB) Positioning: HOB elevated  Taken 4/15/2024 2000 by Radha Gupta RN  Head of Bed (HOB) Positioning: HOB elevated     Problem: Fall Injury Risk  Goal: Absence of Fall and Fall-Related Injury  Outcome: Ongoing, Progressing  Intervention: Identify and Manage Contributors  Recent Flowsheet Documentation  Taken 4/16/2024 0400 by Radha Gupta RN  Medication Review/Management: medications reviewed  Taken 4/16/2024 0200 by Radha Gupta RN  Medication Review/Management: medications reviewed  Taken 4/16/2024 0000 by Radha Gupta RN  Medication Review/Management: medications reviewed  Taken 4/15/2024 2200 by Radha Gupta RN  Medication Review/Management: medications reviewed  Taken 4/15/2024 2000 by Radha Gupta RN  Medication Review/Management: medications reviewed  Intervention: Promote Injury-Free Environment  Recent Flowsheet Documentation  Taken 4/16/2024 0400 by Radha Gupta RN  Safety Promotion/Fall Prevention: activity supervised  Taken 4/16/2024 0200 by Radha Gupta RN  Safety Promotion/Fall Prevention: activity supervised  Taken 4/16/2024 0000 by Radha Gupta RN  Safety  Promotion/Fall Prevention: activity supervised  Taken 4/15/2024 2200 by Radha Gupta, RN  Safety Promotion/Fall Prevention: activity supervised  Taken 4/15/2024 2000 by Radha Gupta, RN  Safety Promotion/Fall Prevention: activity supervised

## 2024-04-16 NOTE — PLAN OF CARE
Goal Outcome Evaluation:  Plan of Care Reviewed With: patient        Progress: improving  Outcome Evaluation: Pt able to ambulate 430 feet SBA with FWx, progressed to no AD for 470 feet SBA with good stability noted. Educated on sternal precautions with STS. Pt continued to present below baseline function warranting IPPT POC. d/c rec home with assist when medically appropriate.      Anticipated Discharge Disposition (PT): home with assist

## 2024-04-16 NOTE — PROGRESS NOTES
Khalif Joshua  [id number]  1950   LOS: 10 days   Patient Care Team:  Renee Liriano MD as PCP - General (Internal Medicine)    Chief Complaint: Follow-up on atrial fibrillation, CAD    Subjective Patient back in sinus rhythm this morning on telemetry, amiodarone by protocol.  Patient still has chest tube in.  No complaints.      Objective     Vital Sign Min/Max for last 24 hours  Temp  Min: 97 °F (36.1 °C)  Max: 99.1 °F (37.3 °C)   BP  Min: 101/60  Max: 135/71   Pulse  Min: 75  Max: 91   Resp  Min: 16  Max: 18   SpO2  Min: 95 %  Max: 100 %   No data recorded   No data recorded         04/14/24  0320 04/15/24  0329   Weight: 99.1 kg (218 lb 6.4 oz) 97.6 kg (215 lb 3.2 oz)         Intake/Output Summary (Last 24 hours) at 4/16/2024 1352  Last data filed at 4/16/2024 0600  Gross per 24 hour   Intake 240 ml   Output 50 ml   Net 190 ml       Physical Exam:     General Appearance:    Alert, cooperative, in no acute distress   Lungs:   Decreased breath sounds to auscultation,respirations regular, even and                unlabored, bilateral chest tubes placed    Heart:    Regular and normal rate, normal S1 and S2, no            murmur, no gallop, no rub, no click   Abdomen:  Extremities:   Soft, nontender, bowel sounds audible x4    No edema, normal range of motion   Pulses:   Pulses palpable and equal bilaterally    Sternal incision CDI  Results Review:   Results from last 7 days   Lab Units 04/16/24  0750 04/15/24  1556 04/15/24  0558 04/14/24  0509   SODIUM mmol/L 135*  --  136 135*   POTASSIUM mmol/L 3.5 3.6 3.8 3.6   CHLORIDE mmol/L 98  --  97* 94*   CO2 mmol/L 26.0  --  28.0 30.0*   BUN mg/dL 22  --  24* 31*   CREATININE mg/dL 0.78  --  0.81 0.81   GLUCOSE mg/dL 92  --  111* 120*   CALCIUM mg/dL 8.2*  --  9.0 9.3     Results from last 7 days   Lab Units 04/16/24  0750 04/15/24  0558 04/14/24  0509 04/13/24  0433   WBC 10*3/mm3 10.80 8.70  --  4.92   HEMOGLOBIN g/dL 9.1* 9.8* 9.0* 9.8*   HEMATOCRIT % 27.9*  29.9* 27.7* 30.1*   PLATELETS 10*3/mm3 278 241  --  176           Chest x-ray 4/14/2024:    Bilateral pleural drains remain in place. Nasogastric tube terminates in the stomach. No discernible persisting pneumothorax on the left, with very small right apical pneumothorax present. Layering small pleural effusion on the left appears likely mildly   increased with adjacent left basilar atelectasis. Unchanged heart and mediastinal contours.     No new ECG to review    Medication Review: Reviewed, amiodarone at 0.5 mg/min    Assessment & Plan   Patient is status post CABG (LIMA to LAD, SVG to diagonal, SVG to OM, SVG to RPL) , POD 6.  Patient had postoperative atrial fib with RVR, postop ileus, and bilateral pneumothorax.  Continue amiodarone per protocol.  Start NOAC once pneumothorax resolved and chest tubes removed.  Hopefully patient home in 24-48 hours.      Radames Gerber MD

## 2024-04-16 NOTE — PLAN OF CARE
Goal Outcome Evaluation:  Plan of Care Reviewed With: patient, spouse        Progress: improving  Outcome Evaluation: Vital signs wnl. Oxygen level greater than 90% on room air. Pain medication given as needed. Right chest tube removed per CT surgeons. Left chest tube remains in place. Will continue to monitor.

## 2024-04-17 ENCOUNTER — READMISSION MANAGEMENT (OUTPATIENT)
Dept: CALL CENTER | Facility: HOSPITAL | Age: 74
End: 2024-04-17
Payer: MEDICARE

## 2024-04-17 ENCOUNTER — APPOINTMENT (OUTPATIENT)
Dept: GENERAL RADIOLOGY | Facility: HOSPITAL | Age: 74
End: 2024-04-17
Payer: MEDICARE

## 2024-04-17 VITALS
HEIGHT: 71 IN | HEART RATE: 82 BPM | WEIGHT: 215.2 LBS | TEMPERATURE: 98.3 F | SYSTOLIC BLOOD PRESSURE: 120 MMHG | DIASTOLIC BLOOD PRESSURE: 70 MMHG | OXYGEN SATURATION: 98 % | BODY MASS INDEX: 30.13 KG/M2 | RESPIRATION RATE: 18 BRPM

## 2024-04-17 LAB
ANION GAP SERPL CALCULATED.3IONS-SCNC: 11 MMOL/L (ref 5–15)
BUN SERPL-MCNC: 21 MG/DL (ref 8–23)
BUN/CREAT SERPL: 28.4 (ref 7–25)
CALCIUM SPEC-SCNC: 8.9 MG/DL (ref 8.6–10.5)
CHLORIDE SERPL-SCNC: 103 MMOL/L (ref 98–107)
CO2 SERPL-SCNC: 22 MMOL/L (ref 22–29)
CREAT SERPL-MCNC: 0.74 MG/DL (ref 0.76–1.27)
DEPRECATED RDW RBC AUTO: 48.4 FL (ref 37–54)
EGFRCR SERPLBLD CKD-EPI 2021: 96.3 ML/MIN/1.73
ERYTHROCYTE [DISTWIDTH] IN BLOOD BY AUTOMATED COUNT: 13.6 % (ref 12.3–15.4)
GLUCOSE BLDC GLUCOMTR-MCNC: 101 MG/DL (ref 70–130)
GLUCOSE BLDC GLUCOMTR-MCNC: 102 MG/DL (ref 70–130)
GLUCOSE SERPL-MCNC: 98 MG/DL (ref 65–99)
HCT VFR BLD AUTO: 29 % (ref 37.5–51)
HGB BLD-MCNC: 9.2 G/DL (ref 13–17.7)
MCH RBC QN AUTO: 30.7 PG (ref 26.6–33)
MCHC RBC AUTO-ENTMCNC: 31.7 G/DL (ref 31.5–35.7)
MCV RBC AUTO: 96.7 FL (ref 79–97)
PLATELET # BLD AUTO: 297 10*3/MM3 (ref 140–450)
PMV BLD AUTO: 9.8 FL (ref 6–12)
POTASSIUM SERPL-SCNC: 4.2 MMOL/L (ref 3.5–5.2)
RBC # BLD AUTO: 3 10*6/MM3 (ref 4.14–5.8)
SODIUM SERPL-SCNC: 136 MMOL/L (ref 136–145)
WBC NRBC COR # BLD AUTO: 12.8 10*3/MM3 (ref 3.4–10.8)

## 2024-04-17 PROCEDURE — 71045 X-RAY EXAM CHEST 1 VIEW: CPT

## 2024-04-17 PROCEDURE — 99232 SBSQ HOSP IP/OBS MODERATE 35: CPT | Performed by: INTERNAL MEDICINE

## 2024-04-17 PROCEDURE — 25010000002 METOCLOPRAMIDE PER 10 MG: Performed by: SURGERY

## 2024-04-17 PROCEDURE — 80048 BASIC METABOLIC PNL TOTAL CA: CPT

## 2024-04-17 PROCEDURE — 82948 REAGENT STRIP/BLOOD GLUCOSE: CPT

## 2024-04-17 PROCEDURE — 99024 POSTOP FOLLOW-UP VISIT: CPT

## 2024-04-17 PROCEDURE — 85027 COMPLETE CBC AUTOMATED: CPT

## 2024-04-17 PROCEDURE — 99239 HOSP IP/OBS DSCHRG MGMT >30: CPT | Performed by: INTERNAL MEDICINE

## 2024-04-17 RX ORDER — METOPROLOL TARTRATE 50 MG/1
50 TABLET, FILM COATED ORAL EVERY 12 HOURS SCHEDULED
Qty: 60 TABLET | Refills: 2 | Status: SHIPPED | OUTPATIENT
Start: 2024-04-17

## 2024-04-17 RX ORDER — NITROGLYCERIN 0.4 MG/1
0.4 TABLET SUBLINGUAL
Qty: 30 TABLET | Refills: 12 | Status: SHIPPED | OUTPATIENT
Start: 2024-04-17

## 2024-04-17 RX ORDER — ASPIRIN 81 MG/1
81 TABLET ORAL DAILY
Qty: 30 TABLET | Refills: 2 | Status: SHIPPED | OUTPATIENT
Start: 2024-04-18

## 2024-04-17 RX ORDER — FAMOTIDINE 20 MG/1
20 TABLET, FILM COATED ORAL
Qty: 30 TABLET | Refills: 2 | Status: SHIPPED | OUTPATIENT
Start: 2024-04-17

## 2024-04-17 RX ORDER — AMOXICILLIN 250 MG
2 CAPSULE ORAL 2 TIMES DAILY
Qty: 30 TABLET | Refills: 2 | Status: SHIPPED | OUTPATIENT
Start: 2024-04-17

## 2024-04-17 RX ORDER — HYDROCODONE BITARTRATE AND ACETAMINOPHEN 7.5; 325 MG/1; MG/1
1 TABLET ORAL EVERY 6 HOURS PRN
Qty: 25 TABLET | Refills: 0 | Status: SHIPPED | OUTPATIENT
Start: 2024-04-17

## 2024-04-17 RX ORDER — AMIODARONE HYDROCHLORIDE 200 MG/1
200 TABLET ORAL
Qty: 30 TABLET | Refills: 2 | Status: SHIPPED | OUTPATIENT
Start: 2024-04-17

## 2024-04-17 RX ORDER — AMIODARONE HYDROCHLORIDE 200 MG/1
200 TABLET ORAL
Status: DISCONTINUED | OUTPATIENT
Start: 2024-04-17 | End: 2024-04-17 | Stop reason: HOSPADM

## 2024-04-17 RX ADMIN — BISACODYL 10 MG: 5 TABLET, COATED ORAL at 08:23

## 2024-04-17 RX ADMIN — METOPROLOL TARTRATE 50 MG: 50 TABLET, FILM COATED ORAL at 08:24

## 2024-04-17 RX ADMIN — SENNOSIDES AND DOCUSATE SODIUM 2 TABLET: 8.6; 5 TABLET ORAL at 08:23

## 2024-04-17 RX ADMIN — METOCLOPRAMIDE 10 MG: 5 INJECTION, SOLUTION INTRAMUSCULAR; INTRAVENOUS at 08:24

## 2024-04-17 RX ADMIN — FAMOTIDINE 20 MG: 20 TABLET, FILM COATED ORAL at 08:23

## 2024-04-17 RX ADMIN — Medication 10 ML: at 08:33

## 2024-04-17 RX ADMIN — APIXABAN 5 MG: 5 TABLET, FILM COATED ORAL at 12:07

## 2024-04-17 RX ADMIN — ACETAMINOPHEN 650 MG: 325 TABLET ORAL at 12:07

## 2024-04-17 RX ADMIN — AMIODARONE HYDROCHLORIDE 200 MG: 200 TABLET ORAL at 12:07

## 2024-04-17 RX ADMIN — ASPIRIN 81 MG: 81 TABLET, COATED ORAL at 08:24

## 2024-04-17 NOTE — CASE MANAGEMENT/SOCIAL WORK
Case Management Discharge Note      Final Note: Spoke with patient at bedside. Plan is to discharge home today, 4/17. Voiced no questions or concerns. Patient states he has transportation but may also Uber at time of discharge.         Selected Continued Care - Admitted Since 4/5/2024       Destination    No services have been selected for the patient.                Durable Medical Equipment    No services have been selected for the patient.                Dialysis/Infusion    No services have been selected for the patient.                Home Medical Care    No services have been selected for the patient.                Therapy    No services have been selected for the patient.                Community Resources    No services have been selected for the patient.                Community & DME    No services have been selected for the patient.                         Final Discharge Disposition Code: 01 - home or self-care

## 2024-04-17 NOTE — PROGRESS NOTES
Khalif Joshua  [id number]  1950   LOS: 11 days   Patient Care Team:  Renee Liriano MD as PCP - General (Internal Medicine)    Chief Complaint: Follow-up on atrial fibrillation, CAD    Subjective no complaints.  Chest tubes removed.      Objective     Vital Sign Min/Max for last 24 hours  Temp  Min: 98 °F (36.7 °C)  Max: 98.8 °F (37.1 °C)   BP  Min: 101/60  Max: 135/66   Pulse  Min: 75  Max: 95   Resp  Min: 18  Max: 18   SpO2  Min: 96 %  Max: 99 %   No data recorded   No data recorded         04/14/24  0320 04/15/24  0329   Weight: 99.1 kg (218 lb 6.4 oz) 97.6 kg (215 lb 3.2 oz)         Intake/Output Summary (Last 24 hours) at 4/17/2024 0955  Last data filed at 4/17/2024 0900  Gross per 24 hour   Intake 716 ml   Output 0 ml   Net 716 ml       Physical Exam:     General Appearance:    Alert, cooperative, in no acute distress   Lungs:   Decreased breath sounds to auscultation,respirations regular, even and                unlabored, bilateral chest tubes placed    Heart:    Regular and normal rate, normal S1 and S2, no            murmur, no gallop, no rub, no click   Abdomen:  Extremities:   Soft, nontender, bowel sounds audible x4    No edema, normal range of motion   Pulses:   Pulses palpable and equal bilaterally    Sternal incision CDI  Results Review:   Results from last 7 days   Lab Units 04/17/24  0036 04/16/24  0750 04/15/24  1556 04/15/24  0558   SODIUM mmol/L 136 135*  --  136   POTASSIUM mmol/L 4.2 3.5 3.6 3.8   CHLORIDE mmol/L 103 98  --  97*   CO2 mmol/L 22.0 26.0  --  28.0   BUN mg/dL 21 22  --  24*   CREATININE mg/dL 0.74* 0.78  --  0.81   GLUCOSE mg/dL 98 92  --  111*   CALCIUM mg/dL 8.9 8.2*  --  9.0     Results from last 7 days   Lab Units 04/17/24  0036 04/16/24  0750 04/15/24  0558   WBC 10*3/mm3 12.80* 10.80 8.70   HEMOGLOBIN g/dL 9.2* 9.1* 9.8*   HEMATOCRIT % 29.0* 27.9* 29.9*   PLATELETS 10*3/mm3 297 278 241           Chest x-ray 4/14/2024:    Bilateral pleural drains remain in place.  Nasogastric tube terminates in the stomach. No discernible persisting pneumothorax on the left, with very small right apical pneumothorax present. Layering small pleural effusion on the left appears likely mildly   increased with adjacent left basilar atelectasis. Unchanged heart and mediastinal contours.     No new ECG to review    Medication Review: Reviewed, amiodarone at 0.5 mg/min    Assessment & Plan   Patient is status post CABG (LIMA to LAD, SVG to diagonal, SVG to OM, SVG to RPL) , POD 7.  Patient had postoperative atrial fib with RVR, postop ileus, and bilateral pneumothorax.  Continue amiodarone per protocol.  Started eliquis since chest tubes removed.  Can go home on eliquis 200mg daily.  Will likely stop at next visit.  Will see him in few weeks      Radames Gerber MD

## 2024-04-17 NOTE — PROGRESS NOTES
[facility] Cardiothoracic Surgery In-Patient Progress Note     POD # 7 s/p CABG x 4     LOS: 11 days       Subjective  Denies chest pain shortness of breath.  Wants to go home.      Objective  Vital Signs  Temp:  [98 °F (36.7 °C)-98.8 °F (37.1 °C)] 98 °F (36.7 °C)  Heart Rate:  [75-91] 89  Resp:  [18] 18  BP: (101-135)/(60-71) 135/66        Physical Exam:   General Appearance: alert, appears stated age and cooperative   Lungs: clear to auscultation, respirations regular, respirations even, and respirations unlabored   Heart: Irregularly irregular    Skin: Incision c/d/I   Sternum: Stable   CT: No air leak with cough  Output by Drain (mL) 04/16/24 0701 - 04/16/24 1900 04/16/24 1901 - 04/17/24 0700 04/17/24 0701 - 04/17/24 0740 Range Total   Chest Tube 1 Left 0 0  0        Results     Results from last 7 days   Lab Units 04/17/24  0036   WBC 10*3/mm3 12.80*   HEMOGLOBIN g/dL 9.2*   HEMATOCRIT % 29.0*   PLATELETS 10*3/mm3 297     Results from last 7 days   Lab Units 04/17/24  0036   SODIUM mmol/L 136   POTASSIUM mmol/L 4.2   CHLORIDE mmol/L 103   CO2 mmol/L 22.0   BUN mg/dL 21   CREATININE mg/dL 0.74*   GLUCOSE mg/dL 98   CALCIUM mg/dL 8.9     Imaging Results (Last 24 Hours)       Procedure Component Value Units Date/Time    XR Chest 1 View [[id number]] Collected: 04/16/24 0819     Updated: 04/16/24 0834    Narrative:      XR CHEST 1 VW    Date of Exam: 4/16/2024 2:43 AM EDT    Indication: Bilateral pneumothorax    Comparison:  4/15/2024    Findings:  Bilateral pleural pigtail catheters. Median sternotomy wires.    Cardiac size is similar to prior exam. Similar left basal opacity. Slightly increased small left pleural effusion. No pneumothorax. No evidence of acute osseous abnormalities. Visualized upper abdomen is unremarkable. Subcutaneous emphysema seen within   the left chest wall and left neck.      Impression:      Impression:  No substantial pneumothorax.    Slightly increased small left pleural effusion with  associated left basilar opacity.    Subcutaneous emphysema seen within the left chest wall and left neck.      Electronically Signed: Hu Shook MD    4/16/2024 8:31 AM EDT    Workstation ID: [id number]            Assessment    NSTEMI status post CABG 4/10/2024    Hypertension    Hyperlipidemia LDL goal <70    T2DM    Postoperative atrial fibrillation    Postprocedural pneumothorax      Plan   D/C remaining chest tube  Afib management per cardiology  Ambulate  Pulmonary Toilet: IS q1 hr while awake  ASA, Statin, BB  D/C home today if cardiology is in agreement    MISTI Jo  04/17/24  07:40 EDT

## 2024-04-17 NOTE — PLAN OF CARE
Problem: Adult Inpatient Plan of Care  Goal: Plan of Care Review  4/17/2024 0610 by Radha Gupta RN  Outcome: Ongoing, Progressing  4/17/2024 0610 by Radha Gupta RN  Outcome: Ongoing, Progressing  Goal: Patient-Specific Goal (Individualized)  4/17/2024 0610 by Radha Gupta RN  Outcome: Ongoing, Progressing  4/17/2024 0610 by Radha Gupta RN  Outcome: Ongoing, Progressing  Goal: Absence of Hospital-Acquired Illness or Injury  4/17/2024 0610 by Radha Gupta RN  Outcome: Ongoing, Progressing  4/17/2024 0610 by Radha Gupta RN  Outcome: Ongoing, Progressing  Intervention: Identify and Manage Fall Risk  Recent Flowsheet Documentation  Taken 4/17/2024 0600 by Radha Gupta RN  Safety Promotion/Fall Prevention: activity supervised  Taken 4/17/2024 0400 by Radha Gupta RN  Safety Promotion/Fall Prevention: activity supervised  Taken 4/17/2024 0200 by Radha Gupta RN  Safety Promotion/Fall Prevention: activity supervised  Taken 4/16/2024 2000 by Radha Gupta RN  Safety Promotion/Fall Prevention: activity supervised  Intervention: Prevent Skin Injury  Recent Flowsheet Documentation  Taken 4/17/2024 0600 by Radha Gupta RN  Body Position: position changed independently  Taken 4/17/2024 0400 by Radha Gupta RN  Body Position: position changed independently  Taken 4/17/2024 0200 by Radha Gupta RN  Body Position: position changed independently  Taken 4/16/2024 2000 by Radha Gupta RN  Body Position: position changed independently  Intervention: Prevent and Manage VTE (Venous Thromboembolism) Risk  Recent Flowsheet Documentation  Taken 4/17/2024 0600 by Radha Gupta RN  Activity Management: activity encouraged  Taken 4/17/2024 0400 by Radha uGpta RN  Activity Management: activity encouraged  Taken 4/17/2024 0200 by Radha Gupta RN  Activity Management: activity encouraged  Taken 4/16/2024 2000 by Radha Gupta RN  Activity Management:  activity encouraged  Intervention: Prevent Infection  Recent Flowsheet Documentation  Taken 4/17/2024 0600 by Radha Gupta RN  Infection Prevention: hand hygiene promoted  Taken 4/17/2024 0400 by Radha Gupta RN  Infection Prevention: hand hygiene promoted  Taken 4/17/2024 0200 by Radha Gupta RN  Infection Prevention: hand hygiene promoted  Taken 4/16/2024 2000 by Radha Gupta RN  Infection Prevention: hand hygiene promoted  Goal: Optimal Comfort and Wellbeing  4/17/2024 0610 by Radha Gupta RN  Outcome: Ongoing, Progressing  4/17/2024 0610 by Radha Gupta RN  Outcome: Ongoing, Progressing  Goal: Readiness for Transition of Care  4/17/2024 0610 by Radha Gupta RN  Outcome: Ongoing, Progressing  4/17/2024 0610 by Radha Gupta RN  Outcome: Ongoing, Progressing     Problem: Activity Intolerance (Cardiovascular Surgery)  Goal: Improved Activity Tolerance  4/17/2024 0610 by Radha Gupta RN  Outcome: Ongoing, Progressing  4/17/2024 0610 by Radha Gupta RN  Outcome: Ongoing, Progressing     Problem: Adjustment to Surgery (Cardiovascular Surgery)  Goal: Optimal Coping with Heart Surgery  4/17/2024 0610 by Radha Gupta RN  Outcome: Ongoing, Progressing  4/17/2024 0610 by Radha Gupta RN  Outcome: Ongoing, Progressing     Problem: Bleeding (Cardiovascular Surgery)  Goal: Bleeding (Cardiovascular Surgery)  4/17/2024 0610 by Radha Gupta RN  Outcome: Ongoing, Progressing  4/17/2024 0610 by Radha Gupta RN  Outcome: Ongoing, Progressing     Problem: Bowel Motility Impaired (Cardiovascular Surgery)  Goal: Effective Bowel Elimination (Cardiovascular Surgery)  4/17/2024 0610 by Radha Gupta RN  Outcome: Ongoing, Progressing  4/17/2024 0610 by Radha Gupta RN  Outcome: Ongoing, Progressing     Problem: Cardiac Function Impaired (Cardiovascular Surgery)  Goal: Effective Cardiac Function  4/17/2024 0610 by Radha Gupta RN  Outcome: Ongoing,  Progressing  4/17/2024 0610 by Radha Gupta RN  Outcome: Ongoing, Progressing     Problem: Cerebral Tissue Perfusion (Cardiovascular Surgery)  Goal: Optimal Cerebral Tissue Perfusion (Cardiovascular Surgery)  4/17/2024 0610 by Radha Gupta RN  Outcome: Ongoing, Progressing  4/17/2024 0610 by Radha Gupta RN  Outcome: Ongoing, Progressing  Intervention: Protect and Optimize Cerebral Perfusion  Recent Flowsheet Documentation  Taken 4/17/2024 0600 by Radha Gupta RN  Head of Bed (HOB) Positioning: HOB elevated  Taken 4/17/2024 0400 by Radha Gupta RN  Head of Bed (HOB) Positioning: HOB elevated  Taken 4/17/2024 0200 by Radha Gupta RN  Head of Bed (HOB) Positioning: HOB elevated  Taken 4/16/2024 2000 by Radha Gupta RN  Head of Bed (HOB) Positioning: HOB elevated     Problem: Fluid and Electrolyte Imbalance (Cardiovascular Surgery)  Goal: Fluid and Electrolyte Balance (Cardiovascular Surgery)  4/17/2024 0610 by Radha Gupta RN  Outcome: Ongoing, Progressing  4/17/2024 0610 by Radha Gupta RN  Outcome: Ongoing, Progressing     Problem: Glycemic Control Impaired (Cardiovascular Surgery)  Goal: Blood Glucose Level Within Targeted Range (Cardiovascular Surgery)  4/17/2024 0610 by Radha Gupta RN  Outcome: Ongoing, Progressing  4/17/2024 0610 by Radha Gupta RN  Outcome: Ongoing, Progressing     Problem: Infection (Cardiovascular Surgery)  Goal: Absence of Infection Signs and Symptoms  4/17/2024 0610 by Radha Gupta RN  Outcome: Ongoing, Progressing  4/17/2024 0610 by Radha Gupta RN  Outcome: Ongoing, Progressing  Intervention: Prevent or Manage Infection  Recent Flowsheet Documentation  Taken 4/17/2024 0600 by Radha Gupta RN  Infection Prevention: hand hygiene promoted  Taken 4/17/2024 0400 by Radha Gupta RN  Infection Prevention: hand hygiene promoted  Taken 4/17/2024 0200 by Radha Gupta RN  Infection Prevention: hand hygiene  promoted  Taken 4/16/2024 2000 by Radha Gupta RN  Infection Prevention: hand hygiene promoted     Problem: Ongoing Anesthesia Effects (Cardiovascular Surgery)  Goal: Anesthesia/Sedation Recovery  4/17/2024 0610 by Radha Gupta RN  Outcome: Ongoing, Progressing  4/17/2024 0610 by Radha Gupta RN  Outcome: Ongoing, Progressing  Intervention: Optimize Anesthesia Recovery  Recent Flowsheet Documentation  Taken 4/17/2024 0600 by Radha Gupta RN  Safety Promotion/Fall Prevention: activity supervised  Taken 4/17/2024 0400 by Radha Gupta RN  Safety Promotion/Fall Prevention: activity supervised  Taken 4/17/2024 0200 by Radha Gupta RN  Safety Promotion/Fall Prevention: activity supervised  Taken 4/16/2024 2000 by Radha Gupta RN  Safety Promotion/Fall Prevention: activity supervised  Taken 4/16/2024 1937 by Radha Gupta RN  Patient Tolerance (IS): good  Taken 4/16/2024 1800 by Radha Gupta RN  Patient Tolerance (IS): good     Problem: Pain (Cardiovascular Surgery)  Goal: Acceptable Pain Control  4/17/2024 0610 by Radha Gupta RN  Outcome: Ongoing, Progressing  4/17/2024 0610 by Radha Gupta RN  Outcome: Ongoing, Progressing     Problem: Postoperative Nausea and Vomiting (Cardiovascular Surgery)  Goal: Nausea and Vomiting Relief (Cardiovascular Surgery)  4/17/2024 0610 by Radha Gupta RN  Outcome: Ongoing, Progressing  4/17/2024 0610 by Radha Gupta RN  Outcome: Ongoing, Progressing     Problem: Postoperative Urinary Retention (Cardiovascular Surgery)  Goal: Effective Urinary Elimination (Cardiovascular Surgery)  4/17/2024 0610 by Radha Gupta RN  Outcome: Ongoing, Progressing  4/17/2024 0610 by Radha Gupta RN  Outcome: Ongoing, Progressing  Intervention: Monitor and Manage Urinary Retention  Recent Flowsheet Documentation  Taken 4/16/2024 1945 by Radha Gupta RN  Urinary Elimination Promotion: toileting offered     Problem: Respiratory  Compromise (Cardiovascular Surgery)  Goal: Effective Oxygenation and Ventilation (Cardiovascular Surgery)  4/17/2024 0610 by Radha Gupta RN  Outcome: Ongoing, Progressing  4/17/2024 0610 by Radha Gupta RN  Outcome: Ongoing, Progressing  Intervention: Promote Airway Secretion Clearance  Recent Flowsheet Documentation  Taken 4/16/2024 1937 by Radha Gupta RN  Patient Tolerance (IS): good  Taken 4/16/2024 1800 by Radha Gupta RN  Patient Tolerance (IS): good     Problem: Asthma Comorbidity  Goal: Maintenance of Asthma Control  4/17/2024 0610 by Radha Gupta RN  Outcome: Ongoing, Progressing  4/17/2024 0610 by Radha Gupta RN  Outcome: Ongoing, Progressing  Intervention: Maintain Asthma Symptom Control  Recent Flowsheet Documentation  Taken 4/17/2024 0600 by Radha Gupta RN  Medication Review/Management: medications reviewed  Taken 4/17/2024 0400 by Radha Gupta RN  Medication Review/Management: medications reviewed  Taken 4/17/2024 0200 by Radha Gupta RN  Medication Review/Management: medications reviewed  Taken 4/16/2024 2000 by Radha Gupta RN  Medication Review/Management: medications reviewed     Problem: Behavioral Health Comorbidity  Goal: Maintenance of Behavioral Health Symptom Control  4/17/2024 0610 by Radha Gupta RN  Outcome: Ongoing, Progressing  4/17/2024 0610 by Radha Gupta RN  Outcome: Ongoing, Progressing  Intervention: Maintain Behavioral Health Symptom Control  Recent Flowsheet Documentation  Taken 4/17/2024 0600 by Radha Gupta RN  Medication Review/Management: medications reviewed  Taken 4/17/2024 0400 by Radha Gupta RN  Medication Review/Management: medications reviewed  Taken 4/17/2024 0200 by Radha Gupta RN  Medication Review/Management: medications reviewed  Taken 4/16/2024 2000 by Radha Gupta RN  Medication Review/Management: medications reviewed     Problem: COPD (Chronic Obstructive Pulmonary Disease)  Comorbidity  Goal: Maintenance of COPD Symptom Control  4/17/2024 0610 by Radha Gupta RN  Outcome: Ongoing, Progressing  4/17/2024 0610 by Radha Gupta RN  Outcome: Ongoing, Progressing  Intervention: Maintain COPD-Symptom Control  Recent Flowsheet Documentation  Taken 4/17/2024 0600 by Radha Gupta RN  Medication Review/Management: medications reviewed  Taken 4/17/2024 0400 by Radha Gupta RN  Medication Review/Management: medications reviewed  Taken 4/17/2024 0200 by Radha Gupta RN  Medication Review/Management: medications reviewed  Taken 4/16/2024 2000 by Radha Gupta RN  Medication Review/Management: medications reviewed     Problem: Diabetes Comorbidity  Goal: Blood Glucose Level Within Targeted Range  4/17/2024 0610 by Radha Gupta RN  Outcome: Ongoing, Progressing  4/17/2024 0610 by Radha Gupta RN  Outcome: Ongoing, Progressing     Problem: Heart Failure Comorbidity  Goal: Maintenance of Heart Failure Symptom Control  4/17/2024 0610 by Radha Gupta RN  Outcome: Ongoing, Progressing  4/17/2024 0610 by Radha Gupta RN  Outcome: Ongoing, Progressing  Intervention: Maintain Heart Failure-Management  Recent Flowsheet Documentation  Taken 4/17/2024 0600 by Radha Gupta RN  Medication Review/Management: medications reviewed  Taken 4/17/2024 0400 by Radha Gupta RN  Medication Review/Management: medications reviewed  Taken 4/17/2024 0200 by Radha Gupta RN  Medication Review/Management: medications reviewed  Taken 4/16/2024 2000 by Radha Gupta RN  Medication Review/Management: medications reviewed     Problem: Hypertension Comorbidity  Goal: Blood Pressure in Desired Range  4/17/2024 0610 by Radha Gupta RN  Outcome: Ongoing, Progressing  4/17/2024 0610 by Radha Gupta RN  Outcome: Ongoing, Progressing  Intervention: Maintain Blood Pressure Management  Recent Flowsheet Documentation  Taken 4/17/2024 0600 by Radha Gupta  RN  Medication Review/Management: medications reviewed  Taken 4/17/2024 0400 by Radha Gupta RN  Medication Review/Management: medications reviewed  Taken 4/17/2024 0200 by Radha Gupta RN  Medication Review/Management: medications reviewed  Taken 4/16/2024 2000 by Radha Gupta RN  Medication Review/Management: medications reviewed     Problem: Obstructive Sleep Apnea Risk or Actual Comorbidity Management  Goal: Unobstructed Breathing During Sleep  4/17/2024 0610 by Radha Gupta RN  Outcome: Ongoing, Progressing  4/17/2024 0610 by Radha Gupta RN  Outcome: Ongoing, Progressing     Problem: Osteoarthritis Comorbidity  Goal: Maintenance of Osteoarthritis Symptom Control  4/17/2024 0610 by Radha Gupta RN  Outcome: Ongoing, Progressing  4/17/2024 0610 by Radha Gupta RN  Outcome: Ongoing, Progressing  Intervention: Maintain Osteoarthritis Symptom Control  Recent Flowsheet Documentation  Taken 4/17/2024 0600 by Radha Gupta RN  Activity Management: activity encouraged  Medication Review/Management: medications reviewed  Taken 4/17/2024 0400 by Radha Gupta RN  Activity Management: activity encouraged  Medication Review/Management: medications reviewed  Taken 4/17/2024 0200 by Radha Gupta RN  Activity Management: activity encouraged  Medication Review/Management: medications reviewed  Taken 4/16/2024 2000 by Radha Gupta RN  Activity Management: activity encouraged  Medication Review/Management: medications reviewed     Problem: Pain Chronic (Persistent) (Comorbidity Management)  Goal: Acceptable Pain Control and Functional Ability  4/17/2024 0610 by Radha Gupta RN  Outcome: Ongoing, Progressing  4/17/2024 0610 by Radha Gupta RN  Outcome: Ongoing, Progressing  Intervention: Manage Persistent Pain  Recent Flowsheet Documentation  Taken 4/17/2024 0600 by Radha Gupta RN  Medication Review/Management: medications reviewed  Taken 4/17/2024 0400 by  Radha Gupta RN  Medication Review/Management: medications reviewed  Taken 4/17/2024 0200 by Radha Gupta RN  Medication Review/Management: medications reviewed  Taken 4/16/2024 2000 by Radha Gupta RN  Medication Review/Management: medications reviewed     Problem: Seizure Disorder Comorbidity  Goal: Maintenance of Seizure Control  4/17/2024 0610 by Radha Gupta RN  Outcome: Ongoing, Progressing  4/17/2024 0610 by Radha Gupta RN  Outcome: Ongoing, Progressing     Problem: Communication Impairment (Mechanical Ventilation, Invasive)  Goal: Effective Communication  4/17/2024 0610 by Radha Gupta RN  Outcome: Ongoing, Progressing  4/17/2024 0610 by Radha Gupta RN  Outcome: Ongoing, Progressing     Problem: Device-Related Complication Risk (Mechanical Ventilation, Invasive)  Goal: Optimal Device Function  4/17/2024 0610 by Radha Gupta RN  Outcome: Ongoing, Progressing  4/17/2024 0610 by Radha Gupta RN  Outcome: Ongoing, Progressing     Problem: Inability to Wean (Mechanical Ventilation, Invasive)  Goal: Mechanical Ventilation Liberation  4/17/2024 0610 by Radha Gupta RN  Outcome: Ongoing, Progressing  4/17/2024 0610 by Radha Gupat RN  Outcome: Ongoing, Progressing  Intervention: Promote Extubation and Mechanical Ventilation Liberation  Recent Flowsheet Documentation  Taken 4/17/2024 0600 by Radha Gupta RN  Medication Review/Management: medications reviewed  Taken 4/17/2024 0400 by Radha Gupta RN  Medication Review/Management: medications reviewed  Taken 4/17/2024 0200 by Radha Gupta RN  Medication Review/Management: medications reviewed  Taken 4/16/2024 2000 by Radha Gupta RN  Medication Review/Management: medications reviewed     Problem: Nutrition Impairment (Mechanical Ventilation, Invasive)  Goal: Optimal Nutrition Delivery  4/17/2024 0610 by Radha Gupta RN  Outcome: Ongoing, Progressing  4/17/2024 0610 by Radha Gupta  RN  Outcome: Ongoing, Progressing     Problem: Skin and Tissue Injury (Mechanical Ventilation, Invasive)  Goal: Absence of Device-Related Skin and Tissue Injury  4/17/2024 0610 by Radha Gupta RN  Outcome: Ongoing, Progressing  4/17/2024 0610 by Radha Gupta RN  Outcome: Ongoing, Progressing     Problem: Ventilator-Induced Lung Injury (Mechanical Ventilation, Invasive)  Goal: Absence of Ventilator-Induced Lung Injury  4/17/2024 0610 by Radha Gupta RN  Outcome: Ongoing, Progressing  4/17/2024 0610 by Radha Gupta RN  Outcome: Ongoing, Progressing  Intervention: Prevent Ventilator-Associated Pneumonia  Recent Flowsheet Documentation  Taken 4/17/2024 0600 by Radha Gupta RN  Head of Bed (HOB) Positioning: HOB elevated  Taken 4/17/2024 0400 by Radha Gupta RN  Head of Bed (HOB) Positioning: HOB elevated  Taken 4/17/2024 0200 by Radha Gupta RN  Head of Bed (HOB) Positioning: HOB elevated  Taken 4/16/2024 2000 by Radha Gupta RN  Head of Bed (HOB) Positioning: HOB elevated     Problem: Skin Injury Risk Increased  Goal: Skin Health and Integrity  4/17/2024 0610 by Radha Gupta RN  Outcome: Ongoing, Progressing  4/17/2024 0610 by Radha Gupta RN  Outcome: Ongoing, Progressing  Intervention: Optimize Skin Protection  Recent Flowsheet Documentation  Taken 4/17/2024 0600 by Radha Gupta RN  Head of Bed (HOB) Positioning: HOB elevated  Taken 4/17/2024 0400 by Radha Gupta RN  Head of Bed (HOB) Positioning: HOB elevated  Taken 4/17/2024 0200 by Radha Gupta RN  Head of Bed (HOB) Positioning: HOB elevated  Taken 4/16/2024 2000 by Radha Gupta RN  Head of Bed (HOB) Positioning: HOB elevated     Problem: Fall Injury Risk  Goal: Absence of Fall and Fall-Related Injury  4/17/2024 0610 by Radha Gupta RN  Outcome: Ongoing, Progressing  4/17/2024 0610 by Candy, Radha, RN  Outcome: Ongoing, Progressing  Intervention: Identify and Manage Contributors  Recent  Flowsheet Documentation  Taken 4/17/2024 0600 by Radha Gupta RN  Medication Review/Management: medications reviewed  Taken 4/17/2024 0400 by Radha Gupta RN  Medication Review/Management: medications reviewed  Taken 4/17/2024 0200 by Radha Gupta RN  Medication Review/Management: medications reviewed  Taken 4/16/2024 2000 by Radha Gupta RN  Medication Review/Management: medications reviewed  Intervention: Promote Injury-Free Environment  Recent Flowsheet Documentation  Taken 4/17/2024 0600 by Radha Gupta RN  Safety Promotion/Fall Prevention: activity supervised  Taken 4/17/2024 0400 by Radha Gupta RN  Safety Promotion/Fall Prevention: activity supervised  Taken 4/17/2024 0200 by Radha Gupta RN  Safety Promotion/Fall Prevention: activity supervised  Taken 4/16/2024 2000 by Radha Gupta RN  Safety Promotion/Fall Prevention: activity supervised   Goal Outcome Evaluation:      Patient remains in the bed, chest rise and fall noted. Patient calm and cooperative. Patient appears in no distress, call bell within reach. Bed locked and the lowered to the floor.

## 2024-04-17 NOTE — DISCHARGE SUMMARY
[facility] Medicine Services  DISCHARGE SUMMARY    Patient Name: Khalif Joshua  : 1950  MRN: [id number]    Date of Admission: 2024 10:17 AM  Date of Discharge:  24  Primary Care Physician: Renee Liriano MD    Consults       Date and Time Order Name Status Description    2024 12:37 PM Inpatient Consult to Hospitalist MIST[facility] for Medical Management      4/10/2024  5:33 PM Inpatient Consult to Cardiology Completed     2024  3:57 PM Inpatient Cardiothoracic Surgery Consult Completed             Hospital Course     Presenting Problem: NSTEMI    Active Hospital Problems    Diagnosis  POA    **NSTEMI status post CABG 4/10/2024 [I21.4]  Yes    Postoperative atrial fibrillation [I48.0]  No    Postprocedural pneumothorax [J95.811]  Unknown    T2DM [E11.9]  Yes    Hypertension [I10]  Yes    Hyperlipidemia LDL goal <70 [E78.5]  Yes      Resolved Hospital Problems    Diagnosis Date Resolved POA    Combined systolic and diastolic CHF [I50.40] 2024 Yes          Hospital Course:  Khalif Joshua is a 73 y.o. male with a history of CAD, hypertension, hyperlipidemia who presented with NSTEMI and was taken to CABG on 4/10.  He was transferred out of ICU on .  He is being followed by CT surgery and cardiology.  General surgery was consulted on  due to concern for postop ileus.Below is a summary of patient's hospital course , he was discharged home 24     This patient's problems and plans were partially entered by my partner and updated as appropriate by me 24.     NSTEMI  Multivessel CAD status post CABG on 4/10.  Combined systolic and diastolic heart failure  Right pneumothorax  Left pleural effusion  Hyperlipidemia  Hypertension  - Managed by CT surgery and cardiology  - Continue aspirin, Plavix, beta-blockers (as tolerated), statin  --S/p bilateral chest tube placement by Dr. oGldman for  pneumothorax. CTX removed prior to discharge and discharge  cleared by CTS   - Pain control as needed  --patient was also recommended to begin eliquis prior to dc, he will follow closely with cardiology         Afib RVR--resolved  -noted on AM on 4/14 following ambulation  -Cardiology following   -Increased Lopressor to 50 mg BID  -s/p amiodarone, NOAC once chest tubes removed--started eliquis prior to dc with plan to likely stop at next cards visit, they plan to see in a few weeks      Postop ileus--resolved  -CT abdomen/pelvis revealed dilated small and large bowel throughout the abdomen with scattered air-fluid levels.  Small bowel measuring up to 4.5 cm in caliber.  Findings concerning for postoperative ileus.  -General surgeon Dr. Lee consulted  -NG tube removed 4/14  - eating well with BM at time of discharge  - stool softener sent at time of discharge         Discharge Follow Up Recommendations for outpatient labs/diagnostics:  CTS in 2-3 weeks  Cardiology in a few weeks  PCP in 1-2 weeks   Lifting/post op instructions as given by CTS     Day of Discharge     HPI:   Patient doing well. He is sitting up in chair and asking to go home today. No new complaints. CTX removed     Review of Systems  As above    Vital Signs:   Temp:  [98 °F (36.7 °C)-98.5 °F (36.9 °C)] 98.3 °F (36.8 °C)  Heart Rate:  [77-95] 82  Resp:  [18] 18  BP: (116-135)/(65-84) 125/84      Physical Exam:  GEN: NAD, resting in bed, awake  HEENT: on room air, atraumatic, normocephalic, surgical scar intact well healing  NECK: supple, no masses  RESP: on room air, normal effort  CV: on tele, sinus rhythm  PSYCH: normal affect, appropriate  NEURO: awake, alert, no focal deficits noted  MSK: no edema noted  SKIN: no rashes noted       Pertinent  and/or Most Recent Results     LAB RESULTS:      Lab 04/17/24  0036 04/16/24  0750 04/15/24  0558 04/14/24  0509 04/13/24  0433 04/12/24  0436 04/11/24  0303 04/10/24  2010 04/10/24  1735   WBC 12.80* 10.80 8.70  --  4.92 7.52 14.58*   < > 18.67*   HEMOGLOBIN 9.2*  9.1* 9.8* 9.0* 9.8* 9.7* 10.1*   < > 10.8*   HEMATOCRIT 29.0* 27.9* 29.9* 27.7* 30.1* 29.9* 31.0*   < > 32.3*   PLATELETS 297 278 241  --  176 165 211   < > 200   NEUTROS ABS  --   --   --   --   --   --  11.98*  --   --    IMMATURE GRANS (ABS)  --   --   --   --   --   --  0.11*  --   --    LYMPHS ABS  --   --   --   --   --   --  0.90  --   --    MONOS ABS  --   --   --   --   --   --  1.52*  --   --    EOS ABS  --   --   --   --   --   --  0.03  --   --    MCV 96.7 95.2 93.7  --  95.0 97.1* 97.2*   < > 93.9   PROTIME  --   --   --   --   --   --  16.0*  --  17.9*   APTT  --   --   --   --   --   --   --   --  31.9    < > = values in this interval not displayed.         Lab 04/17/24  0036 04/16/24  0750 04/15/24  1556 04/15/24  0558 04/14/24  0509 04/13/24  1546 04/13/24  0433 04/11/24  0303 04/10/24  2010 04/10/24  1735   SODIUM 136 135*  --  136 135*  --  131*   < > 141 135*   POTASSIUM 4.2 3.5 3.6 3.8 3.6   < > 3.8   < > 4.2 4.3   CHLORIDE 103 98  --  97* 94*  --  95*   < > 101 96*   CO2 22.0 26.0  --  28.0 30.0*  --  27.0   < > 21.0* 24.0   ANION GAP 11.0 11.0  --  11.0 11.0  --  9.0   < > 19.0* 15.0   BUN 21 22  --  24* 31*  --  30*   < > 21 21   CREATININE 0.74* 0.78  --  0.81 0.81  --  0.83   < > 1.15 1.19   EGFR 96.3 94.8  --  93.7 93.7  --  93.0   < > 67.6 64.9   GLUCOSE 98 92  --  111* 120*  --  129*   < > 140* 107*   CALCIUM 8.9 8.2*  --  9.0 9.3  --  8.8   < > 9.6 9.1   IONIZED CALCIUM  --   --   --   --   --   --   --   --   --  1.20   MAGNESIUM  --   --   --   --   --   --   --   --  2.1 2.1   PHOSPHORUS  --   --   --   --   --   --   --   --  3.9 4.2    < > = values in this interval not displayed.         Lab 04/11/24  0303 04/10/24  2010 04/10/24  1735   TOTAL PROTEIN 6.3  --   --    ALBUMIN 4.5 4.4 4.1   GLOBULIN 1.8  --   --    ALT (SGPT) 15  --   --    AST (SGOT) 46*  --   --    BILIRUBIN 0.3  --   --    ALK PHOS 36*  --   --          Lab 04/11/24  0303 04/10/24  1735   PROTIME 16.0* 17.9*   INR  1.27* 1.46*                 Lab 04/10/24  2155 04/10/24  1753 04/10/24  1647   PH, ARTERIAL 7.365 7.397 7.30*   PCO2, ARTERIAL 39.4 37.3  --    PO2 .0* 251.0*  --    FIO2 40 100  --    HCO3 ART 22.5 22.9  --    BASE EXCESS ART -2.7* -1.6*  --    CARBOXYHEMOGLOBIN 1.1 0.8  --      Brief Urine Lab Results       None          Microbiology Results (last 10 days)       ** No results found for the last 240 hours. **            XR Chest 1 View    Result Date: 4/17/2024  XR CHEST 1 VW Date of Exam: 4/17/2024 11:07 AM EDT Indication: post chest tube removal Comparison: Chest radiograph same date timed 07:54. Findings: Removal of left chest tube. Sternotomy and CABG. Unchanged enlarged cardiac silhouette. Improved aeration in the left lung base. Right lung is overall clear. Similar trace left apical pneumothorax. No discrete right pneumothorax. Osseous structures are unchanged. Persistent small-volume subcutaneous gas in the left chest and neck.     Impression: Similar trace left apical pneumothorax after left chest tube removal. Electronically Signed: Ole Giordano MD  4/17/2024 11:32 AM EDT  Workstation ID: [id number]    XR Chest 1 View    Result Date: 4/17/2024  XR CHEST 1 VW Date of Exam: 4/17/2024 7:49 AM EDT Indication: Pneumothorax Comparison: Chest radiograph 4/16/2024. Findings: Removal of right chest tube. Unchanged position of left chest tube. Sternotomy and CABG. Similar trace left pleural effusion and left basilar atelectasis. Right lung is overall clear. No discrete right pneumothorax. Trace left pneumothorax. Osseous structures are unchanged. Similar subcutaneous gas in the left chest and neck.     Impression: No discrete right pneumothorax after right chest tube removal. Trace left pneumothorax with left chest tube in unchanged position. Electronically Signed: Ole Giordano MD  4/17/2024 8:06 AM EDT  Workstation ID: [id number]    XR Chest 1 View    Result Date: 4/16/2024  XR CHEST 1 VW Date of Exam:  4/16/2024 2:43 AM EDT Indication: Bilateral pneumothorax Comparison:  4/15/2024 Findings: Bilateral pleural pigtail catheters. Median sternotomy wires. Cardiac size is similar to prior exam. Similar left basal opacity. Slightly increased small left pleural effusion. No pneumothorax. No evidence of acute osseous abnormalities. Visualized upper abdomen is unremarkable. Subcutaneous emphysema seen within the left chest wall and left neck.     Impression: No substantial pneumothorax. Slightly increased small left pleural effusion with associated left basilar opacity. Subcutaneous emphysema seen within the left chest wall and left neck. Electronically Signed: Hu Shook MD  4/16/2024 8:31 AM EDT  Workstation ID: [id number]    XR Chest 1 View    Result Date: 4/15/2024  XR CHEST 1 VW Date of Exam: 4/15/2024 8:38 AM EDT Indication: pneumothorax Comparison: Chest radiograph 4/14/2024. Findings: Unchanged position of bilateral chest tubes. Removal of nasogastric tube. Sternotomy and CABG. Scattered subsegmental atelectasis. No focal consolidation. Decreased, trace left pleural effusion. No significant pneumothorax on this exam.     Impression: Bilateral chest tubes in place without significant pneumothorax. Decreased, trace left pleural effusion. Electronically Signed: Ole Giordano MD  4/15/2024 8:54 AM EDT  Workstation ID: [id number]    XR Chest 1 View    Result Date: 4/14/2024  XR CHEST 1 VW Date of Exam: 4/14/2024 5:53 AM EDT Indication: bialteral PTX moderate post op Comparison: 1 day prior. Findings: Bilateral pleural drains remain in place. Nasogastric tube terminates in the stomach. No discernible persisting pneumothorax on the left, with very small right apical pneumothorax present. Layering small pleural effusion on the left appears likely mildly  increased with adjacent left basilar atelectasis. Unchanged heart and mediastinal contours.     Impression: Bilateral pleural drains remain in place. Nasogastric tube  terminates in the stomach. No discernible persisting pneumothorax on the left, with very small right apical pneumothorax present. Layering small pleural effusion on the left appears likely mildly  increased with adjacent left basilar atelectasis. Unchanged heart and mediastinal contours. Electronically Signed: Mikey Ko MD  4/14/2024 8:48 AM EDT  Workstation ID: [id number]    XR Chest 1 View    Result Date: 4/13/2024  XR CHEST 1 VW Date of Exam: 4/13/2024 11:11 PM EDT Indication: chest tube placement Comparison: 4/13/2024 at 1905 hours. Findings: Interval placement of bilateral chest tubes. Previously seen pneumothoraces have resolved. There is persistent left basilar airspace disease and probable small left pleural effusion. There is evidence of prior median sternotomy. Gastric suction tube courses below the diaphragm. No new lung opacity. No acute osseous abnormality.     Impression: Interval placement of bilateral chest tubes with resolution of pneumothoraces. Stable left basilar airspace disease and small pleural effusion Electronically Signed: Moiz Cardoza MD  4/13/2024 11:28 PM EDT  Workstation ID: [id number]    XR Abdomen KUB    Result Date: 4/13/2024  XR ABDOMEN KUB Date of Exam: 4/13/2024 7:35 PM EDT Indication: NG tube placement verification Comparison: CT abdomen pelvis 4/12/2024. Findings: Gastric suction tube terminates in the stomach. There are multiple dilated small bowel loops in the visualized abdomen. No evidence of pneumoperitoneum.     Impression: Gastric suction tube terminates in the stomach. Electronically Signed: Moiz Cardoza MD  4/13/2024 8:01 PM EDT  Workstation ID: [id number]    XR Chest 1 View    Result Date: 4/13/2024  XR CHEST 1 VW Date of Exam: 4/13/2024 6:58 PM EDT Indication: pleural effusion/radiology recommending post CT abdomen Comparison: 4/12/2024 Findings: There is a new moderate-sized right-sided pneumothorax. There is also a moderate size left pneumothorax, increased in  size from prior exam. Previously demonstrated left thoracostomy tube has been removed. The right internal jugular introducer sheath and [place] catheter have been removed. There is minimal left basilar atelectasis. Heart size and pulmonary vessels are within normal limits. Patient is status post median sternotomy.     Impression: 1. New moderate size right pneumothorax. 2. Moderate size left pleural effusion, increased from prior exam status post removal of left thoracostomy tube. Findings were personally called to the  patient's nurse Briana at 7:33 p.m. on 4/13/2024 Electronically Signed: Lalo Jordan MD  4/13/2024 7:34 PM EDT  Workstation ID: [id number]    CT Abdomen Pelvis Without Contrast    Result Date: 4/13/2024  CT ABDOMEN PELVIS WO CONTRAST Date of Exam: 4/13/2024 6:19 PM EDT Indication: abdominal pain, nausea/vomiting, previous bowel perforations. Comparison: None available. Technique: Axial CT images were obtained of the abdomen and pelvis without the administration of contrast. Reconstructed coronal and sagittal images were also obtained. Automated exposure control and iterative construction methods were used. Findings: Liver: The liver is unremarkable in morphology. Evaluation for focal liver lesions is limited without IV contrast. No biliary dilation is seen. Gallbladder: Cholelithiasis. The gallbladder is otherwise unremarkable. Pancreas: Unremarkable. Spleen: Unremarkable. Adrenal glands: Unremarkable. Genitourinary tract: Bilateral parapelvic cysts noted. 3 mm nonobstructing calculus within the left kidney. The ureters are unremarkable. Air within the urinary bladder may be related to recent instrumentation. Prostate gland is grossly unremarkable. Gastrointestinal tract: Dilated small and large bowel throughout the abdomen with scattered air-fluid levels. Small bowel measures up to 4.5 cm in caliber. Multiple small bowel anastomoses noted. Small bowel is markedly dilated/patulous about anastomotic   sutures within the upper abdomen (series 900, image 23). This measures approximately 9 cm in caliber. Colonic diverticulosis is present. Appendix: No findings to suggest acute appendicitis. Other findings: No free air or free fluid is identified. No pathologically enlarged lymph nodes are seen. Vascular calcifications are present. Bones and soft tissues: No acute osseous lesion is identified. Bones are demineralized. There are degenerative changes within the spine. Sternotomy wires are noted, and there is subcutaneous emphysema within the visualized lower anterior chest wall. Lung bases: Bilateral pneumothoraces are partially imaged. Bibasilar atelectasis and trace bilateral pleural fluid also noted. Pneumothoraces appear new or larger when compared with chest x-ray from 4/12/2024. Recommend further evaluation with dedicated chest x-ray.     Impression: 1.Dilated small and large bowel throughout the abdomen with scattered air-fluid levels. Small bowel measures up to 4.5 cm in caliber. Findings may represent postoperative ileus. Partial small bowel obstruction may have a similar appearance. 2.Multiple small bowel anastomoses noted.  Small bowel is markedly dilated/patulous about anastomotic sutures within the upper abdomen (series 900, image 23). This measures approximately 9 cm in caliber. 3.Colonic diverticulosis. 4.Bilateral pneumothoraces are partially imaged. Bibasilar atelectasis and trace bilateral pleural fluid also noted. Pneumothoraces appear new or larger when compared with chest x-ray from 4/12/2024. Recommend further evaluation with dedicated chest x-ray. 5.Additional findings as detailed above. The above findings were discussed with Gi, the patient's nurse, via telephone on 4/13/2024 6:44 PM EDT. Electronically Signed: Nick Singh MD  4/13/2024 6:44 PM EDT  Workstation ID: [id number]    XR Chest 1 View    Result Date: 4/12/2024  XR CHEST 1 VW Date of Exam: 4/12/2024 3:07 AM EDT Indication:  Post-Op Heart Surgery Comparison: 4/11/2024. Findings: Support lines are unchanged in position. Suggestion of a minimal left apical pneumothorax. There is mild atelectasis of the lung bases, greatest on the left. There may be a minimal left effusion.     Impression: Probable minimal left apical pneumothorax. Mild atelectasis lung bases, greatest on the left with minimal left effusion. Electronically Signed: Tammy Grande MD  4/12/2024 7:38 AM EDT  Workstation ID: [id number]    XR Chest 1 View    Result Date: 4/11/2024  XR CHEST 1 VW Date of Exam: 4/11/2024 2:39 AM EDT Indication: Post-Op Heart Surgery Comparison: 1 day prior. Findings: Interval extubation and removal of nasogastric tube. Remaining support hardware projects unchanged. There is no distinct pneumothorax. Trace effusion on the left appears similar. No new focal airspace consolidation. Unchanged heart and mediastinal contours.     Impression: Interval extubation with remainder of exam unchanged. Electronically Signed: Mikey Ko MD  4/11/2024 6:05 AM EDT  Workstation ID: [id number]    XR Chest 1 View    Result Date: 4/10/2024  XR CHEST 1 VW Date of Exam: 4/10/2024 6:03 PM EDT Indication: Post-Op Check Line & Tube Placement Comparison: 4/5/2024 Findings: Sternotomy wires are now seen. ET tube is in good position at the level of the inferior margin of the clavicles. Right IJ catheter tip lies in the mid SVC. NG tube is seen passing at least a couple centimeters below the GE junction. Heart shadow is in the upper range of normal size. Pulmonary vasculature appears normal. Lungs appear clear except for minimal bibasilar discoid atelectasis. No pneumothorax or effusion is seen.     Impression: Poststernotomy chest radiograph, with ET tube, right IJ catheter, and NG tube, apparently in satisfactory position. Minimal bibasilar atelectasis. No evidence of pneumothorax. Electronically Signed: Epifanio Light MD  4/10/2024 6:23 PM EDT  Workstation ID:  [id number]    Central Line    Result Date: 4/10/2024  Nolberto Vieira MD     4/10/2024  1:42 PM Central Line Pre-sedation assessment completed: 4/10/2024 1:20 PM Patient reassessed immediately prior to procedure Patient location during procedure: OR Start time: 4/10/2024 1:20 PM Stop Time:4/10/2024 1:27 PM Indications: vascular access Staff Anesthesiologist: Nolberto Vieira MD Preanesthetic Checklist Completed: patient identified, IV checked, site marked, risks and benefits discussed, surgical consent, monitors and equipment checked, pre-op evaluation and timeout performed Central Line Prep Sterile Tech:cap, gloves, gown, mask and sterile barriers Prep: chloraprep Patient monitoring: blood pressure monitoring, continuous pulse oximetry and EKG Central Line Procedure Laterality:right Location:internal jugular Catheter Type:Cordis and double lumen (+ SLIC) Catheter Size:9 Fr Guidance:landmark technique and palpation technique PROCEDURE NOTE/ULTRASOUND INTERPRETATION.  Using ultrasound guidance the potential vascular sites for insertion of the catheter were visualized to determine the patency of the vessel to be used for vascular access.  After selecting the appropriate site for insertion, the needle was visualized under ultrasound being inserted into the internal jugular vein, followed by ultrasound confirmation of wire and catheter placement. There were no abnormalities seen on ultrasound; an image was taken; and the patient tolerated the procedure with no complications. Images: still images obtained, printed/placed on chart Assessment Post procedure:biopatch applied, line sutured, occlusive dressing applied and secured with tape Assessement:blood return through all ports, free fluid flow, chest x-ray ordered and Rohit Test Complications:no Patient Tolerance:patient tolerated the procedure well with no apparent complications     Arterial Line    Result Date: 4/10/2024  Nolberto Vieira MD     4/10/2024 12:19 PM  Arterial Line Pre-sedation assessment completed: 4/10/2024 12:10 PM Patient reassessed immediately prior to procedure Patient location during procedure: pre-op Stop Time:4/10/2024 12:15 PM  Line placed for hemodynamic monitoring. Performed By Anesthesiologist: Nolberto Vieira MD Preanesthetic Checklist Completed: patient identified, IV checked, site marked, risks and benefits discussed, surgical consent, monitors and equipment checked, pre-op evaluation and timeout performed Arterial Line Prep  Sterile Tech: cap, gloves and sterile barriers Prep: ChloraPrep Patient monitoring: blood pressure monitoring, continuous pulse oximetry and EKG Arterial Line Procedure Laterality:right Location:  radial artery Catheter size: 20 G Guidance: ultrasound guided PROCEDURE NOTE/ULTRASOUND INTERPRETATION.  Using ultrasound guidance the potential vascular sites for insertion of the catheter were visualized to determine the patency of the vessel to be used for vascular access.  After selecting the appropriate site for insertion, the needle was visualized under ultrasound being inserted into the radial artery, followed by ultrasound confirmation of wire and catheter placement. There were no abnormalities seen on ultrasound; an image was taken; and the patient tolerated the procedure with no complications. Number of attempts: 1 Successful placement: yes Post Assessment Dressing Type: line sutured, occlusive dressing applied, secured with tape and wrist guard applied. Complications no Circ/Move/Sens Assessment: normal and unchanged. Patient Tolerance: patient tolerated the procedure well with no apparent complications     Adult Transthoracic Echo Limited W/ Cont if Necessary Per Protocol    Result Date: 4/7/2024    Left ventricular systolic function is normal. Calculated left ventricular EF = 56.7%     Adult Transthoracic Echo Complete W/ Cont if Necessary Per Protocol    Result Date: 4/7/2024    Left ventricular systolic function is  mildly decreased. Calculated left ventricular EF = 46.9% Left ventricular ejection fraction appears to be 46 - 50%.  wall motion not well seen and endocardial border not well seen.  would recomend limited contrast echo   Left ventricular diastolic function is consistent with (grade I) impaired relaxation.   The right ventricular cavity is mildly dilated.   Estimated right ventricular systolic pressure from tricuspid regurgitation is normal (<35 mmHg).      Results for orders placed during the hospital encounter of 04/05/24    Duplex Carotid Ultrasound CAR    Interpretation Summary    Right internal carotid artery demonstrates a less than 50% stenosis.    Left internal carotid artery demonstrates a less than 50% stenosis.    Mild heterogeneous carotid plaque bilaterally.    Antegrade vetebral flow bilaterally.      Results for orders placed during the hospital encounter of 04/05/24    Duplex Carotid Ultrasound CAR    Interpretation Summary    Right internal carotid artery demonstrates a less than 50% stenosis.    Left internal carotid artery demonstrates a less than 50% stenosis.    Mild heterogeneous carotid plaque bilaterally.    Antegrade vetebral flow bilaterally.      Results for orders placed during the hospital encounter of 04/05/24    Adult Transthoracic Echo Limited W/ Cont if Necessary Per Protocol    Interpretation Summary    Left ventricular systolic function is normal. Calculated left ventricular EF = 56.7%      Plan for Follow-up of Pending Labs/Results: na    Discharge Details        Discharge Medications        New Medications        Instructions Start Date   amiodarone 200 MG tablet  Commonly known as: PACERONE   200 mg, Oral, Every 24 Hours Scheduled      apixaban 5 MG tablet tablet  Commonly known as: ELIQUIS   5 mg, Oral, Every 12 Hours Scheduled      famotidine 20 MG tablet  Commonly known as: PEPCID   20 mg, Oral, 2 Times Daily Before Meals      HYDROcodone-acetaminophen 7.5-325 MG per  tablet  Commonly known as: NORCO   1 tablet, Oral, Every 6 Hours PRN      metoprolol tartrate 50 MG tablet  Commonly known as: LOPRESSOR   50 mg, Oral, Every 12 Hours Scheduled      nitroglycerin 0.4 MG SL tablet  Commonly known as: NITROSTAT   0.4 mg, Sublingual, Every 5 Minutes PRN, Take no more than 3 doses in 15 minutes.      sennosides-docusate 8.6-50 MG per tablet  Commonly known as: PERICOLACE   2 tablets, Oral, 2 Times Daily             Changes to Medications        Instructions Start Date   aspirin 81 MG EC tablet  What changed:   medication strength  how much to take   81 mg, Oral, Daily   Start Date: April 18, 2024            Continue These Medications        Instructions Start Date   diclofenac 75 MG EC tablet  Commonly known as: VOLTAREN   75 mg, Oral, 2 Times Daily      Magnesium 250 MG tablet   1 tablet, Oral, Daily      metFORMIN  MG 24 hr tablet  Commonly known as: GLUCOPHAGE-XR   500 mg, Oral, Daily With Breakfast      Multivitamin Adult tablet tablet  Generic drug: multivitamin with minerals   1 tablet, Oral, Daily      rosuvastatin 40 MG tablet  Commonly known as: CRESTOR   40 mg, Oral, Daily      tadalafil 5 MG tablet  Commonly known as: CIALIS   5 mg, Oral, Daily             Stop These Medications      losartan-hydrochlorothiazide 100-25 MG per tablet  Commonly known as: HYZAAR              No Known Allergies      Discharge Disposition:  Home or Self Care    Diet:  Hospital:  Diet Order   Procedures    Diet: Cardiac; Healthy Heart (2-3 Na+); Fluid Consistency: Thin (IDDSI 0)       Diet Instructions       Diet: Cardiac Diets; Healthy Heart (2-3 Na+); Regular Texture (IDDSI 7); Thin (IDDSI 0)      Discharge Diet: Cardiac Diets    Cardiac Diet: Healthy Heart (2-3 Na+)    Texture: Regular Texture (IDDSI 7)    Fluid Consistency: Thin (IDDSI 0)             Activity:  Activity Instructions       Activity as Tolerated      Bathing Restrictions      You may shower    Type of Restriction: Bathing     Bathing Restrictions: No Tub Bath    Driving Restrictions      Type of Restriction: Driving    Driving Restrictions: No Driving While Taking Narcotics    Lifting Restrictions      Type of Restriction: Lifting    Lifting Restrictions: Lifting Restriction (Indicate Limit)    Weight Limit (Pounds): 10    Length of Lifting Restriction: until seen at next follow-up appointment            Restrictions or Other Recommendations:  As tolerated       CODE STATUS:    Code Status and Medical Interventions:   Ordered at: 04/10/24 1733     Code Status (Patient has no pulse and is not breathing):    CPR (Attempt to Resuscitate)     Medical Interventions (Patient has pulse or is breathing):    Full Support       Future Appointments   Date Time Provider Department Center   5/2/2024 11:00 AM Gil Gerber MD [facility]   5/6/2024  8:30 AM Kell Mosley APRN [facility]   5/10/2024  1:00 PM [facility]       Additional Instructions for the Follow-ups that You Need to Schedule       Call MD With Problems / Concerns   As directed      Instructions:   Please call the [facility] Surgery office with any questions or concerns you may have [phone]    Order Comments: Instructions: Please call the [facility] Surgery office with any questions or concerns you may have [phone]         Discharge Follow-up with PCP   As directed       Currently Documented PCP:    Renee Liriano MD    PCP Phone Number:    [phone]     Follow Up Details: Follow Up With PCP Within 7-14 Days        Discharge Follow-up with Specialty: Cardiothoracic Surgery   As directed      Follow Up Details: Follow Up in 2-4 Weeks   Specialty: Cardiothoracic Surgery        Cardiac Rehab Evaluation and Enrollment   Apr 22, 2024      Reason for Consult: Baltazar Doherty MD  04/17/24      Time Spent on Discharge:  I spent  35  minutes on this discharge activity which included: face-to-face encounter  with the patient, reviewing the data in the system, coordination of the care with the nursing staff as well as consultants, documentation, and entering orders.

## 2024-04-18 LAB
QT INTERVAL: 360 MS
QTC INTERVAL: 442 MS

## 2024-04-18 NOTE — OUTREACH NOTE
Prep Survey      Flowsheet Row Responses   [facility] facility patient discharged from? [facility]   Is LACE score < 7 ? No   Eligibility Readm Mgmt   Discharge diagnosis **NSTEMI status post CABG   Does the patient have one of the following disease processes/diagnoses(primary or secondary)? Cardiothoracic surgery   Does the patient have Home health ordered? No   Is there a DME ordered? No   Prep survey completed? Yes            MIKA BLAKE - Registered Nurse

## 2024-04-24 ENCOUNTER — READMISSION MANAGEMENT (OUTPATIENT)
Dept: CALL CENTER | Facility: HOSPITAL | Age: 74
End: 2024-04-24
Payer: MEDICARE

## 2024-04-24 NOTE — OUTREACH NOTE
CT Surgery Week 1 Survey      Flowsheet Row Responses   [facility] facility patient discharged from? [facility]   Does the patient have one of the following disease processes/diagnoses(primary or secondary)? Cardiothoracic surgery   Week 1 attempt successful? No   Unsuccessful attempts Attempt 1              Cely CASTILLO - Licensed Nurse

## 2024-04-29 ENCOUNTER — TELEPHONE (OUTPATIENT)
Dept: CARDIOLOGY | Facility: CLINIC | Age: 74
End: 2024-04-29
Payer: MEDICARE

## 2024-04-29 NOTE — TELEPHONE ENCOUNTER
Received call from patient regarding swelling in his legs and feet. Patient states he had open heart surgery last week and his legs are swollen and uncomfortable. He has 2 incisions on his left leg and 1 on his right that are all clean, dry, and intact with no redness. He does not check his BP or HR at home and has not been monitoring his weight. He does take all medications on med list as prescribed. He has been keeping his feet elevated and he mentioned that he is more fatigued since his surgery than he was before. Patient advised that this can be normal after a major surgery as he recovers that he will be more fatigued as his body recuperates. Patient asked if he can get in to see Dr. Gerber sooner than his scheduled Thursday appointment, patient advised that we have no open appointments and Thursday is the soonest he will be able to get in to see Dr. Gerber. Patient advised to call his PCP office as well as his surgeon's office and see if he can get in there. Patient agreeable to plan and all questions answered at this time.

## 2024-05-02 ENCOUNTER — TELEPHONE (OUTPATIENT)
Dept: CARDIOLOGY | Facility: CLINIC | Age: 74
End: 2024-05-02
Payer: MEDICARE

## 2024-05-02 ENCOUNTER — OFFICE VISIT (OUTPATIENT)
Dept: CARDIOLOGY | Facility: CLINIC | Age: 74
End: 2024-05-02
Payer: MEDICARE

## 2024-05-02 ENCOUNTER — HOSPITAL ENCOUNTER (OUTPATIENT)
Facility: HOSPITAL | Age: 74
Discharge: HOME | End: 2024-05-02
Payer: MEDICARE

## 2024-05-02 VITALS
SYSTOLIC BLOOD PRESSURE: 128 MMHG | BODY MASS INDEX: 30.84 KG/M2 | DIASTOLIC BLOOD PRESSURE: 70 MMHG | WEIGHT: 215.4 LBS | HEART RATE: 78 BPM | HEIGHT: 70 IN | OXYGEN SATURATION: 97 %

## 2024-05-02 VITALS — BODY MASS INDEX: 30.78 KG/M2 | HEIGHT: 70 IN | WEIGHT: 215 LBS

## 2024-05-02 DIAGNOSIS — R60.0 BILATERAL LEG EDEMA: ICD-10-CM

## 2024-05-02 DIAGNOSIS — R60.0 BILATERAL LEG EDEMA: Primary | ICD-10-CM

## 2024-05-02 LAB
BH CV LOWER VASCULAR LEFT COMMON FEMORAL AUGMENT: NORMAL
BH CV LOWER VASCULAR LEFT COMMON FEMORAL COMPRESS: NORMAL
BH CV LOWER VASCULAR LEFT COMMON FEMORAL PHASIC: NORMAL
BH CV LOWER VASCULAR LEFT COMMON FEMORAL SPONT: NORMAL
BH CV LOWER VASCULAR LEFT DISTAL FEMORAL AUGMENT: NORMAL
BH CV LOWER VASCULAR LEFT DISTAL FEMORAL COMPRESS: NORMAL
BH CV LOWER VASCULAR LEFT DISTAL FEMORAL PHASIC: NORMAL
BH CV LOWER VASCULAR LEFT DISTAL FEMORAL SPONT: NORMAL
BH CV LOWER VASCULAR LEFT GASTRONEMIUS COMPRESS: NORMAL
BH CV LOWER VASCULAR LEFT GREATER SAPH AK COMPRESS: NORMAL
BH CV LOWER VASCULAR LEFT LESSER SAPH COMPRESS: NORMAL
BH CV LOWER VASCULAR LEFT MID FEMORAL AUGMENT: NORMAL
BH CV LOWER VASCULAR LEFT MID FEMORAL COMPRESS: NORMAL
BH CV LOWER VASCULAR LEFT MID FEMORAL PHASIC: NORMAL
BH CV LOWER VASCULAR LEFT MID FEMORAL SPONT: NORMAL
BH CV LOWER VASCULAR LEFT PERONEAL COMPRESS: NORMAL
BH CV LOWER VASCULAR LEFT POPLITEAL AUGMENT: NORMAL
BH CV LOWER VASCULAR LEFT POPLITEAL COMPRESS: NORMAL
BH CV LOWER VASCULAR LEFT POPLITEAL PHASIC: NORMAL
BH CV LOWER VASCULAR LEFT POPLITEAL SPONT: NORMAL
BH CV LOWER VASCULAR LEFT POSTERIOR TIBIAL COMPRESS: NORMAL
BH CV LOWER VASCULAR LEFT PROFUNDA FEMORAL AUGMENT: NORMAL
BH CV LOWER VASCULAR LEFT PROFUNDA FEMORAL PHASIC: NORMAL
BH CV LOWER VASCULAR LEFT PROFUNDA FEMORAL SPONT: NORMAL
BH CV LOWER VASCULAR LEFT PROXIMAL FEMORAL AUGMENT: NORMAL
BH CV LOWER VASCULAR LEFT PROXIMAL FEMORAL COMPRESS: NORMAL
BH CV LOWER VASCULAR LEFT PROXIMAL FEMORAL PHASIC: NORMAL
BH CV LOWER VASCULAR LEFT PROXIMAL FEMORAL SPONT: NORMAL
BH CV LOWER VASCULAR LEFT SAPHENOFEMORAL JUNCTION AUGMENT: NORMAL
BH CV LOWER VASCULAR LEFT SAPHENOFEMORAL JUNCTION COMPRESS: NORMAL
BH CV LOWER VASCULAR LEFT SAPHENOFEMORAL JUNCTION PHASIC: NORMAL
BH CV LOWER VASCULAR LEFT SAPHENOFEMORAL JUNCTION SPONT: NORMAL
BH CV LOWER VASCULAR RIGHT COMMON FEMORAL AUGMENT: NORMAL
BH CV LOWER VASCULAR RIGHT COMMON FEMORAL COMPRESS: NORMAL
BH CV LOWER VASCULAR RIGHT COMMON FEMORAL PHASIC: NORMAL
BH CV LOWER VASCULAR RIGHT COMMON FEMORAL SPONT: NORMAL
BH CV LOWER VASCULAR RIGHT DISTAL FEMORAL AUGMENT: NORMAL
BH CV LOWER VASCULAR RIGHT DISTAL FEMORAL COMPRESS: NORMAL
BH CV LOWER VASCULAR RIGHT DISTAL FEMORAL PHASIC: NORMAL
BH CV LOWER VASCULAR RIGHT DISTAL FEMORAL SPONT: NORMAL
BH CV LOWER VASCULAR RIGHT GASTRONEMIUS COMPRESS: NORMAL
BH CV LOWER VASCULAR RIGHT GREATER SAPH AK COMPRESS: NORMAL
BH CV LOWER VASCULAR RIGHT GREATER SAPH BK COMPRESS: NORMAL
BH CV LOWER VASCULAR RIGHT LESSER SAPH COMPRESS: NORMAL
BH CV LOWER VASCULAR RIGHT MID FEMORAL AUGMENT: NORMAL
BH CV LOWER VASCULAR RIGHT MID FEMORAL COMPRESS: NORMAL
BH CV LOWER VASCULAR RIGHT MID FEMORAL PHASIC: NORMAL
BH CV LOWER VASCULAR RIGHT MID FEMORAL SPONT: NORMAL
BH CV LOWER VASCULAR RIGHT PERONEAL COMPRESS: NORMAL
BH CV LOWER VASCULAR RIGHT POPLITEAL AUGMENT: NORMAL
BH CV LOWER VASCULAR RIGHT POPLITEAL COMPRESS: NORMAL
BH CV LOWER VASCULAR RIGHT POPLITEAL PHASIC: NORMAL
BH CV LOWER VASCULAR RIGHT POPLITEAL SPONT: NORMAL
BH CV LOWER VASCULAR RIGHT POSTERIOR TIBIAL COMPRESS: NORMAL
BH CV LOWER VASCULAR RIGHT PROFUNDA FEMORAL AUGMENT: NORMAL
BH CV LOWER VASCULAR RIGHT PROFUNDA FEMORAL PHASIC: NORMAL
BH CV LOWER VASCULAR RIGHT PROFUNDA FEMORAL SPONT: NORMAL
BH CV LOWER VASCULAR RIGHT PROXIMAL FEMORAL AUGMENT: NORMAL
BH CV LOWER VASCULAR RIGHT PROXIMAL FEMORAL COMPRESS: NORMAL
BH CV LOWER VASCULAR RIGHT PROXIMAL FEMORAL PHASIC: NORMAL
BH CV LOWER VASCULAR RIGHT PROXIMAL FEMORAL SPONT: NORMAL
BH CV LOWER VASCULAR RIGHT SAPHENOFEMORAL JUNCTION AUGMENT: NORMAL
BH CV LOWER VASCULAR RIGHT SAPHENOFEMORAL JUNCTION COMPRESS: NORMAL
BH CV LOWER VASCULAR RIGHT SAPHENOFEMORAL JUNCTION PHASIC: NORMAL
BH CV LOWER VASCULAR RIGHT SAPHENOFEMORAL JUNCTION SPONT: NORMAL

## 2024-05-02 PROCEDURE — 93970 EXTREMITY STUDY: CPT

## 2024-05-02 RX ORDER — HYDROCHLOROTHIAZIDE 25 MG/1
25 TABLET ORAL AS NEEDED
COMMUNITY
Start: 2024-04-30 | End: 2024-05-06

## 2024-05-02 RX ORDER — FUROSEMIDE 20 MG/1
20 TABLET ORAL DAILY PRN
Qty: 90 TABLET | Refills: 3 | Status: SHIPPED | OUTPATIENT
Start: 2024-05-02

## 2024-05-02 NOTE — PROGRESS NOTES
"Chief Complaint  Edema (foot)      Subjective   History of Present Illness    Problem List    Problem List  -NSTEMI, hs trop 50, EKG negative for ST elevations, LHC showed obstructive MVCAD.  CABG with Dr. Barlow 4/2024.  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  I saw him ER after having some exertional chest pain and dyspnea.  Had been walking several miles a day prior but really had felt off.  Treated for NSTEMI.  LHC showed obstructive LHC and that lead to CABG.  Comoplicated by  postoperative atrial fib with RVR, postop ileus, and bilateral pneumothorax.  He continue to improve and was discharged in good condtion.  Left leg swollen at site of vein harvesting.  Some shortness of breath.  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  Ros negative except for the above.      Objective   Vital Signs:  Vitals:    05/02/24 1033   BP: 128/70   Pulse: 78   SpO2: 97%     Estimated body mass index is 30.91 kg/m² as calculated from the following:    Height as of this encounter: 177.8 cm (70\").    Weight as of this encounter: 97.7 kg (215 lb 6.4 oz).       Physical Exam  HENT:      Head: Normocephalic.   Eyes:      Extraocular Movements: Extraocular movements intact.   Cardiovascular:      Rate and Rhythm: Normal rate and regular rhythm.      Heart sounds: No murmur heard.     No gallop.      Comments: Healing thoracotomy  Pulmonary:      Breath sounds: Normal breath sounds.   Abdominal:      Palpations: Abdomen is soft.   Musculoskeletal:      Right lower leg: No edema.      Left lower leg: Edema present.   Skin:     General: Skin is warm and dry.   Neurological:      General: No focal deficit present.      Mental Status: He is alert.   Psychiatric:         Mood and Affect: Mood normal.           "     Assessment   -NSTEMI, hs trop 50, EKG negative for ST elevations, LHC showed obstructive MVCAD.  CABG with Dr. Barlow 4/2024.  LIMA to LAD, SVG to diagonal, SVG to OM, SVG to RPL  -LV systolic function preserved  -bnp negative  -post operative afib  -HLD  -GERD  -DM  -Leg edema, left leg  -Hx of bowel perforation a year ago surgically repaired    Plan   -continue eliquis and aspirin for now.    -continue metoprolol.  BP well controlled. Unclear how necessary but will continue for now  -Consider ACE.  Consider SGLT2 inhibitor  -will get US of legs given new swelling      4 week follow up  Gil Gerber MD  05/02/2024 11:10 EDT

## 2024-05-03 ENCOUNTER — READMISSION MANAGEMENT (OUTPATIENT)
Dept: CALL CENTER | Facility: HOSPITAL | Age: 74
End: 2024-05-03
Payer: MEDICARE

## 2024-05-03 NOTE — OUTREACH NOTE
CT Surgery Week 3 Survey      Flowsheet Row Responses   [facility] facility patient discharged from? [place]   Does the patient have one of the following disease processes/diagnoses(primary or secondary)? Cardiothoracic surgery   Week 3 attempt successful? No   Unsuccessful attempts Attempt 1            Aida CASTILLO - Registered Nurse

## 2024-05-06 ENCOUNTER — OFFICE VISIT (OUTPATIENT)
Dept: CARDIAC SURGERY | Facility: CLINIC | Age: 74
End: 2024-05-06
Payer: MEDICARE

## 2024-05-06 VITALS
BODY MASS INDEX: 30.5 KG/M2 | SYSTOLIC BLOOD PRESSURE: 132 MMHG | TEMPERATURE: 98.6 F | DIASTOLIC BLOOD PRESSURE: 82 MMHG | OXYGEN SATURATION: 96 % | HEART RATE: 89 BPM | WEIGHT: 212.6 LBS

## 2024-05-06 DIAGNOSIS — I21.4 NSTEMI (NON-ST ELEVATED MYOCARDIAL INFARCTION): Primary | ICD-10-CM

## 2024-05-06 PROBLEM — E87.20 LACTIC ACIDOSIS: Status: RESOLVED | Noted: 2021-10-31 | Resolved: 2024-05-06

## 2024-05-06 PROBLEM — A41.9 SEPSIS WITHOUT ACUTE ORGAN DYSFUNCTION: Status: RESOLVED | Noted: 2021-10-31 | Resolved: 2024-05-06

## 2024-05-06 PROBLEM — K57.92 ACUTE DIVERTICULITIS: Status: RESOLVED | Noted: 2021-10-30 | Resolved: 2024-05-06

## 2024-05-06 PROCEDURE — 99024 POSTOP FOLLOW-UP VISIT: CPT | Performed by: REGISTERED NURSE

## 2024-05-06 PROCEDURE — 1160F RVW MEDS BY RX/DR IN RCRD: CPT | Performed by: REGISTERED NURSE

## 2024-05-06 PROCEDURE — 3075F SYST BP GE 130 - 139MM HG: CPT | Performed by: REGISTERED NURSE

## 2024-05-06 PROCEDURE — 3079F DIAST BP 80-89 MM HG: CPT | Performed by: REGISTERED NURSE

## 2024-05-06 PROCEDURE — 1159F MED LIST DOCD IN RCRD: CPT | Performed by: REGISTERED NURSE

## 2024-05-07 ENCOUNTER — READMISSION MANAGEMENT (OUTPATIENT)
Dept: CALL CENTER | Facility: HOSPITAL | Age: 74
End: 2024-05-07
Payer: MEDICARE

## 2024-05-08 ENCOUNTER — TELEPHONE (OUTPATIENT)
Dept: CARDIAC SURGERY | Facility: CLINIC | Age: 74
End: 2024-05-08
Payer: MEDICARE

## 2024-05-10 ENCOUNTER — DOCUMENTATION (OUTPATIENT)
Dept: CARDIAC REHAB | Facility: HOSPITAL | Age: 74
End: 2024-05-10
Payer: MEDICARE

## 2024-05-30 NOTE — THERAPY EVALUATION
Patient Name: Khalif Joshua  : 1950    MRN: [id number]                              Today's Date: 2024       Admit Date: 2024    Visit Dx:     ICD-10-CM ICD-9-CM   1. Chest pain, unspecified type  R07.9 786.50   2. NSTEMI (non-ST elevated myocardial infarction)  I21.4 410.70   3. Coronary artery disease involving native heart, unspecified vessel or lesion type, unspecified whether angina present  I25.10 414.01     Patient Active Problem List   Diagnosis    Acute diverticulitis    Hypertension    Dyslipidemia    Abnormal EKG    Lactic acidosis    Sepsis without acute organ dysfunction    Peritonitis    Diverticul disease small and large intestine, no perforati or abscess    NSTEMI (non-ST elevated myocardial infarction)    MV CAD    T2DM    Combined systolic and diastolic CHF     Past Medical History:   Diagnosis Date    CAD (coronary artery disease)     Diverticulitis     Hyperlipemia     Hypertension     Osteoarthritis     Perforated sigmoid colon      Past Surgical History:   Procedure Laterality Date    CARDIAC CATHETERIZATION N/A 2024    Procedure: Left Heart Cath;  Surgeon: Jermaine Pineda III, MD;  Location: [facility] CATH INVASIVE LOCATION;  Service: Cardiology;  Laterality: N/A;    COLOSTOMY CLOSURE N/A 2022    Procedure: COLOSTOMY TAKEDOWN OPEN;  Surgeon: Jose Ferguson MD;  Location:  [facility];  Service: General;  Laterality: N/A;    CORONARY ARTERY BYPASS GRAFT N/A 4/10/2024    Procedure: MEDIAN STERNOTOMY, CORONARY ARTERY BYPASS GRAFTING X4, LIMA ,EVH OF THE LEFT GREATER SAPHENOUS VEIN WITH EVH EXPLORATION OF THE RIGHT LEG;  Surgeon: Jermaine Barlow MD;  Location:  [facility];  Service: Cardiothoracic;  Laterality: N/A;    EXPLORATORY LAPAROTOMY N/A 10/30/2021    Procedure: LAPAROTOMY EXPLORATORY, SIGMOID COLECTOMY,  END COLOSTOMY;  Surgeon: Jose Ferguson MD;  Location:  [facility];  Service: General;  Laterality: N/A;    ILEOSTOMY      Placement and reversal     REPLACEMENT TOTAL KNEE Bilateral       General Information       Row Name 04/11/24 0954          Physical Therapy Time and Intention    Document Type evaluation  -APRIL     Mode of Treatment physical therapy  -APRIL       Row Name 04/11/24 0954          General Information    Patient Profile Reviewed yes  -APRIL     Prior Level of Function independent:;bed mobility;ADL's;gait;transfer  -APRIL     Existing Precautions/Restrictions cardiac;sternal;oxygen therapy device and L/min  -APRIL     Barriers to Rehab none identified  -APRIL       Row Name 04/11/24 0954          Living Environment    People in Home spouse  -APRIL       Row Name 04/11/24 0954          Home Main Entrance    Number of Stairs, Main Entrance two  -APRIL       Row Name 04/11/24 0954          Cognition    Orientation Status (Cognition) oriented x 4  -APRIL       Row Name 04/11/24 0954          Safety Issues, Functional Mobility    Safety Issues Affecting Function (Mobility) safety precautions follow-through/compliance;safety precaution awareness  -APRIL     Impairments Affecting Function (Mobility) balance;endurance/activity tolerance;shortness of breath;strength  -APRIL               User Key  (r) = Recorded By, (t) = Taken By, (c) = Cosigned By      Initials Name Provider Type    APRIL Ofelia Means PT Physical Therapist                   Mobility       Row Name 04/11/24 0954          Bed Mobility    Comment, (Bed Mobility) patient is OOB and returns to the chair  -[facility] Name 04/11/24 0954          Transfers    Comment, (Transfers) cues for maintaining sternal precautions  -APRIL       Row Name 04/11/24 0954          Bed-Chair Transfer    Bed-Chair [place] (Transfers) minimum assist (75% patient effort)  -APRIL     Assistive Device (Bed-Chair Transfers) walker, front-wheeled  -APRIL       Row Name 04/11/24 0954          Sit-Stand Transfer    Sit-Stand [place] (Transfers) minimum assist (75% patient effort)  -APRIL     Assistive Device (Sit-Stand Transfers) walker,  front-wheeled  -APRIL       Row Name 04/11/24 0954          Gait/Stairs (Locomotion)    [place] Level (Gait) minimum assist (75% patient effort)  -APRIL     Distance in Feet (Gait) 220  -APRIL     Deviations/Abnormal Patterns (Gait) abbe decreased;stride length decreased  -APRIL     Bilateral Gait Deviations forward flexed posture  -APRIL     Comment, (Gait/Stairs) patient has step through gait pattern  -APRIL               User Key  (r) = Recorded By, (t) = Taken By, (c) = Cosigned By      Initials Name Provider Type    Ofelia Daley PT Physical Therapist                   Obj/Interventions       Row Name 04/11/24 0955          Range of Motion Comprehensive    General Range of Motion no range of motion deficits identified  -APRIL       Row Name 04/11/24 0955          Strength Comprehensive (MMT)    Comment, General Manual Muscle Testing (MMT) Assessment generalized weakness 4/5  -APRIL       Row Name 04/11/24 0955          Balance    Balance Assessment sitting static balance;sitting dynamic balance;standing static balance;standing dynamic balance  -APRIL     Static Sitting Balance independent  -APRIL     Dynamic Sitting Balance independent  -APRIL     Position, Sitting Balance unsupported;sitting in chair  -APRIL     Static Standing Balance minimal assist  -APRIL     Dynamic Standing Balance minimal assist  -APRIL     Position/Device Used, Standing Balance supported;walker, front-wheeled  -APRIL               User Key  (r) = Recorded By, (t) = Taken By, (c) = Cosigned By      Initials Name Provider Type    Ofelia Daley PT Physical Therapist                   Goals/Plan       Row Name 04/11/24 0959          Bed Mobility Goal 1 (PT)    Activity/Assistive Device (Bed Mobility Goal 1, PT) bed mobility activities, all  -APRIL     [place] Level/Cues Needed (Bed Mobility Goal 1, PT) independent  -APRIL     Time Frame (Bed Mobility Goal 1, PT) long term goal (LTG);10 days  -APRIL     Progress/Outcomes (Bed Mobility Goal 1, PT) goal ongoing  -APRIL        Row Name 04/11/24 0959          Transfer Goal 1 (PT)    Activity/Assistive Device (Transfer Goal 1, PT) sit-to-stand/stand-to-sit  -APRIL     [place] Level/Cues Needed (Transfer Goal 1, PT) independent  -APRIL     Time Frame (Transfer Goal 1, PT) long term goal (LTG)  -APRIL     Progress/Outcome (Transfer Goal 1, PT) goal ongoing  -APRIL       Row Name 04/11/24 0959          Gait Training Goal 1 (PT)    Activity/Assistive Device (Gait Training Goal 1, PT) gait (walking locomotion)  -APRIL     [place] Level (Gait Training Goal 1, PT) independent  -APRIL     Distance (Gait Training Goal 1, PT) 440  -APRIL     Time Frame (Gait Training Goal 1, PT) long term goal (LTG);10 days  -APRIL     Progress/Outcome (Gait Training Goal 1, PT) goal ongoing  -APRIL       Row Name 04/11/24 0959          Therapy Assessment/Plan (PT)    Planned Therapy Interventions (PT) balance training;bed mobility training;gait training;home exercise program;transfer training;stair training;strengthening  -APRIL               User Key  (r) = Recorded By, (t) = Taken By, (c) = Cosigned By      Initials Name Provider Type    APRIL Ofelia Means, PT Physical Therapist                   Clinical Impression       Row Name 04/11/24 0956          Pain    Pretreatment Pain Rating 4/10  -APRIL     Posttreatment Pain Rating 6/10  -APRIL     Pain Location incisional  -APRIL     Pain Location - chest  -APRIL     Pain Intervention(s) Repositioned;Medication (See MAR);Ambulation/increased activity;Splinting  -APRIL       Row Name 04/11/24 0956          Plan of Care Review    Plan of Care Reviewed With patient  -APRIL     Progress improving  -APRIL     Outcome Evaluation PT eval is completed. patient presents S/P CABG x4. patient demonstrates impaired bed mobility transfers and gait compared to baseline status. patient was able to ambulate 220 ft with min assist. stable vitals with activity anticipate patient to be able to go home with family assist at D/C  -APRIL       Row Name 04/11/24 0956           Therapy Assessment/Plan (PT)    Patient/Family Therapy Goals Statement (PT) return to PLOF  -APRIL     Rehab Potential (PT) good, to achieve stated therapy goals  -APRIL     Criteria for Skilled Interventions Met (PT) yes;skilled treatment is necessary  -APRIL     Therapy Frequency (PT) daily  -APRIL       Row Name 04/11/24 0956          Vital Signs    Pre Systolic BP Rehab 112  -APRIL     Pre Treatment Diastolic BP 62  -APRIL     Intra Systolic BP Rehab 80   -APRIL     Intra Treatment Diastolic BP 58   -APRIL     Post Systolic BP Rehab 108  -APRIL     Post Treatment Diastolic BP 60  -APRIL     Pretreatment Heart Rate (beats/min) 79  -APRIL     Posttreatment Heart Rate (beats/min) 88  -APRIL     Pre SpO2 (%) 95  -APRIL     O2 Delivery Pre Treatment nasal cannula  -APRIL     Post SpO2 (%) 93  -APRIL     O2 Delivery Post Treatment nasal cannula  -APRIL     Pre Patient Position Sitting  -APRIL     Intra Patient Position Standing  -APRIL     Post Patient Position Sitting  -APRIL       Row Name 04/11/24 0956          Positioning and Restraints    Pre-Treatment Position sitting in chair/recliner  -APRIL     Post Treatment Position chair  -APRIL     In Chair notified nsg;reclined;sitting;call light within reach;encouraged to call for assist;exit alarm on  -APRIL               User Key  (r) = Recorded By, (t) = Taken By, (c) = Cosigned By      Initials Name Provider Type    APRIL Ofelia Means, PT Physical Therapist                   Outcome Measures       Row Name 04/11/24 1000          How much help from another person do you currently need...    Turning from your back to your side while in flat bed without using bedrails? 3  -APRIL     Moving from lying on back to sitting on the side of a flat bed without bedrails? 3  -APRIL     Moving to and from a bed to a chair (including a wheelchair)? 3  -APRIL     Standing up from a chair using your arms (e.g., wheelchair, bedside chair)? 3  -APRIL     Climbing 3-5 steps with a railing? 3  -APRIL     To walk in hospital room? 3  -APRIL     AM-PAC 6 Clicks  Score (PT) 18  -APRIL     Highest Level of Mobility Goal 6 --> Walk 10 steps or more  -               User Key  (r) = Recorded By, (t) = Taken By, (c) = Cosigned By      Initials Name Provider Type    Ofelia Daley PT Physical Therapist                                 Physical Therapy Education       Title: PT OT SLP Therapies (In Progress)       Topic: Physical Therapy (In Progress)       Point: Mobility training (In Progress)       Learning Progress Summary             Patient Acceptance, E, NR by APRIL at 4/11/2024 0850                         Point: Home exercise program (In Progress)       Learning Progress Summary             Patient Acceptance, E, NR by APRIL at 4/11/2024 0850                         Point: Body mechanics (In Progress)       Learning Progress Summary             Patient Acceptance, E, NR by APRIL at 4/11/2024 0850                         Point: Precautions (In Progress)       Learning Progress Summary             Patient Acceptance, E, NR by APRIL at 4/11/2024 0850                                         User Key       Initials Effective Dates Name Provider Type Discipline    APRIL 02/03/23 -  Ofelia Means PT Physical Therapist PT                  PT Recommendation and Plan  Planned Therapy Interventions (PT): balance training, bed mobility training, gait training, home exercise program, transfer training, stair training, strengthening  Plan of Care Reviewed With: patient  Progress: improving  Outcome Evaluation: PT eval is completed. patient presents S/P CABG x4. patient demonstrates impaired bed mobility transfers and gait compared to baseline status. patient was able to ambulate 220 ft with min assist. stable vitals with activity anticipate patient to be able to go home with family assist at D/C     Time Calculation:   PT Evaluation Complexity  History, PT Evaluation Complexity: 3 or more personal factors and/or comorbidities  Examination of Body Systems (PT Eval Complexity): total of 4 or  more elements  Clinical Presentation (PT Evaluation Complexity): unstable  Clinical Decision Making (PT Evaluation Complexity): moderate complexity  Overall Complexity (PT Evaluation Complexity): moderate complexity     PT Charges       Row Name 04/11/24 1001             Time Calculation    Start Time 0850  -APRIL      PT Received On 04/11/24  -APRIL      PT Goal Re-Cert Due Date 04/21/24  -APRIL         Untimed Charges    PT Eval/Re-eval Minutes 48  -APRIL         Total Minutes    Untimed Charges Total Minutes 48  -APRIL       Total Minutes 48  -APRIL                User Key  (r) = Recorded By, (t) = Taken By, (c) = Cosigned By      Initials Name Provider Type    Ofelia Daley, PT Physical Therapist                  Therapy Charges for Today       Code Description Service Date Service Provider Modifiers Qty    [id number] HC PT EVAL MOD COMPLEXITY 4 4/11/2024 Ofelia Means PT GP 1            PT G-Codes  AM-PAC 6 Clicks Score (PT): 18  PT Discharge Summary  Anticipated Discharge Disposition (PT): home with assist    Ofelia Means PT  4/11/2024     61yom had prostate biopsy performed in the [facility] this morning, was intubated during the procedure and woke up complaining of pain in the throat. Pain has continued to worsen since dc, now having swelling to the right side of the neck and difficulty swallowing.

## 2024-06-06 ENCOUNTER — LAB (OUTPATIENT)
Facility: HOSPITAL | Age: 74
End: 2024-06-06
Payer: MEDICARE

## 2024-06-06 ENCOUNTER — OFFICE VISIT (OUTPATIENT)
Dept: CARDIOLOGY | Facility: CLINIC | Age: 74
End: 2024-06-06
Payer: MEDICARE

## 2024-06-06 VITALS
WEIGHT: 210 LBS | DIASTOLIC BLOOD PRESSURE: 80 MMHG | SYSTOLIC BLOOD PRESSURE: 126 MMHG | OXYGEN SATURATION: 95 % | HEIGHT: 70 IN | BODY MASS INDEX: 30.06 KG/M2 | HEART RATE: 83 BPM

## 2024-06-06 DIAGNOSIS — I25.810 CORONARY ARTERY DISEASE INVOLVING CORONARY BYPASS GRAFT OF NATIVE HEART WITHOUT ANGINA PECTORIS: Primary | ICD-10-CM

## 2024-06-06 DIAGNOSIS — I25.10 ATHEROSCLEROSIS OF NATIVE CORONARY ARTERY OF NATIVE HEART WITHOUT ANGINA PECTORIS: ICD-10-CM

## 2024-06-06 DIAGNOSIS — I25.810 CORONARY ARTERY DISEASE INVOLVING CORONARY BYPASS GRAFT OF NATIVE HEART WITHOUT ANGINA PECTORIS: ICD-10-CM

## 2024-06-06 DIAGNOSIS — I48.0 PAF (PAROXYSMAL ATRIAL FIBRILLATION): ICD-10-CM

## 2024-06-06 LAB
ALBUMIN SERPL-MCNC: 4.5 G/DL (ref 3.5–5.2)
ALBUMIN/GLOB SERPL: 1.5 G/DL
ALP SERPL-CCNC: 83 U/L (ref 39–117)
ALT SERPL W P-5'-P-CCNC: 15 U/L (ref 1–41)
ANION GAP SERPL CALCULATED.3IONS-SCNC: 12.1 MMOL/L (ref 5–15)
AST SERPL-CCNC: 19 U/L (ref 1–40)
BASOPHILS # BLD AUTO: 0.03 10*3/MM3 (ref 0–0.2)
BASOPHILS NFR BLD AUTO: 0.5 % (ref 0–1.5)
BILIRUB SERPL-MCNC: 0.3 MG/DL (ref 0–1.2)
BUN SERPL-MCNC: 22 MG/DL (ref 8–23)
BUN/CREAT SERPL: 25.6 (ref 7–25)
CALCIUM SPEC-SCNC: 9.8 MG/DL (ref 8.6–10.5)
CHLORIDE SERPL-SCNC: 101 MMOL/L (ref 98–107)
CHOLEST SERPL-MCNC: 135 MG/DL (ref 0–200)
CO2 SERPL-SCNC: 24.9 MMOL/L (ref 22–29)
CREAT SERPL-MCNC: 0.86 MG/DL (ref 0.76–1.27)
CRP SERPL-MCNC: 0.15 MG/DL (ref 0.01–0.5)
DEPRECATED RDW RBC AUTO: 44.4 FL (ref 37–54)
EGFRCR SERPLBLD CKD-EPI 2021: 91.4 ML/MIN/1.73
EOSINOPHIL # BLD AUTO: 0.1 10*3/MM3 (ref 0–0.4)
EOSINOPHIL NFR BLD AUTO: 1.6 % (ref 0.3–6.2)
ERYTHROCYTE [DISTWIDTH] IN BLOOD BY AUTOMATED COUNT: 13.9 % (ref 12.3–15.4)
GLOBULIN UR ELPH-MCNC: 3.1 GM/DL
GLUCOSE SERPL-MCNC: 76 MG/DL (ref 65–99)
HCT VFR BLD AUTO: 40.5 % (ref 37.5–51)
HDLC SERPL-MCNC: 48 MG/DL (ref 40–60)
HGB BLD-MCNC: 13.1 G/DL (ref 13–17.7)
IMM GRANULOCYTES # BLD AUTO: 0.02 10*3/MM3 (ref 0–0.05)
IMM GRANULOCYTES NFR BLD AUTO: 0.3 % (ref 0–0.5)
LDLC SERPL CALC-MCNC: 52 MG/DL (ref 0–100)
LDLC/HDLC SERPL: 0.9 {RATIO}
LYMPHOCYTES # BLD AUTO: 1.57 10*3/MM3 (ref 0.7–3.1)
LYMPHOCYTES NFR BLD AUTO: 25.7 % (ref 19.6–45.3)
MCH RBC QN AUTO: 28.5 PG (ref 26.6–33)
MCHC RBC AUTO-ENTMCNC: 32.3 G/DL (ref 31.5–35.7)
MCV RBC AUTO: 88 FL (ref 79–97)
MONOCYTES # BLD AUTO: 0.46 10*3/MM3 (ref 0.1–0.9)
MONOCYTES NFR BLD AUTO: 7.5 % (ref 5–12)
NEUTROPHILS NFR BLD AUTO: 3.92 10*3/MM3 (ref 1.7–7)
NEUTROPHILS NFR BLD AUTO: 64.4 % (ref 42.7–76)
NRBC BLD AUTO-RTO: 0 /100 WBC (ref 0–0.2)
PLATELET # BLD AUTO: 349 10*3/MM3 (ref 140–450)
PMV BLD AUTO: 10 FL (ref 6–12)
POTASSIUM SERPL-SCNC: 4.3 MMOL/L (ref 3.5–5.2)
PROT SERPL-MCNC: 7.6 G/DL (ref 6–8.5)
RBC # BLD AUTO: 4.6 10*6/MM3 (ref 4.14–5.8)
SODIUM SERPL-SCNC: 138 MMOL/L (ref 136–145)
TRIGL SERPL-MCNC: 218 MG/DL (ref 0–150)
VLDLC SERPL-MCNC: 35 MG/DL (ref 5–40)
WBC NRBC COR # BLD AUTO: 6.1 10*3/MM3 (ref 3.4–10.8)

## 2024-06-06 PROCEDURE — 36415 COLL VENOUS BLD VENIPUNCTURE: CPT

## 2024-06-06 PROCEDURE — 80061 LIPID PANEL: CPT

## 2024-06-06 PROCEDURE — 86141 C-REACTIVE PROTEIN HS: CPT

## 2024-06-06 PROCEDURE — 80053 COMPREHEN METABOLIC PANEL: CPT

## 2024-06-06 PROCEDURE — 85025 COMPLETE CBC W/AUTO DIFF WBC: CPT

## 2024-06-06 RX ORDER — LOSARTAN POTASSIUM 50 MG/1
50 TABLET ORAL DAILY
COMMUNITY

## 2024-06-06 NOTE — PROGRESS NOTES
"Chief Complaint  Bilateral leg edema      Subjective   History of Present Illness    Problem List    Problem List  -NSTEMI, hs trop 50, EKG negative for ST elevations, LHC showed obstructive MVCAD.  CABG with Dr. Barlow 4/2024.  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  I saw him ER after having some exertional chest pain and dyspnea.  Had been walking several miles a day prior but really had felt off.  Treated for NSTEMI.  LHC showed obstructive LHC and that lead to CABG.  Comoplicated by  postoperative atrial fib with RVR, postop ileus, and bilateral pneumothorax.  He continue to improve and was discharged in good condtion.  Left leg swollen at site of vein harvesting.  Some shortness of breath.  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  Ros negative except for the above.      Update 6/6/24  Feeling better but slowly recovering.  Had infected stitch which seems to have improved with antibiotics.  Us legs negative for DVT.    Objective   Vital Signs:  Vitals:    06/06/24 1153   BP: 126/80   Pulse: 83   SpO2: 95%     Estimated body mass index is 30.13 kg/m² as calculated from the following:    Height as of this encounter: 177.8 cm (70\").    Weight as of this encounter: 95.3 kg (210 lb).       Physical Exam  HENT:      Head: Normocephalic.   Eyes:      Extraocular Movements: Extraocular movements intact.   Cardiovascular:      Rate and Rhythm: Normal rate and regular rhythm.      Heart sounds: No murmur heard.     No gallop.      Comments: Healing thoracotomy  Pulmonary:      Breath sounds: Normal breath sounds.   Abdominal:      Palpations: Abdomen is soft.   Musculoskeletal:      Right lower leg: No edema.      Left lower leg: Edema present.   Skin:     General: Skin is warm and dry. "   Neurological:      General: No focal deficit present.      Mental Status: He is alert.   Psychiatric:         Mood and Affect: Mood normal.               Assessment   -NSTEMI, hs trop 50, EKG negative for ST elevations, LHC showed obstructive MVCAD.  CABG with Dr. Barlow 4/2024.  LIMA to LAD, SVG to diagonal, SVG to OM, SVG to RPL  -LV systolic function preserved  -bnp negative  -post operative afib  -HLD  -GERD  -DM  -Leg edema, left leg  -Hx of bowel perforation a year ago surgically repaired    Plan   -continue eliquis (resume) and aspirin for now.  [place] of afib, 30 day monitor  -continue metoprolol.  BP well controlled. Unclear how necessary but will continue for now  -Consider ACE.  Consider SGLT2 inhibitor  -cardiac rehab, blood work      4 week follow up  Gil Gerber MD  05/02/2024 11:10 EDT

## 2024-06-07 ENCOUNTER — SPECIALTY PHARMACY (OUTPATIENT)
Dept: CARDIOLOGY | Facility: CLINIC | Age: 74
End: 2024-06-07
Payer: MEDICARE

## 2024-06-07 ENCOUNTER — TELEPHONE (OUTPATIENT)
Dept: CARDIOLOGY | Facility: CLINIC | Age: 74
End: 2024-06-07
Payer: MEDICARE

## 2024-06-07 DIAGNOSIS — G47.33 OSA (OBSTRUCTIVE SLEEP APNEA): Primary | ICD-10-CM

## 2024-06-07 NOTE — TELEPHONE ENCOUNTER
----- Message from Gil Gerber sent at 6/7/2024  9:32 AM EDT -----  LDL looks good.  Triglycerides not at goal.  Would suggest vascepa 2 grams bid

## 2024-06-07 NOTE — TELEPHONE ENCOUNTER
"From secure chat from Dr. Gerber: \"I tried to call him. he had 4 second pause in the night. can we confirm no symptoms. also if not assessed for sleep apnea then need to get outpatient sleep study.\"   "

## 2024-06-12 RX ORDER — ICOSAPENT ETHYL 1000 MG/1
2 CAPSULE ORAL 2 TIMES DAILY WITH MEALS
Qty: 360 CAPSULE | Refills: 3 | Status: SHIPPED | OUTPATIENT
Start: 2024-06-12

## 2024-06-12 NOTE — TELEPHONE ENCOUNTER
Specialty Pharmacy Patient Management Program  Initial Prescription Request     Patient currently fills medications at  Pharmacy. Needing a new RX for Vascepa per labs on 6/6/2024. [id number] required per insurance.     Requested Prescriptions     Pending Prescriptions Disp Refills    Vascepa 1 g capsule capsule 360 capsule 3     Sig: Take 2 g by mouth 2 (Two) Times a Day With Meals.     Pended for cardiology provider, Dr. Riley, to review and approve if appropriate.     Grayson Fairchild, PharmD  Clinical Specialty Pharmacist  6/12/2024  14:18 EDT

## 2024-09-19 ENCOUNTER — OFFICE VISIT (OUTPATIENT)
Dept: CARDIOLOGY | Facility: CLINIC | Age: 74
End: 2024-09-19
Payer: MEDICARE

## 2024-09-19 VITALS
DIASTOLIC BLOOD PRESSURE: 80 MMHG | SYSTOLIC BLOOD PRESSURE: 126 MMHG | WEIGHT: 215.7 LBS | BODY MASS INDEX: 30.88 KG/M2 | HEART RATE: 91 BPM | HEIGHT: 70 IN | OXYGEN SATURATION: 96 %

## 2024-09-19 DIAGNOSIS — I25.10 ATHEROSCLEROSIS OF NATIVE CORONARY ARTERY OF NATIVE HEART WITHOUT ANGINA PECTORIS: Primary | ICD-10-CM

## 2024-09-19 PROCEDURE — 99214 OFFICE O/P EST MOD 30 MIN: CPT | Performed by: INTERNAL MEDICINE

## 2024-09-19 PROCEDURE — 3074F SYST BP LT 130 MM HG: CPT | Performed by: INTERNAL MEDICINE

## 2024-09-19 PROCEDURE — 3079F DIAST BP 80-89 MM HG: CPT | Performed by: INTERNAL MEDICINE

## 2024-09-19 RX ORDER — ROSUVASTATIN CALCIUM 10 MG/1
10 TABLET, COATED ORAL DAILY
Qty: 90 TABLET | Refills: 3 | Status: SHIPPED | OUTPATIENT
Start: 2024-09-19

## 2024-09-19 RX ORDER — DICLOFENAC SODIUM 75 MG/1
TABLET, DELAYED RELEASE ORAL
COMMUNITY
Start: 2024-09-13

## 2024-09-19 RX ORDER — LOSARTAN POTASSIUM AND HYDROCHLOROTHIAZIDE 25; 100 MG/1; MG/1
1 TABLET ORAL DAILY
COMMUNITY

## 2025-03-27 ENCOUNTER — OFFICE VISIT (OUTPATIENT)
Dept: CARDIOLOGY | Facility: CLINIC | Age: 75
End: 2025-03-27
Payer: MEDICARE

## 2025-03-27 ENCOUNTER — LAB (OUTPATIENT)
Facility: HOSPITAL | Age: 75
End: 2025-03-27
Payer: MEDICARE

## 2025-03-27 VITALS
OXYGEN SATURATION: 98 % | DIASTOLIC BLOOD PRESSURE: 80 MMHG | WEIGHT: 200.9 LBS | HEART RATE: 93 BPM | BODY MASS INDEX: 28.76 KG/M2 | HEIGHT: 70 IN | SYSTOLIC BLOOD PRESSURE: 124 MMHG

## 2025-03-27 DIAGNOSIS — G47.33 OSA (OBSTRUCTIVE SLEEP APNEA): ICD-10-CM

## 2025-03-27 DIAGNOSIS — I25.810 CORONARY ARTERY DISEASE INVOLVING CORONARY BYPASS GRAFT OF NATIVE HEART WITHOUT ANGINA PECTORIS: ICD-10-CM

## 2025-03-27 DIAGNOSIS — I25.10 ATHEROSCLEROSIS OF NATIVE CORONARY ARTERY OF NATIVE HEART WITHOUT ANGINA PECTORIS: Primary | ICD-10-CM

## 2025-03-27 DIAGNOSIS — R60.0 BILATERAL LEG EDEMA: ICD-10-CM

## 2025-03-27 DIAGNOSIS — N42.9 DISORDER OF PROSTATE, UNSPECIFIED: ICD-10-CM

## 2025-03-27 DIAGNOSIS — I25.10 ATHEROSCLEROSIS OF NATIVE CORONARY ARTERY OF NATIVE HEART WITHOUT ANGINA PECTORIS: ICD-10-CM

## 2025-03-27 DIAGNOSIS — I25.85 CHRONIC CORONARY MICROVASCULAR DYSFUNCTION: ICD-10-CM

## 2025-03-27 PROCEDURE — 80053 COMPREHEN METABOLIC PANEL: CPT

## 2025-03-27 PROCEDURE — 36415 COLL VENOUS BLD VENIPUNCTURE: CPT

## 2025-03-27 PROCEDURE — 86141 C-REACTIVE PROTEIN HS: CPT

## 2025-03-27 PROCEDURE — 84153 ASSAY OF PSA TOTAL: CPT

## 2025-03-27 PROCEDURE — 83036 HEMOGLOBIN GLYCOSYLATED A1C: CPT

## 2025-03-27 PROCEDURE — 80061 LIPID PANEL: CPT

## 2025-03-27 RX ORDER — BACLOFEN 20 MG
500 TABLET ORAL DAILY
COMMUNITY

## 2025-03-27 RX ORDER — POLYETHYLENE GLYCOL 3350 17 G/17G
POWDER, FOR SOLUTION ORAL
COMMUNITY

## 2025-03-27 NOTE — PROGRESS NOTES
"Chief Complaint  Bilateral leg edema, Coronary artery disease involving coronary bypass graft of , Atherosclerosis of native coronary artery of native heart w, and Follow-up (6 month follow up)      Subjective   History of Present Illness    Problem List    Problem List  -NSTEMI, hs trop 50, EKG negative for ST elevations, LHC showed obstructive MVCAD.  CABG with Dr. Barlow 4/2024.  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  I saw him ER after having some exertional chest pain and dyspnea.  Had been walking several miles a day prior but really had felt off.  Treated for NSTEMI.  LHC showed obstructive LHC and that lead to CABG.  Comoplicated by  postoperative atrial fib with RVR, postop ileus, and bilateral pneumothorax.  He continue to improve and was discharged in good condtion.  Left leg swollen at site of vein harvesting.  Some shortness of breath.  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  Ros negative except for the above.      Update 6/6/24  Feeling better but slowly recovering.  Had infected stitch which seems to have improved with antibiotics.  Us legs negative for DVT.    Update 9/19/24  Had some shoulder issues that is resolving.  No CV complaints.      Update 3/27/25  No changes.      Objective   Vital Signs:  Vitals:    03/27/25 1016   BP: 124/80   Pulse: 93   SpO2: 98%     Estimated body mass index is 28.83 kg/m² as calculated from the following:    Height as of this encounter: 177.8 cm (70\").    Weight as of this encounter: 91.1 kg (200 lb 14.4 oz).       Physical Exam  HENT:      Head: Normocephalic.   Eyes:      Extraocular Movements: Extraocular movements intact.   Cardiovascular:      Rate and Rhythm: Normal rate and regular rhythm.      Heart sounds: No murmur heard.     No gallop.      Comments: " Healing thoracotomy  Pulmonary:      Breath sounds: Normal breath sounds.   Abdominal:      Palpations: Abdomen is soft.   Musculoskeletal:      Right lower leg: No edema.      Left lower leg: No edema.   Skin:     General: Skin is warm and dry.   Neurological:      General: No focal deficit present.      Mental Status: He is alert.   Psychiatric:         Mood and Affect: Mood normal.               Assessment   -NSTEMI, hs trop 50, EKG negative for ST elevations, LHC showed obstructive MVCAD.  CABG with Dr. Barlow 4/2024.  LIMA to LAD, SVG to diagonal, SVG to OM, SVG to RPL  -bnp negative  -post operative afib  -HLD  -GERD  -Hx of bowel perforation a years ago surgically repaired    Plan   -holter negative.  Wants to hold on anticoagulation.  Aspirin 81mg.  Encouraged smart watch use.  -statin  -asymptomatic pauses.   Hold on BB  -ARB  -blood work      6 month follow up  Gil Gerber MD

## 2025-03-28 LAB
ALBUMIN SERPL-MCNC: 4.2 G/DL (ref 3.5–5.2)
ALBUMIN/GLOB SERPL: 1.5 G/DL
ALP SERPL-CCNC: 75 U/L (ref 39–117)
ALT SERPL W P-5'-P-CCNC: 14 U/L (ref 1–41)
ANION GAP SERPL CALCULATED.3IONS-SCNC: 15.8 MMOL/L (ref 5–15)
AST SERPL-CCNC: 21 U/L (ref 1–40)
BILIRUB SERPL-MCNC: 0.3 MG/DL (ref 0–1.2)
BUN SERPL-MCNC: 21 MG/DL (ref 8–23)
BUN/CREAT SERPL: 23.3 (ref 7–25)
CALCIUM SPEC-SCNC: 9.4 MG/DL (ref 8.6–10.5)
CHLORIDE SERPL-SCNC: 105 MMOL/L (ref 98–107)
CHOLEST SERPL-MCNC: 105 MG/DL (ref 0–200)
CO2 SERPL-SCNC: 20.2 MMOL/L (ref 22–29)
CREAT SERPL-MCNC: 0.9 MG/DL (ref 0.76–1.27)
CRP SERPL-MCNC: 0.29 MG/DL (ref 0.01–0.5)
EGFRCR SERPLBLD CKD-EPI 2021: 90.2 ML/MIN/1.73
GLOBULIN UR ELPH-MCNC: 2.8 GM/DL
GLUCOSE SERPL-MCNC: 92 MG/DL (ref 65–99)
HBA1C MFR BLD: 5.4 % (ref 4.8–5.6)
HDLC SERPL-MCNC: 44 MG/DL (ref 40–60)
LDLC SERPL CALC-MCNC: 47 MG/DL (ref 0–100)
LDLC/HDLC SERPL: 1.08 {RATIO}
POTASSIUM SERPL-SCNC: 4.3 MMOL/L (ref 3.5–5.2)
PROT SERPL-MCNC: 7 G/DL (ref 6–8.5)
PSA SERPL-MCNC: 3.3 NG/ML (ref 0–4)
SODIUM SERPL-SCNC: 141 MMOL/L (ref 136–145)
TRIGL SERPL-MCNC: 67 MG/DL (ref 0–150)
VLDLC SERPL-MCNC: 14 MG/DL (ref 5–40)

## 2025-07-10 NOTE — ANESTHESIA PREPROCEDURE EVALUATION
" Anesthesia Evaluation     Patient summary reviewed and Nursing notes reviewed   no history of anesthetic complications:  NPO Solid Status: Waived due to emergency  NPO Liquid Status: Waived due to emergency           Airway   Mallampati: III  TM distance: <3 FB  Neck ROM: full  Possible difficult intubation  Dental - normal exam     Pulmonary - negative pulmonary ROS and normal exam   Cardiovascular     ECG reviewed  Rhythm: regular  Rate: abnormal    (+) hypertension,   (-) angina, [place]      Neuro/Psych- negative ROS  GI/Hepatic/Renal/Endo    (+)   diabetes mellitus (\"prediabetes\") type 2 well controlled, thyroid problem hypothyroidism  (-) GERD, liver disease, no renal disease    ROS Comment: Free air/ diverticulitis    Musculoskeletal     Abdominal    Substance History      OB/GYN          Other - negative ROS                     Anesthesia Plan    ASA 3 - emergent     general with block     intravenous induction     Anesthetic plan, all risks, benefits, and alternatives have been provided, discussed and informed consent has been obtained with: patient.      " Statement Selected

## (undated) DEVICE — FLTR RESERV PERFUS INTERSEPT 02 STRL

## (undated) DEVICE — SUT ETHLN 2/0 PS 18IN 585H

## (undated) DEVICE — TR BAND RADIAL ARTERY COMPRESSION DEVICE: Brand: TR BAND

## (undated) DEVICE — SUT SILK 3/0 TIES 18IN A184H

## (undated) DEVICE — MODEL BT2000 P/N 700287-012KIT CONTENTS: MANIFOLD WITH SALINE AND CONTRAST PORTS, SALINE TUBING WITH SPIKE AND HAND SYRINGE, TRANSDUCER: Brand: BT2000 AUTOMATED MANIFOLD KIT

## (undated) DEVICE — LEX BASIC NO DRAPE: Brand: MEDLINE INDUSTRIES, INC.

## (undated) DEVICE — JACKSON-PRATT 100CC BULB RESERVOIR: Brand: CARDINAL HEALTH

## (undated) DEVICE — AVID DUAL STAGE VENOUS DRAINAGE CANNULA: Brand: AVID DUAL STAGE VENOUS DRAINAGE CANNULA

## (undated) DEVICE — TOWEL,OR,DSP,ST,BLUE,STD,4/PK,20PK/CS: Brand: MEDLINE

## (undated) DEVICE — PK CATH CARD 10

## (undated) DEVICE — PK ATS CUST W CARDIOTOMY RESEVOIR

## (undated) DEVICE — TOTAL TRAY, 16FR 10ML SIL FOLEY, URN: Brand: MEDLINE

## (undated) DEVICE — AVANTI + 4F STD W/GW: Brand: AVANTI

## (undated) DEVICE — MODEL AT P65, P/N 701554-001KIT CONTENTS: HAND CONTROLLER, 3-WAY HIGH-PRESSURE STOPCOCK WITH ROTATING END AND PREMIUM HIGH-PRESSURE TUBING: Brand: ANGIOTOUCH® KIT

## (undated) DEVICE — PATIENT RETURN ELECTRODE, SINGLE-USE, CONTACT QUALITY MONITORING, ADULT, WITH 9FT CORD, FOR PATIENTS WEIGING OVER 33LBS. (15KG): Brand: MEGADYNE

## (undated) DEVICE — GLV SURG BIOGEL LTX PF 8

## (undated) DEVICE — SUT SILK 0/0 CT2 18IN C027D

## (undated) DEVICE — SPNG GZ WOVN 4X4IN 12PLY 10/BX STRL

## (undated) DEVICE — DRSNG WND BORDR/ADHS NONADHR/GZ LF 4X10IN STRL

## (undated) DEVICE — ENSEAL 20 CM SHAFT, LARGE JAW: Brand: ENSEAL X1

## (undated) DEVICE — TB SXN DLP RIGD MACROSUCKER 6F 3IN

## (undated) DEVICE — SUT PDS 1 CTX 36IN VIO PDP371T

## (undated) DEVICE — CVR PROB ULTRASND/TRANSD W/GEL LNG 18X250CM STRL

## (undated) DEVICE — CANN VESL DLP 1WY BLNT/TP 3MM

## (undated) DEVICE — CATHETER,URETHRAL,REDRUBBER,STERILE,22FR: Brand: MEDLINE

## (undated) DEVICE — ELECTRD BLD EZ CLN STD 2.5IN

## (undated) DEVICE — GOWN,PREVENTION PLUS,XXLARGE,STERILE: Brand: MEDLINE

## (undated) DEVICE — SUT PROLN 4/0 RB1 D/A 36IN 8557H

## (undated) DEVICE — 3M™ IOBAN™ 2 ANTIMICROBIAL INCISE DRAPE 6650EZ: Brand: IOBAN™ 2

## (undated) DEVICE — ANTIBACTERIAL UNDYED BRAIDED (POLYGLACTIN 910), SYNTHETIC ABSORBABLE SUTURE: Brand: COATED VICRYL

## (undated) DEVICE — STERILE PVP: Brand: MEDLINE INDUSTRIES, INC.

## (undated) DEVICE — ADULT, W/LG. BACK PAD, RADIOTRANSPARENT ELEMENT AND LEAD WIRE COMPATIBLE W/: Brand: DEFIBRILLATION ELECTRODES

## (undated) DEVICE — Device

## (undated) DEVICE — POOLE SUCTION INSTRUMENT WITH REMOVABLE SHEATH: Brand: POOLE

## (undated) DEVICE — ADAPT/Y PERFUS DLP FML/LUER COLR/CODE/CLMP 8.9AND25.4CM

## (undated) DEVICE — EZ GLIDE AORTIC CANNULA: Brand: EDWARDS LIFESCIENCES EZ GLIDE AORTIC CANNULA

## (undated) DEVICE — PROXIMATE RH ROTATING HEAD SKIN STAPLERS (35 WIDE) CONTAINS 35 STAINLESS STEEL STAPLES: Brand: PROXIMATE

## (undated) DEVICE — CLTH CLENS READYCLEANSE PERI CARE PK/5

## (undated) DEVICE — 3M™ MEDIPORE™ H SOFT CLOTH SURGICAL TAPE, 2863, 3 IN X 10 YD, 12/CASE: Brand: 3M™ MEDIPORE™

## (undated) DEVICE — SUT PROLN 2/0 V7 36IN 8977H

## (undated) DEVICE — SUT PDS LP 1 TP1 96IN VIO PDP880GA

## (undated) DEVICE — GLV SURG SENSICARE PI MIC PF SZ8 LF STRL

## (undated) DEVICE — PAD,ARMBOARD,CONV,FOAM,2X8X20",12PR/CS: Brand: MEDLINE

## (undated) DEVICE — SUT PDS 1 CT1 18IN Z741D

## (undated) DEVICE — OASIS DRAIN, SINGLE, INLINE & ATS COMPATIBLE: Brand: OASIS

## (undated) DEVICE — JP PERF DRN SIL FLT 10MM FULL: Brand: CARDINAL HEALTH

## (undated) DEVICE — PK PERFUS CUST W/CARDIOPLEGIA

## (undated) DEVICE — CATH DIAG IMPULSE FL3.5 5F 100CM

## (undated) DEVICE — DBD-DRAPE,LAP,CHOLE,W/TROUGHS,STERILE: Brand: MEDLINE

## (undated) DEVICE — TBG SXN RIGD MINI/SUCKER 9F 4.75IN

## (undated) DEVICE — CONNECT Y INTERSEPT W/LL 3/8 X 3/8 X 3/8IN

## (undated) DEVICE — SUT SILK 4/0 TIES 18IN A183H

## (undated) DEVICE — 12 FOOT DISPOSABLE EXTENSION CABLE WITH SAFE CONNECT / SCREW-DOWN

## (undated) DEVICE — SYS VASOVIEW HEMOPRO ENDOSCOPIC HARVST VESL

## (undated) DEVICE — CATH DIAG EXPO M/ PK 5F FL4/FR4 PIG

## (undated) DEVICE — 1-PIECE DRAINABLE OSTOMY POUCH, FLEXTEND: Brand: PREMIER

## (undated) DEVICE — TRAP FLD MINIVAC MEGADYNE 100ML

## (undated) DEVICE — ST EXT IV SMRTSTE 2VLV FIX M LL 6ML 41

## (undated) DEVICE — Device: Brand: PERFECTCUT ROTATING AORTIC PUNCH

## (undated) DEVICE — GLV SURG BIOGEL LTX PF 7 1/2

## (undated) DEVICE — SUT MNCRYL PLS ANTIB UD 3/0 PS2 27IN

## (undated) DEVICE — TUBING, SUCTION, 1/4" X 10', STRAIGHT: Brand: MEDLINE

## (undated) DEVICE — SUT SILK 3/0 SH CR8 18IN C013D

## (undated) DEVICE — LEGGINGS, PAIR, 29X43, STERILE: Brand: MEDLINE

## (undated) DEVICE — SAFESECURE,SECUREMENT,FOLEY CATH,STERILE: Brand: MEDLINE

## (undated) DEVICE — DRSNG WND BORDR/ADHS NONADHR/GZ LF 4X4IN STRL

## (undated) DEVICE — SUT PROLN 2/0 PC3 8833H

## (undated) DEVICE — SPNG LAP PREWSH SFTPK 18X18IN STRL PK/5

## (undated) DEVICE — SUCTION CANISTER 2500CC: Brand: DEROYAL

## (undated) DEVICE — SUT PROLN 4/0 SH D/A 36IN 8521H

## (undated) DEVICE — GW PERIPH GUIDERIGHT STD/EXCHNG/J/TIP SS 0.035IN 5X260CM

## (undated) DEVICE — SENSR CERBRL O2 PK/2

## (undated) DEVICE — GLV SURG SENSICARE PI MIC PF SZ8.5 LF STRL

## (undated) DEVICE — TBG PENCL TELESCP MEGADYNE SMOKE EVAC 10FT

## (undated) DEVICE — SUT SILK 2/0 TIES 18IN A185H

## (undated) DEVICE — LEX GENERAL ABDOMINAL SPLIT: Brand: MEDLINE INDUSTRIES, INC.

## (undated) DEVICE — SUT PROLN 7/0 CV BV1 24IN 8304H BX/36

## (undated) DEVICE — GLIDESHEATH SLENDER STAINLESS STEEL KIT: Brand: GLIDESHEATH SLENDER

## (undated) DEVICE — SUT SILK 2 SUTUPAK TIE 60IN SA8H 2STRAND

## (undated) DEVICE — SUT PROLN 3/0 SH D/A 36IN 8522H

## (undated) DEVICE — SUT PROLN 6/0 C1 D/A 30IN 8706H

## (undated) DEVICE — PENCL ROCKRSWCH MEGADYNE W/HOLSTR 10FT SS

## (undated) DEVICE — HLDR CATH FOL STATLOCK SWVL TRICOT

## (undated) DEVICE — DRAPE,UNDERBUTTOCKS,PCH,STERILE: Brand: MEDLINE

## (undated) DEVICE — CANN AORT ROOT DLP VNT 14G 7F

## (undated) DEVICE — SILICONE CLAMP COVERS, STERILE, BLUE, 0.29" (7.40MM) X 5", 2/PKG: Brand: KEY SURGICAL SILICONE CLAMP COVERS

## (undated) DEVICE — PK HEART OPN 10

## (undated) DEVICE — 450 ML BOTTLE OF 0.05% CHLORHEXIDINE GLUCONATE IN 99.95% STERILE WATER FOR IRRIGATION, USP AND APPLICATOR.: Brand: IRRISEPT ANTIMICROBIAL WOUND LAVAGE

## (undated) DEVICE — NDL PERC 1PRT THNWALL W/BASEPLT 18G 7CM

## (undated) DEVICE — BLD SCLPL BEAVR MINI STR 2BVL 180D LF

## (undated) DEVICE — SUT PROLN 2/0 KS 30IN 8623H

## (undated) DEVICE — PREMIUM DRY TRAY LF: Brand: MEDLINE INDUSTRIES, INC.

## (undated) DEVICE — LEVEL SENSORS PADS ARE USED TO ATTACH THE LEVEL SENSORS TO A HARD SHELL RESERVOIR. INCLUDES COUPLING GEL.: Brand: TERUMO® ADVANCED PERFUSION SYSTEM 1

## (undated) DEVICE — CVR PROB ULTRASND/TRANSD W/GEL 7X11IN STRL